# Patient Record
Sex: MALE | Employment: OTHER | URBAN - NONMETROPOLITAN AREA
[De-identification: names, ages, dates, MRNs, and addresses within clinical notes are randomized per-mention and may not be internally consistent; named-entity substitution may affect disease eponyms.]

---

## 2017-01-07 DIAGNOSIS — M1A.09X0 IDIOPATHIC CHRONIC GOUT OF MULTIPLE SITES WITHOUT TOPHUS: Primary | ICD-10-CM

## 2017-01-09 RX ORDER — ALLOPURINOL 300 MG/1
TABLET ORAL
Qty: 90 TABLET | Refills: 1 | Status: SHIPPED | OUTPATIENT
Start: 2017-01-09 | End: 2017-07-01

## 2017-01-17 ENCOUNTER — OFFICE VISIT (OUTPATIENT)
Dept: FAMILY MEDICINE | Facility: OTHER | Age: 77
End: 2017-01-17

## 2017-01-17 VITALS
SYSTOLIC BLOOD PRESSURE: 124 MMHG | OXYGEN SATURATION: 96 % | HEART RATE: 68 BPM | RESPIRATION RATE: 18 BRPM | TEMPERATURE: 97.1 F | DIASTOLIC BLOOD PRESSURE: 68 MMHG | WEIGHT: 225.7 LBS | BODY MASS INDEX: 35.43 KG/M2 | HEIGHT: 67 IN

## 2017-01-17 DIAGNOSIS — Z02.89 HEALTH EXAMINATION OF DEFINED SUBPOPULATION: Primary | ICD-10-CM

## 2017-01-17 PROCEDURE — 99499 UNLISTED E&M SERVICE: CPT | Performed by: FAMILY MEDICINE

## 2017-01-17 ASSESSMENT — PAIN SCALES - GENERAL: PAINLEVEL: NO PAIN (0)

## 2017-01-17 NOTE — NURSING NOTE
"Chief Complaint   Patient presents with     Forms     [employer] D[employer] Screening        Initial /68 mmHg  Pulse 68  Temp(Src) 97.1  F (36.2  C) (Temporal)  Resp 18  Ht 5' 7\" (1.702 m)  Wt 225 lb 11.2 oz (102.377 kg)  BMI 35.34 kg/m2  SpO2 96% Estimated body mass index is 35.34 kg/(m^2) as calculated from the following:    Height as of this encounter: 5' 7\" (1.702 m).    Weight as of this encounter: 225 lb 11.2 oz (102.377 kg).  BP completed using cuff size: large    "

## 2017-01-17 NOTE — PROGRESS NOTES
"SUBJECTIVE:                                                    Uli Champagne is a 76 year old male who presents to clinic today for the following health issues:    Chief Complaint   Patient presents with     Forms     [employer] Drivers Screening          Problem list and histories reviewed & adjusted, as indicated.    C: NEGATIVE for fever, chills, change in weight  E/M: NEGATIVE for ear, mouth and throat problems  R: NEGATIVE for significant cough or SOB  CV: NEGATIVE for chest pain, palpitations or peripheral edema    OBJECTIVE:                                                    /68 mmHg  Pulse 68  Temp(Src) 97.1  F (36.2  C) (Temporal)  Resp 18  Ht 5' 7\" (1.702 m)  Wt 225 lb 11.2 oz (102.377 kg)  BMI 35.34 kg/m2  SpO2 96%  Body mass index is 35.34 kg/(m^2).    See dictated note     ASSESSMENT/PLAN:                                                    [employer]  Driving form completed    Jakub Chester MD, MD  [facility]          "

## 2017-01-17 NOTE — MR AVS SNAPSHOT
After Visit Summary   1/17/2017    Uli Champagne    MRN: [id number]           Patient Information     Date Of Birth          1940        Visit Information        Provider Department      1/17/2017 2:00 PM Jakub Chester MD [facility]         Follow-ups after your visit        Your next 10 appointments already scheduled     Jan 20, 2017  1:00 PM   Anticoagulation Visit with [facility]   [facility]    [address] [facility] [id number] [phone]              Who to contact     If you have questions or need follow up information about today's clinic visit or your schedule please contact [facility] directly at [phone].  Normal or non-critical lab and imaging results will be communicated to you by [employer], letter or phone within 4 business days after the clinic has received the results. If you do not hear from us within 7 days, please contact the clinic through [employer] or phone. If you have a critical or abnormal lab result, we will notify you by phone as soon as possible.  Submit refill requests through [employer] or call your pharmacy and they will forward the refill request to us. Please allow 3 business days for your refill to be completed.          Additional Information About Your Visit        MyChart Information     [employer] gives you secure access to your electronic health record. If you see a primary care provider, you can also send messages to your care team and make appointments. If you have questions, please call your primary care clinic.  If you do not have a primary care provider, please call [phone] and they will assist you.        Care EveryWhere ID     This is your Care EveryWhere ID. This could be used by other organizations to access your [facility] medical records  [id number]        Your Vitals Were     Pulse Temperature Respirations Height BMI (Body Mass Index) Pulse Oximetry    68 97.1  " F (36.2  C) (Temporal) 18 5' 7\" (1.702 m) 35.34 kg/m2 96%       Blood Pressure from Last 3 Encounters:   01/17/17 124/68   10/03/16 112/68   07/05/16 112/64    Weight from Last 3 Encounters:   01/17/17 225 lb 11.2 oz (102.377 kg)   10/03/16 225 lb 12.8 oz (102.422 kg)   07/20/16 223 lb (101.152 kg)              Today, you had the following     No orders found for display       Primary Care Provider Office Phone # Fax #    Jakub Chester [phone]       [facility] [address] [facility] [address]        Thank you!     Thank you for choosing [facility]  for your care. Our goal is always to provide you with excellent care. Hearing back from our patients is one way we can continue to improve our services. Please take a few minutes to complete the written survey that you may receive in the mail after your visit with us. Thank you!             Your Updated Medication List - Protect others around you: Learn how to safely use, store and throw away your medicines at www.disposemymeds.org.          This list is accurate as of: 1/17/17  2:59 PM.  Always use your most recent med list.                   Brand Name Dispense Instructions for use    allopurinol 300 MG tablet    ZYLOPRIM    90 tablet    TAKE 1 TABLET DAILY FOR GOUT       aspirin 81 MG tablet      1 TABLET DAILY       Benazepril-Hydrochlorothiazide 20-25 MG Tabs     180 tablet    TAKE 1 TABLET TWICE A DAY FOR HYPERTENSION       fish oil-omega-3 fatty acids 1000 MG capsule      Take 1 g by mouth daily.       nitroglycerin 0.4 MG sublingual tablet    NITROSTAT    25 tablet    Place 1 tablet (0.4 mg) under the tongue See Admin Instructions for chest pain       simvastatin 40 MG tablet    ZOCOR    90 tablet    Take 1 tablet (40 mg) by mouth At Bedtime       warfarin 5 MG tablet    COUMADIN    90 tablet    Take 1 tablet (5 mg) by mouth See Admin Instructions Take one half tablet on Tuesday & Saturday and one " tablet all other days of the week or as directed by the coumadin clinic

## 2017-01-20 ENCOUNTER — ANTICOAGULATION THERAPY VISIT (OUTPATIENT)
Dept: ANTICOAGULATION | Facility: OTHER | Age: 77
End: 2017-01-20
Payer: COMMERCIAL

## 2017-01-20 DIAGNOSIS — I48.20 CHRONIC A-FIB (H): ICD-10-CM

## 2017-01-20 DIAGNOSIS — Z79.01 LONG-TERM (CURRENT) USE OF ANTICOAGULANTS: Primary | ICD-10-CM

## 2017-01-20 LAB — INR POINT OF CARE: 2.3 (ref 0.86–1.14)

## 2017-01-20 PROCEDURE — 99207 ZZC NO CHARGE NURSE ONLY: CPT

## 2017-01-20 PROCEDURE — 85610 PROTHROMBIN TIME: CPT | Mod: QW

## 2017-01-20 PROCEDURE — 36416 COLLJ CAPILLARY BLOOD SPEC: CPT

## 2017-01-20 NOTE — PROGRESS NOTES
ANTICOAGULATION FOLLOW-UP CLINIC VISIT    Patient Name:  Uli Champagne  Date:  1/20/2017  Contact Type:  Face to Face    SUBJECTIVE:     Patient Findings     Positives No Problem Findings           OBJECTIVE    INR PROTIME   Date Value Ref Range Status   01/20/2017 2.3* 0.86 - 1.14 Final       ASSESSMENT / PLAN  INR assessment THER    Recheck INR In: 6 WEEKS    INR Location Clinic      Anticoagulation Summary as of 1/20/2017     INR goal 2.0-3.0   Selected INR 2.3 (1/20/2017)   Maintenance plan 2.5 mg (5 mg x 0.5) on Tue, Sat; 5 mg (5 mg x 1) all other days   Full instructions 2.5 mg on Tue, Sat; 5 mg all other days   Weekly total 30 mg   No change documented Rui Landers RN   Plan last modified Rui Landers RN (3/31/2016)   Next INR check 3/3/2017   Target end date     Indications   Long-term (current) use of anticoagulants [Z79.01] [Z79.01]  Chronic a-fib (H) [I48.2]         Anticoagulation Episode Summary     INR check location     Preferred lab     Send INR reminders to [facility]    Comments       Anticoagulation Care Providers     Provider Role Specialty Phone number    Jakub Chester MD Responsible Family Practice [phone]            See the Encounter Report to view Anticoagulation Flowsheet and Dosing Calendar (Go to Encounters tab in chart review, and find the Anticoagulation Therapy Visit)    Dosage adjustment made based on physician directed care plan.    Rui Landers RN

## 2017-03-03 ENCOUNTER — ANTICOAGULATION THERAPY VISIT (OUTPATIENT)
Dept: ANTICOAGULATION | Facility: OTHER | Age: 77
End: 2017-03-03
Payer: COMMERCIAL

## 2017-03-03 DIAGNOSIS — Z79.01 LONG-TERM (CURRENT) USE OF ANTICOAGULANTS: ICD-10-CM

## 2017-03-03 DIAGNOSIS — I48.20 CHRONIC A-FIB (H): ICD-10-CM

## 2017-03-03 LAB — INR POINT OF CARE: 2.5 (ref 0.86–1.14)

## 2017-03-03 PROCEDURE — 85610 PROTHROMBIN TIME: CPT | Mod: QW

## 2017-03-03 PROCEDURE — 99207 ZZC NO CHARGE NURSE ONLY: CPT

## 2017-03-03 PROCEDURE — 36416 COLLJ CAPILLARY BLOOD SPEC: CPT

## 2017-03-03 NOTE — PROGRESS NOTES
ANTICOAGULATION FOLLOW-UP CLINIC VISIT    Patient Name:  Uli Champagne  Date:  3/3/2017  Contact Type:  Face to Face    SUBJECTIVE:     Patient Findings     Positives No Problem Findings           OBJECTIVE    INR Protime   Date Value Ref Range Status   03/03/2017 2.5 (A) 0.86 - 1.14 Final       ASSESSMENT / PLAN  INR assessment THER    Recheck INR In: 6 WEEKS    INR Location Clinic      Anticoagulation Summary as of 3/3/2017     INR goal 2.0-3.0   Today's INR 2.5   Maintenance plan 2.5 mg (5 mg x 0.5) on Tue, Sat; 5 mg (5 mg x 1) all other days   Full instructions 2.5 mg on Tue, Sat; 5 mg all other days   Weekly total 30 mg   No change documented Rui Landers RN   Plan last modified Rui Landers RN (3/31/2016)   Next INR check 4/14/2017   Target end date     Indications   Long-term (current) use of anticoagulants [Z79.01] [Z79.01]  Chronic a-fib (H) [I48.2]         Anticoagulation Episode Summary     INR check location     Preferred lab     Send INR reminders to [facility]    Comments       Anticoagulation Care Providers     Provider Role Specialty Phone number    Jakub Chester MD [facility] [phone]            See the Encounter Report to view Anticoagulation Flowsheet and Dosing Calendar (Go to Encounters tab in chart review, and find the Anticoagulation Therapy Visit)    Dosage adjustment made based on physician directed care plan.    Rui Landers RN

## 2017-03-03 NOTE — MR AVS SNAPSHOT
Uli SINGH Ihsan   3/3/2017 1:00 PM   Anticoagulation Therapy Visit    Description:  76 year old male   Provider:  JITENDRA ANTI COAG   Department:  [facility]           INR as of 3/3/2017     Today's INR 2.5      Anticoagulation Summary as of 3/3/2017     INR goal 2.0-3.0   Today's INR 2.5   Full instructions 2.5 mg on Tue, Sat; 5 mg all other days   Next INR check 4/14/2017    Indications   Long-term (current) use of anticoagulants [Z79.01] [Z79.01]  Chronic a-fib (H) [I48.2]         Your next Anticoagulation Clinic appointment(s)     Apr 14, 2017  1:00 PM CDT   Anticoagulation Visit with JITENDRA ANTI ROCKY   [facility] ([facility])    [address] [facility] [id number] [phone]              Contact Numbers     Clinic Number:         March 2017 Details    Sun Mon Tue Wed Thu Fri Sat        1               2               3      5 mg   See details      4      2.5 mg           5      5 mg         6      5 mg         7      2.5 mg         8      5 mg         9      5 mg         10      5 mg         11      2.5 mg           12      5 mg         13      5 mg         14      2.5 mg         15      5 mg         16      5 mg         17      5 mg         18      2.5 mg           19      5 mg         20      5 mg         21      2.5 mg         22      5 mg         23      5 mg         24      5 mg         25      2.5 mg           26      5 mg         27      5 mg         28      2.5 mg         29      5 mg         30      5 mg         31      5 mg           Date Details   03/03 This INR check               How to take your warfarin dose     To take:  2.5 mg Take 0.5 of a 5 mg tablet.    To take:  5 mg Take 1 of the 5 mg tablets.           April 2017 Details    Sun Mon Tue Wed Thu Fri Sat           1      2.5 mg           2      5 mg         3      5 mg         4      2.5 mg         5      5 mg         6      5 mg         7      5 mg         8      2.5 mg           9      5 mg         10       5 mg         11      2.5 mg         12      5 mg         13      5 mg         14            15                 16               17               18               19               20               21               22                 23               24               25               26               27               28               29                 30                      Date Details   No additional details    Date of next INR:  4/14/2017         How to take your warfarin dose     To take:  2.5 mg Take 0.5 of a 5 mg tablet.    To take:  5 mg Take 1 of the 5 mg tablets.

## 2017-03-25 DIAGNOSIS — I48.21 PERMANENT ATRIAL FIBRILLATION (H): Chronic | ICD-10-CM

## 2017-03-25 DIAGNOSIS — I10 BENIGN ESSENTIAL HYPERTENSION: ICD-10-CM

## 2017-03-27 RX ORDER — WARFARIN SODIUM 5 MG/1
TABLET ORAL
Qty: 90 TABLET | Refills: 0 | Status: SHIPPED | OUTPATIENT
Start: 2017-03-27 | End: 2017-07-01

## 2017-03-27 RX ORDER — BENAZEPRIL/HYDROCHLOROTHIAZIDE 20 MG-25MG
TABLET ORAL
Qty: 180 TABLET | Refills: 0 | Status: SHIPPED | OUTPATIENT
Start: 2017-03-27 | End: 2017-07-01

## 2017-03-27 NOTE — TELEPHONE ENCOUNTER
Recent Labs   Lab Test 05/10/16 05/20/15  12/16/14   0921   11/12/13   0758   CHOL  121  108*  108*  122   < >  124   HDL  43  35  35  48   < >  35*   LDL  57.0  49.0  49  50   < >  61   TRIG  104  120  120  119   < >  137   CHOLHDLRATIO   --    --   2.5   --   3.0    < > = values in this interval not displayed.     Lab Results   Component Value Date    AST 33 05/10/2016     Lab Results   Component Value Date    ALT 28 05/10/2016     No results found for: A1C  Creatinine   Date Value Ref Range Status   05/10/2016 1.0 mg/dL Final   ]  Potassium   Date Value Ref Range Status   05/10/2016 3.9 mmol/L Final   ]  TSH   Date Value Ref Range Status   05/20/2015 2.50 mcU/mL Final   01/31/2007 2.45 0.4 - 5.0 mU/L Final   11/13/2002 2.58 0.4 - 5.5 mcU/mL    09/14/2001 1.93 0.4 - 5.5 mcU/mL      BP Readings from Last 4 Encounters:   01/17/17 124/68   10/03/16 112/68   07/05/16 112/64   02/01/16 124/70       Last PHQ-9 score on record= No Value exists for the : HP#[id number]

## 2017-04-14 ENCOUNTER — ANTICOAGULATION THERAPY VISIT (OUTPATIENT)
Dept: ANTICOAGULATION | Facility: OTHER | Age: 77
End: 2017-04-14
Payer: COMMERCIAL

## 2017-04-14 DIAGNOSIS — Z79.01 LONG-TERM (CURRENT) USE OF ANTICOAGULANTS: ICD-10-CM

## 2017-04-14 DIAGNOSIS — I48.20 CHRONIC A-FIB (H): ICD-10-CM

## 2017-04-14 LAB — INR POINT OF CARE: 2.1 (ref 0.86–1.14)

## 2017-04-14 PROCEDURE — 36416 COLLJ CAPILLARY BLOOD SPEC: CPT

## 2017-04-14 PROCEDURE — 99207 ZZC NO CHARGE NURSE ONLY: CPT

## 2017-04-14 PROCEDURE — 85610 PROTHROMBIN TIME: CPT | Mod: QW

## 2017-04-14 NOTE — MR AVS SNAPSHOT
Uli SINGH Ihsan   4/14/2017 1:00 PM   Anticoagulation Therapy Visit    Description:  76 year old male   Provider:  JITENDRA ANTI COAG   Department:  [facility]           INR as of 4/14/2017     Today's INR 2.1      Anticoagulation Summary as of 4/14/2017     INR goal 2.0-3.0   Today's INR 2.1   Full instructions 2.5 mg on Tue, Sat; 5 mg all other days   Next INR check 5/25/2017    Indications   Long-term (current) use of anticoagulants [Z79.01] [Z79.01]  Chronic a-fib (H) [I48.2]         Your next Anticoagulation Clinic appointment(s)     May 25, 2017  1:15 PM CDT   Anticoagulation Visit with J[facility]ENDRA ANTI ROCKY   [facility]    [address] [facility] [id number] [phone]              Contact Numbers     Clinic Number:         April 2017 Details    Sun Mon Tue Wed Thu Fri Sat           1                 2               3               4               5               6               7               8                 9               10               11               12               13               14      5 mg   See details      15      2.5 mg           16      5 mg         17      5 mg         18      2.5 mg         19      5 mg         20      5 mg         21      5 mg         22      2.5 mg           23      5 mg         24      5 mg         25      2.5 mg         26      5 mg         27      5 mg         28      5 mg         29      2.5 mg           30      5 mg                Date Details   04/14 This INR check               How to take your warfarin dose     To take:  2.5 mg Take 0.5 of a 5 mg tablet.    To take:  5 mg Take 1 of the 5 mg tablets.           May 2017 Details    Sun Mon Tue Wed Thu Fri Sat      1      5 mg         2      2.5 mg         3      5 mg         4      5 mg         5      5 mg         6      2.5 mg           7      5 mg         8      5 mg         9      2.5 mg         10      5 mg         11      5 mg         12      5 mg         13      2.5  mg           14      5 mg         15      5 mg         16      2.5 mg         17      5 mg         18      5 mg         19      5 mg         20      2.5 mg           21      5 mg         22      5 mg         23      2.5 mg         24      5 mg         25            26               27                 28               29               30               31                   Date Details   No additional details    Date of next INR:  5/25/2017         How to take your warfarin dose     To take:  2.5 mg Take 0.5 of a 5 mg tablet.    To take:  5 mg Take 1 of the 5 mg tablets.

## 2017-04-14 NOTE — PROGRESS NOTES
ANTICOAGULATION FOLLOW-UP CLINIC VISIT    Patient Name:  Uli Champagne  Date:  4/14/2017  Contact Type:  Face to Face    SUBJECTIVE:     Patient Findings     Positives No Problem Findings           OBJECTIVE    INR Protime   Date Value Ref Range Status   04/14/2017 2.1 (A) 0.86 - 1.14 Final       ASSESSMENT / PLAN  INR assessment THER    Recheck INR In: 6 WEEKS    INR Location Clinic      Anticoagulation Summary as of 4/14/2017     INR goal 2.0-3.0   Today's INR 2.1   Maintenance plan 2.5 mg (5 mg x 0.5) on Tue, Sat; 5 mg (5 mg x 1) all other days   Full instructions 2.5 mg on Tue, Sat; 5 mg all other days   Weekly total 30 mg   No change documented Rui Landers RN   Plan last modified Rui Landers RN (3/31/2016)   Next INR check 5/25/2017   Target end date     Indications   Long-term (current) use of anticoagulants [Z79.01] [Z79.01]  Chronic a-fib (H) [I48.2]         Anticoagulation Episode Summary     INR check location     Preferred lab     Send INR reminders to [facility]    Comments       Anticoagulation Care Providers     Provider Role Specialty Phone number    Jakub Chester MD [facility] [phone]            See the Encounter Report to view Anticoagulation Flowsheet and Dosing Calendar (Go to Encounters tab in chart review, and find the Anticoagulation Therapy Visit)    Dosage adjustment made based on physician directed care plan.    Rui Landers RN

## 2017-04-20 ENCOUNTER — HOSPITAL ENCOUNTER (OUTPATIENT)
Dept: CARDIOLOGY | Facility: CLINIC | Age: 77
Discharge: HOME | End: 2017-04-20
Attending: INTERNAL MEDICINE | Admitting: INTERNAL MEDICINE
Payer: COMMERCIAL

## 2017-04-20 ENCOUNTER — OFFICE VISIT (OUTPATIENT)
Dept: CARDIOLOGY | Facility: CLINIC | Age: 77
End: 2017-04-20
Payer: COMMERCIAL

## 2017-04-20 VITALS
HEART RATE: 46 BPM | WEIGHT: 227.3 LBS | HEIGHT: 69 IN | DIASTOLIC BLOOD PRESSURE: 68 MMHG | SYSTOLIC BLOOD PRESSURE: 130 MMHG | BODY MASS INDEX: 33.67 KG/M2 | OXYGEN SATURATION: 96 %

## 2017-04-20 DIAGNOSIS — R00.1 BRADYCARDIA: Primary | ICD-10-CM

## 2017-04-20 DIAGNOSIS — I48.20 CHRONIC A-FIB (H): ICD-10-CM

## 2017-04-20 DIAGNOSIS — E78.5 HYPERLIPIDEMIA LDL GOAL <100: ICD-10-CM

## 2017-04-20 DIAGNOSIS — E66.09 NON MORBID OBESITY DUE TO EXCESS CALORIES: ICD-10-CM

## 2017-04-20 DIAGNOSIS — I10 ESSENTIAL HYPERTENSION, BENIGN: ICD-10-CM

## 2017-04-20 PROCEDURE — 93005 ELECTROCARDIOGRAM TRACING: CPT

## 2017-04-20 PROCEDURE — 99214 OFFICE O/P EST MOD 30 MIN: CPT | Mod: 25 | Performed by: INTERNAL MEDICINE

## 2017-04-20 PROCEDURE — 93000 ELECTROCARDIOGRAM COMPLETE: CPT | Performed by: INTERNAL MEDICINE

## 2017-04-20 NOTE — LETTER
4/20/2017    Jakub Chester MD  [facility]   [address] [facility] [address]    RE: Uli Champagne       Dear Colleague,    I had the pleasure of seeing Uli Champagne in the [facility].    Quick note on Uli Champagne who is 76.  He has chronic atrial fibrillation and significant AV conduction system dysfunction.  He has a slow ventricular response, but he has not had anything to suggest progressive limitations as best I can tell based on his heart rate.  He has not had dizziness or syncope.  He does not feel as if his breathing slows him down at all.  He is slowed down more by orthopedic issues and age and his being overweight.  He says he is able to carry groceries and do things that are not overly demanding without a sense of limitation.      A 24-hour Holter monitor done as a surveillance study revealed an average heart rate of 50 beats a minute, a lower rate of 30 during the hours of sleep and an upper rate of 70.  Pretty significant, but once again he seems to be tolerating it.  There were plenty of pauses greater than 2 seconds.  Typically, it has to be over 3 seconds to get the body's and the brain's attention.      Accordingly, he should not ever be on bradycardic drugs unless he gets a pacemaker and he understands that.      His current medicines as listed in Epic are allopurinol, benazepril, hydrochlorothiazide, simvastatin, warfarin, aspirin, fish oil.      REVIEW OF SYSTEMS:  As is identified in Epic was reviewed.  In general, he has vision and hearing and some hearing loss.  He is short of breath on exertion if he really pushes himself, but it has not changed.  Negative for dizziness or syncope.  No chest pain.  Upper GI:  Negative.  Lower GI:  Negative.  :  Positive for some urinary incontinence. Musculoskeletal:  Positive for chronic back pain.  He walks with a cane.  The remainder of the review as listed and I endorse as  negative.      SOCIAL HISTORY:  He lives in a house.  He drives a car.  He takes care of his yard a little bit.  He does not drink or smoke.  He has a couple of grown children.  He is retired.      PHYSICAL EXAMINATION:     GENERAL:  Revealed that he walks slowly with a cane.   VITAL SIGNS:  Blood pressure 130/70, heart rate 45-50 beats a minute.  He weighs 227 pounds, which is relatively stable for him.   HEAD:  Normal.   NECK:  Free of neck vein distention or bruit.   HEART:  Slow and irregular without gallop or murmur.   LUNGS:  Clear.   ABDOMEN:  Rotund, firm, nontender without pain or mass.   EXTREMITIES:  Free of edema.      I reviewed the issues with him with regards to his bradycardia and AV conduction and I explained how the system works.  He is a smart man and he gets it.  His blood pressure is relatively normal.  He is not symptomatic with his bradycardia.  His lipid profile on simvastatin is excellent, but I have not particularly been following that per se.     Outpatient Encounter Prescriptions as of 4/20/2017   Medication Sig Dispense Refill     warfarin (COUMADIN) 5 MG tablet TAKE ONE-HALF (1/2) TABLET ON TUESDAY AND SATURDAY AND 1 TABLET ON ALL OTHER DAYS OF THE WEEK OR AS DIRECTED BY THE COUMADIN CLINIC 90 tablet 0     Benazepril-Hydrochlorothiazide 20-25 MG TABS TAKE 1 TABLET TWICE A DAY FOR HYPERTENSION 180 tablet 0     allopurinol (ZYLOPRIM) 300 MG tablet TAKE 1 TABLET DAILY FOR GOUT 90 tablet 1     simvastatin (ZOCOR) 40 MG tablet Take 1 tablet (40 mg) by mouth At Bedtime 90 tablet 3     fish oil-omega-3 fatty acids (FISH OIL) 1000 MG capsule Take 1 g by mouth daily.       ASPIRIN 81 MG OR TABS 1 TABLET DAILY       nitroglycerin (NITROSTAT) 0.4 MG SL tablet Place 1 tablet (0.4 mg) under the tongue See Admin Instructions for chest pain 25 tablet 1     No facility-administered encounter medications on file as of 4/20/2017.       IMPRESSION:   1.  Chronic AV conduction system dysfunction.     2.   Moderate bradycardia bordering on significant, but asymptomatic so far.   3.  Obesity.   4.  Hypertension.   5.  Hyperlipidemia.      PLAN:  Continue as we are.  I asked to see him in a year with a surveillance 24-hour Holter monitor.      PROGNOSIS:  Good.  I told him before he leaves this earth he will probably get a pacemaker.  He smiled and understood.        During this consult, I took at least 35 minutes.  Over half the time was in counseling and education, 80% of the time face-to-face.  He was fully examined.  Medications and some indications and side effects were all reviewed.  Various studies were reviewed including previous nuclear stress test in 2009, EKGs, stress tests, etc.     Again, thank you for allowing me to participate in the care of your patient.      Sincerely,    JIGNA NEWBY MD     [facility]

## 2017-04-20 NOTE — PROGRESS NOTES
HPI and Plan:   See dictation        Orders Placed This Encounter   Procedures     EKG 12-LEAD CLINIC READ     No orders of the defined types were placed in this encounter.    There are no discontinued medications.      Encounter Diagnosis   Name Primary?     Bradycardia Yes       CURRENT MEDICATIONS:  Current Outpatient Prescriptions   Medication Sig Dispense Refill     warfarin (COUMADIN) 5 MG tablet TAKE ONE-HALF (1/2) TABLET ON TUESDAY AND SATURDAY AND 1 TABLET ON ALL OTHER DAYS OF THE WEEK OR AS DIRECTED BY THE COUMADIN CLINIC 90 tablet 0     Benazepril-Hydrochlorothiazide 20-25 MG TABS TAKE 1 TABLET TWICE A DAY FOR HYPERTENSION 180 tablet 0     allopurinol (ZYLOPRIM) 300 MG tablet TAKE 1 TABLET DAILY FOR GOUT 90 tablet 1     simvastatin (ZOCOR) 40 MG tablet Take 1 tablet (40 mg) by mouth At Bedtime 90 tablet 3     fish oil-omega-3 fatty acids (FISH OIL) 1000 MG capsule Take 1 g by mouth daily.       ASPIRIN 81 MG OR TABS 1 TABLET DAILY       nitroglycerin (NITROSTAT) 0.4 MG SL tablet Place 1 tablet (0.4 mg) under the tongue See Admin Instructions for chest pain (Patient not taking: Reported on 4/20/2017) 25 tablet 1       ALLERGIES     Allergies   Allergen Reactions     No Known Drug Allergies        PAST MEDICAL HISTORY:  Past Medical History:   Diagnosis Date     Atrial fibrillation (H)      Coronary atherosclerosis of unspecified type of vessel, native or graft      Hernia of unspecified site of abdominal cavity without mention of obstruction or gangrene     umbilical hernia; s/p repair 3/1998     Hordeolum externum left upper eyelid 1/21/2016     Malignant neoplasm of prostate (H) 07/14/08    Admit.Discharged 07/17/08     Other and unspecified hyperlipidemia      Postsurgical aortocoronary bypass status 1992     Respiratory complications      Unspecified essential hypertension      Unspecified tinnitus     left ear       PAST SURGICAL HISTORY:  Past Surgical History:   Procedure Laterality Date     C CABG,  VEIN, THREE  1992     C NONSPECIFIC PROCEDURE  1987    Bone fusion in neck     C REMV PROSTATE,RETROPUB,RAD,LTD NODES  7/14/2008    Radical retropubic prostatectomy and pelvic lymph node dissection.     HC REMOVAL OF TONSILS,10+ Y/O  1969    Tonsils 10+y.o.     HEMILAMINECTOMY, DISCECTOMY LUMBAR TWO LEVELS, COMBINED Left 12/16/2015    Procedure: COMBINED HEMILAMINECTOMY, DISCECTOMY LUMBAR TWO LEVELS;  Surgeon: Jung Finley MD;  Location: [facility]     STENT, CORONARY, ALEAH  2002    x5       FAMILY HISTORY:  Family History   Problem Relation Age of Onset     Arthritis Father      Hypertension Brother      CANCER Brother      liver     HEART DISEASE Father      HEART DISEASE Sister      HEART DISEASE Brother        SOCIAL HISTORY:  Social History     Social History     Marital status:      Spouse name: N/A     Number of children: N/A     Years of education: N/A     Social History Main Topics     Smoking status: Former Smoker     Packs/day: 0.50     Years: 2.00     Types: Cigarettes     Quit date: 1/1/1960     Smokeless tobacco: Never Used     Alcohol use Yes      Comment: socially     Drug use: No     Sexual activity: No     Other Topics Concern     Parent/Sibling W/ Cabg, Mi Or Angioplasty Before 65f 55m? No     Social History Narrative         Review of Systems:  Skin:  Negative       Eyes:  Positive for glasses    ENT:  Positive for tinnitus left ear, has had this long term.  Respiratory:  Positive for dyspnea on exertion;shortness of breath     Cardiovascular:  Negative for;chest pain;edema Positive for;palpitations;lightheadedness    Gastroenterology: Negative      Genitourinary:  Positive for incontinence nuerogenic bladder  Musculoskeletal:  Positive for back pain lower back pain   Neurologic:  Negative      Psychiatric:  Negative      Heme/Lymph/Imm:  Negative      Endocrine:  Negative        Physical Exam:  Vitals: /68 (BP Location: Left arm, Patient Position: Fowlers, Cuff Size: Adult Large)  " Pulse (!) 46  Ht 1.753 m (5' 9\")  Wt 103.1 kg (227 lb 4.8 oz)  SpO2 96%  BMI 33.57 kg/m2    Constitutional:  cooperative, alert and oriented, well developed, well nourished, in no acute distress overweight;obese      Skin:  warm and dry to the touch        Head:  normocephalic        Eyes:  pupils equal and round;sclera white        ENT:           Neck:  carotid pulses are full and equal bilaterally        Chest:  clear to auscultation          Cardiac: no murmurs, gallops or rubs detected;no S3 or S4 irregularly irregular rhythm;bradycardic                Abdomen:  abdomen soft;no masses;non-tender obese      Vascular: pulses full and equal                                        Extremities and Back:  no edema;no deformities, clubbing, cyanosis, erythema observed              Neurological:  affect appropriate, oriented to time, person and place;no gross motor deficits          Recent Lab Results:  LIPID RESULTS:  Lab Results   Component Value Date    CHOL 121 05/10/2016    HDL 43 05/10/2016    LDL 57.0 05/10/2016    TRIG 104 05/10/2016    CHOLHDLRATIO 2.5 12/16/2014       LIVER ENZYME RESULTS:  Lab Results   Component Value Date    AST 33 05/10/2016    ALT 28 05/10/2016       CBC RESULTS:  Lab Results   Component Value Date    WBC 7.6 11/27/2015    RBC 5.23 11/27/2015    HGB 14.7 12/29/2015    HCT 47.3 11/27/2015    MCV 90 11/27/2015    MCH 31.9 11/27/2015    MCHC 35.3 11/27/2015    RDW 13.7 11/27/2015     11/27/2015       BMP RESULTS:  Lab Results   Component Value Date     12/29/2015    POTASSIUM 3.9 05/10/2016    CHLORIDE 104 12/29/2015    CO2 31 12/29/2015    ANIONGAP 5 12/29/2015     (A) 05/10/2016    BUN 18 12/29/2015    CR 1.0 05/10/2016    GFRESTIMATED >60 05/10/2016    GFRESTBLACK >90   GFR Calc   12/29/2015    ANKIT 8.6 12/29/2015        A1C RESULTS:  No results found for: A1C    INR RESULTS:  Lab Results   Component Value Date    INR 2.1 (A) 04/14/2017    INR 2.5 " (A) 03/03/2017    INR 1.99 (H) 01/07/2016    INR 1.55 (H) 12/31/2015           CC  No referring provider defined for this encounter.

## 2017-04-20 NOTE — LETTER
4/20/2017      RE: Uli Champagne  [address] [facility] [address]       Dear Colleague,    Thank you for the opportunity to participate in the care of your patient, Uli Champagne, at the [facility]. Please see a copy of my visit note below.    HPI and Plan:   See dictation        Orders Placed This Encounter   Procedures     EKG 12-LEAD CLINIC READ     No orders of the defined types were placed in this encounter.    There are no discontinued medications.      Encounter Diagnosis   Name Primary?     Bradycardia Yes       CURRENT MEDICATIONS:  Current Outpatient Prescriptions   Medication Sig Dispense Refill     warfarin (COUMADIN) 5 MG tablet TAKE ONE-HALF (1/2) TABLET ON TUESDAY AND SATURDAY AND 1 TABLET ON ALL OTHER DAYS OF THE WEEK OR AS DIRECTED BY THE COUMADIN CLINIC 90 tablet 0     Benazepril-Hydrochlorothiazide 20-25 MG TABS TAKE 1 TABLET TWICE A DAY FOR HYPERTENSION 180 tablet 0     allopurinol (ZYLOPRIM) 300 MG tablet TAKE 1 TABLET DAILY FOR GOUT 90 tablet 1     simvastatin (ZOCOR) 40 MG tablet Take 1 tablet (40 mg) by mouth At Bedtime 90 tablet 3     fish oil-omega-3 fatty acids (FISH OIL) 1000 MG capsule Take 1 g by mouth daily.       ASPIRIN 81 MG OR TABS 1 TABLET DAILY       nitroglycerin (NITROSTAT) 0.4 MG SL tablet Place 1 tablet (0.4 mg) under the tongue See Admin Instructions for chest pain (Patient not taking: Reported on 4/20/2017) 25 tablet 1       ALLERGIES     Allergies   Allergen Reactions     No Known Drug Allergies        PAST MEDICAL HISTORY:  Past Medical History:   Diagnosis Date     Atrial fibrillation (H)      Coronary atherosclerosis of unspecified type of vessel, native or graft      Hernia of unspecified site of abdominal cavity without mention of obstruction or gangrene     umbilical hernia; s/p repair 3/1998     Hordeolum externum left upper eyelid 1/21/2016     Malignant neoplasm of prostate (H)  07/14/08    Admit.Discharged 07/17/08     Other and unspecified hyperlipidemia      Postsurgical aortocoronary bypass status 1992     Respiratory complications      Unspecified essential hypertension      Unspecified tinnitus     left ear       PAST SURGICAL HISTORY:  Past Surgical History:   Procedure Laterality Date     C CABG, VEIN, THREE  1992     C NONSPECIFIC PROCEDURE  1987    Bone fusion in neck     C REMV PROSTATE,RETROPUB,RAD,LTD NODES  7/14/2008    Radical retropubic prostatectomy and pelvic lymph node dissection.     HC REMOVAL OF TONSILS,12+ Y/O  1969    Tonsils 10+y.o.     HEMILAMINECTOMY, DISCECTOMY LUMBAR TWO LEVELS, COMBINED Left 12/16/2015    Procedure: COMBINED HEMILAMINECTOMY, DISCECTOMY LUMBAR TWO LEVELS;  Surgeon: Jung Finley MD;  Location: [facility]     STENT, CORONARY, ALEAH  2002    x5       FAMILY HISTORY:  Family History   Problem Relation Age of Onset     Arthritis Father      Hypertension Brother      CANCER Brother      liver     HEART DISEASE Father      HEART DISEASE Sister      HEART DISEASE Brother        SOCIAL HISTORY:  Social History     Social History     Marital status:      Spouse name: N/A     Number of children: N/A     Years of education: N/A     Social History Main Topics     Smoking status: Former Smoker     Packs/day: 0.50     Years: 2.00     Types: Cigarettes     Quit date: 1/1/1960     Smokeless tobacco: Never Used     Alcohol use Yes      Comment: socially     Drug use: No     Sexual activity: No     Other Topics Concern     Parent/Sibling W/ Cabg, Mi Or Angioplasty Before 65f 55m? No     Social History Narrative         Review of Systems:  Skin:  Negative       Eyes:  Positive for glasses    ENT:  Positive for tinnitus left ear, has had this long term.  Respiratory:  Positive for dyspnea on exertion;shortness of breath     Cardiovascular:  Negative for;chest pain;edema Positive for;palpitations;lightheadedness    Gastroenterology: Negative     "  Genitourinary:  Positive for incontinence nuerogenic bladder  Musculoskeletal:  Positive for back pain lower back pain   Neurologic:  Negative      Psychiatric:  Negative      Heme/Lymph/Imm:  Negative      Endocrine:  Negative        Physical Exam:  Vitals: /68 (BP Location: Left arm, Patient Position: Fowlers, Cuff Size: Adult Large)  Pulse (!) 46  Ht 1.753 m (5' 9\")  Wt 103.1 kg (227 lb 4.8 oz)  SpO2 96%  BMI 33.57 kg/m2    Constitutional:  cooperative, alert and oriented, well developed, well nourished, in no acute distress overweight;obese      Skin:  warm and dry to the touch        Head:  normocephalic        Eyes:  pupils equal and round;sclera white        ENT:           Neck:  carotid pulses are full and equal bilaterally        Chest:  clear to auscultation          Cardiac: no murmurs, gallops or rubs detected;no S3 or S4 irregularly irregular rhythm;bradycardic                Abdomen:  abdomen soft;no masses;non-tender obese      Vascular: pulses full and equal                                        Extremities and Back:  no edema;no deformities, clubbing, cyanosis, erythema observed              Neurological:  affect appropriate, oriented to time, person and place;no gross motor deficits          Recent Lab Results:  LIPID RESULTS:  Lab Results   Component Value Date    CHOL 121 05/10/2016    HDL 43 05/10/2016    LDL 57.0 05/10/2016    TRIG 104 05/10/2016    CHOLHDLRATIO 2.5 12/16/2014       LIVER ENZYME RESULTS:  Lab Results   Component Value Date    AST 33 05/10/2016    ALT 28 05/10/2016       CBC RESULTS:  Lab Results   Component Value Date    WBC 7.6 11/27/2015    RBC 5.23 11/27/2015    HGB 14.7 12/29/2015    HCT 47.3 11/27/2015    MCV 90 11/27/2015    MCH 31.9 11/27/2015    MCHC 35.3 11/27/2015    RDW 13.7 11/27/2015     11/27/2015       BMP RESULTS:  Lab Results   Component Value Date     12/29/2015    POTASSIUM 3.9 05/10/2016    CHLORIDE 104 12/29/2015    CO2 31 " 12/29/2015    ANIONGAP 5 12/29/2015     (A) 05/10/2016    BUN 18 12/29/2015    CR 1.0 05/10/2016    GFRESTIMATED >60 05/10/2016    GFRESTBLACK >90   GFR Calc   12/29/2015    ANKIT 8.6 12/29/2015        A1C RESULTS:  No results found for: A1C    INR RESULTS:  Lab Results   Component Value Date    INR 2.1 (A) 04/14/2017    INR 2.5 (A) 03/03/2017    INR 1.99 (H) 01/07/2016    INR 1.55 (H) 12/31/2015         JIGNA NEWBY MD    CC  No referring provider defined for this encounter.

## 2017-04-20 NOTE — MR AVS SNAPSHOT
After Visit Summary   4/20/2017    Uli Champagne    MRN: [id number]           Patient Information     Date Of Birth          1940        Visit Information        Provider Department      4/20/2017 3:30 PM Yovani Amanda MD [facility]        Today's Diagnoses     Bradycardia    -  1    Non morbid obesity due to excess calories        Hyperlipidemia LDL goal <100        Chronic a-fib (H)        Essential hypertension, benign           Follow-ups after your visit        Additional Services     Follow-Up with Cardiologist                 Your next 10 appointments already scheduled     Apr 21, 2017  1:40 PM CDT   XR PELVIS AND HIP LEFT 2 VIEWS with [email or web address]   [address] [address]              Please bring a list of your current medicines to your exam. (Include vitamins, minerals and over-thecounter medicines.) Leave your valuables at home.  Tell your doctor if there is a chance you may be pregnant.  You do not need to do anything special for this exam.            Apr 21, 2017  1:50 PM CDT   Return Visit with Yovani Ogden DO   [facility]    [address] [place] [id number] [phone]            May 25, 2017  1:15 PM CDT   Anticoagulation Visit with JITENDRA ANTI COAG   [facility]    [address] [facility] [address] [phone]              Future tests that were ordered for you today     Open Future Orders        Priority Expected Expires Ordered    Holter Monitor 24 hour - Adult Routine 4/20/2018 5/25/2018 4/20/2017    Follow-Up with Cardiologist Routine 4/20/2018 9/2/2018 4/20/2017    XR Pelvis and Hip Left 2 Views Routine 4/21/2017 4/20/2018 4/19/2017            Who to contact     If you have questions or need follow up information about today's clinic visit or your schedule please  "contact [facility] directly at [phone].  Normal or non-critical lab and imaging results will be communicated to you by MyChart, letter or phone within 4 business days after the clinic has received the results. If you do not hear from us within 7 days, please contact the clinic through [employer] or phone. If you have a critical or abnormal lab result, we will notify you by phone as soon as possible.  Submit refill requests through [employer] or call your pharmacy and they will forward the refill request to us. Please allow 3 business days for your refill to be completed.          Additional Information About Your Visit        [employer] [facility] Information     [employer] gives you secure access to your electronic health record. If you see a primary care provider, you can also send messages to your care team and make appointments. If you have questions, please call your primary care clinic.  If you do not have a primary care provider, please call [phone] and they will assist you.        Care EveryWhere ID     This is your Care EveryWhere ID. This could be used by other organizations to access your [facility] medical records  [id number]        Your Vitals Were     Pulse Height Pulse Oximetry BMI (Body Mass Index)          46 1.753 m (5' 9\") 96% 33.57 kg/m2         Blood Pressure from Last 3 Encounters:   04/20/17 130/68   01/17/17 124/68   10/03/16 112/68    Weight from Last 3 Encounters:   04/20/17 103.1 kg (227 lb 4.8 oz)   01/17/17 102.4 kg (225 lb 11.2 oz)   10/03/16 102.4 kg (225 lb 12.8 oz)              We Performed the Following     EKG 12-LEAD CLINIC READ        Primary Care Provider Office Phone # Fax #    Jakub Chester [phone]       [facility] [address] [facility] [address]        Thank you!     Thank you for choosing [facility]  for your care. Our goal is always to provide you with excellent care. Hearing back from our patients is one " way we can continue to improve our services. Please take a few minutes to complete the written survey that you may receive in the mail after your visit with us. Thank you!             Your Updated Medication List - Protect others around you: Learn how to safely use, store and throw away your medicines at www.disposemymeds.org.          This list is accurate as of: 4/20/17  3:39 PM.  Always use your most recent med list.                   Brand Name Dispense Instructions for use    allopurinol 300 MG tablet    ZYLOPRIM    90 tablet    TAKE 1 TABLET DAILY FOR GOUT       aspirin 81 MG tablet      1 TABLET DAILY       Benazepril-Hydrochlorothiazide 20-25 MG Tabs     180 tablet    TAKE 1 TABLET TWICE A DAY FOR HYPERTENSION       fish oil-omega-3 fatty acids 1000 MG capsule      Take 1 g by mouth daily.       nitroglycerin 0.4 MG sublingual tablet    NITROSTAT    25 tablet    Place 1 tablet (0.4 mg) under the tongue See Admin Instructions for chest pain       simvastatin 40 MG tablet    ZOCOR    90 tablet    Take 1 tablet (40 mg) by mouth At Bedtime       warfarin 5 MG tablet    COUMADIN    90 tablet    TAKE ONE-HALF (1/2) TABLET ON TUESDAY AND SATURDAY AND 1 TABLET ON ALL OTHER DAYS OF THE WEEK OR AS DIRECTED BY THE COUMADIN CLINIC

## 2017-04-21 ENCOUNTER — OFFICE VISIT (OUTPATIENT)
Dept: ORTHOPEDICS | Facility: CLINIC | Age: 77
End: 2017-04-21
Payer: COMMERCIAL

## 2017-04-21 ENCOUNTER — RADIANT APPOINTMENT (OUTPATIENT)
Dept: GENERAL RADIOLOGY | Facility: CLINIC | Age: 77
End: 2017-04-21
Attending: ORTHOPAEDIC SURGERY
Payer: COMMERCIAL

## 2017-04-21 VITALS — BODY MASS INDEX: 35.05 KG/M2 | HEIGHT: 67 IN | TEMPERATURE: 97.5 F | WEIGHT: 223.3 LBS

## 2017-04-21 DIAGNOSIS — M25.552 HIP PAIN, LEFT: ICD-10-CM

## 2017-04-21 DIAGNOSIS — M16.12 PRIMARY OSTEOARTHRITIS OF LEFT HIP: Primary | ICD-10-CM

## 2017-04-21 PROCEDURE — 99213 OFFICE O/P EST LOW 20 MIN: CPT | Performed by: ORTHOPAEDIC SURGERY

## 2017-04-21 PROCEDURE — 73502 X-RAY EXAM HIP UNI 2-3 VIEWS: CPT | Mod: TC

## 2017-04-21 ASSESSMENT — PAIN SCALES - GENERAL: PAINLEVEL: NO PAIN (0)

## 2017-04-21 NOTE — PROGRESS NOTES
HISTORY OF PRESENT ILLNESS:  Quick note on Uli Champagne who is 76.  He has chronic atrial fibrillation and significant AV conduction system dysfunction.  He has a slow ventricular response, but he has not had anything to suggest progressive limitations as best I can tell based on his heart rate.  He has not had dizziness or syncope.  He does not feel as if his breathing slows him down at all.  He is slowed down more by orthopedic issues and age and his being overweight.  He says he is able to carry groceries and do things that are not overly demanding without a sense of limitation.      A 24-hour Holter monitor done as a surveillance study revealed an average heart rate of 50 beats a minute, a lower rate of 30 during the hours of sleep and an upper rate of 70.  Pretty significant, but once again he seems to be tolerating it.  There were plenty of pauses greater than 2 seconds.  Typically, it has to be over 3 seconds to get the body's and the brain's attention.      Accordingly, he should not ever be on bradycardic drugs unless he gets a pacemaker and he understands that.      His current medicines as listed in Epic are allopurinol, benazepril, hydrochlorothiazide, simvastatin, warfarin, aspirin, fish oil.      REVIEW OF SYSTEMS:  As is identified in Epic was reviewed.  In general, he has vision and hearing and some hearing loss.  He is short of breath on exertion if he really pushes himself, but it has not changed.  Negative for dizziness or syncope.  No chest pain.  Upper GI:  Negative.  Lower GI:  Negative.  :  Positive for some urinary incontinence. Musculoskeletal:  Positive for chronic back pain.  He walks with a cane.  The remainder of the review as listed and I endorse as negative.      SOCIAL HISTORY:  He lives in a house.  He drives a car.  He takes care of his yard a little bit.  He does not drink or smoke.  He has a couple of grown children.  He is retired.      PHYSICAL EXAMINATION:     GENERAL:   Revealed that he walks slowly with a cane.   VITAL SIGNS:  Blood pressure 130/70, heart rate 45-50 beats a minute.  He weighs 227 pounds, which is relatively stable for him.   HEAD:  Normal.   NECK:  Free of neck vein distention or bruit.   HEART:  Slow and irregular without gallop or murmur.   LUNGS:  Clear.   ABDOMEN:  Rotund, firm, nontender without pain or mass.   EXTREMITIES:  Free of edema.      I reviewed the issues with him with regards to his bradycardia and AV conduction and I explained how the system works.  He is a smart man and he gets it.  His blood pressure is relatively normal.  He is not symptomatic with his bradycardia.  His lipid profile on simvastatin is excellent, but I have not particularly been following that per se.      IMPRESSION:   1.  Chronic AV conduction system dysfunction.     2.  Moderate bradycardia bordering on significant, but asymptomatic so far.   3.  Obesity.   4.  Hypertension.   5.  Hyperlipidemia.      PLAN:  Continue as we are.  I asked to see him in a year with a surveillance 24-hour Holter monitor.      PROGNOSIS:  Good.  I told him before he leaves this earth he will probably get a pacemaker.  He smiled and understood.        During this consult, I took at least 35 minutes.  Over half the time was in counseling and education, 80% of the time face-to-face.  He was fully examined.  Medications and some indications and side effects were all reviewed.  Various studies were reviewed including previous nuclear stress test in , EKGs, stress tests, etc.      cc:   YOSHI Chester M.D.   [address]  [address]         JIGNA NEWBY MD, [facility]             D: 2017 15:38   T: 2017 15:43   MT: SEBASTIAN      Name:     KIRK LAYTON   MRN:      [id number]        Account:      [id number]   :      1940           Service Date: 2017      Document: [id number]

## 2017-04-21 NOTE — NURSING NOTE
"Chief Complaint   Patient presents with     Musculoskeletal Problem     Left hip pain     Consult       Initial Temp 97.5  F (36.4  C) (Temporal)  Ht 1.702 m (5' 7\")  Wt 101.3 kg (223 lb 4.8 oz)  BMI 34.97 kg/m2 Estimated body mass index is 34.97 kg/(m^2) as calculated from the following:    Height as of this encounter: 1.702 m (5' 7\").    Weight as of this encounter: 101.3 kg (223 lb 4.8 oz).  Medication Reconciliation: complete   Tevin/SEAN     "

## 2017-04-21 NOTE — PROGRESS NOTES
"Office Visit-Follow up    Chief Complaint: Uli Champagne is a 76 year old male who is being seen for   Chief Complaint   Patient presents with     Musculoskeletal Problem     Left hip pain     Consult       History of Present Illness:   Complaints of deep left lateral hip pain. Worse with weightbearing. Better with rest. No pain at rest. No pain at night. Rates the pain as moderate. Describes it as achy. Tingling a couple years progressively worsening. He's been using a cane. I previously seen him for a knee issue that is improved.      REVIEW OF SYSTEMS  General: negative for, night sweats, dizziness, fatigue  Resp: No shortness of breath and no cough  CV: negative for chest pain, syncope or near-syncope  GI: negative for nausea, vomiting and diarrhea  : negative for dysuria and hematuria  Musculoskeletal: as above  Neurologic: negative for syncope   Hematologic: negative for bleeding disorder    Physical Exam:  Vitals: Temp 97.5  F (36.4  C) (Temporal)  Ht 5' 7\" (1.702 m)  Wt 223 lb 4.8 oz (101.3 kg)  BMI 34.97 kg/m2  BMI= Body mass index is 34.97 kg/(m^2).  Constitutional: healthy, alert and no acute distress   Psychiatric: mentation appears normal and affect normal/bright  NEURO: no focal deficits  RESP: Normal with easy respirations and no use of accessory muscles to breathe, no audible wheezing or retractions  CV: LLE:  no edema           SKIN: No erythema, rashes, excoriation, or breakdown. No evidence of infection.   JOINT/EXTREMITIES:left hip: No focal areas of tenderness. Quad is slightly smaller when compared to the contralateral side. Hip flexion to approximately 100  with some pain. Internal rotation 10  no pain. External rotation of the hip re-creates his pain.  GAIT: antalgic            Diagnostic Modalities:  left hip X-ray: with moderate joint space narrowing, osteophytes  Independent visualization of the images was performed.      Impression: left hip primary osteoarthritis in a " 76-year-old male on Coumadin    Plan:  All of the above pertinent physical exam and imaging modalities findings was reviewed with Uli.    Treatment options discussed. Recommended Tylenol (avoid anti-inflammatories secondary to Coumadin use) and physical therapy. Could proceed with a intra-articular steroid injection if this fails. However order to do this by radiology he would need to come off his Coumadin.        Return to clinic 4-6 , week(s), PRN, or sooner as needed for changes.  Re-x-ray on return: Amina Ogden D.O.

## 2017-04-21 NOTE — PROGRESS NOTES
Appropriate assistive devices provided during their visit. yes (Yes, No, N/A) cane (list device)    Exam table and/or cart  placed in the lowest position. yes (Yes, No, N/A)    Brakes on tables/carts/wheelchairs used at all times. yes (Yes, No, N/A)    Non slip footwear applied. n/a (Yes, No, NA)    Patient was accompanied by staff throughout visit. yes (Yes, No, N/A)    Equipment safety straps used. n/a (Yes, No, N/A)    Assist with toileting. n/a (Yes, No, N/A)

## 2017-04-21 NOTE — MR AVS SNAPSHOT
After Visit Summary   4/21/2017    Uli Champagne    MRN: [id number]           Patient Information     Date Of Birth          1940        Visit Information        Provider Department      4/21/2017 1:50 PM Yovani Ogden,  [facility]        Today's Diagnoses     Primary osteoarthritis of left hip    -  1       Follow-ups after your visit        Additional Services     PHYSICAL THERAPY REFERRAL       *This therapy referral will be filtered to a centralized scheduling office at [facility] and the patient will receive a call to schedule an appointment at a [facility] location most convenient for them. *     [facility] provides Physical Therapy evaluation and treatment and many specialty services across the [facility] system.  If requesting a specialty program, please choose from the list below.    If you have not heard from the scheduling office within 2 business days, please call [phone] for all locations, with the exception of [place], please call [phone].  Treatment: Evaluation & Treatment  Special Instructions/Modalities: hep  Special Programs:     Please be aware that coverage of these services is subject to the terms and limitations of your health insurance plan.  Call member services at your health plan with any benefit or coverage questions.      **Note to Provider:  If you are referring outside of [facility] for the therapy appointment, please list the name of the location in the  special instructions  above, print the referral and give to the patient to schedule the appointment.                  Your next 10 appointments already scheduled     May 25, 2017  1:15 PM CDT   Anticoagulation Visit with [facility] ANTI COAG   [facility] ([facility]    [address]  [facility] [address] [phone]              Future tests that were ordered for you today     Open Future Orders        Priority  "Expected Expires Ordered    Holter Monitor 24 hour - Adult Routine 4/20/2018 5/25/2018 4/20/2017    Follow-Up with Cardiologist Routine 4/20/2018 9/2/2018 4/20/2017            Who to contact     If you have questions or need follow up information about today's clinic visit or your schedule please contact [facility] directly at [phone].  Normal or non-critical lab and imaging results will be communicated to you by DCMobilityhart, letter or phone within 4 business days after the clinic has received the results. If you do not hear from us within 7 days, please contact the clinic through SavingStart or phone. If you have a critical or abnormal lab result, we will notify you by phone as soon as possible.  Submit refill requests through [employer] Amigo or call your pharmacy and they will forward the refill request to us. Please allow 3 business days for your refill to be completed.          Additional Information About Your Visit        DCMobilityharSnappCloud Information     [employer] gives you secure access to your electronic health record. If you see a primary care provider, you can also send messages to your care team and make appointments. If you have questions, please call your primary care clinic.  If you do not have a primary care provider, please call [phone] and they will assist you.        Care EveryWhere ID     This is your Care EveryWhere ID. This could be used by other organizations to access your [place] medical records  [id number]        Your Vitals Were     Temperature Height BMI (Body Mass Index)             97.5  F (36.4  C) (Temporal) 5' 7\" (1.702 m) 34.97 kg/m2          Blood Pressure from Last 3 Encounters:   04/20/17 130/68   01/17/17 124/68   10/03/16 112/68    Weight from Last 3 Encounters:   04/21/17 223 lb 4.8 oz (101.3 kg)   04/20/17 227 lb 4.8 oz (103.1 kg)   01/17/17 225 lb 11.2 oz (102.4 kg)              We Performed the Following     PHYSICAL THERAPY REFERRAL        Primary Care Provider Office " Phone # Fax #    Jakub Chester [phone]       [facility] [address] [facility] [address]        Thank you!     Thank you for choosing [facility]  for your care. Our goal is always to provide you with excellent care. Hearing back from our patients is one way we can continue to improve our services. Please take a few minutes to complete the written survey that you may receive in the mail after your visit with us. Thank you!             Your Updated Medication List - Protect others around you: Learn how to safely use, store and throw away your medicines at www.disposemymeds.org.          This list is accurate as of: 4/21/17  2:17 PM.  Always use your most recent med list.                   Brand Name Dispense Instructions for use    allopurinol 300 MG tablet    ZYLOPRIM    90 tablet    TAKE 1 TABLET DAILY FOR GOUT       aspirin 81 MG tablet      1 TABLET DAILY       Benazepril-Hydrochlorothiazide 20-25 MG Tabs     180 tablet    TAKE 1 TABLET TWICE A DAY FOR HYPERTENSION       fish oil-omega-3 fatty acids 1000 MG capsule      Take 1 g by mouth daily.       nitroglycerin 0.4 MG sublingual tablet    NITROSTAT    25 tablet    Place 1 tablet (0.4 mg) under the tongue See Admin Instructions for chest pain       simvastatin 40 MG tablet    ZOCOR    90 tablet    Take 1 tablet (40 mg) by mouth At Bedtime       warfarin 5 MG tablet    COUMADIN    90 tablet    TAKE ONE-HALF (1/2) TABLET ON TUESDAY AND SATURDAY AND 1 TABLET ON ALL OTHER DAYS OF THE WEEK OR AS DIRECTED BY THE COUMADIN CLINIC

## 2017-04-21 NOTE — LETTER
"  4/21/2017       RE: Uli Champagne  [address] [facility] [address]           Dear Colleague,    Thank you for referring your patient, Uli Champagne, to the [facility]. Please see a copy of my visit note below.    Office Visit-Follow up    Chief Complaint: Uli Champagne is a 76 year old male who is being seen for   Chief Complaint   Patient presents with     Musculoskeletal Problem     Left hip pain     Consult       History of Present Illness:   Complaints of deep left lateral hip pain. Worse with weightbearing. Better with rest. No pain at rest. No pain at night. Rates the pain as moderate. Describes it as achy. Tingling a couple years progressively worsening. He's been using a cane. I previously seen him for a knee issue that is improved.      REVIEW OF SYSTEMS  General: negative for, night sweats, dizziness, fatigue  Resp: No shortness of breath and no cough  CV: negative for chest pain, syncope or near-syncope  GI: negative for nausea, vomiting and diarrhea  : negative for dysuria and hematuria  Musculoskeletal: as above  Neurologic: negative for syncope   Hematologic: negative for bleeding disorder    Physical Exam:  Vitals: Temp 97.5  F (36.4  C) (Temporal)  Ht 5' 7\" (1.702 m)  Wt 223 lb 4.8 oz (101.3 kg)  BMI 34.97 kg/m2  BMI= Body mass index is 34.97 kg/(m^2).  Constitutional: healthy, alert and no acute distress   Psychiatric: mentation appears normal and affect normal/bright  NEURO: no focal deficits  RESP: Normal with easy respirations and no use of accessory muscles to breathe, no audible wheezing or retractions  CV: LLE:  no edema           SKIN: No erythema, rashes, excoriation, or breakdown. No evidence of infection.   JOINT/EXTREMITIES:left hip: No focal areas of tenderness. Quad is slightly smaller when compared to the contralateral side. Hip flexion to approximately 100  with some pain. Internal rotation 10  no pain. External rotation of the hip " re-creates his pain.  GAIT: antalgic            Diagnostic Modalities:  left hip X-ray: with moderate joint space narrowing, osteophytes  Independent visualization of the images was performed.      Impression: left hip primary osteoarthritis in a 76-year-old male on Coumadin    Plan:  All of the above pertinent physical exam and imaging modalities findings was reviewed with Uli.    Treatment options discussed. Recommended Tylenol (avoid anti-inflammatories secondary to Coumadin use) and physical therapy. Could proceed with a intra-articular steroid injection if this fails. However order to do this by radiology he would need to come off his Coumadin.        Return to clinic 4-6 , week(s), PRN, or sooner as needed for changes.  Re-x-ray on return: No    Isaiah Ogden D.O.          Again, thank you for allowing me to participate in the care of your patient.        Sincerely,              Yovani Ogden, DO

## 2017-04-27 ENCOUNTER — OFFICE VISIT (OUTPATIENT)
Dept: FAMILY MEDICINE | Facility: OTHER | Age: 77
End: 2017-04-27
Payer: COMMERCIAL

## 2017-04-27 VITALS
DIASTOLIC BLOOD PRESSURE: 82 MMHG | WEIGHT: 228.6 LBS | SYSTOLIC BLOOD PRESSURE: 138 MMHG | BODY MASS INDEX: 35.8 KG/M2 | TEMPERATURE: 97 F | RESPIRATION RATE: 24 BRPM | HEART RATE: 64 BPM

## 2017-04-27 DIAGNOSIS — S90.212A HEMATOMA, SUBUNGUAL, GREAT TOE, LEFT, INITIAL ENCOUNTER: Primary | ICD-10-CM

## 2017-04-27 PROCEDURE — 99212 OFFICE O/P EST SF 10 MIN: CPT | Performed by: FAMILY MEDICINE

## 2017-04-27 ASSESSMENT — PAIN SCALES - GENERAL: PAINLEVEL: NO PAIN (0)

## 2017-04-27 NOTE — PROGRESS NOTES
SUBJECTIVE:                                                    Uli Champagne is a 76 year old male who presents to clinic today for the following health issues:  Chief Complaint   Patient presents with     Toenail     toenail loose lt great toe     Loose great toenail with hematoma beneath, no hx trauma  No obvious infection  Trimmed, cleaned  Tepid soaks  Pod consult   cb sooner if signs of infection  dbue

## 2017-04-27 NOTE — NURSING NOTE
"Chief Complaint   Patient presents with     Toenail     toenail loose lt great toe       Initial /82 (BP Location: Left arm, Patient Position: Chair, Cuff Size: Adult Regular)  Pulse 64  Temp 97  F (36.1  C) (Temporal)  Resp 24  Wt 228 lb 9.6 oz (103.7 kg)  BMI 35.8 kg/m2 Estimated body mass index is 35.8 kg/(m^2) as calculated from the following:    Height as of 4/21/17: 5' 7\" (1.702 m).    Weight as of this encounter: 228 lb 9.6 oz (103.7 kg).  Medication Reconciliation: complete  "

## 2017-04-27 NOTE — MR AVS SNAPSHOT
After Visit Summary   4/27/2017    Uli Champagne    MRN: [id number]           Patient Information     Date Of Birth          1940        Visit Information        Provider Department      4/27/2017 10:00 AM Naren Bautista MD [facility]        Today's Diagnoses     Hematoma, subungual, great toe, left, initial encounter    -  1       Follow-ups after your visit        Your next 10 appointments already scheduled     May 01, 2017  1:00 PM CDT   Evaluation with Irma Driscoll PT   [facility]    [address] [facility] [id number] [phone]            May 03, 2017  8:00 AM CDT   New Visit with Gomez Huddleston DPM   [facility]    [address] [facility] [id number] [phone]            May 25, 2017  1:15 PM CDT   Anticoagulation Visit with JITENDRA ANTI COAG   [facility])    [address] [facility] [id number] [phone]              Who to contact     If you have questions or need follow up information about today's clinic visit or your schedule please contact [facility] directly at [phone].  Normal or non-critical lab and imaging results will be communicated to you by MyChart, letter or phone within 4 business days after the clinic has received the results. If you do not hear from us within 7 days, please contact the clinic through MyChart or phone. If you have a critical or abnormal lab result, we will notify you by phone as soon as possible.  Submit refill requests through CyberHeart or call your pharmacy and they will forward the refill request to us. Please allow 3 business days for your refill to be completed.          Additional Information About Your Visit        ShareDeskhart Information     [employer]berHeart gives you secure access to your electronic health record. If you see a primary care provider, you can also send messages to your care  team and make appointments. If you have questions, please call your primary care clinic.  If you do not have a primary care provider, please call [phone] and they will assist you.        Care EveryWhere ID     This is your Care EveryWhere ID. This could be used by other organizations to access your [place] medical records  [id number]        Your Vitals Were     Pulse Temperature Respirations BMI (Body Mass Index)          64 97  F (36.1  C) (Temporal) 24 35.8 kg/m2         Blood Pressure from Last 3 Encounters:   04/27/17 138/82   04/20/17 130/68   01/17/17 124/68    Weight from Last 3 Encounters:   04/27/17 228 lb 9.6 oz (103.7 kg)   04/21/17 223 lb 4.8 oz (101.3 kg)   04/20/17 227 lb 4.8 oz (103.1 kg)              Today, you had the following     No orders found for display       Primary Care Provider Office Phone # Fax #    Jakub Chester [phone]       [facility] [address] [facility] [address]        Thank you!     Thank you for choosing [facility]  for your care. Our goal is always to provide you with excellent care. Hearing back from our patients is one way we can continue to improve our services. Please take a few minutes to complete the written survey that you may receive in the mail after your visit with us. Thank you!             Your Updated Medication List - Protect others around you: Learn how to safely use, store and throw away your medicines at www.disposemymeds.org.          This list is accurate as of: 4/27/17 10:32 AM.  Always use your most recent med list.                   Brand Name Dispense Instructions for use    allopurinol 300 MG tablet    ZYLOPRIM    90 tablet    TAKE 1 TABLET DAILY FOR GOUT       aspirin 81 MG tablet      1 TABLET DAILY       Benazepril-Hydrochlorothiazide 20-25 MG Tabs     180 tablet    TAKE 1 TABLET TWICE A DAY FOR HYPERTENSION       fish oil-omega-3 fatty acids 1000 MG capsule      Take 1 g by mouth  daily.       nitroglycerin 0.4 MG sublingual tablet    NITROSTAT    25 tablet    Place 1 tablet (0.4 mg) under the tongue See Admin Instructions for chest pain       simvastatin 40 MG tablet    ZOCOR    90 tablet    Take 1 tablet (40 mg) by mouth At Bedtime       warfarin 5 MG tablet    COUMADIN    90 tablet    TAKE ONE-HALF (1/2) TABLET ON TUESDAY AND SATURDAY AND 1 TABLET ON ALL OTHER DAYS OF THE WEEK OR AS DIRECTED BY THE COUMADIN CLINIC

## 2017-05-01 ENCOUNTER — HOSPITAL ENCOUNTER (OUTPATIENT)
Dept: PHYSICAL THERAPY | Facility: OTHER | Age: 77
Setting detail: THERAPIES SERIES
End: 2017-05-01
Attending: ORTHOPAEDIC SURGERY
Payer: COMMERCIAL

## 2017-05-01 PROCEDURE — 97161 PT EVAL LOW COMPLEX 20 MIN: CPT | Mod: GP | Performed by: PHYSICAL THERAPIST

## 2017-05-01 PROCEDURE — 97110 THERAPEUTIC EXERCISES: CPT | Mod: GP | Performed by: PHYSICAL THERAPIST

## 2017-05-01 PROCEDURE — 40000718 ZZHC STATISTIC PT DEPARTMENT ORTHO VISIT: Performed by: PHYSICAL THERAPIST

## 2017-05-01 NOTE — PROGRESS NOTES
05/01/17 1300   General Information   Type of Visit Initial OP Ortho PT Evaluation   Start of Care Date 05/01/17   Referring Physician ilana vu DO   Patient/Family Goals Statement left groin pain    Orders Evaluate and Treat   Date of Order 04/21/17   Insurance Type [employer]   Insurance Comments/Visits Authorized after eval    Medical Diagnosis primary OA of left hip M16.12   Surgical/Medical history reviewed Yes   Precautions/Limitations no known precautions/limitations   Body Part(s)   Body Part(s) Hip   Presentation and Etiology   Pertinent history of current problem (include personal factors and/or comorbidities that impact the POC) patient reports that he has had left groin , hip laterial and buttock pain . has started to take tylenoll and that helps with laying down . has trouble with straingthening the left knee . PMH had LB surgery dec 2015 due to legs feeling like they were going to clapse and that is better since surgery . the left knee still has pain and makes it feels like his leg is going to give out . currently if he brushes his teeth x 2 minutes will start to have lowback pain and laterial hip pain . denies numb or tingling or incottenence .will get pain in B calves when he walks . PMH none reported    Impairments A. Pain;H. Impaired gait   Functional Limitations perform desired leisure / sports activities;perform activities of daily living   Symptom Location left buttock and laterial hip    Onset date of current episode/exacerbation 07/01/16   Chronicity Chronic   Pain rating (0-10 point scale) Worst (/10);Best (/10)   Best (/10) 0/10   Worst (/10) 8/10   Pain quality B. Dull;C. Aching;A. Sharp   Frequency of pain/symptoms C. With activity   Pain/symptoms are: Worse during the day   Pain/symptoms exacerbated by B. Walking   Pain/symptoms eased by C. Rest;E. Changing positions   Progression of symptoms since onset: Unchanged   Prior Level of Function   Functional Level Prior Comment goes to snap 2  x week , bike , hip abd/add, knee flexion , knee extension   Current Level of Function   Current Community Support Family/friend caregiver   Patient role/employment history F. Retired   Current equipment-Gait/Locomotion Standard cane   Fall Risk Screen   Fall screen completed by PT   Per patient - Fall 2 or more times in past year? No   Per patient - Fall with injury in past year? No   Is patient a fall risk? No   Hip Objective Findings   Side (if bilateral, select both right and left) Left   Observation B knee varus and lacks full extension B   Hip ROM Comments left hip internal rotation pain in buttock and hip    Lumbar ROM flexion limited standing by balance but no change in pain ,extension and laterial flexion / rotation moderate range with no change in pain    Hip/Knee Strength Comments left hip 4-/5 right hip 4/5 knee B 4/5   Straight Leg Raise Test neg B    Scour Test pain in left hip    Left Hamstring Flexibility B 25    Planned Therapy Interventions   Planned Therapy Interventions ROM;strengthening;stretching   Clinical Impression   Criteria for Skilled Therapeutic Interventions Met yes, treatment indicated   PT Diagnosis left laterial hip / buttock pain    Functional limitations due to impairments LEFS 26/80   Clinical Presentation Stable/Uncomplicated   Clinical Decision Making (Complexity) Low complexity   Predicted Duration of Therapy Intervention (days/wks) 6 Rx sessions over 3 months every other week    Risk & Benefits of therapy have been explained Yes   Patient, Family & other staff in agreement with plan of care Yes   Education Assessment   Preferred Learning Style Listening;Reading;Demonstration   Barriers to Learning No barriers   ORTHO GOALS   PT Ortho Eval Goals 1;2   Ortho Goal 1   Goal Identifier 1   Goal Description instruction in HEP for gentle ROM and strengtheinging    Target Date 08/01/17   Ortho Goal 2   Goal Identifier 2    Goal Description patient to have reduction in pain level  currently 0-7/10 goal is 0-4/10 and improved tolerance to activity currently LEFS is 26/80 goal is 40/80   Target Date 08/01/17   Total Evaluation Time   Total Evaluation Time 15

## 2017-05-01 NOTE — PROGRESS NOTES
05/01/17 1300   Lower Extremity Functional Scale ( 1996 KD Serrano: used with permission)   a. Any of your usual work, housework, or school activities. 1-Quite a Bit of Difficulty   b. Your usual hobbies, recreational or sporting activities.  2-Moderate Difficulty   c. Getting into or out of the bath. 2-Moderate Difficulty   d. Walking between rooms. 2-Moderate Difficulty   e. Putting on your shoes or socks. 2-Moderate Difficulty   f. Squatting. 0-Extreme Difficulty or Unable to Perform Activity   g. Lifting an object, like a bag of groceries from the floor.  3-A Little Bit of Difficulty   h. Performing light activities around your home.  2-Moderate Difficulty   i. Performing heavy activities around your home. 0-Extreme Difficulty or Unable to Perform Activity   j. Getting into or out of a car. 3-A Little Bit of Difficulty   k. Walking 2 blocks. 1-Quite a Bit of Difficulty   l. Walking a mile. 0-Extreme Difficulty or Unable to Perform Activity   m. Going up or down 10 stairs (about 1 flight of stairs). 1-Quite a Bit of Difficulty   n. Standing for 1 hour. 0-Extreme Difficulty or Unable to Perform Activity   o. Sitting for 1 hour. 4-No Difficulty   p. Running on even ground. 0-Extreme Difficulty or Unable to Perform Activity   q. Running on uneven ground. 0-Extreme Difficulty or Unable to Perform Activity   r. Making sharp turns while running fast. 0-Extreme Difficulty or Unable to Perform Activity   s. Hopping. 0-Extreme Difficulty or Unable to Perform Activity   t. Rolling over in bed. 3-A Little Bit of Difficulty   SCORE:    Column Totals: /80 26

## 2017-05-03 ENCOUNTER — OFFICE VISIT (OUTPATIENT)
Dept: PODIATRY | Facility: OTHER | Age: 77
End: 2017-05-03
Payer: COMMERCIAL

## 2017-05-03 VITALS — WEIGHT: 228 LBS | TEMPERATURE: 98 F | BODY MASS INDEX: 35.79 KG/M2 | HEIGHT: 67 IN

## 2017-05-03 DIAGNOSIS — Z92.29 HX: ANTICOAGULATION: Primary | ICD-10-CM

## 2017-05-03 DIAGNOSIS — L60.3 ONYCHODYSTROPHY: ICD-10-CM

## 2017-05-03 DIAGNOSIS — S90.222A SUBUNGUAL HEMATOMA OF FOOT, LEFT, INITIAL ENCOUNTER: ICD-10-CM

## 2017-05-03 DIAGNOSIS — I73.9 PERIPHERAL VASCULAR DISEASE (H): ICD-10-CM

## 2017-05-03 PROCEDURE — 99203 OFFICE O/P NEW LOW 30 MIN: CPT | Mod: 25 | Performed by: PODIATRIST

## 2017-05-03 PROCEDURE — 11721 DEBRIDE NAIL 6 OR MORE: CPT | Performed by: PODIATRIST

## 2017-05-03 ASSESSMENT — PAIN SCALES - GENERAL: PAINLEVEL: NO PAIN (0)

## 2017-05-03 NOTE — MR AVS SNAPSHOT
"              After Visit Summary   5/3/2017    Uli Champagne    MRN: [id number]           Patient Information     Date Of Birth          1940        Visit Information        Provider Department      5/3/2017 8:00 AM Gomez Huddleston DPM [facility]        Today's Diagnoses     HX: anticoagulation    -  1    Peripheral vascular disease (H)        Onychodystrophy        Subungual hematoma of foot, left, initial encounter          Care Instructions    Nail Debridement    A high quality instrument makes trimming toenails MUCH easier.  Search [employer] for any 5\" nail nipper manufactured by reliable brands such as Miltex, Integra or Jarit as these quality instruments will help manage difficult nails more effectively and comfortably. Search \"Miltex -CH\" for chrome nippers about $67 or \"Miltex -SS\" for stainless steel and are my preferred instrument in the clinic as they are processed through the autoclave after each use.  A physician is not necessary to trim nails even if you are taking blood thinners or are diabetic.  Your family or care givers may help manage your toenails.      Trim or sand the nails once weekly.  Do not wait until they are long and painful or trimming will become too difficult and painful and will increase your risk of complications or infection.  A course file or 120 grit sandpaper on a block can be helpful.  For very thick nails many people prefer battery operated guerra such as an Amope', Personal Pedi and Emjoi for regular use or heavy painful callouses or thick toenails.    Trim or skive any portion of nail that is thick, loose, crumbling, or not well attached. Do not tear the nail away, but rather cut them with a nail nipper.  You may follow up with your Podiatric Physician if you have pain, bleeding, infection, questions or other concerns.      You may also contact the following Registered Nurses for further help with nail debridement and minor hygiene " valente.  They will come to your home, trim your toenails and soak your feet, as well as monitor for any complications that would require evaluation by a Physician.      [employer] [facility]  Fadumo Rodriguez RN, [facility]  [phone]  [email or web address]    [facility]  Michelle Klein RN  Office [phone]  [email or web address]    [facility] by Gina Love RN  [phone]  Call or text for appointment        Calluses, Corns, IPKs, Porokeratosis    When there is excessive friction or pressure on the skin, the body responds by making the skin thicker.  While this may protect the deeper structures, the thickened skin can take up more space and thus increase pressure over a bony prominence or become an open sore or skin ulcer as this skin becomes less flexible.    Flat, diffuse thickening are simple calluses and they are usually caused by friction.  Often these are the result of rubbing on a shoe or going barefoot.    Calluses with a central core between the toes are called corns.  These often result from prominent joints on adjacent toes rubbing together.  Theses are often a symptom of bone malalignment and will usually recur unless the underlying bones are addressed.    Many of these lesions can be kept comfortable with routine maintenance. This consists of filing them with a Ped Egg, callus file, or 120 grit sandpaper on a block, every day during your bath or shower.  Most people prefer battery operated guerra such as an Amope', Personal Pedi and Emjoi for regular use or heavy painful callouses.  Heavy creams or ointments can be applied 1-2 times every day to keep them soft. Toe spacers can be used for corns, gel pads can be used for other lesions on the bottom of the foot. If there is a deformity noted, such as a prominent bone, often this can be addressed to minimize recurrence. However, sometimes the pressure and lesion simply migrates to another spot after surgery, so it is not a  guaranteed cure.     If you have severe callouses and cracking, you may apply heavy greasy ointments that you scoop up such as Cetaphil, Eucerin, Aquaphor or Vaseline.  For more aggressive help apply heavy creams or ointment under occlusive dressings such as Saran Wrap or Jelly Feet while sleeping.   Jelly Feet can be obtained at [email or web address].     To be successful with managing hyperkeratotic skin, you must manage hygiene daily.  At your bath or shower time is the easiest time to work on this when skin is most soft.  There is no medical or surgical treatment that will absolutely eliminate many of these symptoms.      Pedifix is a reliable source for all sorts of foot pads, cushions, or interdigital spacers and foot appliances. Go to www.[employer] or request a catalog at 1-823-CloudHelix.        Please call with any additional questions.             Follow-ups after your visit        Your next 10 appointments already scheduled     May 15, 2017  1:30 PM CDT   Ortho Treatment with Irma F Peel, PT   [facility]    [address] [facility] [address]   [phone]            May 25, 2017  1:15 PM CDT   Anticoagulation Visit with JITENDRA ANTI ROCKY   [facility]    [address] [facility] [address]   [phone]            May 30, 2017  1:00 PM CDT   Ortho Treatment with Irma F Peel, PT   [facility]    [address] [facility] [address]   [phone]            Jun 12, 2017  1:00 PM CDT   Ortho Treatment with Irma F Peel, PT   [facility]    [address] [facility] [address]   [phone]            Jun 26, 2017  1:00 PM CDT   Ortho Treatment with Irma F Peel, PT   [facility]    [address] [place] [address] [phone]              Who to contact     If you have questions or need follow up  "information about today's clinic visit or your schedule please contact [facility] directly at [phone].  Normal or non-critical lab and imaging results will be communicated to you by MyChart, letter or phone within 4 business days after the clinic has received the results. If you do not hear from us within 7 days, please contact the clinic through ViaViewhart or phone. If you have a critical or abnormal lab result, we will notify you by phone as soon as possible.  Submit refill requests through [employer] or call your pharmacy and they will forward the refill request to us. Please allow 3 business days for your refill to be completed.          Additional Information About Your Visit        ViaViewhart Information     [employer] gives you secure access to your electronic health record. If you see a primary care provider, you can also send messages to your care team and make appointments. If you have questions, please call your primary care clinic.  If you do not have a primary care provider, please call [phone] and they will assist you.        Care EveryWhere ID     This is your Care EveryWhere ID. This could be used by other organizations to access your [place] medical records  [id number]        Your Vitals Were     Temperature Height BMI (Body Mass Index)             98  F (36.7  C) (Temporal) 5' 7\" (1.702 m) 35.71 kg/m2          Blood Pressure from Last 3 Encounters:   04/27/17 138/82   04/20/17 130/68   01/17/17 124/68    Weight from Last 3 Encounters:   05/03/17 228 lb (103.4 kg)   04/27/17 228 lb 9.6 oz (103.7 kg)   04/21/17 223 lb 4.8 oz (101.3 kg)              Today, you had the following     No orders found for display       Primary Care Provider Office Phone # Fax #    Jakub Chester [phone]       [facility] [address] [facility] [address]        Thank you!     Thank you for choosing [facility]  for your care. Our goal is always to " provide you with excellent care. Hearing back from our patients is one way we can continue to improve our services. Please take a few minutes to complete the written survey that you may receive in the mail after your visit with us. Thank you!             Your Updated Medication List - Protect others around you: Learn how to safely use, store and throw away your medicines at www.disposemymeds.org.          This list is accurate as of: 5/3/17  8:21 AM.  Always use your most recent med list.                   Brand Name Dispense Instructions for use    allopurinol 300 MG tablet    ZYLOPRIM    90 tablet    TAKE 1 TABLET DAILY FOR GOUT       aspirin 81 MG tablet      1 TABLET DAILY       Benazepril-Hydrochlorothiazide 20-25 MG Tabs     180 tablet    TAKE 1 TABLET TWICE A DAY FOR HYPERTENSION       fish oil-omega-3 fatty acids 1000 MG capsule      Take 1 g by mouth daily.       nitroglycerin 0.4 MG sublingual tablet    NITROSTAT    25 tablet    Place 1 tablet (0.4 mg) under the tongue See Admin Instructions for chest pain       simvastatin 40 MG tablet    ZOCOR    90 tablet    Take 1 tablet (40 mg) by mouth At Bedtime       warfarin 5 MG tablet    COUMADIN    90 tablet    TAKE ONE-HALF (1/2) TABLET ON TUESDAY AND SATURDAY AND 1 TABLET ON ALL OTHER DAYS OF THE WEEK OR AS DIRECTED BY THE COUMADIN CLINIC

## 2017-05-03 NOTE — PROGRESS NOTES
HPI:  Uli Champagne is a 76 year old male who is seen in consultation at the request of Naren Bautista MD.    Pt presents for eval of:   (Onset, Location, L/R, Character, Treatments, Injury if yes)     Onset mid April 2017, loose Left great toenail, black appearance under the nail last few days  Toenail fungus Left toenails ongoing for years with multiple treatments over the years    Works as a retired.    Weight management plan: Patient was referred to their PCP to discuss a diet and exercise plan.     Review of Systems:  Patient denies fever, chills, rash, wound, stiffness, limping, numbness, weakness, heart burn, blood in stool, chest pain with activity, calf pain when walking, shortness of breath with activity, chronic cough, easy bleeding/bruising, swelling of ankles, excessive thirst, fatigue, depression, anxiety.  Patient admits only to symptoms noted in history.     PAST MEDICAL HISTORY:   Past Medical History:   Diagnosis Date     Atrial fibrillation (H)      Coronary atherosclerosis of unspecified type of vessel, native or graft      Hernia of unspecified site of abdominal cavity without mention of obstruction or gangrene     umbilical hernia; s/p repair 3/1998     Hordeolum externum left upper eyelid 1/21/2016     Malignant neoplasm of prostate (H) 07/14/08    Admit.Discharged 07/17/08     Other and unspecified hyperlipidemia      Postsurgical aortocoronary bypass status 1992     Respiratory complications      Unspecified essential hypertension      Unspecified tinnitus     left ear     PAST SURGICAL HISTORY:   Past Surgical History:   Procedure Laterality Date     C CABG, VEIN, THREE  1992     C NONSPECIFIC PROCEDURE  1987    Bone fusion in neck     C REMV PROSTATE,RETROPUB,RAD,LTD NODES  7/14/2008    Radical retropubic prostatectomy and pelvic lymph node dissection.     HC REMOVAL OF TONSILS,10+ Y/O  1969    Tonsils 10+y.o.     HEMILAMINECTOMY, DISCECTOMY LUMBAR TWO LEVELS, COMBINED Left 12/16/2015     Procedure: COMBINED HEMILAMINECTOMY, DISCECTOMY LUMBAR TWO LEVELS;  Surgeon: Jung Finley MD;  Location: [facility] OR     STENT, CORONARY, ALEAH  2002    x5     MEDICATIONS:   Current Outpatient Prescriptions:      warfarin (COUMADIN) 5 MG tablet, TAKE ONE-HALF (1/2) TABLET ON TUESDAY AND SATURDAY AND 1 TABLET ON ALL OTHER DAYS OF THE WEEK OR AS DIRECTED BY THE COUMADIN CLINIC, Disp: 90 tablet, Rfl: 0     Benazepril-Hydrochlorothiazide 20-25 MG TABS, TAKE 1 TABLET TWICE A DAY FOR HYPERTENSION, Disp: 180 tablet, Rfl: 0     allopurinol (ZYLOPRIM) 300 MG tablet, TAKE 1 TABLET DAILY FOR GOUT, Disp: 90 tablet, Rfl: 1     simvastatin (ZOCOR) 40 MG tablet, Take 1 tablet (40 mg) by mouth At Bedtime, Disp: 90 tablet, Rfl: 3     nitroglycerin (NITROSTAT) 0.4 MG SL tablet, Place 1 tablet (0.4 mg) under the tongue See Admin Instructions for chest pain, Disp: 25 tablet, Rfl: 1     fish oil-omega-3 fatty acids (FISH OIL) 1000 MG capsule, Take 1 g by mouth daily., Disp: , Rfl:      ASPIRIN 81 MG OR TABS, 1 TABLET DAILY, Disp: , Rfl:   ALLERGIES:    Allergies   Allergen Reactions     No Known Drug Allergies      SOCIAL HISTORY:   Social History     Social History     Marital status:      Spouse name: N/A     Number of children: N/A     Years of education: N/A     Occupational History     Not on file.     Social History Main Topics     Smoking status: Former Smoker     Packs/day: 0.50     Years: 2.00     Types: Cigarettes     Quit date: 1/1/1960     Smokeless tobacco: Never Used     Alcohol use Yes      Comment: socially     Drug use: No     Sexual activity: No     Other Topics Concern     Parent/Sibling W/ Cabg, Mi Or Angioplasty Before 65f 55m? No     Social History Narrative     FAMILY HISTORY:   Family History   Problem Relation Age of Onset     Arthritis Father      HEART DISEASE Father      Hypertension Brother      CANCER Brother      liver     HEART DISEASE Sister      HEART DISEASE Brother      EXAM:Vitals: Temp  "98  F (36.7  C) (Temporal)  Ht 5' 7\" (1.702 m)  Wt 228 lb (103.4 kg)  BMI 35.71 kg/m2  BMI= Body mass index is 35.71 kg/(m^2).    General appearance: Patient is alert and fully cooperative with history & exam.  No sign of distress is noted during the visit.     Psychiatric: Affect is pleasant & appropriate.  Patient appears motivated to improve health.     Respiratory: Breathing is regular & unlabored while sitting.     HEENT: Hearing is intact to spoken word.  Speech is clear.  No gross evidence of visual impairment that would impact ambulation.     Vascular: DP 1/4 & PT 1/4 left & right.  CFT delayed with dependent rubor noted about the digits.  Diminished hair growth distal to mid tibia and no hair about the foot and toes.  Temperature changes noted, warm to cool proximal to distal.  Hemosiderin pigmentation noted with multiple varicosities legs and feet bilateral. Generalized edema bilateral legs and feet.  Pt denies claudication history.     Neurologic: Normal plantar response bilateral.  Intact protective threshold plus 10/10 applications of a 5.07 monofilament.  Pt admits no burning and paraesthesias about the feet and toes with palpation.     Dermatologic: All 10 nails are thickened, elongated, discolored and painful with palpation and debridement.  Diminished texture turgor and tone about the integument.  Skin is thin & shiny.  No Pre ulcerative hyperkeratosis noted.  No abscess or full thickness ulcerations noted.    Left hallux nail has a subungual hematoma. Upon debridement of the entire nail plate further bleeding is noted.      Musculoskeletal: Patient is ambulatory without assistive device or brace.  There is semi reducible contracture of the lesser digits.    Creatinine (mg/dL)   Date Value   05/10/2016 1.0   12/29/2015 0.73   11/27/2015 0.69   11/16/2015 0.78   05/20/2015 0.9   05/20/2015 0.9          ASSESSMENT:       ICD-10-CM    1. HX: anticoagulation Z79.01    2. Peripheral vascular disease " (H) I73.9    3. Onychodystrophy L60.3    4. Subungual hematoma of foot, left, initial encounter S90.222A         PLAN:    5/3/2017  All 10 nails were debrided with a nail nipper.   We discussed risk factors and preventive measures.    We discussed appropriate hygiene, shoe gear, daily foot exam, and reinforced management of weight, diet, activity goals.  Dispensed written foot care instructions.    All questions were answered to their satisfaction.    RTC as needed with questions or concerns.    Gomez Huddleston DPM

## 2017-05-03 NOTE — PATIENT INSTRUCTIONS
"Nail Debridement    A high quality instrument makes trimming toenails MUCH easier.  Search [employer] ([place]) for any 5\" nail nipper manufactured by reliable brands such as Miltex, Integra or Jarit as these quality instruments will help manage difficult nails more effectively and comfortably. Search \"Miltex -CH\" for chrome nippers about $67 or \"Miltex -SS\" for stainless steel and are my preferred instrument in the clinic as they are processed through the autoclave after each use.  A physician is not necessary to trim nails even if you are taking blood thinners or are diabetic.  Your family or care givers may help manage your toenails.      Trim or sand the nails once weekly.  Do not wait until they are long and painful or trimming will become too difficult and painful and will increase your risk of complications or infection.  A course file or 120 grit sandpaper on a block can be helpful.  For very thick nails many people prefer battery operated guerra such as an Amope', Personal Pedi and Emjoi for regular use or heavy painful callouses or thick toenails.    Trim or skive any portion of nail that is thick, loose, crumbling, or not well attached. Do not tear the nail away, but rather cut them with a nail nipper.  You may follow up with your Podiatric Physician if you have pain, bleeding, infection, questions or other concerns.      You may also contact the following Registered Nurses for further help with nail debridement and minor hygiene concnerns.  They will come to your home, trim your toenails and soak your feet, as well as monitor for any complications that would require evaluation by a Physician.      [employer] [facility]  Fadumo Rodriguez RN, [facility]  [phone]  [email or web address]    [employer]  Michelle Klein RN  Office [phone]  [email or web address]    [facility] [employer] by Gina Love RN  [phone]  Call or text for appointment        Calluses, Corns, IPKs, " Porokeratosis    When there is excessive friction or pressure on the skin, the body responds by making the skin thicker.  While this may protect the deeper structures, the thickened skin can take up more space and thus increase pressure over a bony prominence or become an open sore or skin ulcer as this skin becomes less flexible.    Flat, diffuse thickening are simple calluses and they are usually caused by friction.  Often these are the result of rubbing on a shoe or going barefoot.    Calluses with a central core between the toes are called corns.  These often result from prominent joints on adjacent toes rubbing together.  Theses are often a symptom of bone malalignment and will usually recur unless the underlying bones are addressed.    Many of these lesions can be kept comfortable with routine maintenance. This consists of filing them with a Ped Egg, callus file, or 120 grit sandpaper on a block, every day during your bath or shower.  Most people prefer battery operated guerra such as an Amope', Personal Pedi and Emjoi for regular use or heavy painful callouses.  Heavy creams or ointments can be applied 1-2 times every day to keep them soft. Toe spacers can be used for corns, gel pads can be used for other lesions on the bottom of the foot. If there is a deformity noted, such as a prominent bone, often this can be addressed to minimize recurrence. However, sometimes the pressure and lesion simply migrates to another spot after surgery, so it is not a guaranteed cure.     If you have severe callouses and cracking, you may apply heavy greasy ointments that you scoop up such as Cetaphil, Eucerin, Aquaphor or Vaseline.  For more aggressive help apply heavy creams or ointment under occlusive dressings such as Saran Wrap or Jelly Feet while sleeping.   Jelly Feet can be obtained at [email or web address].     To be successful with managing hyperkeratotic skin, you must manage hygiene daily.  At your bath or shower time  is the easiest time to work on this when skin is most soft.  There is no medical or surgical treatment that will absolutely eliminate many of these symptoms.      Pedifix is a reliable source for all sorts of foot pads, cushions, or interdigital spacers and foot appliances. Go to [email or web address].WhoGotStuff or request a catalog at 6-191-Greatist.        Please call with any additional questions.

## 2017-05-15 ENCOUNTER — HOSPITAL ENCOUNTER (OUTPATIENT)
Dept: PHYSICAL THERAPY | Facility: OTHER | Age: 77
Setting detail: THERAPIES SERIES
End: 2017-05-15
Attending: ORTHOPAEDIC SURGERY
Payer: COMMERCIAL

## 2017-05-15 PROCEDURE — 97110 THERAPEUTIC EXERCISES: CPT | Mod: GP | Performed by: PHYSICAL THERAPIST

## 2017-05-15 PROCEDURE — 40000718 ZZHC STATISTIC PT DEPARTMENT ORTHO VISIT: Performed by: PHYSICAL THERAPIST

## 2017-05-25 ENCOUNTER — ANTICOAGULATION THERAPY VISIT (OUTPATIENT)
Dept: ANTICOAGULATION | Facility: OTHER | Age: 77
End: 2017-05-25
Payer: COMMERCIAL

## 2017-05-25 DIAGNOSIS — Z79.01 LONG-TERM (CURRENT) USE OF ANTICOAGULANTS: ICD-10-CM

## 2017-05-25 DIAGNOSIS — I48.20 CHRONIC A-FIB (H): ICD-10-CM

## 2017-05-25 LAB — INR POINT OF CARE: 2.3 (ref 0.86–1.14)

## 2017-05-25 PROCEDURE — 99207 ZZC NO CHARGE NURSE ONLY: CPT

## 2017-05-25 PROCEDURE — 36416 COLLJ CAPILLARY BLOOD SPEC: CPT

## 2017-05-25 PROCEDURE — 85610 PROTHROMBIN TIME: CPT | Mod: QW

## 2017-05-25 NOTE — MR AVS SNAPSHOT
Uli SINGH Ihsan   5/25/2017 1:15 PM   Anticoagulation Therapy Visit    Description:  76 year old male   Provider:  JITENDRA ANTI COAG   Department:  [facility]           INR as of 5/25/2017     Today's INR 2.3      Anticoagulation Summary as of 5/25/2017     INR goal 2.0-3.0   Today's INR 2.3   Full instructions 2.5 mg on Tue, Sat; 5 mg all other days   Next INR check 7/6/2017    Indications   Long-term (current) use of anticoagulants [Z79.01] [Z79.01]  Chronic a-fib (H) [I48.2]         Your next Anticoagulation Clinic appointment(s)     Jul 06, 2017  1:00 PM CDT   Anticoagulation [facility] with [facility]ENDRA ANTI ROCKY   [facility]    [address] [facility] [id number] [phone]              Contact Numbers     Clinic Number:         May 2017 Details    Sun Mon Tue Wed Thu Fri Sat      1               2               3               4               5               6                 7               8               9               10               11               12               13                 14               15               16               17               18               19               20                 21               22               23               24               25      5 mg   See details      26      5 mg         27      2.5 mg           28      5 mg         29      5 mg         30      2.5 mg         31      5 mg             Date Details   05/25 This INR check               How to take your warfarin dose     To take:  2.5 mg Take 0.5 of a 5 mg tablet.    To take:  5 mg Take 1 of the 5 mg tablets.           June 2017 Details    Sun Mon Tue Wed Thu Fri Sat         1      5 mg         2      5 mg         3      2.5 mg           4      5 mg         5      5 mg         6      2.5 mg         7      5 mg         8      5 mg         9      5 mg         10      2.5 mg           11      5 mg         12      5 mg         13      2.5 mg         14      5 mg         15       5 mg         16      5 mg         17      2.5 mg           18      5 mg         19      5 mg         20      2.5 mg         21      5 mg         22      5 mg         23      5 mg         24      2.5 mg           25      5 mg         26      5 mg         27      2.5 mg         28      5 mg         29      5 mg         30      5 mg           Date Details   No additional details            How to take your warfarin dose     To take:  2.5 mg Take 0.5 of a 5 mg tablet.    To take:  5 mg Take 1 of the 5 mg tablets.           July 2017 Details    Sun Mon Tue Wed Thu Fri Sat           1      2.5 mg           2      5 mg         3      5 mg         4      2.5 mg         5      5 mg         6            7               8                 9               10               11               12               13               14               15                 16               17               18               19               20               21               22                 23               24               25               26               27               28               29                 30               31                     Date Details   No additional details    Date of next INR:  7/6/2017         How to take your warfarin dose     To take:  2.5 mg Take 0.5 of a 5 mg tablet.    To take:  5 mg Take 1 of the 5 mg tablets.

## 2017-05-25 NOTE — PROGRESS NOTES
ANTICOAGULATION FOLLOW-UP CLINIC VISIT    Patient Name:  Uli Champagne  Date:  5/25/2017  Contact Type:  Face to Face    SUBJECTIVE:     Patient Findings     Positives No Problem Findings           OBJECTIVE    INR Protime   Date Value Ref Range Status   05/25/2017 2.3 (A) 0.86 - 1.14 Final       ASSESSMENT / PLAN  INR assessment THER    Recheck INR In: 6 WEEKS    INR Location Clinic      Anticoagulation Summary as of 5/25/2017     INR goal 2.0-3.0   Today's INR 2.3   Maintenance plan 2.5 mg (5 mg x 0.5) on Tue, Sat; 5 mg (5 mg x 1) all other days   Full instructions 2.5 mg on Tue, Sat; 5 mg all other days   Weekly total 30 mg   No change documented Rui Landers RN   Plan last modified Rui Landers RN (3/31/2016)   Next INR check 7/6/2017   Target end date     Indications   Long-term (current) use of anticoagulants [Z79.01] [Z79.01]  Chronic a-fib (H) [I48.2]         Anticoagulation Episode Summary     INR check location     Preferred lab     Send INR reminders to [facility]    Comments       Anticoagulation Care Providers     Provider Role Specialty Phone number    Jakub Chester MD [facility] [phone]            See the Encounter Report to view Anticoagulation Flowsheet and Dosing Calendar (Go to Encounters tab in chart review, and find the Anticoagulation Therapy Visit)    Dosage adjustment made based on physician directed care plan.    Rui Landers RN

## 2017-05-30 ENCOUNTER — HOSPITAL ENCOUNTER (OUTPATIENT)
Dept: PHYSICAL THERAPY | Facility: OTHER | Age: 77
Setting detail: THERAPIES SERIES
End: 2017-05-30
Attending: ORTHOPAEDIC SURGERY
Payer: COMMERCIAL

## 2017-05-30 PROCEDURE — 97110 THERAPEUTIC EXERCISES: CPT | Mod: GP | Performed by: PHYSICAL THERAPIST

## 2017-05-30 PROCEDURE — 40000718 ZZHC STATISTIC PT DEPARTMENT ORTHO VISIT: Performed by: PHYSICAL THERAPIST

## 2017-06-12 ENCOUNTER — HOSPITAL ENCOUNTER (OUTPATIENT)
Dept: PHYSICAL THERAPY | Facility: OTHER | Age: 77
Setting detail: THERAPIES SERIES
End: 2017-06-12
Attending: ORTHOPAEDIC SURGERY
Payer: COMMERCIAL

## 2017-06-12 PROCEDURE — 40000718 ZZHC STATISTIC PT DEPARTMENT ORTHO VISIT: Performed by: PHYSICAL THERAPIST

## 2017-06-12 PROCEDURE — 97110 THERAPEUTIC EXERCISES: CPT | Mod: GP | Performed by: PHYSICAL THERAPIST

## 2017-06-26 ENCOUNTER — HOSPITAL ENCOUNTER (OUTPATIENT)
Dept: PHYSICAL THERAPY | Facility: OTHER | Age: 77
Setting detail: THERAPIES SERIES
End: 2017-06-26
Attending: ORTHOPAEDIC SURGERY
Payer: COMMERCIAL

## 2017-06-26 PROCEDURE — 40000718 ZZHC STATISTIC PT DEPARTMENT ORTHO VISIT: Performed by: PHYSICAL THERAPIST

## 2017-06-26 PROCEDURE — 97110 THERAPEUTIC EXERCISES: CPT | Mod: GP | Performed by: PHYSICAL THERAPIST

## 2017-06-26 NOTE — PROGRESS NOTES
06/26/17 1300   Lower Extremity Functional Scale ( 1996 KD Serrano: used with permission)   a. Any of your usual work, housework, or school activities. 1-Quite a Bit of Difficulty   b. Your usual hobbies, recreational or sporting activities.  2-Moderate Difficulty   c. Getting into or out of the bath. 2-Moderate Difficulty   d. Walking between rooms. 2-Moderate Difficulty   e. Putting on your shoes or socks. 2-Moderate Difficulty   f. Squatting. 0-Extreme Difficulty or Unable to Perform Activity   g. Lifting an object, like a bag of groceries from the floor.  3-A Little Bit of Difficulty   h. Performing light activities around your home.  2-Moderate Difficulty   i. Performing heavy activities around your home. 0-Extreme Difficulty or Unable to Perform Activity   j. Getting into or out of a car. 3-A Little Bit of Difficulty   k. Walking 2 blocks. 1-Quite a Bit of Difficulty   l. Walking a mile. 0-Extreme Difficulty or Unable to Perform Activity   m. Going up or down 10 stairs (about 1 flight of stairs). 1-Quite a Bit of Difficulty   n. Standing for 1 hour. 0-Extreme Difficulty or Unable to Perform Activity   o. Sitting for 1 hour. 4-No Difficulty   p. Running on even ground. 0-Extreme Difficulty or Unable to Perform Activity   q. Running on uneven ground. 0-Extreme Difficulty or Unable to Perform Activity   r. Making sharp turns while running fast. 0-Extreme Difficulty or Unable to Perform Activity   s. Hopping. 0-Extreme Difficulty or Unable to Perform Activity   t. Rolling over in bed. 3-A Little Bit of Difficulty   SCORE:    Column Totals: /80 26

## 2017-06-28 NOTE — PROGRESS NOTES
Outpatient Physical Therapy Discharge Note     Patient: Uli Champagne  : 1940    Beginning/End Dates of Reporting Period:  2017 to 2017    Referring Provider: ilana Rodriguez Diagnosis: hip pain      Client Self Report: is going to follow up with mirella and possible injection     Objective Measurements:  Objective Measure: pain level 0-5/10,very little change with pain in his back when brushing teeth   Details: LEFS 26 at eval and discharge       patient seen for 5 Rx sessions for instruction in HEP and exercises in clinic at last Rx completing the following   bridges , SLR, hip adduction isometric , hip internal/external rotation , side hip abduction and standing  knee flexion 30x biodex x 15 level 5, standing on green step laterial step downs 15x , standing heelcord stretch at counter hold 10 x 5 , bosu 2 minutes B UE support     Goals:  Goal Identifier 1   Goal Description instruction in HEP for gentle ROM and strengtheinging    Target Date 17   Date Met      Progress:     Goal Identifier 2    Goal Description patient to have reduction in pain level currently 0-7/10 goal is 0-4/10 and improved tolerance to activity currently LEFS is 26/80 goal is 40/80   Target Date 17   Date Met      Progress:       Progress Toward Goals:   Progress this reporting period: patient is very compliant with HEP , has made slight improvement in overall decrease pain but his overall function has not significantly improved , he is going to follow up with his doctor          Plan:  Discharge from therapy.    Discharge:    Reason for Discharge: Patient has met all goals.  No further expectation of progress.    Equipment Issued: none    Discharge Plan: Patient to continue home program.

## 2017-07-01 DIAGNOSIS — I10 BENIGN ESSENTIAL HYPERTENSION: ICD-10-CM

## 2017-07-01 DIAGNOSIS — M1A.09X0 IDIOPATHIC CHRONIC GOUT OF MULTIPLE SITES WITHOUT TOPHUS: ICD-10-CM

## 2017-07-01 DIAGNOSIS — I48.21 PERMANENT ATRIAL FIBRILLATION (H): Chronic | ICD-10-CM

## 2017-07-03 RX ORDER — BENAZEPRIL/HYDROCHLOROTHIAZIDE 20 MG-25MG
TABLET ORAL
Qty: 180 TABLET | Refills: 1 | Status: SHIPPED | OUTPATIENT
Start: 2017-07-03 | End: 2018-01-08

## 2017-07-03 RX ORDER — ALLOPURINOL 300 MG/1
TABLET ORAL
Qty: 90 TABLET | Refills: 1 | Status: SHIPPED | OUTPATIENT
Start: 2017-07-03 | End: 2018-01-08

## 2017-07-03 RX ORDER — WARFARIN SODIUM 5 MG/1
TABLET ORAL
Qty: 90 TABLET | Refills: 0 | Status: SHIPPED | OUTPATIENT
Start: 2017-07-03 | End: 2017-10-02

## 2017-07-03 NOTE — TELEPHONE ENCOUNTER
Allopurinol  Routing refill request to provider for review/approval because:  Labs out of range:  Uric acid  Labs not current:  Uric acid, CBC    Benazepril-HCTZ  Routing refill request to provider for review/approval because:  Labs not current:  Potassium and Creatinine    Mary Carty RN  [facility]

## 2017-07-03 NOTE — TELEPHONE ENCOUNTER
Allopurinol       Last Written Prescription Date: 1/9/17  Last Fill Quantity: 90, # refills: 1  Last Office Visit with [facility] or  Health prescribing provider:  4/27/17        Uric Acid   Date Value Ref Range Status   02/03/2010 8.5 3.5 - 8.5 mg/dL Final   ]  Creatinine   Date Value Ref Range Status   05/10/2016 1.0 mg/dL Final   ]  Lab Results   Component Value Date    WBC 7.6 11/27/2015     Lab Results   Component Value Date    RBC 5.23 11/27/2015     Lab Results   Component Value Date    HGB 14.7 12/29/2015     Lab Results   Component Value Date    HCT 47.3 11/27/2015     No components found for: MCT  Lab Results   Component Value Date    MCV 90 11/27/2015     Lab Results   Component Value Date    MCH 31.9 11/27/2015     Lab Results   Component Value Date    MCHC 35.3 11/27/2015     Lab Results   Component Value Date    RDW 13.7 11/27/2015     Lab Results   Component Value Date     11/27/2015     Lab Results   Component Value Date    AST 33 05/10/2016     Lab Results   Component Value Date    ALT 28 05/10/2016     Benazepril-HCTZ      Last Written Prescription Date: 3/27/17  Last Fill Quantity: 180, # refills: 0  Last Office Visit with [facility] or [facility] prescribing provider: 4/27/17       Potassium   Date Value Ref Range Status   05/10/2016 3.9 mmol/L Final     Creatinine   Date Value Ref Range Status   05/10/2016 1.0 mg/dL Final     BP Readings from Last 3 Encounters:   04/27/17 138/82   04/20/17 130/68   01/17/17 124/68     Warfarin    Last Written Prescription Date: 3/27/17  Last Fill Qty: 90, # refills: 0  Last Office Visit with [facility] or [facility] prescribing provider: 4/27/17       Date and Result of Last PT/INR:   Lab Results   Component Value Date    INR 2.3 05/25/2017    INR 2.1 04/14/2017    INR 1.99 01/07/2016    INR 1.55 12/31/2015

## 2017-07-05 ENCOUNTER — TELEPHONE (OUTPATIENT)
Dept: ORTHOPEDICS | Facility: CLINIC | Age: 77
End: 2017-07-05

## 2017-07-05 DIAGNOSIS — M16.12 PRIMARY OSTEOARTHRITIS OF LEFT HIP: Primary | ICD-10-CM

## 2017-07-05 NOTE — TELEPHONE ENCOUNTER
Per Dr. Ogden's note:     Plan:  All of the above pertinent physical exam and imaging modalities findings was reviewed with Uli.     Treatment options discussed. Recommended Tylenol (avoid anti-inflammatories secondary to Coumadin use) and physical therapy. Could proceed with a intra-articular steroid injection if this fails. However order to do this by radiology he would need to come off his Coumadin.      Return to clinic 4-6 , week(s), PRN, or sooner as needed for changes.      *writer scheduled patient for appt with Melvi 7/13/17. Should be have him stop coumadin prior and schedule the injection for the same day?

## 2017-07-05 NOTE — TELEPHONE ENCOUNTER
Reason for Call:  Other call back    Detailed comments: Dr. Ogden patient, left hip, patient called and just completed therapy, he would like to know what the next step is.     Phone Number Patient can be reached at: Home number on file [phone] (CELL PHONE    Best Time: any time -   Can we leave a detailed message on this number? YES    Call taken on 7/5/2017 at 2:02 PM by Lili Echevarria

## 2017-07-06 ENCOUNTER — ANTICOAGULATION THERAPY VISIT (OUTPATIENT)
Dept: ANTICOAGULATION | Facility: OTHER | Age: 77
End: 2017-07-06
Payer: COMMERCIAL

## 2017-07-06 DIAGNOSIS — Z79.01 LONG-TERM (CURRENT) USE OF ANTICOAGULANTS: ICD-10-CM

## 2017-07-06 DIAGNOSIS — I48.20 CHRONIC A-FIB (H): ICD-10-CM

## 2017-07-06 LAB — INR POINT OF CARE: 2.1 (ref 0.86–1.14)

## 2017-07-06 PROCEDURE — 36416 COLLJ CAPILLARY BLOOD SPEC: CPT

## 2017-07-06 PROCEDURE — 85610 PROTHROMBIN TIME: CPT | Mod: QW

## 2017-07-06 PROCEDURE — 99207 ZZC NO CHARGE NURSE ONLY: CPT

## 2017-07-06 NOTE — TELEPHONE ENCOUNTER
Pt ok with going ahead with injection. Will cancel appt for next week upon return call from patient and order injection through radiology.

## 2017-07-06 NOTE — PROGRESS NOTES
ANTICOAGULATION FOLLOW-UP CLINIC VISIT    Patient Name:  Uli Champagne  Date:  7/6/2017  Contact Type:  Face to Face    SUBJECTIVE:     Patient Findings     Positives No Problem Findings           OBJECTIVE    INR Protime   Date Value Ref Range Status   07/06/2017 2.1 (A) 0.86 - 1.14 Final       ASSESSMENT / PLAN  INR assessment THER    Recheck INR In: 7 WEEKS    INR Location Clinic      Anticoagulation Summary as of 7/6/2017     INR goal 2.0-3.0   Today's INR 2.1   Maintenance plan 2.5 mg (5 mg x 0.5) on Tue, Sat; 5 mg (5 mg x 1) all other days   Full instructions 2.5 mg on Tue, Sat; 5 mg all other days   Weekly total 30 mg   No change documented Rui Landers RN   Plan last modified Rui Landers RN (3/31/2016)   Next INR check 8/24/2017   Target end date     Indications   Long-term (current) use of anticoagulants [Z79.01] [Z79.01]  Chronic a-fib (H) [I48.2]         Anticoagulation Episode Summary     INR check location     Preferred lab     Send INR reminders to [facility]    Comments       Anticoagulation Care Providers     Provider Role Specialty Phone number    Jakub Chester MD [facility] [phone]            See the Encounter Report to view Anticoagulation Flowsheet and Dosing Calendar (Go to Encounters tab in chart review, and find the Anticoagulation Therapy Visit)    Dosage adjustment made based on physician directed care plan.    Rui Landers RN

## 2017-07-06 NOTE — TELEPHONE ENCOUNTER
"Patient reports completing therapy without relief in his hip.  Still has pain \"when standing at the counter\" for example. He would like the steroid injection. Pain to left hip and radiates to low back. Injection ordered with assistance of Venita Scott. Patient will need to wean from coumadin prior to injection. Called patient to inform him that he needs to call his primary care doctor for instructions regarding weaning from coumadin. Provided number to schedule injection ([phone]). Instructed patient that he will need a  the day of injection and if he's still having lots of pain 2 weeks after the injection, he's to f/u with Dr. Ogden. Patient agreeable.  "

## 2017-07-06 NOTE — MR AVS SNAPSHOT
Uli SINGH Ihsan   7/6/2017 1:00 PM   Anticoagulation Therapy Visit    Description:  76 year old male   Provider:  JITENDRA ANTI COAG   Department:  [facility]           INR as of 7/6/2017     Today's INR 2.1      Anticoagulation Summary as of 7/6/2017     INR goal 2.0-3.0   Today's INR 2.1   Full instructions 2.5 mg on Tue, Sat; 5 mg all other days   Next INR check 8/24/2017    Indications   Long-term (current) use of anticoagulants [Z79.01] [Z79.01]  Chronic a-fib (H) [I48.2]         Your next Anticoagulation Clinic appointment(s)     Aug 24, 2017  1:00 PM CDT   Antico[facility]    [address] [facility] [id number] [phone]              Contact Numbers     Clinic Number:         July 2017 Details    Sun Mon Tue Wed Thu Fri Sat           1                 2               3               4               5               6      5 mg   See details      7      5 mg         8      2.5 mg           9      5 mg         10      5 mg         11      2.5 mg         12      5 mg         13      5 mg         14      5 mg         15      2.5 mg           16      5 mg         17      5 mg         18      2.5 mg         19      5 mg         20      5 mg         21      5 mg         22      2.5 mg           23      5 mg         24      5 mg         25      2.5 mg         26      5 mg         27      5 mg         28      5 mg         29      2.5 mg           30      5 mg         31      5 mg               Date Details   07/06 This INR check               How to take your warfarin dose     To take:  2.5 mg Take 0.5 of a 5 mg tablet.    To take:  5 mg Take 1 of the 5 mg tablets.           August 2017 Details    Sun Mon Tue Wed Thu Fri Sat       1      2.5 mg         2      5 mg         3      5 mg         4      5 mg         5      2.5 mg           6      5 mg         7      5 mg         8      2.5 mg         9      5 mg         10      5 mg          11      5 mg         12      2.5 mg           13      5 mg         14      5 mg         15      2.5 mg         16      5 mg         17      5 mg         18      5 mg         19      2.5 mg           20      5 mg         21      5 mg         22      2.5 mg         23      5 mg         24            25               26                 27               28               29               30               31                  Date Details   No additional details    Date of next INR:  8/24/2017         How to take your warfarin dose     To take:  2.5 mg Take 0.5 of a 5 mg tablet.    To take:  5 mg Take 1 of the 5 mg tablets.

## 2017-07-06 NOTE — TELEPHONE ENCOUNTER
Patient ok with cancelling appointment and scheduling injection. Will call patient back this afternoon or tomorrow AM to schedule injection.

## 2017-07-14 ENCOUNTER — HOSPITAL ENCOUNTER (OUTPATIENT)
Dept: GENERAL RADIOLOGY | Facility: CLINIC | Age: 77
Discharge: HOME | End: 2017-07-14
Attending: ORTHOPAEDIC SURGERY | Admitting: ORTHOPAEDIC SURGERY
Payer: COMMERCIAL

## 2017-07-14 DIAGNOSIS — M16.12 PRIMARY OSTEOARTHRITIS OF LEFT HIP: ICD-10-CM

## 2017-07-14 PROCEDURE — 25000128 H RX IP 250 OP 636: Performed by: RADIOLOGY

## 2017-07-14 PROCEDURE — 25000125 ZZHC RX 250: Performed by: RADIOLOGY

## 2017-07-14 PROCEDURE — 25500064 ZZH RX 255 OP 636: Performed by: RADIOLOGY

## 2017-07-14 PROCEDURE — 20610 DRAIN/INJ JOINT/BURSA W/O US: CPT | Mod: LT

## 2017-07-14 PROCEDURE — S0020 INJECTION, BUPIVICAINE HYDRO: HCPCS | Performed by: RADIOLOGY

## 2017-07-14 RX ORDER — IOPAMIDOL 408 MG/ML
50 INJECTION, SOLUTION INTRAVASCULAR ONCE
Status: COMPLETED | OUTPATIENT
Start: 2017-07-14 | End: 2017-07-14

## 2017-07-14 RX ORDER — BUPIVACAINE HYDROCHLORIDE 2.5 MG/ML
10 INJECTION, SOLUTION EPIDURAL; INFILTRATION; INTRACAUDAL ONCE
Status: COMPLETED | OUTPATIENT
Start: 2017-07-14 | End: 2017-07-14

## 2017-07-14 RX ORDER — LIDOCAINE HYDROCHLORIDE 10 MG/ML
20 INJECTION, SOLUTION INFILTRATION; PERINEURAL ONCE
Status: COMPLETED | OUTPATIENT
Start: 2017-07-14 | End: 2017-07-14

## 2017-07-14 RX ORDER — TRIAMCINOLONE ACETONIDE 40 MG/ML
40 INJECTION, SUSPENSION INTRA-ARTICULAR; INTRAMUSCULAR ONCE
Status: COMPLETED | OUTPATIENT
Start: 2017-07-14 | End: 2017-07-14

## 2017-07-14 RX ADMIN — IOPAMIDOL 1 ML: 408 INJECTION, SOLUTION INTRAVASCULAR at 09:38

## 2017-07-14 RX ADMIN — TRIAMCINOLONE ACETONIDE 1 ML: 40 INJECTION, SUSPENSION INTRA-ARTICULAR; INTRAMUSCULAR at 09:42

## 2017-07-14 RX ADMIN — LIDOCAINE HYDROCHLORIDE 1 ML: 10 INJECTION, SOLUTION INFILTRATION; PERINEURAL at 09:37

## 2017-07-14 RX ADMIN — BUPIVACAINE HYDROCHLORIDE 4 ML: 2.5 INJECTION, SOLUTION EPIDURAL; INFILTRATION; INTRACAUDAL at 09:42

## 2017-07-14 NOTE — PROGRESS NOTES
Appropriate assistive devices provided during their visit. yes (Yes, No, N/A)n/a (list device)    Exam table and/or cart  placed in the lowest position. yes(Yes, No, N/A)    Brakes on tables/carts/wheelchairs used at all times. n/a(Yes, No, N/A)    Non slip footwear applied. n/a(Yes, No, NA)    Patient was accompanied by staff throughout visit. yes (Yes, No, N/A)    Equipment safety straps used. n/a (Yes, No, N/A)    Assist with toileting. n/a (Yes, No, N/A)

## 2017-08-24 ENCOUNTER — TELEPHONE (OUTPATIENT)
Dept: ANTICOAGULATION | Facility: OTHER | Age: 77
End: 2017-08-24

## 2017-08-24 ENCOUNTER — ANTICOAGULATION THERAPY VISIT (OUTPATIENT)
Dept: ANTICOAGULATION | Facility: OTHER | Age: 77
End: 2017-08-24
Payer: COMMERCIAL

## 2017-08-24 DIAGNOSIS — Z79.01 LONG-TERM (CURRENT) USE OF ANTICOAGULANTS: ICD-10-CM

## 2017-08-24 DIAGNOSIS — I48.20 CHRONIC A-FIB (H): ICD-10-CM

## 2017-08-24 DIAGNOSIS — I48.21 PERMANENT ATRIAL FIBRILLATION (H): Primary | Chronic | ICD-10-CM

## 2017-08-24 LAB — INR POINT OF CARE: 2 (ref 0.86–1.14)

## 2017-08-24 PROCEDURE — 99207 ZZC NO CHARGE NURSE ONLY: CPT

## 2017-08-24 PROCEDURE — 85610 PROTHROMBIN TIME: CPT | Mod: QW

## 2017-08-24 PROCEDURE — 36416 COLLJ CAPILLARY BLOOD SPEC: CPT

## 2017-08-24 NOTE — PROGRESS NOTES
ANTICOAGULATION FOLLOW-UP CLINIC VISIT    Patient Name:  Uli Champagne  Date:  8/24/2017  Contact Type:  Face to Face    SUBJECTIVE:     Patient Findings     Positives No Problem Findings           OBJECTIVE    INR Protime   Date Value Ref Range Status   08/24/2017 2.0 (A) 0.86 - 1.14 Final       ASSESSMENT / PLAN  INR assessment THER    Recheck INR In: 6 WEEKS    INR Location Clinic      Anticoagulation Summary as of 8/24/2017     INR goal 2.0-3.0   Today's INR 2.0   Maintenance plan 2.5 mg (5 mg x 0.5) on Tue, Sat; 5 mg (5 mg x 1) all other days   Full instructions 2.5 mg on Tue, Sat; 5 mg all other days   Weekly total 30 mg   No change documented Rui Landers RN   Plan last modified Rui Landers RN (3/31/2016)   Next INR check 10/5/2017   Target end date     Indications   Long-term (current) use of anticoagulants [Z79.01] [Z79.01]  Chronic a-fib (H) [I48.2]         Anticoagulation Episode Summary     INR check location     Preferred lab     Send INR reminders to [facility]    Comments       Anticoagulation Care Providers     Provider Role Specialty Phone number    Jakub Chester MD [facility] [phone]            See the Encounter Report to view Anticoagulation Flowsheet and Dosing Calendar (Go to Encounters tab in chart review, and find the Anticoagulation Therapy Visit)    Dosage adjustment made based on physician directed care plan.    Rui Landers RN

## 2017-08-24 NOTE — MR AVS SNAPSHOT
Uli SINGH Ihsan   8/24/2017 1:00 PM   Anticoagulation Therapy Visit    Description:  76 year old male   Provider:  JITENDRA ANTI COAG   Department:  [facility]           INR as of 8/24/2017     Today's INR 2.0      Anticoagulation Summary as of 8/24/2017     INR goal 2.0-3.0   Today's INR 2.0   Full instructions 2.5 mg on Tue, Sat; 5 mg all other days   Next INR check 10/5/2017    Indications   Long-term (current) use of anticoagulants [Z79.01] [Z79.01]  Chronic a-fib (H) [I48.2]         Your next Anticoagulation Clinic appointment(s)     Oct 05, 2017  1:15 PM CDT   Anticoagulation Visit with JITENDRA ANTI ROCKY   [facility]    [address] [facility] [id number] [phone]              Contact Numbers     Clinic Number:         August 2017 Details    Sun Mon Tue Wed Thu Fri Sat       1               2               3               4               5                 6               7               8               9               10               11               12                 13               14               15               16               17               18               19                 20               21               22               23               24      5 mg   See details      25      5 mg         26      2.5 mg           27      5 mg         28      5 mg         29      2.5 mg         30      5 mg         31      5 mg            Date Details   08/24 This INR check               How to take your warfarin dose     To take:  2.5 mg Take 0.5 of a 5 mg tablet.    To take:  5 mg Take 1 of the 5 mg tablets.           September 2017 Details    Sun Mon Tue Wed Thu Fri Sat          1      5 mg         2      2.5 mg           3      5 mg         4      5 mg         5      2.5 mg         6      5 mg         7      5 mg         8      5 mg         9      2.5 mg           10      5 mg         11      5 mg         12      2.5 mg         13      5 mg         14      5  mg         15      5 mg         16      2.5 mg           17      5 mg         18      5 mg         19      2.5 mg         20      5 mg         21      5 mg         22      5 mg         23      2.5 mg           24      5 mg         25      5 mg         26      2.5 mg         27      5 mg         28      5 mg         29      5 mg         30      2.5 mg          Date Details   No additional details            How to take your warfarin dose     To take:  2.5 mg Take 0.5 of a 5 mg tablet.    To take:  5 mg Take 1 of the 5 mg tablets.           October 2017 Details    Sun Mon Tue Wed Thu Fri Sat     1      5 mg         2      5 mg         3      2.5 mg         4      5 mg         5            6               7                 8               9               10               11               12               13               14                 15               16               17               18               19               20               21                 22               23               24               25               26               27               28                 29               30               31                    Date Details   No additional details    Date of next INR:  10/5/2017         How to take your warfarin dose     To take:  2.5 mg Take 0.5 of a 5 mg tablet.    To take:  5 mg Take 1 of the 5 mg tablets.

## 2017-08-28 ENCOUNTER — TELEPHONE (OUTPATIENT)
Dept: LAB | Facility: OTHER | Age: 77
End: 2017-08-28

## 2017-08-28 DIAGNOSIS — E78.5 HYPERLIPIDEMIA LDL GOAL <100: ICD-10-CM

## 2017-08-28 DIAGNOSIS — I10 ESSENTIAL HYPERTENSION, BENIGN: Primary | ICD-10-CM

## 2017-08-28 DIAGNOSIS — C61 PROSTATE CANCER (H): ICD-10-CM

## 2017-08-28 DIAGNOSIS — Z13.29 SCREENING FOR THYROID DISORDER: ICD-10-CM

## 2017-08-28 NOTE — TELEPHONE ENCOUNTER
Patient has lab appointment 08/31/2017. There are no orders for him. Please review and future any orders needed.  Thank you!  [place] Alyssa

## 2017-08-31 ENCOUNTER — ALLIED HEALTH/NURSE VISIT (OUTPATIENT)
Dept: FAMILY MEDICINE | Facility: OTHER | Age: 77
End: 2017-08-31
Payer: COMMERCIAL

## 2017-08-31 DIAGNOSIS — I10 ESSENTIAL HYPERTENSION, BENIGN: ICD-10-CM

## 2017-08-31 DIAGNOSIS — Z23 NEED FOR PROPHYLACTIC VACCINATION AND INOCULATION AGAINST INFLUENZA: Primary | ICD-10-CM

## 2017-08-31 DIAGNOSIS — Z13.29 SCREENING FOR THYROID DISORDER: ICD-10-CM

## 2017-08-31 DIAGNOSIS — E78.5 HYPERLIPIDEMIA LDL GOAL <100: ICD-10-CM

## 2017-08-31 LAB
ALBUMIN SERPL-MCNC: 3.5 G/DL (ref 3.4–5)
ALP SERPL-CCNC: 109 U/L (ref 40–150)
ALT SERPL W P-5'-P-CCNC: 27 U/L (ref 0–70)
ANION GAP SERPL CALCULATED.3IONS-SCNC: 10 MMOL/L (ref 3–14)
AST SERPL W P-5'-P-CCNC: 24 U/L (ref 0–45)
BILIRUB SERPL-MCNC: 0.9 MG/DL (ref 0.2–1.3)
BUN SERPL-MCNC: 16 MG/DL (ref 7–30)
CALCIUM SERPL-MCNC: 8.8 MG/DL (ref 8.5–10.1)
CHLORIDE SERPL-SCNC: 107 MMOL/L (ref 94–109)
CHOLEST SERPL-MCNC: 100 MG/DL
CO2 SERPL-SCNC: 25 MMOL/L (ref 20–32)
CREAT SERPL-MCNC: 0.92 MG/DL (ref 0.66–1.25)
GFR SERPL CREATININE-BSD FRML MDRD: 80 ML/MIN/1.7M2
GLUCOSE SERPL-MCNC: 89 MG/DL (ref 70–99)
HDLC SERPL-MCNC: 43 MG/DL
LDLC SERPL CALC-MCNC: 39 MG/DL
NONHDLC SERPL-MCNC: 57 MG/DL
POTASSIUM SERPL-SCNC: 4 MMOL/L (ref 3.4–5.3)
PROT SERPL-MCNC: 7.2 G/DL (ref 6.8–8.8)
PSA SERPL-MCNC: <0.01 UG/L (ref 0–4)
SODIUM SERPL-SCNC: 142 MMOL/L (ref 133–144)
TRIGL SERPL-MCNC: 92 MG/DL
TSH SERPL DL<=0.005 MIU/L-ACNC: 2.31 MU/L (ref 0.4–4)

## 2017-08-31 PROCEDURE — 90670 PCV13 VACCINE IM: CPT

## 2017-08-31 PROCEDURE — 84153 ASSAY OF PSA TOTAL: CPT | Performed by: FAMILY MEDICINE

## 2017-08-31 PROCEDURE — G0009 ADMIN PNEUMOCOCCAL VACCINE: HCPCS

## 2017-08-31 PROCEDURE — 80053 COMPREHEN METABOLIC PANEL: CPT | Performed by: FAMILY MEDICINE

## 2017-08-31 PROCEDURE — 84443 ASSAY THYROID STIM HORMONE: CPT | Performed by: FAMILY MEDICINE

## 2017-08-31 PROCEDURE — 80061 LIPID PANEL: CPT | Performed by: FAMILY MEDICINE

## 2017-08-31 PROCEDURE — 36415 COLL VENOUS BLD VENIPUNCTURE: CPT | Performed by: FAMILY MEDICINE

## 2017-08-31 NOTE — MR AVS SNAPSHOT
After Visit Summary   8/31/2017    Uli Champagne    MRN: [id number]           Patient Information     Date Of Birth          1940        Visit Information        Provider Department      8/31/2017 1:45 PM NL FLOAT NURSE, [facility]        Today's Diagnoses     Need for prophylactic vaccination and inoculation against influenza    -  1       Follow-ups after your visit        Your next 10 appointments already scheduled     Aug 31, 2017  1:45 PM CDT   Nurse Only with JONEL CRAWFORD NURSE, [facility] ([facility])    [address] [facility] [id number] [phone]            Oct 05, 2017  1:15 PM CDT   [facility] with  [facility]    [address] [facility] [address] [phone]              Who to contact     If you have questions or need follow up information about today's clinic visit or your schedule please contact [facility] directly at [phone].  Normal or non-critical lab and imaging results will be communicated to you by [employer], letter or phone within 4 business days after the clinic has received the results. If you do not hear from us within 7 days, please contact the clinic through [employer] or phone. If you have a critical or abnormal lab result, we will notify you by phone as soon as possible.  Submit refill requests through [employer] or call your pharmacy and they will forward the refill request to us. Please allow 3 business days for your refill to be completed.          Additional Information About Your Visit        [employer] Information     [employer] gives you secure access to your electronic health record. If you see a primary care provider, you can also send messages to your care team and make appointments. If you have questions, please call your primary care clinic.  If you do not have a primary care provider, please call [phone] and they will  assist you.        Care EveryWhere ID     This is your Care EveryWhere ID. This could be used by other organizations to access your [place] medical records  [id number]         Blood Pressure from Last 3 Encounters:   04/27/17 138/82   04/20/17 130/68   01/17/17 124/68    Weight from Last 3 Encounters:   05/03/17 228 lb (103.4 kg)   04/27/17 228 lb 9.6 oz (103.7 kg)   04/21/17 223 lb 4.8 oz (101.3 kg)              We Performed the Following     ADMIN MEDICARE: Pneumococcal Vaccine ()     Pneumococcal vaccine 13 valent PCV13 IM (Prevnar) [97071]        Primary Care Provider Office Phone # Fax #    Jakub Chester [phone]       [address] [facility] [address]        Equal Access to Services     [facility] : Hadii oxana blas hadasho Soomaali, waaxda luqadaha, qaybta kaalmada ademartiyada, vesna branham . So [facility] [phone].    ATENCIÓN: Si habla español, tiene a lema disposición servicios gratuitos de asistencia lingüística. Llame al [phone].    We comply with applicable federal civil rights laws and [place] laws. We do not discriminate on the basis of race, color, national origin, age, disability sex, sexual orientation or gender identity.            Thank you!     Thank you for choosing [facility]  for your care. Our goal is always to provide you with excellent care. Hearing back from our patients is one way we can continue to improve our services. Please take a few minutes to complete the written survey that you may receive in the mail after your visit with us. Thank you!             Your Updated Medication List - Protect others around you: Learn how to safely use, store and throw away your medicines at www.disposemymeds.org.          This list is accurate as of: 8/31/17  1:37 PM.  Always use your most recent med list.                   Brand Name Dispense Instructions for use Diagnosis    allopurinol 300 MG tablet    ZYLOPRIM    90  tablet    TAKE 1 TABLET DAILY FOR GOUT    Idiopathic chronic gout of multiple sites without tophus       aspirin 81 MG tablet      1 TABLET DAILY        Benazepril-Hydrochlorothiazide 20-25 MG Tabs     180 tablet    TAKE 1 TABLET TWICE A DAY FOR HYPERTENSION    Benign essential hypertension       fish oil-omega-3 fatty acids 1000 MG capsule      Take 1 g by mouth daily.        nitroGLYcerin 0.4 MG sublingual tablet    NITROSTAT    25 tablet    Place 1 tablet (0.4 mg) under the tongue See Admin Instructions for chest pain    Cardiac dysrhythmia, unspecified       simvastatin 40 MG tablet    ZOCOR    90 tablet    Take 1 tablet (40 mg) by mouth At Bedtime    Hyperlipidemia LDL goal <100       warfarin 5 MG tablet    COUMADIN    90 tablet    TAKE ONE-HALF (1/2) TABLET ON TUESDAY AND SATURDAY AND 1 TABLET ON ALL OTHER DAYS OF THE WEEK OR AS DIRECTED BY THE COUMADIN CLINIC    Permanent atrial fibrillation (H)

## 2017-08-31 NOTE — PROGRESS NOTES
Screening Questionnaire for Adult Immunization    Are you sick today?   No   Do you have allergies to medications, food, a vaccine component or latex?   No   Have you ever had a serious reaction after receiving a vaccination?   No   Do you have a long-term health problem with heart disease, lung disease, asthma, kidney disease, metabolic disease (e.g. diabetes), anemia, or other blood disorder?   No   Do you have cancer, leukemia, HIV/AIDS, or any other immune system problem?   No   In the past 3 months, have you taken medications that affect  your immune system, such as prednisone, other steroids, or anticancer drugs; drugs for the treatment of rheumatoid arthritis, Crohn s disease, or psoriasis; or have you had radiation treatments?   No   Have you had a seizure, or a brain or other nervous system problem?   No   During the past year, have you received a transfusion of blood or blood     products, or been given immune (gamma) globulin or antiviral drug?   No   For women: Are you pregnant or is there a chance you could become        pregnant during the next month?   No   Have you received any vaccinations in the past 4 weeks?   No     Immunization questionnaire answers were all negative.        Prior to injection verified patient identity using patient's name and date of birth.   Patient instructed to remain in clinic for 15 minutes afterwards, and to report any adverse reaction to me immediately.       Screening performed by Garima Lara on 8/31/2017 at 1:36 PM.

## 2017-10-02 DIAGNOSIS — E78.5 HYPERLIPIDEMIA LDL GOAL <100: ICD-10-CM

## 2017-10-02 DIAGNOSIS — I48.21 PERMANENT ATRIAL FIBRILLATION (H): Chronic | ICD-10-CM

## 2017-10-02 RX ORDER — WARFARIN SODIUM 5 MG/1
TABLET ORAL
Qty: 90 TABLET | Refills: 0 | Status: SHIPPED | OUTPATIENT
Start: 2017-10-02 | End: 2018-01-08

## 2017-10-02 RX ORDER — SIMVASTATIN 40 MG
TABLET ORAL
Qty: 90 TABLET | Refills: 2 | Status: SHIPPED | OUTPATIENT
Start: 2017-10-02 | End: 2018-01-10

## 2017-10-02 NOTE — TELEPHONE ENCOUNTER
Simvastatin,   Last Written Prescription Date: 7/26/16  Last Fill Quantity: 90, # refills: 3  Last Office Visit with [facility] or  Health prescribing provider: 4/27/17       Lab Results   Component Value Date    CHOL 100 08/31/2017     Lab Results   Component Value Date    HDL 43 08/31/2017     Lab Results   Component Value Date    LDL 39 08/31/2017     Lab Results   Component Value Date    TRIG 92 08/31/2017     Lab Results   Component Value Date    CHOLHDLRATIO 2.5 12/16/2014       Warfarin       Last Written Prescription Date: 7/3/17  Last Fill Qty: 90, # refills: 0  Last Office Visit with [facility] or [facility] prescribing provider: 4/27/17       Date and Result of Last PT/INR:   Lab Results   Component Value Date    INR 2.0 08/24/2017    INR 2.1 07/06/2017    INR 1.99 01/07/2016    INR 1.55 12/31/2015

## 2017-10-02 NOTE — TELEPHONE ENCOUNTER
Prescription approved per [facility] Refill Protocol.    Rui Landers RN, BSN

## 2017-10-05 ENCOUNTER — ANTICOAGULATION THERAPY VISIT (OUTPATIENT)
Dept: ANTICOAGULATION | Facility: OTHER | Age: 77
End: 2017-10-05
Payer: COMMERCIAL

## 2017-10-05 DIAGNOSIS — I48.20 CHRONIC A-FIB (H): ICD-10-CM

## 2017-10-05 DIAGNOSIS — Z79.01 LONG-TERM (CURRENT) USE OF ANTICOAGULANTS: ICD-10-CM

## 2017-10-05 LAB — INR POINT OF CARE: 2.2 (ref 0.86–1.14)

## 2017-10-05 PROCEDURE — 99207 ZZC NO CHARGE NURSE ONLY: CPT

## 2017-10-05 PROCEDURE — 36416 COLLJ CAPILLARY BLOOD SPEC: CPT

## 2017-10-05 PROCEDURE — 85610 PROTHROMBIN TIME: CPT | Mod: QW

## 2017-10-05 NOTE — MR AVS SNAPSHOT
Uli SINGH Ihsan   10/5/2017 10:30 AM   Anticoagulation Therapy Visit    Description:  77 year old male   Provider:  JITENDRA ANTI COAG   Department:  [facility]           INR as of 10/5/2017     Today's INR 2.2      Anticoagulation Summary as of 10/5/2017     INR goal 2.0-3.0   Today's INR 2.2   Full instructions 2.5 mg on Tue, Sat; 5 mg all other days   Next INR check 11/16/2017    Indications   Long-term (current) use of anticoagulants [Z79.01] [Z79.01]  Chronic a-fib (H) [I48.2]         Your next Anticoagulation Clinic appointment(s)     Nov 16, 2017  1:00 PM CST   Anticoagulation Visit with [facility]DRA ANTI [facility]   [facility]    [address] [facility] [id number] [phone]              Contact Numbers     Clinic Number:         October 2017 Details    Sun Mon Tue Wed Thu Fri Sat     1               2               3               4               5      5 mg   See details      6      5 mg         7      2.5 mg           8      5 mg         9      5 mg         10      2.5 mg         11      5 mg         12      5 mg         13      5 mg         14      2.5 mg           15      5 mg         16      5 mg         17      2.5 mg         18      5 mg         19      5 mg         20      5 mg         21      2.5 mg           22      5 mg         23      5 mg         24      2.5 mg         25      5 mg         26      5 mg         27      5 mg         28      2.5 mg           29      5 mg         30      5 mg         31      2.5 mg              Date Details   10/05 This INR check               How to take your warfarin dose     To take:  2.5 mg Take 0.5 of a 5 mg tablet.    To take:  5 mg Take 1 of the 5 mg tablets.           November 2017 Details    Sun Mon Tue Wed Thu Fri Sat        1      5 mg         2      5 mg         3      5 mg         4      2.5 mg           5      5 mg         6      5 mg         7      2.5 mg         8      5 mg         9      5 mg         10      5 mg          11      2.5 mg           12      5 mg         13      5 mg         14      2.5 mg         15      5 mg         16            17               18                 19               20               21               22               23               24               25                 26               27               28               29               30                  Date Details   No additional details    Date of next INR:  11/16/2017         How to take your warfarin dose     To take:  2.5 mg Take 0.5 of a 5 mg tablet.    To take:  5 mg Take 1 of the 5 mg tablets.

## 2017-10-05 NOTE — PROGRESS NOTES
ANTICOAGULATION FOLLOW-UP CLINIC VISIT    Patient Name:  Uli Champagne  Date:  10/5/2017  Contact Type:  Face to Face    SUBJECTIVE:     Patient Findings     Positives No Problem Findings           OBJECTIVE    INR Protime   Date Value Ref Range Status   10/05/2017 2.2 (A) 0.86 - 1.14 Final       ASSESSMENT / PLAN  INR assessment THER    Recheck INR In: 6 WEEKS    INR Location Clinic      Anticoagulation Summary as of 10/5/2017     INR goal 2.0-3.0   Today's INR 2.2   Maintenance plan 2.5 mg (5 mg x 0.5) on Tue, Sat; 5 mg (5 mg x 1) all other days   Full instructions 2.5 mg on Tue, Sat; 5 mg all other days   Weekly total 30 mg   No change documented Rui Landers RN   Plan last modified Rui Landers RN (3/31/2016)   Next INR check 11/16/2017   Target end date     Indications   Long-term (current) use of anticoagulants [Z79.01] [Z79.01]  Chronic a-fib (H) [I48.2]         Anticoagulation Episode Summary     INR check location     Preferred lab     Send INR reminders to [facility]    Comments       Anticoagulation Care Providers     Provider Role Specialty Phone number    Jakub Chester MD [facility] [phone]            See the Encounter Report to view Anticoagulation Flowsheet and Dosing Calendar (Go to Encounters tab in chart review, and find the Anticoagulation Therapy Visit)    Dosage adjustment made based on physician directed care plan.    Rui Landers RN

## 2017-11-15 ENCOUNTER — OFFICE VISIT (OUTPATIENT)
Dept: FAMILY MEDICINE | Facility: OTHER | Age: 77
End: 2017-11-15
Payer: COMMERCIAL

## 2017-11-15 VITALS
WEIGHT: 212.1 LBS | RESPIRATION RATE: 20 BRPM | TEMPERATURE: 98.2 F | OXYGEN SATURATION: 97 % | BODY MASS INDEX: 33.22 KG/M2 | HEART RATE: 81 BPM | DIASTOLIC BLOOD PRESSURE: 76 MMHG | SYSTOLIC BLOOD PRESSURE: 136 MMHG

## 2017-11-15 DIAGNOSIS — H00.011 HORDEOLUM EXTERNUM OF RIGHT UPPER EYELID: ICD-10-CM

## 2017-11-15 DIAGNOSIS — Z23 NEED FOR PROPHYLACTIC VACCINATION AND INOCULATION AGAINST INFLUENZA: Primary | ICD-10-CM

## 2017-11-15 PROCEDURE — 90662 IIV NO PRSV INCREASED AG IM: CPT | Performed by: FAMILY MEDICINE

## 2017-11-15 PROCEDURE — G0008 ADMIN INFLUENZA VIRUS VAC: HCPCS | Performed by: FAMILY MEDICINE

## 2017-11-15 PROCEDURE — 99213 OFFICE O/P EST LOW 20 MIN: CPT | Mod: 25 | Performed by: FAMILY MEDICINE

## 2017-11-15 RX ORDER — GENTAMICIN SULFATE 3 MG/ML
2 SOLUTION/ DROPS OPHTHALMIC 4 TIMES DAILY
Qty: 3 ML | Refills: 0 | Status: SHIPPED | OUTPATIENT
Start: 2017-11-15 | End: 2017-11-22

## 2017-11-15 ASSESSMENT — PAIN SCALES - GENERAL: PAINLEVEL: MILD PAIN (3)

## 2017-11-15 NOTE — NURSING NOTE
Prior to injection verified patient identity using patient's name and date of birth.      Lawanda Wren MA 11/15/2017  3:42 PM

## 2017-11-15 NOTE — PROGRESS NOTES
SUBJECTIVE:   Uli Champagne is a 77 year old male who presents to clinic today for the following health issues:        Eye(s) Problem  Onset: 3 to 4 days    Description:   Location: right  Pain: YES  Redness: YES, eye lid    Accompanying Signs & Symptoms:  Discharge/mattering: YES  Swelling: YES  Visual changes: no  Fever: no  Nasal Congestion: no  Bothered by bright lights: no    History:   Trauma: no   Foreign body exposure: no    Precipitating factors:   Wearing contacts: no    Alleviating factors:  Improved by: hot packs    Therapies Tried and outcome: Hot packs have had styes in the past. Had eyelids lanced in the past.       Problem list and histories reviewed & adjusted, as indicated.  Additional history: as documented    Patient Active Problem List   Diagnosis     Cardiac dysrhythmia     Essential hypertension, benign     Neurogenic bladder     Prostate cancer (H)     Permanent atrial fibrillation (H)     Chronic idiopathic gout of multiple sites     HYPERLIPIDEMIA LDL GOAL <100     CAD (coronary artery disease)     Advanced directives, counseling/discussion     Health Care Home     Stye     Non morbid obesity due to excess calories     Lumbar spinal stenosis     S/P lumbar laminectomy     Chronic a-fib (H)     Essential hypertension     Hordeolum externum left upper eyelid     Long-term (current) use of anticoagulants [Z79.01]     Tear of medial meniscus of left knee, initial encounter     Primary osteoarthritis of left knee     Bradycardia     Past Surgical History:   Procedure Laterality Date     C CABG, VEIN, THREE  1992     C NONSPECIFIC PROCEDURE  1987    Bone fusion in neck     C REMV PROSTATE,RETROPUB,RAD,LTD NODES  7/14/2008    Radical retropubic prostatectomy and pelvic lymph node dissection.     HC REMOVAL OF TONSILS,10+ Y/O  1969    Tonsils 10+y.o.     HEMILAMINECTOMY, DISCECTOMY LUMBAR TWO LEVELS, COMBINED Left 12/16/2015    Procedure: COMBINED HEMILAMINECTOMY, DISCECTOMY LUMBAR TWO  LEVELS;  Surgeon: Jung Finley MD;  Location: [facility] OR     STENT, CORONARY, ALEAH  2002    x5       Social History   Substance Use Topics     Smoking status: Former Smoker     Packs/day: 0.50     Years: 2.00     Types: Cigarettes     Quit date: 1/1/1960     Smokeless tobacco: Never Used     Alcohol use Yes      Comment: socially     Family History   Problem Relation Age of Onset     Arthritis Father      HEART DISEASE Father      Hypertension Brother      CANCER Brother      liver     HEART DISEASE Sister      HEART DISEASE Brother          Current Outpatient Prescriptions   Medication Sig Dispense Refill     gentamicin (GARAMYCIN) 0.3 % ophthalmic solution Place 2 drops into the right eye 4 times daily for 7 days 3 mL 0     warfarin (COUMADIN) 5 MG tablet TAKE ONE-HALF (1/2) TABLET ON TUESDAY AND SATURDAY AND 1 TABLET ON ALL OTHER DAYS OF THE WEEK OR AS DIRECTED BY THE COUMADIN CLINIC 90 tablet 0     simvastatin (ZOCOR) 40 MG tablet TAKE 1 TABLET AT BEDTIME 90 tablet 2     Benazepril-Hydrochlorothiazide 20-25 MG TABS TAKE 1 TABLET TWICE A DAY FOR HYPERTENSION 180 tablet 1     allopurinol (ZYLOPRIM) 300 MG tablet TAKE 1 TABLET DAILY FOR GOUT 90 tablet 1     fish oil-omega-3 fatty acids (FISH OIL) 1000 MG capsule Take 1 g by mouth daily.       ASPIRIN 81 MG OR TABS 1 TABLET DAILY       nitroglycerin (NITROSTAT) 0.4 MG SL tablet Place 1 tablet (0.4 mg) under the tongue See Admin Instructions for chest pain (Patient not taking: Reported on 11/15/2017) 25 tablet 1     Allergies   Allergen Reactions     No Known Drug Allergies      Recent Labs   Lab Test  08/31/17   1323 05/10/16  12/29/15   0530  05/20/15   LDL  39  57.0   --    --   49.0  49   HDL  43  43   --    --   35  35   TRIG  92  104   --    --   120  120   ALT  27  28   --    --   28  28   CR  0.92  1.0  0.73   < >  0.9  0.9   GFRESTIMATED  80  >60  >90  Non  GFR Calc     < >  >60   GFRESTBLACK  >90   --   >90   GFR  Calc     < >   --    POTASSIUM  4.0  3.9  4.1   < >  4.2  4.2   TSH  2.31   --    --    --   2.50    < > = values in this interval not displayed.      BP Readings from Last 3 Encounters:   11/15/17 136/76   04/27/17 138/82   04/20/17 130/68    Wt Readings from Last 3 Encounters:   11/15/17 212 lb 1.6 oz (96.2 kg)   05/03/17 228 lb (103.4 kg)   04/27/17 228 lb 9.6 oz (103.7 kg)                          Reviewed and updated as needed this visit by clinical staffTobacco  Allergies  Med Hx  Surg Hx  Fam Hx  Soc Hx      Reviewed and updated as needed this visit by Provider         ROS:  Constitutional, HEENT, cardiovascular, pulmonary, gi and gu systems are negative, except as otherwise noted.      OBJECTIVE:   /76 (BP Location: Right arm, Patient Position: Chair, Cuff Size: Adult Large)  Pulse 81  Temp 98.2  F (36.8  C) (Temporal)  Resp 20  Wt 212 lb 1.6 oz (96.2 kg)  SpO2 97%  BMI 33.22 kg/m2  Body mass index is 33.22 kg/(m^2).  GENERAL: healthy, alert and no distress  EYES: PERRL, EOMI and eyelids- hordeolum/sty OD    Diagnostic Test Results:  none     ASSESSMENT/PLAN:       1. Need for prophylactic vaccination and inoculation against influenza  - FLU VACCINE, INCREASED ANTIGEN, PRESV FREE, AGE 65+ [32598]  - ADMIN INFLUENZA (For MEDICARE Patients ONLY) []    2. Hordeolum externum of right upper eyelid  Hot/warm packs 2-3 times daily for stye.  Come back if increasing reddness, pain or persisting symptoms.  - gentamicin (GARAMYCIN) 0.3 % ophthalmic solution; Place 2 drops into the right eye 4 times daily for 7 days  Dispense: 3 mL; Refill: 0    Patient Instructions     Sty (or Stye)  A sty is an infection of the oil gland of the eyelid. It may develop into a small pocket of pus (an abscess). This can cause pain, redness, and swelling. In early stages, a sty is treated with antibiotic cream, eye drops, or a small towel soaked in warm water (a warm compress). More severe cases may need to be  opened and drained by a healthcare provider.  Home care    Eye drops or ointment are usually prescribed to treat the infection. Use these as directed.     Artificial tears may also be used to lubricate the eye and make it more comfortable. You can buy these over the counter without a prescription. Talk with your healthcare provider before using any over-the-counter treatment for a sty.    Apply a warm, damp towel to the affected eye for at least 5 minutes, 3 to 4 times a day for a week. Warm compresses open the pores and speed the healing. But if the compresses are too hot, they may burn your eyelid.    Sometimes the sty will drain with this treatment alone. If this happens, keep using the antibiotic until all the redness and swelling are gone.    Wash your hands before and after touching the infected eyelid to avoid spreading the infection.    Don t squeeze or try to break open the sty.  Follow-up care  Follow up with your healthcare provider, or as advised.   When to seek medical advice  Call your healthcare provider right away if any of these occur:    Increase in swelling or redness around the eyelid after 48 to 72 hours    Increase in eye pain or the eyelid blisters    Increase in warmth--the eyelid feels hot    Drainage of blood or thick pus from the sty    Blister on the eyelid    Inability to open the eyelid due to swelling    Fever of 100.4 F (38 C) or above, or as directed by your provider    Vision changes    Headache or stiff neck    The sty comes back  Date Last Reviewed: 8/1/2017 2000-2017 [employer]. [address] [place] [address]. All rights reserved. This information is not intended as a substitute for professional medical care. Always follow your healthcare professional's instructions.        Chalazion    A chalazion is a blocked, swollen oil gland in the eyelid. The eyelids have oil glands that lubricate the inside of the lids. If a gland becomes blocked, the oil  builds up and causes the skin to swell.  A chalazion can take several weeks to grow. It can vary in size. It may appear on the inside or outside of the lid. In most cases, it occurs on the upper lid. The skin may be a normal color or may be red. A chalazion is usually not painful, but it can cause discomfort, tenderness, sensitivity to light, eye discharge, and increased tearing.  A chalazion usually lasts from a few weeks to a month. It often goes away on its own. A chalazion can be mistaken for a sty (infection of an oil gland) because they both appear on the eyelid.  Why a chalazion forms  It s often unclear why a chalazion appears. However, a chalazion can develop when you have any of the following conditions:    Chronic blepharitis, when eyelids become irritated    Acne rosacea    Seborrhea    Tuberculosis    Viral infection  Home care  If your healthcare provider finds that a chalazion is infected, he or she may prescribe an antibiotic drop or ointment. Use the medicine as directed.    Wash your hands carefully with soap and warm water before and after caring for your eye(s). This is to help prevent infection.    Apply a warm, moist towel or compress for 10 to 15 minutes, 3 to 4 times a day. This will reduce the swelling and soften the hardened oils blocking the duct.    Massage the area gently after applying the warm compress to help drain the chalazion. Or follow the directions given by your healthcare provider.    Do not try to pop or squeeze the chalazion.    Do not wear eye makeup until the chalazion has healed, or follow your healthcare provider s directions.    Do not wear contact lenses until the chalazion has healed, or follow your healthcare provider s directions.    Once a day, with eyes closed, clean your eyelids with baby shampoo or a moist eyelid cleansing wipe. This is to help reduce clogging of the duct, as well as help prevent a chalazion from returning. Ask your health care provider about  products to clean your eyelids.  Follow-up care  Follow up with your health care provider, or as advised. If the chalazion does not heal in 4 weeks, you may be referred to a healthcare provider who specializes in eye care (an optometrist or ophthalmologist) for further evaluation and treatment. You may also be referred to an eye specialist if you have a large chalazion.  When to seek medical advice  Call your health care provider right away if any of these occur:    Chalazion returns to the same area repeatedly    Existing symptoms (such as pain, warmth, redness, and drainage) get worse    New symptoms appear, such as eye pain, warmth or redness around the eye, eye drainage, or both the upper and lower lids of the same eye swell    You have visual changes or blurred vision    You have a headache that persists    You have a fever of 100.4 F (38 C) or higher, or as directed by your healthcare provider  Date Last Reviewed: 10/9/2015    5276-8127 [employer]. [address] [place] [address]. All rights reserved. This information is not intended as a substitute for professional medical care. Always follow your healthcare professional's instructions.            Isra Carrillo MD  [facility]      Injectable Influenza Immunization Documentation    1.  Is the person to be vaccinated sick today?   No    2. Does the person to be vaccinated have an allergy to a component   of the vaccine?   No  Egg Allergy Algorithm Link    3. Has the person to be vaccinated ever had a serious reaction   to influenza vaccine in the past?   No    4. Has the person to be vaccinated ever had Guillain-Barré syndrome?   No    Form completed by Lawanda Wren MA 11/15/2017  3:24 PM

## 2017-11-15 NOTE — MR AVS SNAPSHOT
After Visit Summary   11/15/2017    Uli Champagne    MRN: [id number]           Patient Information     Date Of Birth          1940        Visit Information        Provider Department      11/15/2017 3:10 PM Isra Carrillo MD [facility]        Today's Diagnoses     Need for prophylactic vaccination and inoculation against influenza    -  1    Hordeolum externum of right upper eyelid          Care Instructions      Sty (or Stye)  A sty is an infection of the oil gland of the eyelid. It may develop into a small pocket of pus (an abscess). This can cause pain, redness, and swelling. In early stages, a sty is treated with antibiotic cream, eye drops, or a small towel soaked in warm water (a warm compress). More severe cases may need to be opened and drained by a healthcare provider.  Home care    Eye drops or ointment are usually prescribed to treat the infection. Use these as directed.     Artificial tears may also be used to lubricate the eye and make it more comfortable. You can buy these over the counter without a prescription. Talk with your healthcare provider before using any over-the-counter treatment for a sty.    Apply a warm, damp towel to the affected eye for at least 5 minutes, 3 to 4 times a day for a week. Warm compresses open the pores and speed the healing. But if the compresses are too hot, they may burn your eyelid.    Sometimes the sty will drain with this treatment alone. If this happens, keep using the antibiotic until all the redness and swelling are gone.    Wash your hands before and after touching the infected eyelid to avoid spreading the infection.    Don t squeeze or try to break open the sty.  Follow-up care  Follow up with your healthcare provider, or as advised.   When to seek medical advice  Call your healthcare provider right away if any of these occur:    Increase in swelling or redness around the eyelid after 48 to 72 hours    Increase in eye  pain or the eyelid blisters    Increase in warmth--the eyelid feels hot    Drainage of blood or thick pus from the sty    Blister on the eyelid    Inability to open the eyelid due to swelling    Fever of 100.4 F (38 C) or above, or as directed by your provider    Vision changes    Headache or stiff neck    The sty comes back  Date Last Reviewed: 8/1/2017 2000-2017 [employer]. [address] [place] [address], [place] [address]. All rights reserved. This information is not intended as a substitute for professional medical care. Always follow your healthcare professional's instructions.        Chalazion    A chalazion is a blocked, swollen oil gland in the eyelid. The eyelids have oil glands that lubricate the inside of the lids. If a gland becomes blocked, the oil builds up and causes the skin to swell.  A chalazion can take several weeks to grow. It can vary in size. It may appear on the inside or outside of the lid. In most cases, it occurs on the upper lid. The skin may be a normal color or may be red. A chalazion is usually not painful, but it can cause discomfort, tenderness, sensitivity to light, eye discharge, and increased tearing.  A chalazion usually lasts from a few weeks to a month. It often goes away on its own. A chalazion can be mistaken for a sty (infection of an oil gland) because they both appear on the eyelid.  Why a chalazion forms  It s often unclear why a chalazion appears. However, a chalazion can develop when you have any of the following conditions:    Chronic blepharitis, when eyelids become irritated    Acne rosacea    Seborrhea    Tuberculosis    Viral infection  Home care  If your healthcare provider finds that a chalazion is infected, he or she may prescribe an antibiotic drop or ointment. Use the medicine as directed.    Wash your hands carefully with soap and warm water before and after caring for your eye(s). This is to help prevent infection.    Apply a warm, moist towel or  compress for 10 to 15 minutes, 3 to 4 times a day. This will reduce the swelling and soften the hardened oils blocking the duct.    Massage the area gently after applying the warm compress to help drain the chalazion. Or follow the directions given by your healthcare provider.    Do not try to pop or squeeze the chalazion.    Do not wear eye makeup until the chalazion has healed, or follow your healthcare provider s directions.    Do not wear contact lenses until the chalazion has healed, or follow your healthcare provider s directions.    Once a day, with eyes closed, clean your eyelids with baby shampoo or a moist eyelid cleansing wipe. This is to help reduce clogging of the duct, as well as help prevent a chalazion from returning. Ask your health care provider about products to clean your eyelids.  Follow-up care  Follow up with your health care provider, or as advised. If the chalazion does not heal in 4 weeks, you may be referred to a healthcare provider who specializes in eye care (an optometrist or ophthalmologist) for further evaluation and treatment. You may also be referred to an eye specialist if you have a large chalazion.  When to seek medical advice  Call your health care provider right away if any of these occur:    Chalazion returns to the same area repeatedly    Existing symptoms (such as pain, warmth, redness, and drainage) get worse    New symptoms appear, such as eye pain, warmth or redness around the eye, eye drainage, or both the upper and lower lids of the same eye swell    You have visual changes or blurred vision    You have a headache that persists    You have a fever of 100.4 F (38 C) or higher, or as directed by your healthcare provider  Date Last Reviewed: 10/9/2015    4782-0396 [employer]. [address] [place] [address], [place] [address]. All rights reserved. This information is not intended as a substitute for professional medical care. Always follow your healthcare  professional's instructions.                Follow-ups after your visit        Your next 10 appointments already scheduled     Nov 16, 2017  1:00 PM CST   Anticoagulation Visit with [facility]A [facility]   [facility]    [address] [facility] [place] [address] [phone]              Who to contact     If you have questions or need follow up information about today's clinic visit or your schedule please contact [facility] directly at [phone].  Normal or non-critical lab and imaging results will be communicated to you by [facility], letter or phone within 4 business days after the clinic has received the results. If you do not hear from us within 7 days, please contact the clinic through [employer] or phone. If you have a critical or abnormal lab result, we will notify you by phone as soon as possible.  Submit refill requests through [employer] or call your pharmacy and they will forward the refill request to us. Please allow 3 business days for your refill to be completed.          Additional Information About Your Visit        [facility] Information     [employer] gives you secure access to your electronic health record. If you see a primary care provider, you can also send messages to your care team and make appointments. If you have questions, please call your primary care clinic.  If you do not have a primary care provider, please call [phone] and they will assist you.        Care EveryWhere ID     This is your Care EveryWhere ID. This could be used by other organizations to access your [facility] medical records  [id number]        Your Vitals Were     Pulse Temperature Respirations Pulse Oximetry BMI (Body Mass Index)       81 98.2  F (36.8  C) (Temporal) 20 97% 33.22 kg/m2        Blood Pressure from Last 3 Encounters:   11/15/17 136/76   04/27/17 138/82   04/20/17 130/68    Weight from Last 3 Encounters:   11/15/17 212 lb 1.6 oz (96.2 kg)   05/03/17 228 lb (103.4 kg)    04/27/17 228 lb 9.6 oz (103.7 kg)              We Performed the Following     ADMIN INFLUENZA (For MEDICARE Patients ONLY) []     FLU VACCINE, INCREASED ANTIGEN, PRESV FREE, AGE 65+ [66417]          Today's Medication Changes          These changes are accurate as of: 11/15/17  3:39 PM.  If you have any questions, ask your nurse or doctor.               Start taking these medicines.        Dose/Directions    gentamicin 0.3 % ophthalmic solution   Commonly known as:  GARAMYCIN   Used for:  Hordeolum externum of right upper eyelid   Started by:  Isra Carrillo MD        Dose:  2 drop   Place 2 drops into the right eye 4 times daily for 7 days   Quantity:  3 mL   Refills:  0            Where to get your medicines      These medications were sent to [facility] [address]   [address] [facility] [address]     Phone:  [phone]     gentamicin 0.3 % ophthalmic solution                Primary Care Provider Office Phone # Fax #    Jakub Chester [phone]       [address] [facility] [address]        Equal Access to Services     [facility]: Hadii aad ku hadasho Soomaali, waaxda luqadaha, qaybta kaalmada adeegyachandan, vesna branham . So [facility] [phone].    ATENCIÓN: Si habla español, tiene a lema disposición servicios gratuitos de asistencia lingüística. Bernardo al [phone].    We comply with applicable federal civil rights laws and [place] laws. We do not discriminate on the basis of race, color, national origin, age, disability, sex, sexual orientation, or gender identity.            Thank you!     Thank you for choosing [facility]  for your care. Our goal is always to provide you with excellent care. Hearing back from our patients is one way we can continue to improve our services. Please take a few minutes to complete the written survey that you may receive in the mail after your visit with us. Thank you!              Your Updated Medication List - Protect others around you: Learn how to safely use, store and throw away your medicines at www.disposemymeds.org.          This list is accurate as of: 11/15/17  3:39 PM.  Always use your most recent med list.                   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        gentamicin 0.3 % ophthalmic solution    GARAMYCIN    3 mL    Place 2 drops into the right eye 4 times daily for 7 days    Hordeolum externum of right upper eyelid       nitroGLYcerin 0.4 MG sublingual tablet    NITROSTAT    25 tablet    Place 1 tablet (0.4 mg) under the tongue See Admin Instructions for chest pain    Cardiac dysrhythmia, unspecified       simvastatin 40 MG tablet    ZOCOR    90 tablet    TAKE 1 TABLET AT BEDTIME    Hyperlipidemia LDL goal <100       warfarin 5 MG tablet    COUMADIN    90 tablet    TAKE ONE-HALF (1/2) TABLET ON TUESDAY AND SATURDAY AND 1 TABLET ON ALL OTHER DAYS OF THE WEEK OR AS DIRECTED BY THE COUMADIN CLINIC    Permanent atrial fibrillation (H)

## 2017-11-15 NOTE — NURSING NOTE
"Chief Complaint   Patient presents with     Eye Problem       Initial /76 (BP Location: Right arm, Patient Position: Chair, Cuff Size: Adult Large)  Pulse 81  Temp 98.2  F (36.8  C) (Temporal)  Resp 20  Wt 212 lb 1.6 oz (96.2 kg)  SpO2 97%  BMI 33.22 kg/m2 Estimated body mass index is 33.22 kg/(m^2) as calculated from the following:    Height as of 5/3/17: 5' 7\" (1.702 m).    Weight as of this encounter: 212 lb 1.6 oz (96.2 kg).  Medication Reconciliation: complete     Lawanda Wren MA 11/15/2017  3:20 PM          "

## 2017-11-16 ENCOUNTER — ANTICOAGULATION THERAPY VISIT (OUTPATIENT)
Dept: ANTICOAGULATION | Facility: OTHER | Age: 77
End: 2017-11-16
Payer: COMMERCIAL

## 2017-11-16 DIAGNOSIS — I48.20 CHRONIC A-FIB (H): ICD-10-CM

## 2017-11-16 DIAGNOSIS — Z79.01 LONG-TERM (CURRENT) USE OF ANTICOAGULANTS: ICD-10-CM

## 2017-11-16 LAB — INR POINT OF CARE: 2.2 (ref 0.86–1.14)

## 2017-11-16 PROCEDURE — 36416 COLLJ CAPILLARY BLOOD SPEC: CPT

## 2017-11-16 PROCEDURE — 85610 PROTHROMBIN TIME: CPT | Mod: QW

## 2017-11-16 PROCEDURE — 99207 ZZC NO CHARGE NURSE ONLY: CPT

## 2017-11-16 NOTE — MR AVS SNAPSHOT
Uli Mcdonaldpramod   11/16/2017 8:45 AM   Anticoagulation Therapy Visit    Description:  77 year old male   Provider:  JITENDRA ANTI COAG   Department:  [facility]           INR as of 11/16/2017     Today's INR 2.2      Anticoagulation Summary as of 11/16/2017     INR goal 2.0-3.0   Today's INR 2.2   Full instructions 2.5 mg on Tue, Sat; 5 mg all other days   Next INR check 12/28/2017    Indications   Long-term (current) use of anticoagulants [Z79.01] [Z79.01]  Chronic a-fib (H) [I48.2]         Your next Anticoagulation Clinic appointment(s)     Dec 28, 2017  1:00 PM CST   Anticoagulation Visit with JITENDRA ANTI ROCKY   [facility]    [address] [facility] [id number] [phone]              Contact Numbers     Clinic Number:         November 2017 Details    Sun Mon Tue Wed Thu Fri Sat        1               2               3               4                 5               6               7               8               9               10               11                 12               13               14               15               16      5 mg   See details      17      5 mg         18      2.5 mg           19      5 mg         20      5 mg         21      2.5 mg         22      5 mg         23      5 mg         24      5 mg         25      2.5 mg           26      5 mg         27      5 mg         28      2.5 mg         29      5 mg         30      5 mg            Date Details   11/16 This INR check               How to take your warfarin dose     To take:  2.5 mg Take 0.5 of a 5 mg tablet.    To take:  5 mg Take 1 of the 5 mg tablets.           December 2017 Details    Sun Mon Tue Wed Thu Fri Sat          1      5 mg         2      2.5 mg           3      5 mg         4      5 mg         5      2.5 mg         6      5 mg         7      5 mg         8      5 mg         9      2.5 mg           10      5 mg         11      5 mg         12      2.5 mg         13      5  mg         14      5 mg         15      5 mg         16      2.5 mg           17      5 mg         18      5 mg         19      2.5 mg         20      5 mg         21      5 mg         22      5 mg         23      2.5 mg           24      5 mg         25      5 mg         26      2.5 mg         27      5 mg         28            29               30                 31                      Date Details   No additional details    Date of next INR:  12/28/2017         How to take your warfarin dose     To take:  2.5 mg Take 0.5 of a 5 mg tablet.    To take:  5 mg Take 1 of the 5 mg tablets.

## 2017-12-06 ENCOUNTER — OFFICE VISIT (OUTPATIENT)
Dept: FAMILY MEDICINE | Facility: OTHER | Age: 77
End: 2017-12-06
Payer: COMMERCIAL

## 2017-12-06 VITALS
SYSTOLIC BLOOD PRESSURE: 126 MMHG | BODY MASS INDEX: 34.93 KG/M2 | HEART RATE: 62 BPM | DIASTOLIC BLOOD PRESSURE: 58 MMHG | RESPIRATION RATE: 20 BRPM | WEIGHT: 223 LBS | TEMPERATURE: 95.1 F | OXYGEN SATURATION: 97 %

## 2017-12-06 DIAGNOSIS — J06.9 VIRAL URI WITH COUGH: ICD-10-CM

## 2017-12-06 DIAGNOSIS — H00.011 HORDEOLUM EXTERNUM OF RIGHT UPPER EYELID: ICD-10-CM

## 2017-12-06 DIAGNOSIS — H11.31 SUBCONJUNCTIVAL HEMORRHAGE OF RIGHT EYE: Primary | ICD-10-CM

## 2017-12-06 PROCEDURE — 99213 OFFICE O/P EST LOW 20 MIN: CPT | Performed by: NURSE PRACTITIONER

## 2017-12-06 NOTE — NURSING NOTE
"Chief Complaint   Patient presents with     Eye Problem     red, irritated       Initial /58  Pulse 62  Temp 95.1  F (35.1  C) (Tympanic)  Resp 20  Wt 223 lb (101.2 kg)  SpO2 97%  BMI 34.93 kg/m2 Estimated body mass index is 34.93 kg/(m^2) as calculated from the following:    Height as of 5/3/17: 5' 7\" (1.702 m).    Weight as of this encounter: 223 lb (101.2 kg).  Medication Reconciliation: complete   ................Madhav Kline LPN,   December 6, 2017,      2:03 PM,   [facility]    "

## 2017-12-06 NOTE — PROGRESS NOTES
SUBJECTIVE:   Uli Champagne is a 77 year old male who presents to clinic today for the following health issues:      Eye(s) Problem      Duration: 3 days    Description:  Location: right  Pain: no  Redness: YES  Discharge: YES- slight mattering, but this is improving now    Accompanying signs and symptoms: cold sx    History (Trauma, foreign body exposure,): None    Precipitating or alleviating factors (contact use): None    Therapies tried and outcome: warm water, eye gtts    Was seen in clinic for right eye stye on 11/15/2017.  Was advised on hot packs and given antibiotic eye drops.  The stye has somewhat improved, but he now has new redness in the eye that started 3 days ago.     He has had upper respiratory infection symptoms for the past week and was coughing quite a bit.   No fevers/chills.         Problem list and histories reviewed & adjusted, as indicated.  Additional history: none    Patient Active Problem List   Diagnosis     Cardiac dysrhythmia     Essential hypertension, benign     Neurogenic bladder     Prostate cancer (H)     Permanent atrial fibrillation (H)     Chronic idiopathic gout of multiple sites     HYPERLIPIDEMIA LDL GOAL <100     CAD (coronary artery disease)     Advanced directives, counseling/discussion     Health Care Home     Stye     Non morbid obesity due to excess calories     Lumbar spinal stenosis     S/P lumbar laminectomy     Chronic a-fib (H)     Essential hypertension     Hordeolum externum left upper eyelid     Long-term (current) use of anticoagulants [Z79.01]     Tear of medial meniscus of left knee, initial encounter     Primary osteoarthritis of left knee     Bradycardia     Past Surgical History:   Procedure Laterality Date     C CABG, VEIN, THREE  1992     C NONSPECIFIC PROCEDURE  1987    Bone fusion in neck     C REMV PROSTATE,RETROPUB,RAD,LTD NODES  7/14/2008    Radical retropubic prostatectomy and pelvic lymph node dissection.     HC REMOVAL OF TONSILS,12+  Y/O  1969    Tonsils 10+y.o.     HEMILAMINECTOMY, DISCECTOMY LUMBAR TWO LEVELS, COMBINED Left 12/16/2015    Procedure: COMBINED HEMILAMINECTOMY, DISCECTOMY LUMBAR TWO LEVELS;  Surgeon: Jung Finley MD;  Location: [facility]     STENT, CORONARY, ALEAH  2002    x5       Social History   Substance Use Topics     Smoking status: Former Smoker     Packs/day: 0.50     Years: 2.00     Types: Cigarettes     Quit date: 1/1/1960     Smokeless tobacco: Never Used     Alcohol use Yes      Comment: socially     Family History   Problem Relation Age of Onset     Arthritis Father      HEART DISEASE Father      Hypertension Brother      CANCER Brother      liver     HEART DISEASE Sister      HEART DISEASE Brother          Current Outpatient Prescriptions   Medication Sig Dispense Refill     warfarin (COUMADIN) 5 MG tablet TAKE ONE-HALF (1/2) TABLET ON TUESDAY AND SATURDAY AND 1 TABLET ON ALL OTHER DAYS OF THE WEEK OR AS DIRECTED BY THE COUMADIN CLINIC 90 tablet 0     simvastatin (ZOCOR) 40 MG tablet TAKE 1 TABLET AT BEDTIME 90 tablet 2     Benazepril-Hydrochlorothiazide 20-25 MG TABS TAKE 1 TABLET TWICE A DAY FOR HYPERTENSION 180 tablet 1     allopurinol (ZYLOPRIM) 300 MG tablet TAKE 1 TABLET DAILY FOR GOUT 90 tablet 1     nitroglycerin (NITROSTAT) 0.4 MG SL tablet Place 1 tablet (0.4 mg) under the tongue See Admin Instructions for chest pain 25 tablet 1     fish oil-omega-3 fatty acids (FISH OIL) 1000 MG capsule Take 1 g by mouth daily.       ASPIRIN 81 MG OR TABS 1 TABLET DAILY       Allergies   Allergen Reactions     No Known Drug Allergies      BP Readings from Last 3 Encounters:   12/06/17 126/58   11/15/17 136/76   04/27/17 138/82    Wt Readings from Last 3 Encounters:   12/06/17 223 lb (101.2 kg)   11/15/17 212 lb 1.6 oz (96.2 kg)   05/03/17 228 lb (103.4 kg)              Reviewed and updated as needed this visit by clinical staffTobacco  Allergies  Meds       Reviewed and updated as needed this visit by Provider          ROS:  Constitutional, HEENT, cardiovascular, pulmonary, gi and gu systems are negative, except as otherwise noted.      OBJECTIVE:   /58  Pulse 62  Temp 95.1  F (35.1  C) (Tympanic)  Resp 20  Wt 223 lb (101.2 kg)  SpO2 97%  BMI 34.93 kg/m2  Body mass index is 34.93 kg/(m^2).  GENERAL: healthy, alert and no distress  EYES: left eye normal, right eye has a small stye on the upper lid, no discharge.  He has a subconjunctival hemorrhage covering the medial half of the eye.  No pain with EOMs.  No discharge or foreign objects noted.      NECK: no adenopathy, no asymmetry, masses, or scars and thyroid normal to palpation  RESP: no rales , no rhonchi, no wheezes and decreased breath sounds bibasilar  CV: regular rate and rhythm, normal S1 S2, no S3 or S4, no murmur, click or rub, no peripheral edema and peripheral pulses strong  MS: no gross musculoskeletal defects noted, no edema    Diagnostic Test Results:  none     ASSESSMENT/PLAN:       1. Subconjunctival hemorrhage of right eye  Likely from the coughing.  Advised this should resolve with time and no specific treatment is needed.     2. Hordeolum externum of right upper eyelid  Improving per patient report, does not appear infected.  Advised on continued hot packs, if this fails to resolve, he may need to see opthalmology again as he has had these drained in the past.     3. Viral URI with cough  Advised on symptomatic treatments including Tylenol, Ibuprofen, increasing fluids and rest.       See Patient Instructions    MARIA D Marr [facility]

## 2017-12-06 NOTE — MR AVS SNAPSHOT
After Visit Summary   12/6/2017    Uli Champagne    MRN: [id number]           Patient Information     Date Of Birth          1940        Visit Information        Provider Department      12/6/2017 2:00 PM Palak Mathias APRN [facility]        Today's Diagnoses     Subconjunctival hemorrhage of right eye    -  1      Care Instructions      Keep using the hot packs for the stye.   If this gets bigger or doesn't go away, let us know.     The redness in your eye will resolve on its own over time, but may take a few weeks to fully go away.       Sty (or Stye)  A sty is an infection of the oil gland of the eyelid. It may develop into a small pocket of pus (an abscess). This can cause pain, redness, and swelling. In early stages, a sty is treated with antibiotic cream, eye drops, or a small towel soaked in warm water (a warm compress). More severe cases may need to be opened and drained by a healthcare provider.  Home care    Eye drops or ointment are usually prescribed to treat the infection. Use these as directed.     Artificial tears may also be used to lubricate the eye and make it more comfortable. You can buy these over the counter without a prescription. Talk with your healthcare provider before using any over-the-counter treatment for a sty.    Apply a warm, damp towel to the affected eye for at least 5 minutes, 3 to 4 times a day for a week. Warm compresses open the pores and speed the healing. But if the compresses are too hot, they may burn your eyelid.    Sometimes the sty will drain with this treatment alone. If this happens, keep using the antibiotic until all the redness and swelling are gone.    Wash your hands before and after touching the infected eyelid to avoid spreading the infection.    Don t squeeze or try to break open the sty.  Follow-up care  Follow up with your healthcare provider, or as advised.   When to seek medical advice  Call your healthcare  provider right away if any of these occur:    Increase in swelling or redness around the eyelid after 48 to 72 hours    Increase in eye pain or the eyelid blisters    Increase in warmth--the eyelid feels hot    Drainage of blood or thick pus from the sty    Blister on the eyelid    Inability to open the eyelid due to swelling    Fever of 100.4 F (38 C) or above, or as directed by your provider    Vision changes    Headache or stiff neck    The sty comes back  Date Last Reviewed: 8/1/2017 2000-2017 [employer]. [address] [place] [address]. All rights reserved. This information is not intended as a substitute for professional medical care. Always follow your healthcare professional's instructions.    Subconjunctival Hemorrhage    A subconjunctival hemorrhage is a result of a broken blood vessel in the white part of the eye. It is usually painless and may be caused by coughing, sneezing, or vomiting. An injury to the eye can cause this. It can also be a sign of high blood pressure (hypertension) or a bleeding disorder.  This can look frightening. But the presence of the blood is not serious. The blood will be reabsorbed without treatment within 2 to 3 weeks.  Home care  You may continue your usual activities.  Follow-up care  Follow up with your healthcare provider, or as advised.  When to seek medical advice  Contact your healthcare provider right away if any of these occur:    Pain in the eye    Change in vision    The blood does not go away within 3 weeks    Increasing redness or swelling of the eye    Severe headache or dizziness    Signs of bruising or bleeding from other parts of your body  Date Last Reviewed: 8/1/2017 2000-2017 [employer]. [address] [place] [address]. All rights reserved. This information is not intended as a substitute for professional medical care. Always follow your healthcare professional's instructions.                Follow-ups  after your visit        Your next 10 appointments already scheduled     Dec 28, 2017  1:00 PM CST   Anticoagulation Visit with JITENDRA ANTI COAG   [facility]    [address] [facility] [id number] [phone]              Who to contact     If you have questions or need follow up information about today's clinic visit or your schedule please contact [facility] directly at [phone].  Normal or non-critical lab and imaging results will be communicated to you by Auxogynhart, letter or phone within 4 business days after the clinic has received the results. If you do not hear from us within 7 days, please contact the clinic through Promuc or phone. If you have a critical or abnormal lab result, we will notify you by phone as soon as possible.  Submit refill requests through Promuc or call your pharmacy and they will forward the refill request to us. Please allow 3 business days for your refill to be completed.          Additional Information About Your Visit        AuxogynharGamervision Information     Promuc gives you secure access to your electronic health record. If you see a primary care provider, you can also send messages to your care team and make appointments. If you have questions, please call your primary care clinic.  If you do not have a primary care provider, please call [phone] and they will assist you.        Care EveryWhere ID     This is your Care EveryWhere ID. This could be used by other organizations to access your [place] medical records  [id number]        Your Vitals Were     Pulse Temperature Respirations Pulse Oximetry BMI (Body Mass Index)       62 95.1  F (35.1  C) (Tympanic) 20 97% 34.93 kg/m2        Blood Pressure from Last 3 Encounters:   12/06/17 126/58   11/15/17 136/76   04/27/17 138/82    Weight from Last 3 Encounters:   12/06/17 223 lb (101.2 kg)   11/15/17 212 lb 1.6 oz (96.2 kg)   05/03/17 228 lb (103.4 kg)              Today, you had the  following     No orders found for display       Primary Care Provider Office Phone # Fax #    Jakub Chester [phone]       [address] [facility] [address]        Equal Access to Services     JAS GODOY : Hadsandra oxana blas aminta Soaustyn, waaxda luqadaha, qaybta kaalmada ademartiyada, vesna boo mukundmarti little yonas verma. So [facility] [phone].    ATENCIÓN: Si habla español, tiene a lema disposición servicios gratuitos de asistencia lingüística. Llame al [phone].    We comply with applicable federal civil rights laws and [place] laws. We do not discriminate on the basis of race, color, national origin, age, disability, sex, sexual orientation, or gender identity.            Thank you!     Thank you for choosing [facility]  for your care. Our goal is always to provide you with excellent care. Hearing back from our patients is one way we can continue to improve our services. Please take a few minutes to complete the written survey that you may receive in the mail after your visit with us. Thank you!             Your Updated Medication List - Protect others around you: Learn how to safely use, store and throw away your medicines at www.disposemymeds.org.          This list is accurate as of: 12/6/17  2:17 PM.  Always use your most recent med list.                   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        nitroGLYcerin 0.4 MG sublingual tablet    NITROSTAT    25 tablet    Place 1 tablet (0.4 mg) under the tongue See Admin Instructions for  chest pain    Cardiac dysrhythmia, unspecified       simvastatin 40 MG tablet    ZOCOR    90 tablet    TAKE 1 TABLET AT BEDTIME    Hyperlipidemia LDL goal <100       warfarin 5 MG tablet    COUMADIN    90 tablet    TAKE ONE-HALF (1/2) TABLET ON TUESDAY AND SATURDAY AND 1 TABLET ON ALL OTHER DAYS OF THE WEEK OR AS DIRECTED BY THE COUMADIN CLINIC    Permanent atrial fibrillation (H)

## 2017-12-06 NOTE — PATIENT INSTRUCTIONS
Keep using the hot packs for the stye.   If this gets bigger or doesn't go away, let us know.     The redness in your eye will resolve on its own over time, but may take a few weeks to fully go away.       Sty (or Stye)  A sty is an infection of the oil gland of the eyelid. It may develop into a small pocket of pus (an abscess). This can cause pain, redness, and swelling. In early stages, a sty is treated with antibiotic cream, eye drops, or a small towel soaked in warm water (a warm compress). More severe cases may need to be opened and drained by a healthcare provider.  Home care    Eye drops or ointment are usually prescribed to treat the infection. Use these as directed.     Artificial tears may also be used to lubricate the eye and make it more comfortable. You can buy these over the counter without a prescription. Talk with your healthcare provider before using any over-the-counter treatment for a sty.    Apply a warm, damp towel to the affected eye for at least 5 minutes, 3 to 4 times a day for a week. Warm compresses open the pores and speed the healing. But if the compresses are too hot, they may burn your eyelid.    Sometimes the sty will drain with this treatment alone. If this happens, keep using the antibiotic until all the redness and swelling are gone.    Wash your hands before and after touching the infected eyelid to avoid spreading the infection.    Don t squeeze or try to break open the sty.  Follow-up care  Follow up with your healthcare provider, or as advised.   When to seek medical advice  Call your healthcare provider right away if any of these occur:    Increase in swelling or redness around the eyelid after 48 to 72 hours    Increase in eye pain or the eyelid blisters    Increase in warmth--the eyelid feels hot    Drainage of blood or thick pus from the sty    Blister on the eyelid    Inability to open the eyelid due to swelling    Fever of 100.4 F (38 C) or above, or as directed by your  provider    Vision changes    Headache or stiff neck    The sty comes back  Date Last Reviewed: 8/1/2017 2000-2017 [employer]. [address] [place] [address]. All rights reserved. This information is not intended as a substitute for professional medical care. Always follow your healthcare professional's instructions.    Subconjunctival Hemorrhage    A subconjunctival hemorrhage is a result of a broken blood vessel in the white part of the eye. It is usually painless and may be caused by coughing, sneezing, or vomiting. An injury to the eye can cause this. It can also be a sign of high blood pressure (hypertension) or a bleeding disorder.  This can look frightening. But the presence of the blood is not serious. The blood will be reabsorbed without treatment within 2 to 3 weeks.  Home care  You may continue your usual activities.  Follow-up care  Follow up with your healthcare provider, or as advised.  When to seek medical advice  Contact your healthcare provider right away if any of these occur:    Pain in the eye    Change in vision    The blood does not go away within 3 weeks    Increasing redness or swelling of the eye    Severe headache or dizziness    Signs of bruising or bleeding from other parts of your body  Date Last Reviewed: 8/1/2017 2000-2017 [employer]. [address] [place] [address]. All rights reserved. This information is not intended as a substitute for professional medical care. Always follow your healthcare professional's instructions.

## 2017-12-28 ENCOUNTER — ANTICOAGULATION THERAPY VISIT (OUTPATIENT)
Dept: ANTICOAGULATION | Facility: OTHER | Age: 77
End: 2017-12-28
Payer: COMMERCIAL

## 2017-12-28 DIAGNOSIS — I48.20 CHRONIC A-FIB (H): ICD-10-CM

## 2017-12-28 DIAGNOSIS — Z79.01 LONG-TERM (CURRENT) USE OF ANTICOAGULANTS: ICD-10-CM

## 2017-12-28 LAB — INR POINT OF CARE: 2.1 (ref 0.86–1.14)

## 2017-12-28 PROCEDURE — 99207 ZZC NO CHARGE NURSE ONLY: CPT

## 2017-12-28 PROCEDURE — 36416 COLLJ CAPILLARY BLOOD SPEC: CPT

## 2017-12-28 PROCEDURE — 85610 PROTHROMBIN TIME: CPT | Mod: QW

## 2017-12-28 NOTE — MR AVS SNAPSHOT
Uli SINGH Ihsan   12/28/2017 1:00 PM   Anticoagulation Therapy Visit    Description:  77 year old male   Provider:  JITENDRA ANTI COAG   Department:  [facility]           INR as of 12/28/2017     Today's INR 2.1      Anticoagulation Summary as of 12/28/2017     INR goal 2.0-3.0   Today's INR 2.1   Full instructions 2.5 mg on Tue, Sat; 5 mg all other days   Next INR check 2/8/2018    Indications   Long-term (current) use of anticoagulants [Z79.01] [Z79.01]  Chronic a-fib (H) [I48.2]         Your next Anticoagulation Clinic appointment(s)     Feb 08, 2018  1:00 PM CST   Anticoagulation Visit with JITENDAR ANTI ROCKY   [facility]    [address] [facility] [address]   [phone]              Contact Numbers     Clinic Number:         December 2017 Details    Sun Mon Tue Wed Thu Fri Sat          1               2                 3               4               5               6               7               8               9                 10               11               12               13               14               15               16                 17               18               19               20               21               22               23                 24               25               26               27               28      5 mg   See details      29      5 mg         30      2.5 mg           31      5 mg                Date Details   12/28 This INR check               How to take your warfarin dose     To take:  2.5 mg Take 0.5 of a 5 mg tablet.    To take:  5 mg Take 1 of the 5 mg tablets.           January 2018 Details    Sun Mon Tue Wed Thu Fri Sat      1      5 mg         2      2.5 mg         3      5 mg         4      5 mg         5      5 mg         6      2.5 mg           7      5 mg         8      5 mg         9      2.5 mg         10      5 mg         11      5 mg         12      5 mg         13      2.5 mg           14      5 mg          15      5 mg         16      2.5 mg         17      5 mg         18      5 mg         19      5 mg         20      2.5 mg           21      5 mg         22      5 mg         23      2.5 mg         24      5 mg         25      5 mg         26      5 mg         27      2.5 mg           28      5 mg         29      5 mg         30      2.5 mg         31      5 mg             Date Details   No additional details            How to take your warfarin dose     To take:  2.5 mg Take 0.5 of a 5 mg tablet.    To take:  5 mg Take 1 of the 5 mg tablets.           February 2018 Details    Sun Mon Tue Wed Thu Fri Sat         1      5 mg         2      5 mg         3      2.5 mg           4      5 mg         5      5 mg         6      2.5 mg         7      5 mg         8            9               10                 11               12               13               14               15               16               17                 18               19               20               21               22               23               24                 25               26               27               28                   Date Details   No additional details    Date of next INR:  2/8/2018         How to take your warfarin dose     To take:  2.5 mg Take 0.5 of a 5 mg tablet.    To take:  5 mg Take 1 of the 5 mg tablets.

## 2017-12-28 NOTE — PROGRESS NOTES
ANTICOAGULATION FOLLOW-UP CLINIC VISIT    Patient Name:  Uli Champagne  Date:  12/28/2017  Contact Type:  Face to Face    SUBJECTIVE:     Patient Findings     Positives No Problem Findings           OBJECTIVE    INR Protime   Date Value Ref Range Status   12/28/2017 2.1 (A) 0.86 - 1.14 Final       ASSESSMENT / PLAN  INR assessment THER    Recheck INR In: 6 WEEKS    INR Location Clinic      Anticoagulation Summary as of 12/28/2017     INR goal 2.0-3.0   Today's INR 2.1   Maintenance plan 2.5 mg (5 mg x 0.5) on Tue, Sat; 5 mg (5 mg x 1) all other days   Full instructions 2.5 mg on Tue, Sat; 5 mg all other days   Weekly total 30 mg   No change documented Iker Jurado RN   Plan last modified Rui Landers RN (3/31/2016)   Next INR check 2/8/2018   Target end date     Indications   Long-term (current) use of anticoagulants [Z79.01] [Z79.01]  Chronic a-fib (H) [I48.2]         Anticoagulation Episode Summary     INR check location     Preferred lab     Send INR reminders to [facility]    Comments       Anticoagulation Care Providers     Provider Role Specialty Phone number    Jakub Chester MD Responsible Family Practice [phone]            See the Encounter Report to view Anticoagulation Flowsheet and Dosing Calendar (Go to Encounters tab in chart review, and find the Anticoagulation Therapy Visit)    Dosage adjustment made based on physician directed care plan.    Iker Jurado, RN

## 2018-01-08 DIAGNOSIS — I10 BENIGN ESSENTIAL HYPERTENSION: ICD-10-CM

## 2018-01-08 DIAGNOSIS — I48.21 PERMANENT ATRIAL FIBRILLATION (H): Chronic | ICD-10-CM

## 2018-01-08 DIAGNOSIS — M1A.09X0 IDIOPATHIC CHRONIC GOUT OF MULTIPLE SITES WITHOUT TOPHUS: ICD-10-CM

## 2018-01-08 NOTE — TELEPHONE ENCOUNTER
"Requested Prescriptions   Pending Prescriptions Disp Refills     warfarin (COUMADIN) 5 MG tablet [Pharmacy Med Name: WARFARIN TABS 5MG] 90 tablet 0     Sig: TAKE ONE-HALF (1/2) TABLET ON TUESDAY AND SATURDAY AND 1 TABLET ON ALL OTHER DAYS OF THE WEEK OR AS DIRECTED BY THE COUMADIN CLINIC    Vitamin K Antagonists Failed    1/8/2018  2:54 PM       Failed - INR is within goal in the past 6 weeks    Confirm INR is within goal in the past 6 weeks.     Recent Labs   Lab Test 12/28/17   INR  2.1*                      Passed - Recent or future visit with authorizing provider's specialty    Patient had office visit in the last year or has a visit in the next 30 days with authorizing provider.  See \"Patient Info\" tab in inbasket, or \"Choose Columns\" in Meds & Orders section of the refill encounter.              Passed - Patient is 18 years of age or older        allopurinol (ZYLOPRIM) 300 MG tablet [Pharmacy Med Name: ALLOPURINOL TABS 300MG] 90 tablet 1     Sig: TAKE 1 TABLET DAILY FOR GOUT    Gout Agents Protocol Failed    1/8/2018  2:54 PM       Failed - CBC on file in past 12 months    Recent Labs   Lab Test  12/29/15   0530  11/27/15   1425   WBC   --   7.6   RBC   --   5.23   HGB  14.7  16.7   HCT   --   47.3   PLT   --   169            Failed - Uric acid greater than or equal to 6 on file in past 12 months    Recent Labs   Lab Test  02/03/10   1455   URIC  8.5     If level is 6mg/dL or greater, ok to refill one time and refer to provider.          Passed - ALT on file in past 12 months    Recent Labs   Lab Test  08/31/17   1323   ALT  27            Passed - Recent or future visit with authorizing provider's specialty    Patient had office visit in the last year or has a visit in the next 30 days with authorizing provider.  See \"Patient Info\" tab in inbasket, or \"Choose Columns\" in Meds & Orders section of the refill encounter.              Passed - Patient is age 18 or older       Passed - Normal serum creatinine on " "file in the past 12 months    Recent Labs   Lab Test  08/31/17   1323   CR  0.92             Benazepril-Hydrochlorothiazide 20-25 MG TABS [Pharmacy Med Name: BENAZEPRIL/HCTZ TABS 20/25MG] 180 tablet 1     Sig: TAKE 1 TABLET TWICE A DAY FOR HYPERTENSION    Diuretics (Including Combos) Protocol Passed    1/8/2018  2:54 PM       Passed - Blood pressure under 140/90    BP Readings from Last 3 Encounters:   12/06/17 126/58   11/15/17 136/76   04/27/17 138/82                Passed - Recent or future visit with authorizing provider's specialty    Patient had office visit in the last year or has a visit in the next 30 days with authorizing provider.  See \"Patient Info\" tab in inbasket, or \"Choose Columns\" in Meds & Orders section of the refill encounter.              Passed - Patient is age 18 or older       Passed - Normal serum creatinine on file in past 12 months    Recent Labs   Lab Test  08/31/17   1323   CR  0.92             Passed - Normal serum potassium on file in past 12 months    Recent Labs   Lab Test  08/31/17   1323   POTASSIUM  4.0                   Passed - Normal serum sodium on file in past 12 months    Recent Labs   Lab Test  08/31/17   1323   NA  142              Last Written Prescription Date:  10/2/17  Last Fill Quantity: 90,  # refills: 0   Last Office Visit with CECIL, P or  Health prescribing provider:  12/6/17   Future Office Visit:    Next 5 appointments (look out 90 days)     Madhav 10, 2018  2:30 PM CST   [facility]ra [facility]illo [facility]   [facility]    [address] [facility] [address] [phone]                 Last Written Prescription Date:  7/3/17  Last Fill Quantity: 1801,  # refills: 1   Last Office Visit with CECIL LEXY or M [facility] prescribing provider:  12/6/17   Future Office Visit:    Next 5 appointments (look out 90 days)     Madhav 10, 2018  2:30 PM CST   Office Visit with Isra Carrillo MD   [facility]    [address] [facility] [id number] [phone]                 /

## 2018-01-09 RX ORDER — ALLOPURINOL 300 MG/1
TABLET ORAL
Qty: 90 TABLET | Refills: 0 | Status: SHIPPED | OUTPATIENT
Start: 2018-01-09 | End: 2018-01-10

## 2018-01-09 RX ORDER — BENAZEPRIL/HYDROCHLOROTHIAZIDE 20 MG-25MG
TABLET ORAL
Qty: 180 TABLET | Refills: 0 | Status: SHIPPED | OUTPATIENT
Start: 2018-01-09 | End: 2018-01-10

## 2018-01-09 RX ORDER — WARFARIN SODIUM 5 MG/1
TABLET ORAL
Qty: 90 TABLET | Refills: 0 | Status: SHIPPED | OUTPATIENT
Start: 2018-01-09 | End: 2018-01-10

## 2018-01-09 NOTE — TELEPHONE ENCOUNTER
Routing refill request to provider for review/approval because:  Labs not current:  CBC, uric acid  Patient needs to be seen because:  Needs to establish care with a new provider    Mary Carty RN  [facility]

## 2018-01-10 ENCOUNTER — OFFICE VISIT (OUTPATIENT)
Dept: FAMILY MEDICINE | Facility: OTHER | Age: 78
End: 2018-01-10
Payer: COMMERCIAL

## 2018-01-10 VITALS
DIASTOLIC BLOOD PRESSURE: 70 MMHG | TEMPERATURE: 98.1 F | WEIGHT: 215.5 LBS | OXYGEN SATURATION: 97 % | BODY MASS INDEX: 33.75 KG/M2 | RESPIRATION RATE: 20 BRPM | HEART RATE: 57 BPM | SYSTOLIC BLOOD PRESSURE: 124 MMHG

## 2018-01-10 DIAGNOSIS — Z76.89 ENCOUNTER TO ESTABLISH CARE WITH NEW DOCTOR: Primary | ICD-10-CM

## 2018-01-10 DIAGNOSIS — I48.21 PERMANENT ATRIAL FIBRILLATION (H): Chronic | ICD-10-CM

## 2018-01-10 DIAGNOSIS — E78.5 HYPERLIPIDEMIA LDL GOAL <100: ICD-10-CM

## 2018-01-10 DIAGNOSIS — M1A.09X0 IDIOPATHIC CHRONIC GOUT OF MULTIPLE SITES WITHOUT TOPHUS: ICD-10-CM

## 2018-01-10 DIAGNOSIS — I10 BENIGN ESSENTIAL HYPERTENSION: ICD-10-CM

## 2018-01-10 DIAGNOSIS — H00.011 HORDEOLUM EXTERNUM OF RIGHT UPPER EYELID: ICD-10-CM

## 2018-01-10 PROBLEM — I71.40 ABDOMINAL AORTIC ANEURYSM (AAA) WITHOUT RUPTURE (H): Status: ACTIVE | Noted: 2018-01-10

## 2018-01-10 PROBLEM — Z95.1 POSTSURGICAL AORTOCORONARY BYPASS STATUS: Status: ACTIVE | Noted: 2018-01-10

## 2018-01-10 PROBLEM — C61 ADENOCARCINOMA OF PROSTATE (H): Status: ACTIVE | Noted: 2018-01-10

## 2018-01-10 PROCEDURE — 99214 OFFICE O/P EST MOD 30 MIN: CPT | Performed by: FAMILY MEDICINE

## 2018-01-10 RX ORDER — BENAZEPRIL/HYDROCHLOROTHIAZIDE 20 MG-25MG
TABLET ORAL
Qty: 180 TABLET | Refills: 3 | Status: SHIPPED | OUTPATIENT
Start: 2018-01-10 | End: 2018-10-08

## 2018-01-10 RX ORDER — SIMVASTATIN 40 MG
40 TABLET ORAL AT BEDTIME
Qty: 90 TABLET | Refills: 3 | Status: SHIPPED | OUTPATIENT
Start: 2018-01-10 | End: 2019-03-29

## 2018-01-10 RX ORDER — WARFARIN SODIUM 5 MG/1
TABLET ORAL
Qty: 90 TABLET | Refills: 3 | Status: SHIPPED | OUTPATIENT
Start: 2018-01-10 | End: 2018-12-26

## 2018-01-10 RX ORDER — ALLOPURINOL 300 MG/1
TABLET ORAL
Qty: 90 TABLET | Refills: 3 | Status: SHIPPED | OUTPATIENT
Start: 2018-01-10 | End: 2019-03-29

## 2018-01-10 ASSESSMENT — PAIN SCALES - GENERAL: PAINLEVEL: NO PAIN (0)

## 2018-01-10 NOTE — PATIENT INSTRUCTIONS
Sty (or Stye)  A sty is an infection of the oil gland of the eyelid. It may develop into a small pocket of pus (an abscess). This can cause pain, redness, and swelling. In early stages, a sty is treated with antibiotic cream, eye drops, or a small towel soaked in warm water (a warm compress). More severe cases may need to be opened and drained by a healthcare provider.  Home care    Eye drops or ointment are usually prescribed to treat the infection. Use these as directed.     Artificial tears may also be used to lubricate the eye and make it more comfortable. You can buy these over the counter without a prescription. Talk with your healthcare provider before using any over-the-counter treatment for a sty.    Apply a warm, damp towel to the affected eye for at least 5 minutes, 3 to 4 times a day for a week. Warm compresses open the pores and speed the healing. But if the compresses are too hot, they may burn your eyelid.    Sometimes the sty will drain with this treatment alone. If this happens, keep using the antibiotic until all the redness and swelling are gone.    Wash your hands before and after touching the infected eyelid to avoid spreading the infection.    Don t squeeze or try to break open the sty.  Follow-up care  Follow up with your healthcare provider, or as advised.   When to seek medical advice  Call your healthcare provider right away if any of these occur:    Increase in swelling or redness around the eyelid after 48 to 72 hours    Increase in eye pain or the eyelid blisters    Increase in warmth--the eyelid feels hot    Drainage of blood or thick pus from the sty    Blister on the eyelid    Inability to open the eyelid due to swelling    Fever of 100.4 F (38 C) or above, or as directed by your provider    Vision changes    Headache or stiff neck    The sty comes back  Date Last Reviewed: 8/1/2017 2000-2017 [employer]. [address] [place] [address], [place] [address]. All rights  reserved. This information is not intended as a substitute for professional medical care. Always follow your healthcare professional's instructions.

## 2018-01-10 NOTE — NURSING NOTE
"Chief Complaint   Patient presents with     Recheck Medication     Eye Problem       Initial /78 (BP Location: Left arm, Patient Position: Chair, Cuff Size: Adult Large)  Pulse 57  Temp 98.1  F (36.7  C) (Temporal)  Resp 20  Wt 215 lb 8 oz (97.8 kg)  SpO2 97%  BMI 33.75 kg/m2 Estimated body mass index is 33.75 kg/(m^2) as calculated from the following:    Height as of 5/3/17: 5' 7\" (1.702 m).    Weight as of this encounter: 215 lb 8 oz (97.8 kg).  Medication Reconciliation: complete     Lawanda Wren MA 1/10/2018  2:35 PM          "

## 2018-01-10 NOTE — MR AVS SNAPSHOT
After Visit Summary   1/10/2018    Uli Champagne    MRN: [id number]           Patient Information     Date Of Birth          1940        Visit Information        Provider Department      1/10/2018 2:30 PM Isra Carrillo MD [facility]        Today's Diagnoses     Hordeolum externum of right upper eyelid    -  1    Permanent atrial fibrillation (H)        Idiopathic chronic gout of multiple sites without tophus        Benign essential hypertension        Hyperlipidemia LDL goal <100          Care Instructions      Sty (or Stye)  A sty is an infection of the oil gland of the eyelid. It may develop into a small pocket of pus (an abscess). This can cause pain, redness, and swelling. In early stages, a sty is treated with antibiotic cream, eye drops, or a small towel soaked in warm water (a warm compress). More severe cases may need to be opened and drained by a healthcare provider.  Home care    Eye drops or ointment are usually prescribed to treat the infection. Use these as directed.     Artificial tears may also be used to lubricate the eye and make it more comfortable. You can buy these over the counter without a prescription. Talk with your healthcare provider before using any over-the-counter treatment for a sty.    Apply a warm, damp towel to the affected eye for at least 5 minutes, 3 to 4 times a day for a week. Warm compresses open the pores and speed the healing. But if the compresses are too hot, they may burn your eyelid.    Sometimes the sty will drain with this treatment alone. If this happens, keep using the antibiotic until all the redness and swelling are gone.    Wash your hands before and after touching the infected eyelid to avoid spreading the infection.    Don t squeeze or try to break open the sty.  Follow-up care  Follow up with your healthcare provider, or as advised.   When to seek medical advice  Call your healthcare provider right away if any of these  occur:    Increase in swelling or redness around the eyelid after 48 to 72 hours    Increase in eye pain or the eyelid blisters    Increase in warmth--the eyelid feels hot    Drainage of blood or thick pus from the sty    Blister on the eyelid    Inability to open the eyelid due to swelling    Fever of 100.4 F (38 C) or above, or as directed by your provider    Vision changes    Headache or stiff neck    The sty comes back  Date Last Reviewed: 8/1/2017 2000-2017 The [employer]. [address] [place] [address]. All rights reserved. This information is not intended as a substitute for professional medical care. Always follow your healthcare professional's instructions.                Follow-ups after your visit        Follow-up notes from your care team     Return if symptoms worsen or fail to improve.      Your next 10 appointments already scheduled     Feb 08, 2018  1:00 PM CST   [facility] with [facility]   [facility]    [address] [facility] [address] [phone]              Who to contact     If you have questions or need follow up information about today's clinic visit or your schedule please contact [facility] directly at [phone].  Normal or non-critical lab and imaging results will be communicated to you by MyChart, letter or phone within 4 business days after the clinic has received the results. If you do not hear from us within 7 days, please contact the clinic through [employer] or phone. If you have a critical or abnormal lab result, we will notify you by phone as soon as possible.  Submit refill requests through [employer] or call your pharmacy and they will forward the refill request to us. Please allow 3 business days for your refill to be completed.          Additional Information About Your Visit        [employer] Information     [employer] gives you secure access to your electronic health record. If you see a  primary care provider, you can also send messages to your care team and make appointments. If you have questions, please call your primary care clinic.  If you do not have a primary care provider, please call [phone] and they will assist you.        Care EveryWhere ID     This is your Care EveryWhere ID. This could be used by other organizations to access your [place] medical records  [id number]        Your Vitals Were     Pulse Temperature Respirations Pulse Oximetry BMI (Body Mass Index)       57 98.1  F (36.7  C) (Temporal) 20 97% 33.75 kg/m2        Blood Pressure from Last 3 Encounters:   01/10/18 124/70   12/06/17 126/58   11/15/17 136/76    Weight from Last 3 Encounters:   01/10/18 215 lb 8 oz (97.8 kg)   12/06/17 223 lb (101.2 kg)   11/15/17 212 lb 1.6 oz (96.2 kg)              Today, you had the following     No orders found for display         Today's Medication Changes          These changes are accurate as of: 1/10/18  3:14 PM.  If you have any questions, ask your nurse or doctor.               These medicines have changed or have updated prescriptions.        Dose/Directions    allopurinol 300 MG tablet   Commonly known as:  ZYLOPRIM   This may have changed:  See the new instructions.   Used for:  Idiopathic chronic gout of multiple sites without tophus   Changed by:  Isra Carrillo MD        TAKE 1 TABLET DAILY FOR GOUT   Quantity:  90 tablet   Refills:  3       Benazepril-Hydrochlorothiazide 20-25 MG Tabs   This may have changed:  See the new instructions.   Used for:  Benign essential hypertension   Changed by:  Isra Carrillo MD        TAKE 1 TABLET TWICE A DAY FOR HYPERTENSION   Quantity:  180 tablet   Refills:  3       simvastatin 40 MG tablet   Commonly known as:  ZOCOR   This may have changed:  See the new instructions.   Used for:  Hyperlipidemia LDL goal <100   Changed by:  Isra Carrillo MD        Dose:  40 mg   Take 1 tablet (40 mg) by mouth At Bedtime   Quantity:  90 tablet    Refills:  3       warfarin 5 MG tablet   Commonly known as:  COUMADIN   This may have changed:  See the new instructions.   Used for:  Permanent atrial fibrillation (H)   Changed by:  Isra Carrillo MD        TAKE ONE-HALF (1/2) TABLET ON TUESDAY AND SATURDAY AND 1 TABLET ON ALL OTHER DAYS OF THE WEEK OR AS DIRECTED BY THE COUMADIN CLINIC   Quantity:  90 tablet   Refills:  3            Where to get your medicines      These medications were sent to [facility] - [place] [id number] [facility]  [address] [facility] [address]     Phone:  [phone]     allopurinol 300 MG tablet    Benazepril-Hydrochlorothiazide 20-25 MG Tabs    simvastatin 40 MG tablet    warfarin 5 MG tablet                Primary Care Provider Office Phone # Fax #    Isra Carrillo [phone]       [address] [facility] [address]        Equal Access to Services     [facility]: Hadii oxana blas hadasho Soomaali, waaxda luqadaha, qaybta kaalmada adeegyada, vesna martinez haybill branham . So [facility] [phone].    ATENCIÓN: Si habla español, tiene a lema disposición servicios gratuitos de asistencia lingüística. Llame al [phone].    We comply with applicable federal civil rights laws and [place] laws. We do not discriminate on the basis of race, color, national origin, age, disability, sex, sexual orientation, or gender identity.            Thank you!     Thank you for choosing [facility]  for your care. Our goal is always to provide you with excellent care. Hearing back from our patients is one way we can continue to improve our services. Please take a few minutes to complete the written survey that you may receive in the mail after your visit with us. Thank you!             Your Updated Medication List - Protect others around you: Learn how to safely use, store and throw away your medicines at www.disposemymeds.org.          This list is accurate as of: 1/10/18   3:14 PM.  Always use your most recent med list.                   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        nitroGLYcerin 0.4 MG sublingual tablet    NITROSTAT    25 tablet    Place 1 tablet (0.4 mg) under the tongue See Admin Instructions for chest pain    Cardiac dysrhythmia, unspecified       simvastatin 40 MG tablet    ZOCOR    90 tablet    Take 1 tablet (40 mg) by mouth At Bedtime    Hyperlipidemia LDL goal <100       warfarin 5 MG tablet    COUMADIN    90 tablet    TAKE ONE-HALF (1/2) TABLET ON TUESDAY AND SATURDAY AND 1 TABLET ON ALL OTHER DAYS OF THE WEEK OR AS DIRECTED BY THE COUMADIN CLINIC    Permanent atrial fibrillation (H)

## 2018-01-10 NOTE — PROGRESS NOTES
SUBJECTIVE:   Uli Champagne is a 77 year old male who presents to clinic today for the following health issues:        Hyperlipidemia Follow-Up      Rate your low fat/cholesterol diet?: fair    Taking statin?  Yes, no muscle aches from statin    Other lipid medications/supplements?:  Fish oil/Omega 3, dose 1000mg without side effects    Hypertension Follow-up      Outpatient blood pressures are being checked at home.  Results are 120 to 116 / 60's.    Low Salt Diet: low salt          Amount of exercise or physical activity: 2-3 days/week for an average of 15-30 minutes    Problems taking medications regularly: No    Medication side effects: none  Diet: regular (no restrictions) and low salt    Concern: Right eye has hard bump on eyelid  and been having numbness in fingers.     Vascular Disease Follow-up:  Atrial Fibrillation with bradycardia      Chest pain or pressure, left side neck or arm pain: No    Shortness of breath/increased sweats/nausea with exertion: No    Pain in calves walking 1-2 blocks: Yes lumbar spinal stenosis.    Worsened or new symptoms since last visit: No    Nitroglycerin use: no    Daily aspirin use: Yes          Problem list and histories reviewed & adjusted, as indicated.  Additional history: Patient has been under the care of Dr. Hernandez who retired. [facility] transfer care to me.   Patient medications reconciled, PMH and Problem list reviewed and HM updated.    Patient Active Problem List   Diagnosis     Cardiac dysrhythmia     Essential hypertension, benign     Neurogenic bladder     Prostate cancer (H)     Permanent atrial fibrillation (H)     Chronic idiopathic gout of multiple sites     HYPERLIPIDEMIA LDL GOAL <100     CAD (coronary artery disease)     Advanced directives, counseling/discussion     Health Care Home     Stye     Non morbid obesity due to excess calories     Lumbar spinal stenosis     S/P lumbar laminectomy     Chronic  a-fib (H)     Essential hypertension     Hordeolum externum left upper eyelid     Long-term (current) use of anticoagulants [Z79.01]     Tear of medial meniscus of left knee, initial encounter     Primary osteoarthritis of left knee     Bradycardia     Abdominal aortic aneurysm (AAA) without rupture (H)     Adenocarcinoma of prostate (H)     Postsurgical aortocoronary bypass status     Past Surgical History:   Procedure Laterality Date     C CABG, VEIN, THREE  1992     C NONSPECIFIC PROCEDURE  1987    Bone fusion in neck     C REMV PROSTATE,RETROPUB,RAD,LTD NODES  7/14/2008    Radical retropubic prostatectomy and pelvic lymph node dissection.     HC REMOVAL OF TONSILS,10+ Y/O  1969    Tonsils 10+y.o.     HEMILAMINECTOMY, DISCECTOMY LUMBAR TWO LEVELS, COMBINED Left 12/16/2015    Procedure: COMBINED HEMILAMINECTOMY, DISCECTOMY LUMBAR TWO LEVELS;  Surgeon: Jung Finley MD;  Location: [facility] OR     STENT, CORONARY, ALEAH  2002    x5       Social History   Substance Use Topics     Smoking status: Former Smoker     Packs/day: 0.50     Years: 2.00     Types: Cigarettes     Quit date: 1/1/1960     Smokeless tobacco: Never Used     Alcohol use Yes      Comment: socially     Family History   Problem Relation Age of Onset     Arthritis Father      HEART DISEASE Father      Hypertension Brother      CANCER Brother      liver     HEART DISEASE Sister      HEART DISEASE Brother          Current Outpatient Prescriptions   Medication Sig Dispense Refill     warfarin (COUMADIN) 5 MG tablet TAKE ONE-HALF (1/2) TABLET ON TUESDAY AND SATURDAY AND 1 TABLET ON ALL OTHER DAYS OF THE WEEK OR AS DIRECTED BY THE COUMADIN CLINIC 90 tablet 0     allopurinol (ZYLOPRIM) 300 MG tablet TAKE 1 TABLET DAILY FOR GOUT 90 tablet 0     simvastatin (ZOCOR) 40 MG tablet TAKE 1 TABLET AT BEDTIME 90 tablet 2     fish oil-omega-3 fatty acids (FISH OIL) 1000 MG capsule Take 1 g by mouth daily.       ASPIRIN 81 MG OR TABS 1 TABLET DAILY        Benazepril-Hydrochlorothiazide 20-25 MG TABS TAKE 1 TABLET TWICE A DAY FOR HYPERTENSION 180 tablet 0     nitroglycerin (NITROSTAT) 0.4 MG SL tablet Place 1 tablet (0.4 mg) under the tongue See Admin Instructions for chest pain (Patient not taking: Reported on 1/10/2018) 25 tablet 1     Allergies   Allergen Reactions     No Known Drug Allergies      Recent Labs   Lab Test  08/31/17   1323 05/10/16  12/29/15   0530  05/20/15   LDL  39  57.0   --    --   49.0  49   HDL  43  43   --    --   35  35   TRIG  92  104   --    --   120  120   ALT  27  28   --    --   28  28   CR  0.92  1.0  0.73   < >  0.9  0.9   GFRESTIMATED  80  >60  >90  Non  GFR Calc     < >  >60   GFRESTBLACK  >90   --   >90   GFR Calc     < >   --    POTASSIUM  4.0  3.9  4.1   < >  4.2  4.2   TSH  2.31   --    --    --   2.50    < > = values in this interval not displayed.      BP Readings from Last 3 Encounters:   01/10/18 124/70   12/06/17 126/58   11/15/17 136/76    Wt Readings from Last 3 Encounters:   01/10/18 215 lb 8 oz (97.8 kg)   12/06/17 223 lb (101.2 kg)   11/15/17 212 lb 1.6 oz (96.2 kg)                  Labs reviewed in EPIC          Reviewed and updated as needed this visit by clinical staffTobacco  Allergies  Meds  Med Hx  Surg Hx  Fam Hx  Soc Hx      Reviewed and updated as needed this visit by Provider  Meds         ROS:  C: NEGATIVE for fever, chills, change in weight  EYES: POSITIVE for lid problem right upper improving   E/M: NEGATIVE for ear, mouth and throat problems  R: NEGATIVE for significant cough or SOB  CV: NEGATIVE for chest pain, palpitations or peripheral edema  MUSCULOSKELETAL: POSITIVE  for back pain chronic lumbar, joint pain chronic left hip, seeing Dr. Ogden and carpal tunnel syndrome in both hands with only AM symptoms.  ROS otherwise negative    OBJECTIVE:     /70 (BP Location: Left arm, Patient Position: Sitting, Cuff Size: Adult Large)  Pulse 57  Temp 98.1  F  (36.7  C) (Temporal)  Resp 20  Wt 215 lb 8 oz (97.8 kg)  SpO2 97%  BMI 33.75 kg/m2  Body mass index is 33.75 kg/(m^2).  GENERAL: alert, no distress, over weight and ambulates with cane  EYES: Eyes grossly normal to inspection and eyelids- hordeolum/sty OD  NECK: no adenopathy, no asymmetry, masses, or scars and thyroid normal to palpation  RESP: lungs clear to auscultation - no rales, rhonchi or wheezes  CV: regular rate and rhythm, normal S1 S2, no S3 or S4, no murmur, click or rub, no peripheral edema and peripheral pulses strong  ABDOMEN: soft, nontender, no hepatosplenomegaly, no masses and bowel sounds normal  MS: Exam for Carpal Tunnel syndrome shows both sides negative - Tinel and Phalen tests are negative, normal sensation, no atrophy.    Diagnostic Test Results:  none     ASSESSMENT/PLAN:     1. Encounter to establish care with new doctor  Patient has been under the care of Dr. Hernandez who retired. [facility] requests transfer care to me.   Patient medications reconciled, PMH and Problem list reviewed and HM updated.    2. Hordeolum externum of right upper eyelid  Acute. Hot/warm packs 2-3 times daily for stye.  Come back if increasing reddness, pain or persisting symptoms.    3. Permanent atrial fibrillation (H)  Chronic. Currently anticoagulated with coumadin. Asymptomatic bradycardia. Follow up with Dr. Amanda in 3 months after 24 hour Holter  - warfarin (COUMADIN) 5 MG tablet; TAKE ONE-HALF (1/2) TABLET ON TUESDAY AND SATURDAY AND 1 TABLET ON ALL OTHER DAYS OF THE WEEK OR AS DIRECTED BY THE COUMADIN CLINIC  Dispense: 90 tablet; Refill: 3    4. Idiopathic chronic gout of multiple sites without tophus  Chronic stable. The current medical regimen is effective;  continue present plan and medications.  - allopurinol (ZYLOPRIM) 300 MG tablet; TAKE 1 TABLET DAILY FOR GOUT  Dispense: 90 tablet; Refill: 3    5. Benign essential hypertension  Chronic controlled. The current  medical regimen is effective;  continue present plan and medications.  - Benazepril-Hydrochlorothiazide 20-25 MG TABS; TAKE 1 TABLET TWICE A DAY FOR HYPERTENSION  Dispense: 180 tablet; Refill: 3    6. Hyperlipidemia LDL goal <100  Chronic controlled. The current medical regimen is effective;  continue present plan and medications.  - simvastatin (ZOCOR) 40 MG tablet; Take 1 tablet (40 mg) by mouth At Bedtime  Dispense: 90 tablet; Refill: 3    FUTURE APPOINTMENTS:       - Make appointment with Cardiology specialist       - Follow-up for annual visit or as needed  Regular exercise    Isra Carrillo MD  [facility]

## 2018-02-08 ENCOUNTER — ANTICOAGULATION THERAPY VISIT (OUTPATIENT)
Dept: ANTICOAGULATION | Facility: OTHER | Age: 78
End: 2018-02-08
Payer: COMMERCIAL

## 2018-02-08 DIAGNOSIS — Z79.01 LONG-TERM (CURRENT) USE OF ANTICOAGULANTS: ICD-10-CM

## 2018-02-08 DIAGNOSIS — I48.20 CHRONIC A-FIB (H): ICD-10-CM

## 2018-02-08 LAB — INR POINT OF CARE: 1.9 (ref 0.86–1.14)

## 2018-02-08 PROCEDURE — 99207 ZZC NO CHARGE NURSE ONLY: CPT

## 2018-02-08 PROCEDURE — 85610 PROTHROMBIN TIME: CPT | Mod: QW

## 2018-02-08 PROCEDURE — 36416 COLLJ CAPILLARY BLOOD SPEC: CPT

## 2018-02-08 NOTE — MR AVS SNAPSHOT
Uli Mcdonaldpramod   2/8/2018 1:00 PM   Anticoagulation Therapy Visit    Description:  77 year old male   Provider:  [facility]   Department:  [facility]           INR as of 2/8/2018     Today's INR 1.9!      Anticoagulation Summary as of 2/8/2018     INR goal 2.0-3.0   Today's INR 1.9!   Full instructions 2.5 mg on Tue, Sat; 5 mg all other days   Next INR check 3/22/2018    Indications   Long-term (current) use of anticoagulants [Z79.01] [Z79.01]  Chronic a-fib (H) [I48.2]         Your next Anticoagulation Clinic appointment(s)     Mar 22, 2018  1:00 PM CDT   Anticoagulation Visit with [facility]   [facility]    [address] [facility] [address]   [phone]              Contact Numbers     Clinic Number:         February 2018 Details    Sun Mon Tue Wed Thu Fri Sat         1               2               3                 4               5               6               7               8      5 mg   See details      9      5 mg         10      2.5 mg           11      5 mg         12      5 mg         13      2.5 mg         14      5 mg         15      5 mg         16      5 mg         17      2.5 mg           18      5 mg         19      5 mg         20      2.5 mg         21      5 mg         22      5 mg         23      5 mg         24      2.5 mg           25      5 mg         26      5 mg         27      2.5 mg         28      5 mg             Date Details   02/08 This INR check               How to take your warfarin dose     To take:  2.5 mg Take 0.5 of a 5 mg tablet.    To take:  5 mg Take 1 of the 5 mg tablets.           March 2018 Details    Sun Mon Tue Wed Thu Fri Sat         1      5 mg         2      5 mg         3      2.5 mg           4      5 mg         5      5 mg         6      2.5 mg         7      5 mg         8      5 mg         9      5 mg         10      2.5 mg           11      5 mg         12      5 mg         13      2.5 mg         14       5 mg         15      5 mg         16      5 mg         17      2.5 mg           18      5 mg         19      5 mg         20      2.5 mg         21      5 mg         22            23               24                 25               26               27               28               29               30               31                Date Details   No additional details    Date of next INR:  3/22/2018         How to take your warfarin dose     To take:  2.5 mg Take 0.5 of a 5 mg tablet.    To take:  5 mg Take 1 of the 5 mg tablets.

## 2018-02-08 NOTE — PROGRESS NOTES
ANTICOAGULATION FOLLOW-UP CLINIC VISIT    Patient Name:  Uli Champagne  Date:  2/8/2018  Contact Type:  Face to Face    SUBJECTIVE:     Patient Findings     Positives No Problem Findings           OBJECTIVE    INR Protime   Date Value Ref Range Status   02/08/2018 1.9 (A) 0.86 - 1.14 Final       ASSESSMENT / PLAN  INR assessment THER    Recheck INR In: 6 WEEKS    INR Location Clinic      Anticoagulation Summary as of 2/8/2018     INR goal 2.0-3.0   Today's INR 1.9!   Maintenance plan 2.5 mg (5 mg x 0.5) on Tue, Sat; 5 mg (5 mg x 1) all other days   Full instructions 2.5 mg on Tue, Sat; 5 mg all other days   Weekly total 30 mg   No change documented Rui Landers RN   Plan last modified Rui Landers RN (3/31/2016)   Next INR check 3/22/2018   Target end date     Indications   Long-term (current) use of anticoagulants [Z79.01] [Z79.01]  Chronic a-fib (H) [I48.2]         Anticoagulation Episode Summary     INR check location     Preferred lab     Send INR reminders to [facility]    Comments       Anticoagulation Care Providers     Provider Role Specialty Phone number    Isra Carrillo MD [facility] [phone]            See the Encounter Report to view Anticoagulation Flowsheet and Dosing Calendar (Go to Encounters tab in chart review, and find the Anticoagulation Therapy Visit)    Dosage adjustment made based on physician directed care plan.    Rui Landers RN

## 2018-02-15 ENCOUNTER — OFFICE VISIT (OUTPATIENT)
Dept: ORTHOPEDICS | Facility: CLINIC | Age: 78
End: 2018-02-15
Payer: COMMERCIAL

## 2018-02-15 VITALS — TEMPERATURE: 97.1 F | HEIGHT: 67 IN | WEIGHT: 215.5 LBS | BODY MASS INDEX: 33.82 KG/M2

## 2018-02-15 DIAGNOSIS — M16.12 PRIMARY OSTEOARTHRITIS OF LEFT HIP: Primary | ICD-10-CM

## 2018-02-15 PROCEDURE — 99212 OFFICE O/P EST SF 10 MIN: CPT | Performed by: ORTHOPAEDIC SURGERY

## 2018-02-15 ASSESSMENT — PAIN SCALES - GENERAL: PAINLEVEL: NO PAIN (0)

## 2018-02-15 NOTE — NURSING NOTE
"Chief Complaint   Patient presents with     RECHECK     left hip primary osteoarthritis        Initial Temp 97.1  F (36.2  C) (Temporal)  Ht 1.708 m (5' 7.25\")  Wt 97.8 kg (215 lb 8 oz)  BMI 33.5 kg/m2 Estimated body mass index is 33.5 kg/(m^2) as calculated from the following:    Height as of this encounter: 1.708 m (5' 7.25\").    Weight as of this encounter: 97.8 kg (215 lb 8 oz).  Medication Reconciliation: complete   Tevin/SEAN     "

## 2018-02-15 NOTE — MR AVS SNAPSHOT
After Visit Summary   2/15/2018    Uli Champagne    MRN: [id number]           Patient Information     Date Of Birth          1940        Visit Information        Provider Department      2/15/2018 11:30 AM Yovani Ogden DO [facility]        Today's Diagnoses     Primary osteoarthritis of left hip    -  1       Follow-ups after your visit        Your next 10 appointments already scheduled     Mar 22, 2018  1:00 PM CDT   [facility] with  ANTI COAG   [facility]    [address] [facility] [address] [phone]              Who to contact     If you have questions or need follow up information about today's clinic visit or your schedule please contact [facility] directly at [phone].  Normal or non-critical lab and imaging results will be communicated to you by MyChart, letter or phone within 4 business days after the clinic has received the results. If you do not hear from us within 7 days, please contact the clinic through MyChart or phone. If you have a critical or abnormal lab result, we will notify you by phone as soon as possible.  Submit refill requests through [facility] or call your pharmacy and they will forward the refill request to us. Please allow 3 business days for your refill to be completed.          Additional Information About Your Visit        MyChart Information     [facility] gives you secure access to your electronic health record. If you see a primary care provider, you can also send messages to your care team and make appointments. If you have questions, please call your primary care clinic.  If you do not have a primary care provider, please call [phone] and they will assist you.        Care EveryWhere ID     This is your Care EveryWhere ID. This could be used by other organizations to access your [place] medical records  [id number]        Your Vitals Were      "Temperature Height BMI (Body Mass Index)             97.1  F (36.2  C) (Temporal) 5' 7.25\" (1.708 m) 33.5 kg/m2          Blood Pressure from Last 3 Encounters:   01/10/18 124/70   12/06/17 126/58   11/15/17 136/76    Weight from Last 3 Encounters:   02/15/18 215 lb 8 oz (97.8 kg)   01/10/18 215 lb 8 oz (97.8 kg)   12/06/17 223 lb (101.2 kg)              Today, you had the following     No orders found for display       Primary Care Provider Office Phone # Fax #    Isra Carrillo [phone]       [address] [facility] [address]        Equal Access to Services     JAS GODOY : Elfego rosenthalo Soaustyn, waaxda luqadaha, qaybta kaalmada adeegyada, vesna verma. So [facility] [phone].    ATENCIÓN: Si habla español, tiene a lema disposición servicios gratuitos de asistencia lingüística. Ll[facility] [phone].    We comply with applicable federal civil rights laws and [place] laws. We do not discriminate on the basis of race, color, national origin, age, disability, sex, sexual orientation, or gender identity.            Thank you!     Thank you for choosing [facility]  for your care. Our goal is always to provide you with excellent care. Hearing back from our patients is one way we can continue to improve our services. Please take a few minutes to complete the written survey that you may receive in the mail after your visit with us. Thank you!             Your Updated Medication List - Protect others around you: Learn how to safely use, store and throw away your medicines at www.disposemymeds.org.          This list is accurate as of 2/15/18 11:38 AM.  Always use your most recent med list.                   Brand Name Dispense Instructions for use Diagnosis    allopurinol 300 MG tablet    ZYLOPRIM    90 tablet    TAKE 1 TABLET DAILY FOR GOUT    Idiopathic chronic gout of multiple sites without tophus       aspirin 81 MG tablet      1 TABLET DAILY        " Benazepril-Hydrochlorothiazide 20-25 MG Tabs     180 tablet    TAKE 1 TABLET TWICE A DAY FOR HYPERTENSION    Benign essential hypertension       fish oil-omega-3 fatty acids 1000 MG capsule      Take 1 g by mouth daily.        nitroGLYcerin 0.4 MG sublingual tablet    NITROSTAT    25 tablet    Place 1 tablet (0.4 mg) under the tongue See Admin Instructions for chest pain    Cardiac dysrhythmia, unspecified       simvastatin 40 MG tablet    ZOCOR    90 tablet    Take 1 tablet (40 mg) by mouth At Bedtime    Hyperlipidemia LDL goal <100       warfarin 5 MG tablet    COUMADIN    90 tablet    TAKE ONE-HALF (1/2) TABLET ON TUESDAY AND SATURDAY AND 1 TABLET ON ALL OTHER DAYS OF THE WEEK OR AS DIRECTED BY THE COUMADIN CLINIC    Permanent atrial fibrillation (H)

## 2018-02-15 NOTE — PROGRESS NOTES
"Office Visit-Follow up    Chief Complaint: Uli Champagne is a 77 year old male who is being seen for   Chief Complaint   Patient presents with     RECHECK     left hip primary osteoarthritis        History of Present Illness:   Received a left hip intra-articular steroid injection under fluoroscopy July 2017.  It has provided good relief.  He is attended physical therapy.  Overall he is doing well.  He is happy with his progress.  He occasionally gets some low back pain when he stands still but it immediately resolves if he changes positions.  He has no pain shooting down the legs.  He is otherwise doing well    REVIEW OF SYSTEMS  General: negative for, night sweats, dizziness, fatigue  Resp: No shortness of breath and no cough  CV: negative for chest pain, syncope or near-syncope  GI: negative for nausea, vomiting and diarrhea  : negative for dysuria and hematuria  Musculoskeletal: as above  Neurologic: negative for syncope   Hematologic: negative for bleeding disorder    Physical Exam:  Vitals: Temp 97.1  F (36.2  C) (Temporal)  Ht 5' 7.25\" (1.708 m)  Wt 215 lb 8 oz (97.8 kg)  BMI 33.5 kg/m2  BMI= Body mass index is 33.5 kg/(m^2).  Constitutional: healthy, alert and no acute distress   Psychiatric: mentation appears normal and affect normal/bright  NEURO: no focal deficits  RESP: Normal with easy respirations and no use of accessory muscles to breathe, no audible wheezing or retractions  CV: LLE:  no edema         SKIN: No erythema, rashes, excoriation, or breakdown. No evidence of infection.   JOINT/EXTREMITIES:left: No focal areas of tenderness.  No weakness.  No pain with motion.  Some restrictions to internal rotation of the hip.  Negative straight leg.  GAIT: antalgic and with assistive device            Diagnostic Modalities:  Previous imaging reviewed.  Independent visualization of the images was performed.      Impression: left hip primary osteoarthritis doing well after intra-articular " steroid injection    Plan:  All of the above pertinent physical exam and imaging modalities findings was reviewed with Uli.    He is currently very happy with his progress.  Minimal symptoms.  I recommended activity as tolerated follow up with me as needed.        Return to clinic PRN, or sooner as needed for changes.  Re-x-ray on return: No    Isaiah Ogden D.O.

## 2018-02-15 NOTE — LETTER
"    2/15/2018         RE: Uli Champagne  [address] [facility] [address]        Dear Colleague,    Thank you for referring your patient, Uli Champagne, to the [facility]. Please see a copy of my visit note below.    Office Visit-Follow up    Chief Complaint: Uli Champagne is a 77 year old male who is being seen for   Chief Complaint   Patient presents with     RECHECK     left hip primary osteoarthritis        History of Present Illness:   Received a left hip intra-articular steroid injection under fluoroscopy July 2017.  It has provided good relief.  He is attended physical therapy.  Overall he is doing well.  He is happy with his progress.  He occasionally gets some low back pain when he stands still but it immediately resolves if he changes positions.  He has no pain shooting down the legs.  He is otherwise doing well    REVIEW OF SYSTEMS  General: negative for, night sweats, dizziness, fatigue  Resp: No shortness of breath and no cough  CV: negative for chest pain, syncope or near-syncope  GI: negative for nausea, vomiting and diarrhea  : negative for dysuria and hematuria  Musculoskeletal: as above  Neurologic: negative for syncope   Hematologic: negative for bleeding disorder    Physical Exam:  Vitals: Temp 97.1  F (36.2  C) (Temporal)  Ht 5' 7.25\" (1.708 m)  Wt 215 lb 8 oz (97.8 kg)  BMI 33.5 kg/m2  BMI= Body mass index is 33.5 kg/(m^2).  Constitutional: healthy, alert and no acute distress   Psychiatric: mentation appears normal and affect normal/bright  NEURO: no focal deficits  RESP: Normal with easy respirations and no use of accessory muscles to breathe, no audible wheezing or retractions  CV: LLE:  no edema         SKIN: No erythema, rashes, excoriation, or breakdown. No evidence of infection.   JOINT/EXTREMITIES:left: No focal areas of tenderness.  No weakness.  No pain with motion.  Some restrictions to internal rotation of the hip.  Negative straight " leg.  GAIT: antalgic and with assistive device            Diagnostic Modalities:  Previous imaging reviewed.  Independent visualization of the images was performed.      Impression: left hip primary osteoarthritis doing well after intra-articular steroid injection    Plan:  All of the above pertinent physical exam and imaging modalities findings was reviewed with Uli.    He is currently very happy with his progress.  Minimal symptoms.  I recommended activity as tolerated follow up with me as needed.        Return to clinic PRN, or sooner as needed for changes.  Re-x-ray on return: No    Isaiah Ogden D.O.          Again, thank you for allowing me to participate in the care of your patient.        Sincerely,        Yovani Ogden, DO

## 2018-03-14 ENCOUNTER — TELEPHONE (OUTPATIENT)
Dept: INTERNAL MEDICINE | Facility: CLINIC | Age: 78
End: 2018-03-14

## 2018-03-14 DIAGNOSIS — Z98.890 S/P LUMBAR LAMINECTOMY: ICD-10-CM

## 2018-03-14 DIAGNOSIS — M48.062 SPINAL STENOSIS OF LUMBAR REGION WITH NEUROGENIC CLAUDICATION: Primary | ICD-10-CM

## 2018-03-14 NOTE — TELEPHONE ENCOUNTER
Patient dropped off a letter to the clinic requesting a referral to a spinal specialist. He had surgery in [place] 3 years ago to help stop his left leg from wanting to collapse at times. He stated that the surgery helped but the surgeon told him that he might need a further surgery done. The surgeon is not longer working I [place]. He would like a referral to a local specialist.     I placed the letter in your basket. Referral pending diagnosis and approval.    Garima Lara MA     3/14/2018

## 2018-03-16 ENCOUNTER — OFFICE VISIT (OUTPATIENT)
Dept: FAMILY MEDICINE | Facility: CLINIC | Age: 78
End: 2018-03-16
Payer: COMMERCIAL

## 2018-03-16 ENCOUNTER — HOSPITAL ENCOUNTER (OUTPATIENT)
Dept: ULTRASOUND IMAGING | Facility: CLINIC | Age: 78
Discharge: HOME | End: 2018-03-16
Attending: NURSE PRACTITIONER | Admitting: NURSE PRACTITIONER
Payer: COMMERCIAL

## 2018-03-16 VITALS
HEART RATE: 54 BPM | WEIGHT: 215.8 LBS | SYSTOLIC BLOOD PRESSURE: 136 MMHG | DIASTOLIC BLOOD PRESSURE: 76 MMHG | RESPIRATION RATE: 20 BRPM | TEMPERATURE: 97 F | OXYGEN SATURATION: 97 % | BODY MASS INDEX: 33.55 KG/M2

## 2018-03-16 DIAGNOSIS — Z79.01 LONG-TERM (CURRENT) USE OF ANTICOAGULANTS: ICD-10-CM

## 2018-03-16 DIAGNOSIS — M79.662 PAIN OF LEFT LOWER LEG: ICD-10-CM

## 2018-03-16 DIAGNOSIS — M79.662 PAIN OF LEFT LOWER LEG: Primary | ICD-10-CM

## 2018-03-16 DIAGNOSIS — Z86.73 HISTORY OF CVA (CEREBROVASCULAR ACCIDENT): ICD-10-CM

## 2018-03-16 DIAGNOSIS — R00.1 BRADYCARDIA: ICD-10-CM

## 2018-03-16 PROCEDURE — 99214 OFFICE O/P EST MOD 30 MIN: CPT | Performed by: NURSE PRACTITIONER

## 2018-03-16 PROCEDURE — 93971 EXTREMITY STUDY: CPT | Mod: LT

## 2018-03-16 ASSESSMENT — PAIN SCALES - GENERAL: PAINLEVEL: MILD PAIN (3)

## 2018-03-16 NOTE — MR AVS SNAPSHOT
After Visit Summary   3/16/2018    Uli Champagne    MRN: [id number]           Patient Information     Date Of Birth          1940        Visit Information        Provider Department      3/16/2018 9:00 AM Argenis Nevarez APRN CNP [facility]        Today's Diagnoses     Pain of left lower leg    -  1    Long-term (current) use of anticoagulants [Z79.01]        Bradycardia        History of CVA (cerebrovascular accident)           Follow-ups after your visit        Your next 10 appointments already scheduled     Mar 22, 2018  1:00 PM CDT   Anticoagulation Visit with JITENDRA ANTI COAG   [facility]    [address] [facility] [id number] [phone]              Future tests that were ordered for you today     Open Future Orders        Priority Expected Expires Ordered    US Lower Extremity Venous Duplex Left STAT  3/16/2019 3/16/2018            Who to contact     If you have questions or need follow up information about today's clinic visit or your schedule please contact [facility] directly at [phone].  Normal or non-critical lab and imaging results will be communicated to you by MyChart, letter or phone within 4 business days after the clinic has received the results. If you do not hear from us within 7 days, please contact the clinic through MyChart or phone. If you have a critical or abnormal lab result, we will notify you by phone as soon as possible.  Submit refill requests through [facility] or call your pharmacy and they will forward the refill request to us. Please allow 3 business days for your refill to be completed.          Additional Information About Your Visit        MyChart Information     [facility] gives you secure access to your electronic health record. If you see a primary care provider, you can also send messages to your care team and make appointments. If you have questions, please call your primary  [facility] clinic.  If you do not have a primary care provider, please call [phone] and they will assist you.        Care EveryWhere ID     This is your Care EveryWhere ID. This could be used by other organizations to access your [place] medical records  [id number]        Your Vitals Were     Pulse Temperature Respirations Pulse Oximetry BMI (Body Mass Index)       54 97  F (36.1  C) (Temporal) 20 97% 33.55 kg/m2        Blood Pressure from Last 3 Encounters:   03/16/18 136/76   01/10/18 124/70   12/06/17 126/58    Weight from Last 3 Encounters:   03/16/18 215 lb 12.8 oz (97.9 kg)   02/15/18 215 lb 8 oz (97.8 kg)   01/10/18 215 lb 8 oz (97.8 kg)               Primary Care Provider Office Phone # Fax #    Isra Carrillo [phone]       [address] [facility] [address]        Equal Access to Services     JAS GODOY : Hadii oxana ku hadasho Soomaali, waaxda luqadaha, qaybta kaalmada adeegyada, vesna branham . So [facility] [phone].    ATENCIÓN: Si habla español, tiene a lema disposición servicios gratuitos de asistencia lingüística. Llame al [phone].    We comply with applicable federal civil rights laws and [place] laws. We do not discriminate on the basis of race, color, national origin, age, disability, sex, sexual orientation, or gender identity.            Thank you!     Thank you for choosing [facility]  for your care. Our goal is always to provide you with excellent care. Hearing back from our patients is one way we can continue to improve our services. Please take a few minutes to complete the written survey that you may receive in the mail after your visit with us. Thank you!             Your Updated Medication List - Protect others around you: Learn how to safely use, store and throw away your medicines at www.disposemymeds.org.          This list is accurate as of 3/16/18 11:11 AM.  Always use your most recent med list.                   Brand  Name Dispense Instructions for use Diagnosis    allopurinol 300 MG tablet    ZYLOPRIM    90 tablet    TAKE 1 TABLET DAILY FOR GOUT    Idiopathic chronic gout of multiple sites without tophus       aspirin 81 MG tablet      1 TABLET DAILY        Benazepril-Hydrochlorothiazide 20-25 MG Tabs     180 tablet    TAKE 1 TABLET TWICE A DAY FOR HYPERTENSION    Benign essential hypertension       fish oil-omega-3 fatty acids 1000 MG capsule      Take 1 g by mouth daily.        nitroGLYcerin 0.4 MG sublingual tablet    NITROSTAT    25 tablet    Place 1 tablet (0.4 mg) under the tongue See Admin Instructions for chest pain    Cardiac dysrhythmia, unspecified       simvastatin 40 MG tablet    ZOCOR    90 tablet    Take 1 tablet (40 mg) by mouth At Bedtime    Hyperlipidemia LDL goal <100       warfarin 5 MG tablet    COUMADIN    90 tablet    TAKE ONE-HALF (1/2) TABLET ON TUESDAY AND SATURDAY AND 1 TABLET ON ALL OTHER DAYS OF THE WEEK OR AS DIRECTED BY THE COUMADIN CLINIC    Permanent atrial fibrillation (H)

## 2018-03-16 NOTE — NURSING NOTE
"Chief Complaint   Patient presents with     Musculoskeletal Problem       Initial /76 (BP Location: Right arm, Patient Position: Chair, Cuff Size: Adult Large)  Pulse 54  Temp 97  F (36.1  C) (Temporal)  Resp 20  Wt 215 lb 12.8 oz (97.9 kg)  SpO2 97%  BMI 33.55 kg/m2 Estimated body mass index is 33.55 kg/(m^2) as calculated from the following:    Height as of 2/15/18: 5' 7.25\" (1.708 m).    Weight as of this encounter: 215 lb 12.8 oz (97.9 kg).  Medication Reconciliation: complete     Lawanda Wren MA 3/16/2018  8:58 AM          "

## 2018-03-16 NOTE — PROGRESS NOTES
SUBJECTIVE:   Uli Champagne is a 77 year old male who presents to clinic today for the following health issues:        Left Leg Pain    Onset: Started last night    Description:   Location: Calf of leg left leg  Character: Dull ache and sharp pain when bending leg    Intensity: mild, moderate    Progression of Symptoms: same    Accompanying Signs & Symptoms:  Other symptoms: tingling    History:   Previous similar pain: no       Precipitating factors:   Trauma or overuse: no     Alleviating factors:  Improved by: Walking    Therapies Tried and outcome: Walking feels a little better today. Concerned about blood clot has had previous stroke in 1987.    Uli is a 77-year-old male who woke up in the middle of the night last night with pain in the posterior left calf, and a feeling of tenseness and tightness into the ankle and foot.  He is quite sedentary, he ambulates with a cane.  He has had problems with increasing weakness of the left lower extremity.  States he had a stroke in 1987, he did well after that, has been working and was ambulatory without any help.  More recently he has needed to use a cane.  The left leg at times wants to give out on him, and he has a requesting to his primary care provider for referral to a spine specialist.  She had a history of previous back surgery.  Presently he denies radiating pain from the back down the leg.  His concern today is for possible blood clot in the leg, since he does have a history of stroke.  He is on warfarin, takes it as ordered, has not missed any doses.  He has his INR checked regularly.  He denies recent surgery or travel.  He is, as stated, more sedentary than usual.  The pain seemed intense last night, is not as severe today.  It actually is better when he is up and ambulating, seems worse at rest.  He denies chest pain, cough, or shortness of breath.          Problem list and histories reviewed & adjusted, as indicated.  Additional history: as  "documented    BP Readings from Last 3 Encounters:   03/16/18 136/76   01/10/18 124/70   12/06/17 126/58    Wt Readings from Last 3 Encounters:   03/16/18 215 lb 12.8 oz (97.9 kg)   02/15/18 215 lb 8 oz (97.8 kg)   01/10/18 215 lb 8 oz (97.8 kg)                    Reviewed and updated as needed this visit by clinical staff  Tobacco  Allergies  Meds  Med Hx  Surg Hx  Fam Hx  Soc Hx      Reviewed and updated as needed this visit by Provider         ROS:  Constitutional, HEENT, cardiovascular, pulmonary, gi and gu systems are negative, except as otherwise noted.    OBJECTIVE:     /76 (BP Location: Right arm, Patient Position: Chair, Cuff Size: Adult Large)  Pulse 54  Temp 97  F (36.1  C) (Temporal)  Resp 20  Wt 215 lb 12.8 oz (97.9 kg)  SpO2 97%  BMI 33.55 kg/m2  Body mass index is 33.55 kg/(m^2).   GENERAL: healthy, alert and no distress  NECK: no adenopathy, no asymmetry, masses, or scars and thyroid normal to palpation  RESP: lungs clear to auscultation - no rales, rhonchi or wheezes  CV: Bradycardic, heart rate regular, normal S1 S2, no S3 or S4, no murmur, click or rub, no peripheral edema and peripheral pulses strong  ABDOMEN: soft, nontender, no hepatosplenomegaly, no masses and bowel sounds normal  MS: The patient ambulates using a cane for support.  Focused exam of left lower extremity: Few scabbed abrasions over the pretibial surface, where he states he \"bumped into something \".  Minimal tenderness in the calf of the leg.  No ropiness, redness, or increased warmth.  No one area of point tenderness.  He describes a feeling of tightness in the ankle and foot, normal pulses, no temperature or color change, no edema.    Venous Doppler ultrasound is negative for DVT    ASSESSMENT/PLAN:     Problem List Items Addressed This Visit        Medium    Long-term (current) use of anticoagulants [Z79.01]    Relevant Orders    US Lower Extremity Venous Duplex Left (Completed)    Bradycardia    Relevant " Orders    US Lower Extremity Venous Duplex Left (Completed)    History of CVA (cerebrovascular accident)    Relevant Orders    US Lower Extremity Venous Duplex Left (Completed)      Other Visit Diagnoses     Pain of left lower leg    -  Primary    Relevant Orders    US Lower Extremity Venous Duplex Left (Completed)           Patient is reassured there is no evidence of DVT.  That was his primary concern today.  He intends to follow-up with his primary care provider regarding the chronic weakness of the left lower extremity, and is hoping to follow-up with a spinal specialist    He is encouraged to try to increase his activity, walk for short intervals more frequently.  Ensure adequate fluid intake, continue anticoagulation therapy    MARIA D Bradford CNP  [facility]

## 2018-03-21 NOTE — TELEPHONE ENCOUNTER
Please notify patient referral placed for medical Spine specialist.  Electronically signed by Isra Carrilol MD

## 2018-03-22 ENCOUNTER — ANTICOAGULATION THERAPY VISIT (OUTPATIENT)
Dept: ANTICOAGULATION | Facility: OTHER | Age: 78
End: 2018-03-22
Payer: COMMERCIAL

## 2018-03-22 DIAGNOSIS — I48.20 CHRONIC A-FIB (H): ICD-10-CM

## 2018-03-22 DIAGNOSIS — Z79.01 LONG-TERM (CURRENT) USE OF ANTICOAGULANTS: ICD-10-CM

## 2018-03-22 LAB — INR POINT OF CARE: 2.6 (ref 0.86–1.14)

## 2018-03-22 PROCEDURE — 99207 ZZC NO CHARGE NURSE ONLY: CPT

## 2018-03-22 PROCEDURE — 36416 COLLJ CAPILLARY BLOOD SPEC: CPT

## 2018-03-22 PROCEDURE — 85610 PROTHROMBIN TIME: CPT | Mod: QW

## 2018-03-22 NOTE — PROGRESS NOTES
ANTICOAGULATION FOLLOW-UP CLINIC VISIT    Patient Name:  Uli Champagne  Date:  3/22/2018  Contact Type:  Face to Face    SUBJECTIVE:     Patient Findings     Positives No Problem Findings           OBJECTIVE    INR Protime   Date Value Ref Range Status   03/22/2018 2.6 (A) 0.86 - 1.14 Final       ASSESSMENT / PLAN  INR assessment THER    Recheck INR In: 6 WEEKS    INR Location Clinic      Anticoagulation Summary as of 3/22/2018     INR goal 2.0-3.0   Today's INR 2.6   Maintenance plan 2.5 mg (5 mg x 0.5) on Tue, Sat; 5 mg (5 mg x 1) all other days   Full instructions 2.5 mg on Tue, Sat; 5 mg all other days   Weekly total 30 mg   No change documented Rui Landers RN   Plan last modified Rui Landers RN (3/31/2016)   Next INR check 5/3/2018   Target end date     Indications   Long-term (current) use of anticoagulants [Z79.01] [Z79.01]  Chronic a-fib (H) [I48.2]         Anticoagulation Episode Summary     INR check location     Preferred lab     Send INR reminders to [facility] POOL    Comments 5 mg tab, AM dose        Anticoagulation Care Providers     Provider Role Specialty Phone number    Isra Carrillo MD [facility] [phone]            See the Encounter Report to view Anticoagulation Flowsheet and Dosing Calendar (Go to Encounters tab in chart review, and find the Anticoagulation Therapy Visit)    Dosage adjustment made based on physician directed care plan.    Rui Landers, RN

## 2018-03-22 NOTE — MR AVS SNAPSHOT
Uli SINGH Ihsan   3/22/2018 1:00 PM   Anticoagulation Therapy Visit    Description:  77 year old male   Provider:  JITENDRA ANTI COAG   Department:  [facility]           INR as of 3/22/2018     Today's INR 2.6      Anticoagulation Summary as of 3/22/2018     INR goal 2.0-3.0   Today's INR 2.6   Full instructions 2.5 mg on Tue, Sat; 5 mg all other days   Next INR check 5/3/2018    Indications   Long-term (current) use of anticoagulants [Z79.01] [Z79.01]  Chronic a-fib (H) [I48.2]         Your next Anticoagulation Clinic appointment(s)     May 03, 2018  1:00 PM CDT   Anticoagulation Visit with JITENDRA ANTI ROCKY   [facility]    [address] [facility] [id number] [phone]              Contact Numbers     Clinic Number:         March 2018 Details    Sun Mon Tue Wed Thu Fri Sat         1               2               3                 4               5               6               7               8               9               10                 11               12               13               14               15               16               17                 18               19               20               21               22      5 mg   See details      23      5 mg         24      2.5 mg           25      5 mg         26      5 mg         27      2.5 mg         28      5 mg         29      5 mg         30      5 mg         31      2.5 mg          Date Details   03/22 This INR check               How to take your warfarin dose     To take:  2.5 mg Take 0.5 of a 5 mg tablet.    To take:  5 mg Take 1 of the 5 mg tablets.           April 2018 Details    Sun Mon Tue Wed Thu Fri Sat     1      5 mg         2      5 mg         3      2.5 mg         4      5 mg         5      5 mg         6      5 mg         7      2.5 mg           8      5 mg         9      5 mg         10      2.5 mg         11      5 mg         12      5 mg         13      5 mg         14      2.5 mg            15      5 mg         16      5 mg         17      2.5 mg         18      5 mg         19      5 mg         20      5 mg         21      2.5 mg           22      5 mg         23      5 mg         24      2.5 mg         25      5 mg         26      5 mg         27      5 mg         28      2.5 mg           29      5 mg         30      5 mg               Date Details   No additional details            How to take your warfarin dose     To take:  2.5 mg Take 0.5 of a 5 mg tablet.    To take:  5 mg Take 1 of the 5 mg tablets.           May 2018 Details    Sun Mon Tue Wed Thu Fri Sat       1      2.5 mg         2      5 mg         3            4               5                 6               7               8               9               10               11               12                 13               14               15               16               17               18               19                 20               21               22               23               24               25               26                 27               28               29               30               31                  Date Details   No additional details    Date of next INR:  5/3/2018         How to take your warfarin dose     To take:  2.5 mg Take 0.5 of a 5 mg tablet.    To take:  5 mg Take 1 of the 5 mg tablets.

## 2018-04-06 ENCOUNTER — HOSPITAL ENCOUNTER (OUTPATIENT)
Dept: CARDIOLOGY | Facility: CLINIC | Age: 78
Discharge: HOME | End: 2018-04-06
Attending: INTERNAL MEDICINE | Admitting: INTERNAL MEDICINE
Payer: COMMERCIAL

## 2018-04-06 DIAGNOSIS — E66.09 NON MORBID OBESITY DUE TO EXCESS CALORIES: ICD-10-CM

## 2018-04-06 DIAGNOSIS — I48.20 CHRONIC A-FIB (H): ICD-10-CM

## 2018-04-06 DIAGNOSIS — R00.1 BRADYCARDIA: ICD-10-CM

## 2018-04-06 DIAGNOSIS — E78.5 HYPERLIPIDEMIA LDL GOAL <100: ICD-10-CM

## 2018-04-06 DIAGNOSIS — I10 ESSENTIAL HYPERTENSION, BENIGN: ICD-10-CM

## 2018-04-06 PROCEDURE — 93227 XTRNL ECG REC<48 HR R&I: CPT | Performed by: INTERNAL MEDICINE

## 2018-04-06 PROCEDURE — 93226 XTRNL ECG REC<48 HR SCAN A/R: CPT

## 2018-04-11 LAB — INTERPRETATION MONITOR -MUSE: NORMAL

## 2018-04-12 ENCOUNTER — OFFICE VISIT (OUTPATIENT)
Dept: FAMILY MEDICINE | Facility: OTHER | Age: 78
End: 2018-04-12
Payer: COMMERCIAL

## 2018-04-12 VITALS
HEART RATE: 60 BPM | BODY MASS INDEX: 32.96 KG/M2 | WEIGHT: 212 LBS | SYSTOLIC BLOOD PRESSURE: 116 MMHG | TEMPERATURE: 95.9 F | DIASTOLIC BLOOD PRESSURE: 70 MMHG | RESPIRATION RATE: 20 BRPM | OXYGEN SATURATION: 99 %

## 2018-04-12 DIAGNOSIS — R19.7 DIARRHEA, UNSPECIFIED TYPE: Primary | ICD-10-CM

## 2018-04-12 PROCEDURE — 87177 OVA AND PARASITES SMEARS: CPT | Performed by: NURSE PRACTITIONER

## 2018-04-12 PROCEDURE — 87209 SMEAR COMPLEX STAIN: CPT | Performed by: NURSE PRACTITIONER

## 2018-04-12 PROCEDURE — 87506 IADNA-DNA/RNA PROBE TQ 6-11: CPT | Performed by: NURSE PRACTITIONER

## 2018-04-12 PROCEDURE — 99213 OFFICE O/P EST LOW 20 MIN: CPT | Performed by: NURSE PRACTITIONER

## 2018-04-12 NOTE — MR AVS SNAPSHOT
After Visit Summary   4/12/2018    Uli Champagne    MRN: [id number]           Patient Information     Date Of Birth          1940        Visit Information        Provider Department      4/12/2018 7:40 AM Palak Mathias APRN [facility]        Today's Diagnoses     Diarrhea, unspecified type    -  1      Care Instructions    Bring back the stool samples when you can.     Make sure you are drinking a lot of water.       Uncertain Causes of Diarrhea (Adult)    Diarrhea is when stools are loose and watery. This can be caused by:    Viral infections    Bacterial infections    Food poisoning    Parasites    Irritable bowel syndrome (IBS)    Inflammatory bowel diseases such as ulcerative colitis, Crohn's disease, and celiac disease    Food intolerance, such as to lactose, the sugar found in milk and milk products    Reaction to medicines like antibiotics, laxatives, cancer drugs, and antacids  Along with diarrhea, you may also have:    Abdominal pain and cramping    Nausea and vomiting    Loss of bowel control    Fever and chills    Bloody stools  In some cases, antibiotics may help to treat diarrhea. You may have a stool sample test. This is done to see what is causing your diarrhea, and if antibiotics will help treat it. The results of a stool sample test may take up to 2 days. The healthcare provider may not give you antibiotics until he or she has the stool test results.  Diarrhea can cause dehydration. This is the loss of too much water and other fluids from the body. When this occurs, body fluid must be replaced. This can be done with oral rehydration solutions. Oral rehydration solutions are available at drugstores and grocery stores without a prescription.  Home care  Follow all instructions given by your healthcare provider. Rest at home for the next 24 hours, or until you feel better. Avoid caffeine, tobacco, and alcohol. These can make diarrhea, cramping, and pain  worse.  If taking medicines:    Don t take over-the-counter diarrhea or nausea medicines unless your healthcare provider tells you to.    You may use acetaminophen or NSAID medicines like ibuprofen or naproxen to reduce pain and fever. Don t use these if you have chronic liver or kidney disease, or ever had a stomach ulcer or gastrointestinal bleeding. Don't use NSAID medicines if you are already taking one for another condition (like arthritis) or are on daily aspirin therapy (such as for heart disease or after a stroke). Talk with your healthcare provider first.    If antibiotics were prescribed, be sure you take them until they are finished. Don t stop taking them even when you feel better. Antibiotics must be taken as a full course.  To prevent the spread of illness:    Remember that washing with soap and water and using alcohol-based  is the best way to prevent the spread of infection.    Clean the toilet after each use.    Wash your hands before eating.    Wash your hands before and after preparing food. Keep in mind that people with diarrhea or vomiting should not prepare food for others.    Wash your hands after using cutting boards, countertops, and knives that have been in contact with raw foods.    Wash and then peel fruits and vegetables.    Keep uncooked meats away from cooked and ready-to-eat foods.    Use a food thermometer when cooking. Cook poultry to at least 165 F (74 C). Cook ground meat (beef, veal, pork, lamb) to at least 160 F (71 C). Cook fresh beef, veal, lamb, and pork to at least 145 F (63 C).    Don t eat raw or undercooked eggs (poached or jeovanny side up), poultry, meat, or unpasteurized milk and juices.  Food and drinks  The main goal while treating vomiting or diarrhea is to prevent dehydration. This is done by taking small amounts of liquids often.    Keep in mind that liquids are more important than food right now.    Drink only small amounts of liquids at a time.    Don t  force yourself to eat, especially if you are having cramping, vomiting, or diarrhea. Don t eat large amounts at a time, even if you are hungry.    If you eat, avoid fatty, greasy, spicy, or fried foods.    Don t eat dairy foods or drink milk if you have diarrhea. These can make diarrhea worse.  During the first 24 hours you can try:    Oral rehydration solutions. Do not use sports drinks. They have too much sugar and not enough electrolytes.    Soft drinks without caffeine    Ginger ale    Water (plain or flavored)    Decaf tea or coffee    Clear broth, consommé, or bouillon    Gelatin, popsicles, or frozen fruit juice bars  The second 24 hours, if you are feeling better, you can add:    Hot cereal, plain toast, bread, rolls, or crackers    Plain noodles, rice, mashed potatoes, chicken noodle soup, or rice soup    Unsweetened canned fruit (no pineapple)    Bananas  As you recover:    Limit fat intake to less than 15 grams per day. Don t eat margarine, butter, oils, mayonnaise, sauces, gravies, fried foods, peanut butter, meat, poultry, or fish.    Limit fiber. Don t eat raw or cooked vegetables, fresh fruits except bananas, or bran cereals.    Limit caffeine and chocolate.    Limit dairy.    Don t use spices or seasonings except salt.    Go back to your normal diet over time, as you feel better and your symptoms improve.    If the symptoms come back, go back to a simple diet or clear liquids.  Follow-up care  Follow up with your healthcare provider, or as advised. If a stool sample was taken or cultures were done, call the healthcare provider for the results as instructed.  Call 911  Call 911 if you have any of these symptoms:    Trouble breathing    Confusion    Extreme drowsiness or trouble walking    Loss of consciousness    Rapid heart rate    Chest pain    Stiff neck    Seizure  When to seek medical advice  Call your healthcare provider right away if any of these occur:    Abdominal pain that gets  worse    Constant lower right abdominal pain    Continued vomiting and inability to keep liquids down    Diarrhea more than 5 times a day    Blood in vomit or stool    Dark urine or no urine for 8 hours, dry mouth and tongue, tiredness, weakness, or dizziness    Drowsiness    New rash    You don t get better in 2 to 3 days    Fever of 100.4 F (38 C) or higher that doesn t get lower with medicine  Date Last Reviewed: 1/3/2016    5274-5087 [employer]. [address] [place] [address]. All rights reserved. This information is not intended as a substitute for professional medical care. Always follow your healthcare professional's instructions.                Follow-ups after your visit        Your next 10 appointments already scheduled     Apr 18, 2018  3:00 PM CDT   Return Visit with Yovani Amanda MD   [facility] ([facility])    [address] [place] [id number]   [phone]            Apr 20, 2018  1:10 PM CDT   New Visit with Scotty Shafer PA-C   [facility] ([facility])    [address] [place] [id number] [phone]            May 03, 2018  1:00 PM CDT   Anticoagulation Visit with JITENDRA ANTI COAG   [facility]    [address] [facility] [id number] [phone]              Future tests that were ordered for you today     Open Future Orders        Priority Expected Expires Ordered    Ova and Parasite Exam Routine Routine  4/12/2019 4/12/2018    Enteric Bacteria and Virus Panel by PRECIOUS Stool Routine  4/12/2019 4/12/2018    Clostridium difficile Toxin B PCR Routine  5/12/2018 4/12/2018            Who to contact     If you have questions or need follow up information about today's clinic visit or your schedule please contact [facility] directly at [phone].  Normal or non-critical lab and imaging results will be  communicated to you by Cardpoolhart, letter or phone within 4 business days after the clinic has received the results. If you do not hear from us within 7 days, please contact the clinic through [employer] or phone. If you have a critical or abnormal lab result, we will notify you by phone as soon as possible.  Submit refill requests through [employer] or call your pharmacy and they will forward the refill request to us. Please allow 3 business days for your refill to be completed.          Additional Information About Your Visit        [employer] Information     [employer] gives you secure access to your electronic health record. If you see a primary care provider, you can also send messages to your care team and make appointments. If you have questions, please call your primary care clinic.  If you do not have a primary care provider, please call [phone] and they will assist you.        Care EveryWhere ID     This is your Care EveryWhere ID. This could be used by other organizations to access your [place] medical records  [id number]        Your Vitals Were     Pulse Temperature Respirations Pulse Oximetry BMI (Body Mass Index)       60 95.9  F (35.5  C) (Tympanic) 20 99% 32.96 kg/m2        Blood Pressure from Last 3 Encounters:   04/12/18 116/70   03/16/18 136/76   01/10/18 124/70    Weight from Last 3 Encounters:   04/12/18 212 lb (96.2 kg)   03/16/18 215 lb 12.8 oz (97.9 kg)   02/15/18 215 lb 8 oz (97.8 kg)               Primary Care Provider Office Phone # Fax #    Isra Carrillo [phone]       [address] [facility] [address]        Equal Access to Services     [facility]: Hadii aad ku hadasho Soomaali, waaxda luqadaha, qaybta kaalmada vesna schneider. So [facility] [phone].    ATENCIÓN: Si habla español, tiene a lema disposición servicios gratuitos de asistencia lingüística. Llame al [phone].    We comply with applicable federal civil rights laws and  [place] laws. We do not discriminate on the basis of race, color, national origin, age, disability, sex, sexual orientation, or gender identity.            Thank you!     Thank you for choosing [facility]  for your care. Our goal is always to provide you with excellent care. Hearing back from our patients is one way we can continue to improve our services. Please take a few minutes to complete the written survey that you may receive in the mail after your visit with us. Thank you!             Your Updated Medication List - Protect others around you: Learn how to safely use, store and throw away your medicines at www.disposemymeds.org.          This list is accurate as of 4/12/18  8:12 AM.  Always use your most recent med list.                   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        nitroGLYcerin 0.4 MG sublingual tablet    NITROSTAT    25 tablet    Place 1 tablet (0.4 mg) under the tongue See Admin Instructions for chest pain    Cardiac dysrhythmia, unspecified       simvastatin 40 MG tablet    ZOCOR    90 tablet    Take 1 tablet (40 mg) by mouth At Bedtime    Hyperlipidemia LDL goal <100       warfarin 5 MG tablet    COUMADIN    90 tablet    TAKE ONE-HALF (1/2) TABLET ON TUESDAY AND SATURDAY AND 1 TABLET ON ALL OTHER DAYS OF THE WEEK OR AS DIRECTED BY THE COUMADIN CLINIC    Permanent atrial fibrillation (H)

## 2018-04-12 NOTE — PROGRESS NOTES
SUBJECTIVE:   Uli Champagne is a 77 year old male who presents to clinic today for the following health issues:      Diarrhea      Duration: 2-3, on/off    Description:       Consistency of stool: watery and explosive       Blood in stool: no        Number of loose stools past 24 hours: 2-3    Intensity:  moderate    Accompanying signs and symptoms:       Fever: no        Nausea/vomitting: no        Abdominal pain: no        Weight loss: YES - lost 8-9 pounds initially, nothing since then.     History (recent antibiotics or travel/ill contacts/med changes/testing done): no    Precipitating or alleviating factors: None    Therapies tried and outcome: PeptoBismol seemed to help a little but stopped per directions on bottle.    Has had diarrhea for 2-3 weeks.  Intermittent, seems to be getting slightly better.   No ill contacts    Problem list and histories reviewed & adjusted, as indicated.  Additional history: as documented    Patient Active Problem List   Diagnosis     Cardiac dysrhythmia     Essential hypertension, benign     Neurogenic bladder     Prostate cancer (H)     Permanent atrial fibrillation (H)     Chronic idiopathic gout of multiple sites     HYPERLIPIDEMIA LDL GOAL <100     CAD (coronary artery disease)     Advanced directives, counseling/discussion     Health Care Home     Stye     Non morbid obesity due to excess calories     Lumbar spinal stenosis     S/P lumbar laminectomy     Chronic a-fib (H)     Essential hypertension     Hordeolum externum left upper eyelid     Long-term (current) use of anticoagulants [Z79.01]     Tear of medial meniscus of left knee, initial encounter     Primary osteoarthritis of left knee     Bradycardia     Abdominal aortic aneurysm (AAA) without rupture (H)     Adenocarcinoma of prostate (H)     Postsurgical aortocoronary bypass status     History of CVA (cerebrovascular accident)     Past Surgical History:   Procedure Laterality Date     C CABG, VEIN, THREE   1992     C NONSPECIFIC PROCEDURE  1987    Bone fusion in neck     C REMV PROSTATE,RETROPUB,RAD,LTD NODES  7/14/2008    Radical retropubic prostatectomy and pelvic lymph node dissection.     HC REMOVAL OF TONSILS,10+ Y/O  1969    Tonsils 10+y.o.     HEMILAMINECTOMY, DISCECTOMY LUMBAR TWO LEVELS, COMBINED Left 12/16/2015    Procedure: COMBINED HEMILAMINECTOMY, DISCECTOMY LUMBAR TWO LEVELS;  Surgeon: Jung Finley MD;  Location: [facility]     STENT, CORONARY, ALEAH  2002    x5       Social History   Substance Use Topics     Smoking status: Former Smoker     Packs/day: 0.50     Years: 2.00     Types: Cigarettes     Quit date: 1/1/1960     Smokeless tobacco: Never Used     Alcohol use Yes      Comment: socially     Family History   Problem Relation Age of Onset     Arthritis Father      HEART DISEASE Father      Hypertension Brother      CANCER Brother      liver     HEART DISEASE Sister      HEART DISEASE Brother          Current Outpatient Prescriptions   Medication Sig Dispense Refill     warfarin (COUMADIN) 5 MG tablet TAKE ONE-HALF (1/2) TABLET ON TUESDAY AND SATURDAY AND 1 TABLET ON ALL OTHER DAYS OF THE WEEK OR AS DIRECTED BY THE COUMADIN CLINIC 90 tablet 3     allopurinol (ZYLOPRIM) 300 MG tablet TAKE 1 TABLET DAILY FOR GOUT 90 tablet 3     Benazepril-Hydrochlorothiazide 20-25 MG TABS TAKE 1 TABLET TWICE A DAY FOR HYPERTENSION 180 tablet 3     simvastatin (ZOCOR) 40 MG tablet Take 1 tablet (40 mg) by mouth At Bedtime 90 tablet 3     nitroglycerin (NITROSTAT) 0.4 MG SL tablet Place 1 tablet (0.4 mg) under the tongue See Admin Instructions for chest pain 25 tablet 1     fish oil-omega-3 fatty acids (FISH OIL) 1000 MG capsule Take 1 g by mouth daily.       ASPIRIN 81 MG OR TABS 1 TABLET DAILY       Allergies   Allergen Reactions     No Known Drug Allergies      BP Readings from Last 3 Encounters:   04/12/18 116/70   03/16/18 136/76   01/10/18 124/70    Wt Readings from Last 3 Encounters:   04/12/18 212 lb (96.2  kg)   03/16/18 215 lb 12.8 oz (97.9 kg)   02/15/18 215 lb 8 oz (97.8 kg)                    Reviewed and updated as needed this visit by clinical staff  Tobacco  Allergies  Meds  Med Hx  Surg Hx  Fam Hx  Soc Hx      Reviewed and updated as needed this visit by Provider         ROS:  Constitutional, HEENT, cardiovascular, pulmonary, gi and gu systems are negative, except as otherwise noted.    OBJECTIVE:     /70  Pulse 60  Temp 95.9  F (35.5  C) (Tympanic)  Resp 20  Wt 212 lb (96.2 kg)  SpO2 99%  BMI 32.96 kg/m2  Body mass index is 32.96 kg/(m^2).  GENERAL: alert, no distress and obese  NECK: no adenopathy, no asymmetry, masses, or scars and thyroid normal to palpation  RESP: lungs clear to auscultation - no rales, rhonchi or wheezes  CV: regular rate and rhythm, normal S1 S2, no S3 or S4, no murmur, click or rub, no peripheral edema and peripheral pulses strong  ABDOMEN: soft, nontender, no hepatosplenomegaly, no masses and bowel sounds normal  MS: no gross musculoskeletal defects noted, no edema    Diagnostic Test Results:  Pending     ASSESSMENT/PLAN:       1. Diarrhea, unspecified type  Will check for infectious etiology.  Slightly improving now and he is not dehydrated.    - Ova and Parasite Exam Routine; Future  - Enteric Bacteria and Virus Panel by PRECIOUS Stool; Future  - Clostridium difficile Toxin B PCR; Future    See Patient Instructions    MARIA D Marr CNP  [facility]

## 2018-04-12 NOTE — PATIENT INSTRUCTIONS
Bring back the stool samples when you can.     Make sure you are drinking a lot of water.       Uncertain Causes of Diarrhea (Adult)    Diarrhea is when stools are loose and watery. This can be caused by:    Viral infections    Bacterial infections    Food poisoning    Parasites    Irritable bowel syndrome (IBS)    Inflammatory bowel diseases such as ulcerative colitis, Crohn's disease, and celiac disease    Food intolerance, such as to lactose, the sugar found in milk and milk products    Reaction to medicines like antibiotics, laxatives, cancer drugs, and antacids  Along with diarrhea, you may also have:    Abdominal pain and cramping    Nausea and vomiting    Loss of bowel control    Fever and chills    Bloody stools  In some cases, antibiotics may help to treat diarrhea. You may have a stool sample test. This is done to see what is causing your diarrhea, and if antibiotics will help treat it. The results of a stool sample test may take up to 2 days. The healthcare provider may not give you antibiotics until he or she has the stool test results.  Diarrhea can cause dehydration. This is the loss of too much water and other fluids from the body. When this occurs, body fluid must be replaced. This can be done with oral rehydration solutions. Oral rehydration solutions are available at drugstores and grocery stores without a prescription.  Home care  Follow all instructions given by your healthcare provider. Rest at home for the next 24 hours, or until you feel better. Avoid caffeine, tobacco, and alcohol. These can make diarrhea, cramping, and pain worse.  If taking medicines:    Don t take over-the-counter diarrhea or nausea medicines unless your healthcare provider tells you to.    You may use acetaminophen or NSAID medicines like ibuprofen or naproxen to reduce pain and fever. Don t use these if you have chronic liver or kidney disease, or ever had a stomach ulcer or gastrointestinal bleeding. Don't use NSAID  medicines if you are already taking one for another condition (like arthritis) or are on daily aspirin therapy (such as for heart disease or after a stroke). Talk with your healthcare provider first.    If antibiotics were prescribed, be sure you take them until they are finished. Don t stop taking them even when you feel better. Antibiotics must be taken as a full course.  To prevent the spread of illness:    Remember that washing with soap and water and using alcohol-based  is the best way to prevent the spread of infection.    Clean the toilet after each use.    Wash your hands before eating.    Wash your hands before and after preparing food. Keep in mind that people with diarrhea or vomiting should not prepare food for others.    Wash your hands after using cutting boards, countertops, and knives that have been in contact with raw foods.    Wash and then peel fruits and vegetables.    Keep uncooked meats away from cooked and ready-to-eat foods.    Use a food thermometer when cooking. Cook poultry to at least 165 F (74 C). Cook ground meat (beef, veal, pork, lamb) to at least 160 F (71 C). Cook fresh beef, veal, lamb, and pork to at least 145 F (63 C).    Don t eat raw or undercooked eggs (poached or jeovanny side up), poultry, meat, or unpasteurized milk and juices.  Food and drinks  The main goal while treating vomiting or diarrhea is to prevent dehydration. This is done by taking small amounts of liquids often.    Keep in mind that liquids are more important than food right now.    Drink only small amounts of liquids at a time.    Don t force yourself to eat, especially if you are having cramping, vomiting, or diarrhea. Don t eat large amounts at a time, even if you are hungry.    If you eat, avoid fatty, greasy, spicy, or fried foods.    Don t eat dairy foods or drink milk if you have diarrhea. These can make diarrhea worse.  During the first 24 hours you can try:    Oral rehydration solutions. Do not  use sports drinks. They have too much sugar and not enough electrolytes.    Soft drinks without caffeine    Ginger ale    Water (plain or flavored)    Decaf tea or coffee    Clear broth, consommé, or bouillon    Gelatin, popsicles, or frozen fruit juice bars  The second 24 hours, if you are feeling better, you can add:    Hot cereal, plain toast, bread, rolls, or crackers    Plain noodles, rice, mashed potatoes, chicken noodle soup, or rice soup    Unsweetened canned fruit (no pineapple)    Bananas  As you recover:    Limit fat intake to less than 15 grams per day. Don t eat margarine, butter, oils, mayonnaise, sauces, gravies, fried foods, peanut butter, meat, poultry, or fish.    Limit fiber. Don t eat raw or cooked vegetables, fresh fruits except bananas, or bran cereals.    Limit caffeine and chocolate.    Limit dairy.    Don t use spices or seasonings except salt.    Go back to your normal diet over time, as you feel better and your symptoms improve.    If the symptoms come back, go back to a simple diet or clear liquids.  Follow-up care  Follow up with your healthcare provider, or as advised. If a stool sample was taken or cultures were done, call the healthcare provider for the results as instructed.  Call 911  Call 911 if you have any of these symptoms:    Trouble breathing    Confusion    Extreme drowsiness or trouble walking    Loss of consciousness    Rapid heart rate    Chest pain    Stiff neck    Seizure  When to seek medical advice  Call your healthcare provider right away if any of these occur:    Abdominal pain that gets worse    Constant lower right abdominal pain    Continued vomiting and inability to keep liquids down    Diarrhea more than 5 times a day    Blood in vomit or stool    Dark urine or no urine for 8 hours, dry mouth and tongue, tiredness, weakness, or dizziness    Drowsiness    New rash    You don t get better in 2 to 3 days    Fever of 100.4 F (38 C) or higher that doesn t get lower  with medicine  Date Last Reviewed: 1/3/2016    9778-0865 [employer]. [address] [place] [address], [place] [address]. All rights reserved. This information is not intended as a substitute for professional medical care. Always follow your healthcare professional's instructions.

## 2018-04-12 NOTE — NURSING NOTE
"Chief Complaint   Patient presents with     Diarrhea     x1 week       Initial /70  Pulse 60  Temp 95.9  F (35.5  C) (Tympanic)  Resp 20  Wt 212 lb (96.2 kg)  SpO2 99%  BMI 32.96 kg/m2 Estimated body mass index is 32.96 kg/(m^2) as calculated from the following:    Height as of 2/15/18: 5' 7.25\" (1.708 m).    Weight as of this encounter: 212 lb (96.2 kg).  Medication Reconciliation: complete   ................Madhav Kline LPN,   April 12, 2018,      7:59 AM,   [facility]    "

## 2018-04-13 DIAGNOSIS — R19.7 DIARRHEA, UNSPECIFIED TYPE: ICD-10-CM

## 2018-04-13 LAB
C COLI+JEJUNI+LARI FUSA STL QL NAA+PROBE: NOT DETECTED
C DIFF TOX B STL QL: NEGATIVE
EC STX1 GENE STL QL NAA+PROBE: NOT DETECTED
EC STX2 GENE STL QL NAA+PROBE: NOT DETECTED
ENTERIC PATHOGEN COMMENT: NORMAL
NOROV GI+II ORF1-ORF2 JNC STL QL NAA+PR: NOT DETECTED
O+P STL MICRO: NORMAL
O+P STL MICRO: NORMAL
RVA NSP5 STL QL NAA+PROBE: NOT DETECTED
SALMONELLA SP RPOD STL QL NAA+PROBE: NOT DETECTED
SHIGELLA SP+EIEC IPAH STL QL NAA+PROBE: NOT DETECTED
SPECIMEN SOURCE: NORMAL
SPECIMEN SOURCE: NORMAL
V CHOL+PARA RFBL+TRKH+TNAA STL QL NAA+PR: NOT DETECTED
Y ENTERO RECN STL QL NAA+PROBE: NOT DETECTED

## 2018-04-13 PROCEDURE — 87209 SMEAR COMPLEX STAIN: CPT | Performed by: NURSE PRACTITIONER

## 2018-04-13 PROCEDURE — 87177 OVA AND PARASITES SMEARS: CPT | Performed by: NURSE PRACTITIONER

## 2018-04-13 PROCEDURE — 87493 C DIFF AMPLIFIED PROBE: CPT | Performed by: NURSE PRACTITIONER

## 2018-04-16 LAB
O+P STL MICRO: NORMAL
O+P STL MICRO: NORMAL
SPECIMEN SOURCE: NORMAL

## 2018-04-18 ENCOUNTER — OFFICE VISIT (OUTPATIENT)
Dept: CARDIOLOGY | Facility: CLINIC | Age: 78
End: 2018-04-18
Payer: COMMERCIAL

## 2018-04-18 VITALS
DIASTOLIC BLOOD PRESSURE: 70 MMHG | BODY MASS INDEX: 33.9 KG/M2 | WEIGHT: 216 LBS | SYSTOLIC BLOOD PRESSURE: 142 MMHG | OXYGEN SATURATION: 97 % | HEART RATE: 42 BPM | HEIGHT: 67 IN

## 2018-04-18 DIAGNOSIS — Z79.01 LONG-TERM (CURRENT) USE OF ANTICOAGULANTS: ICD-10-CM

## 2018-04-18 DIAGNOSIS — I10 ESSENTIAL HYPERTENSION: ICD-10-CM

## 2018-04-18 DIAGNOSIS — I48.20 CHRONIC A-FIB (H): ICD-10-CM

## 2018-04-18 DIAGNOSIS — E78.5 HYPERLIPIDEMIA LDL GOAL <100: ICD-10-CM

## 2018-04-18 DIAGNOSIS — E66.09 NON MORBID OBESITY DUE TO EXCESS CALORIES: Primary | ICD-10-CM

## 2018-04-18 DIAGNOSIS — R00.1 BRADYCARDIA: ICD-10-CM

## 2018-04-18 PROCEDURE — 99214 OFFICE O/P EST MOD 30 MIN: CPT | Performed by: INTERNAL MEDICINE

## 2018-04-18 ASSESSMENT — PAIN SCALES - GENERAL: PAINLEVEL: NO PAIN (0)

## 2018-04-18 NOTE — MR AVS SNAPSHOT
After Visit Summary   4/18/2018    Uli Champagne    MRN: [id number]           Patient Information     Date Of Birth          1940        Visit Information        Provider Department      4/18/2018 3:00 PM Yovani Amanda MD [facility]        Today's Diagnoses     Non morbid obesity due to excess calories    -  1    Hyperlipidemia LDL goal <100        Bradycardia        Chronic a-fib (H)        Long-term (current) use of anticoagulants [Z79.01]        Essential hypertension           Follow-ups after your visit        Additional Services     Follow-Up with Cardiologist       Very slow atrial fibrillation                  Your next 10 appointments already scheduled     Apr 20, 2018  1:10 PM CDT   New Visit with Scotty Shafer PA-C   [facility] ([facility])    [address] [facility] [address] [phone]            May 03, 2018  1:00 PM CDT   Anticoagulation Visit with  ANTI [facility]   [facility]    [address] [facility] [id number] [phone]              Future tests that were ordered for you today     Open Future Orders        Priority Expected Expires Ordered    Holter Monitor 24 hour - Adult Routine 4/18/2019 4/19/2019 4/18/2018    Follow-Up with Cardiologist Routine 4/18/2019 4/19/2019 4/18/2018            Who to contact     If you have questions or need follow up information about today's clinic visit or your schedule please contact [facility] directly at [phone].  Normal or non-critical lab and imaging results will be communicated to you by MyChart, letter or phone within 4 business days after the clinic has received the results. If you do not hear from us within 7 days, please contact the clinic through MyChart or phone. If you have a critical or abnormal lab result, we will notify you by phone as soon as  "possible.  Submit refill requests through [employer] or call your pharmacy and they will forward the refill request to us. Please allow 3 business days for your refill to be completed.          Additional Information About Your Visit        behaviewhart Information     [employer] gives you secure access to your electronic health record. If you see a primary care provider, you can also send messages to your care team and make appointments. If you have questions, please call your primary care clinic.  If you do not have a primary care provider, please call [phone] and they will assist you.        Care EveryWhere ID     This is your Care EveryWhere ID. This could be used by other organizations to access your [place] medical records  [id number]        Your Vitals Were     Pulse Height Pulse Oximetry BMI (Body Mass Index)          42 1.708 m (5' 7.25\") 97% 33.58 kg/m2         Blood Pressure from Last 3 Encounters:   04/18/18 142/70   04/12/18 116/70   03/16/18 136/76    Weight from Last 3 Encounters:   04/18/18 98 kg (216 lb)   04/12/18 96.2 kg (212 lb)   03/16/18 97.9 kg (215 lb 12.8 oz)               Primary Care Provider Office Phone # Fax #    Isra Carrillo [phone]       [address] [facility] [address]        Equal Access to Services     JAS GODOY : Hadii oxana ku hadasho Soomaali, waaxda luqadaha, qaybta kaalmada adeegyada, vesna branham . So [facility] [phone].    ATENCIÓN: Si habla español, tiene a lema disposición servicios gratuitos de asistencia lingüística. Llame al [phone].    We comply with applicable federal civil rights laws and [place] laws. We do not discriminate on the basis of race, color, national origin, age, disability, sex, sexual orientation, or gender identity.            Thank you!     Thank you for choosing [facility]  for your care. Our goal is always to provide you with excellent care. Hearing back " from our patients is one way we can continue to improve our services. Please take a few minutes to complete the written survey that you may receive in the mail after your visit with us. Thank you!             Your Updated Medication List - Protect others around you: Learn how to safely use, store and throw away your medicines at www.disposemymeds.org.          This list is accurate as of 4/18/18  3:31 PM.  Always use your most recent med list.                   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        nitroGLYcerin 0.4 MG sublingual tablet    NITROSTAT    25 tablet    Place 1 tablet (0.4 mg) under the tongue See Admin Instructions for chest pain    Cardiac dysrhythmia, unspecified       simvastatin 40 MG tablet    ZOCOR    90 tablet    Take 1 tablet (40 mg) by mouth At Bedtime    Hyperlipidemia LDL goal <100       warfarin 5 MG tablet    COUMADIN    90 tablet    TAKE ONE-HALF (1/2) TABLET ON TUESDAY AND SATURDAY AND 1 TABLET ON ALL OTHER DAYS OF THE WEEK OR AS DIRECTED BY THE COUMADIN CLINIC    Permanent atrial fibrillation (H)

## 2018-04-18 NOTE — LETTER
4/18/2018    Isra Carrillo MD  [address] [facility] [address]    RE: Uli Champagne       Dear Colleague,    I had the pleasure of seeing Uli Champagne in the [facility].     He has chronic atrial fibrillation and significant AV conduction system dysfunction.  He has a slow ventricular response, but he has not had anything to suggest progressive limitations as best I can tell based on his heart rate.  He has not had dizziness or syncope.  He does not feel as if his breathing slows him down at all.  He is slowed down more by orthopedic issues and age and his being overweight.  He says he is able to carry groceries and do things that are not overly demanding without a sense of limitation.       A 24-hour Holter monitor done as a surveillance study revealed an average heart rate of 50 beats a minute, a lower rate of 30 during the hours of sleep and an upper rate of 70.  Pretty significant, but once again he seems to be tolerating it.  There were plenty of pauses greater than 2 seconds.  Typically, it has to be over 3 seconds to get the body's and the brain's attention.   IMPRESSION:   1.  Chronic AV conduction system dysfunction.     2.  Moderate bradycardia bordering on significant, but asymptomatic so far.   3.  Obesity.   4.  Hypertension.   5.  Hyperlipidemia.      Monitor - 4-2018:    1. Uli Champagne was monitored for 48:00 hours. The monitor was removed after 24:00 hours for unknown reasons. The overal l quality of the tracing  was poor. The principle rhythm was Atrial Fibrillation with slow ventricular response. During this keesha e his average heart rate was 44 bpm. He had a  minimum heart rate of 33 bpm at 1:20:19 (07-APR) and a maximum heart rate of 80 b pm at 9:19:58 (06-APR). The QRS duration measured .09-.14  seconds, the QT interval .44-.53 seconds.  2. He had zero pauses greater than 2.0 seconds. The longest R-R interval measured 1.94 seconds at  11:44:02 (06-APR).  3. There were 1,210 isolated ventricular ectopics, 7 couplets and 47 bigeminal cycles.  4. No events were logged during the recording period.    Signed: CHRISTIAN BROWN and Plan:   See dictation  I recommend continuing current treatment plans in the chart. and Holter Monitor        No orders of the defined types were placed in this encounter.    No orders of the defined types were placed in this encounter.    There are no discontinued medications.      No diagnosis found.    CURRENT MEDICATIONS:  Current Outpatient Prescriptions   Medication Sig Dispense Refill     allopurinol (ZYLOPRIM) 300 MG tablet TAKE 1 TABLET DAILY FOR GOUT 90 tablet 3     ASPIRIN 81 MG OR TABS 1 TABLET DAILY       Benazepril-Hydrochlorothiazide 20-25 MG TABS TAKE 1 TABLET TWICE A DAY FOR HYPERTENSION 180 tablet 3     fish oil-omega-3 fatty acids (FISH OIL) 1000 MG capsule Take 1 g by mouth daily.       simvastatin (ZOCOR) 40 MG tablet Take 1 tablet (40 mg) by mouth At Bedtime 90 tablet 3     warfarin (COUMADIN) 5 MG tablet TAKE ONE-HALF (1/2) TABLET ON TUESDAY AND SATURDAY AND 1 TABLET ON ALL OTHER DAYS OF THE WEEK OR AS DIRECTED BY THE COUMADIN CLINIC 90 tablet 3     nitroglycerin (NITROSTAT) 0.4 MG SL tablet Place 1 tablet (0.4 mg) under the tongue See Admin Instructions for chest pain (Patient not taking: Reported on 4/18/2018) 25 tablet 1       ALLERGIES     Allergies   Allergen Reactions     No Known Drug Allergies        PAST MEDICAL HISTORY:  Past Medical History:   Diagnosis Date     Atrial fibrillation (H)      Coronary atherosclerosis of unspecified type of vessel, native or graft      Hernia of unspecified site of abdominal cavity without mention of obstruction or gangrene     umbilical hernia; s/p repair 3/1998     History of CVA (cerebrovascular accident) 3/16/2018     Hordeolum externum left upper eyelid 1/21/2016     Malignant neoplasm of prostate (H) 07/14/08    Admit.Discharged 07/17/08     Other and  unspecified hyperlipidemia      Postsurgical aortocoronary bypass status 1992     Respiratory complications      Unspecified essential hypertension      Unspecified tinnitus     left ear       PAST SURGICAL HISTORY:  Past Surgical History:   Procedure Laterality Date     C CABG, VEIN, THREE  1992     C NONSPECIFIC PROCEDURE  1987    Bone fusion in neck     C REMV PROSTATE,RETROPUB,RAD,LTD NODES  7/14/2008    Radical retropubic prostatectomy and pelvic lymph node dissection.     HC REMOVAL OF TONSILS,10+ Y/O  1969    Tonsils 10+y.o.     HEMILAMINECTOMY, DISCECTOMY LUMBAR TWO LEVELS, COMBINED Left 12/16/2015    Procedure: COMBINED HEMILAMINECTOMY, DISCECTOMY LUMBAR TWO LEVELS;  Surgeon: Jung Finley MD;  Location: [facility]     STENT, CORONARY, ALEAH  2002    x5       FAMILY HISTORY:  Family History   Problem Relation Age of Onset     Arthritis Father      HEART DISEASE Father      Hypertension Brother      CANCER Brother      liver     HEART DISEASE Sister      HEART DISEASE Brother        SOCIAL HISTORY:  Social History     Social History     Marital status:      Spouse name: N/A     Number of children: N/A     Years of education: N/A     Social History Main Topics     Smoking status: Former Smoker     Packs/day: 0.50     Years: 2.00     Types: Cigarettes     Quit date: 1/1/1960     Smokeless tobacco: Never Used     Alcohol use Yes      Comment: socially     Drug use: No     Sexual activity: No     Other Topics Concern     Parent/Sibling W/ Cabg, Mi Or Angioplasty Before 65f 55m? No     Social History Narrative         Review of Systems:  Skin:  Negative       Eyes:  Positive for glasses    ENT:         Respiratory:  Negative for shortness of breath;dyspnea on exertion;cough;wheezing     Cardiovascular:  chest pain;palpitations;edema;syncope or near-syncope;cyanosis;exercise intolerance;fatigue;lightheadedness;dizziness;Negative for      Gastroenterology: Positive for heartburn;excessive gas or  "bloating;diarrhea    Genitourinary:  not assessed      Musculoskeletal:  Positive for arthritis;back pain Knee pain, arthritis, and low back pain.  Neurologic:  not assessed      Psychiatric:  not assessed      Heme/Lymph/Imm:  not assessed      Endocrine:  not assessed        Physical Exam:  Vitals: /70 (BP Location: Left arm, Patient Position: Chair, Cuff Size: Adult Large)  Pulse (!) 42  Ht 1.708 m (5' 7.25\")  Wt 98 kg (216 lb)  SpO2 97%  BMI 33.58 kg/m2    Constitutional:           Skin:             Head:           Eyes:           Lymph:      ENT:           Neck:           Respiratory:            Cardiac:                                                           GI:           Extremities and Muscular Skeletal:                 Neurological:           Psych:           Recent Lab Results:  LIPID RESULTS:  Lab Results   Component Value Date    CHOL 100 08/31/2017    HDL 43 08/31/2017    LDL 39 08/31/2017    TRIG 92 08/31/2017    CHOLHDLRATIO 2.5 12/16/2014       LIVER ENZYME RESULTS:  Lab Results   Component Value Date    AST 24 08/31/2017    ALT 27 08/31/2017       CBC RESULTS:  Lab Results   Component Value Date    WBC 7.6 11/27/2015    RBC 5.23 11/27/2015    HGB 14.7 12/29/2015    HCT 47.3 11/27/2015    MCV 90 11/27/2015    MCH 31.9 11/27/2015    MCHC 35.3 11/27/2015    RDW 13.7 11/27/2015     11/27/2015       BMP RESULTS:  Lab Results   Component Value Date     08/31/2017    POTASSIUM 4.0 08/31/2017    CHLORIDE 107 08/31/2017    CO2 25 08/31/2017    ANIONGAP 10 08/31/2017    GLC 89 08/31/2017    BUN 16 08/31/2017    CR 0.92 08/31/2017    GFRESTIMATED 80 08/31/2017    GFRESTBLACK >90 08/31/2017    ANKIT 8.8 08/31/2017        A1C RESULTS:  No results found for: A1C    INR RESULTS:  Lab Results   Component Value Date    INR 2.6 (A) 03/22/2018    INR 1.9 (A) 02/08/2018    INR 1.99 (H) 01/07/2016    INR 1.55 (H) 12/31/2015           CC  No referring provider defined for this " encounter.                    Thank you for allowing me to participate in the care of your patient.      Sincerely,     JIGNA NEWBY MD     [facility] [place] [facility]    cc:   No referring provider defined for this encounter.

## 2018-04-18 NOTE — LETTER
4/18/2018      Isra Carrillo MD  [address] [facility] [address]      RE: Uli Champagne       Dear Colleague,    I had the pleasure of seeing Uli Champagne in the [facility].    Service Date: 04/18/2018      HISTORY OF PRESENT ILLNESS:  I saw Uli Champagne today, who is 77.  I have followed him now for almost 5 years because of a history of well-tolerated chronic atrial fibrillation.  As time has gone by, he has become more and more bradycardic.  As time has gone by, however, he denies absolutely any symptoms to suggest that his bradycardia is symptomatic.  He denies chest pain, palpitations, dizziness or syncope.  He denies shortness of breath on exertion.  He says he is limited in terms of exertion more by having to walk with a cane and being a little unsteady and wobbly physically, but not dizziness, syncope, shortness of breath or chest pain.      A surveillance 24-hour Holter monitor revealed an average heart rate of 44 beats per minute, a lower rate of 33 beats per minute and maximum heart rate of 80 beats per minute.  Many people would be symptomatic at this level, but it does not seem to bother him at all.  He had no prolonged pauses.  He had occasional unifocal PVCs, rare couplets and occasional bigeminy.  No symptoms were recorded during the period of monitoring.      He is not on any bradycardic drugs nor should he receive any in the future unless he has a pacemaker as protection.      PAST MEDICAL HISTORY:   1.  Chronically overweight.   2.  Hypertension.   3.  Hyperlipidemia.   4.  Bradycardia associated with atrial fibrillation.      Because of the atrial fibrillation, he is on appropriate stroke prophylaxis anticoagulation.      CURRENT MEDICATIONS:  Zyloprim, aspirin, benazepril/hydrochlorothiazide, simvastatin and warfarin.      SOCIAL HISTORY:  He lives with his wife.  He is up and around slowly.  He does drive a car.  He does some shopping but  very little yard work or housework anymore.  He is retired.      PHYSICAL EXAMINATION:   GENERAL:  Bright, alert male.   VITAL SIGNS:  Blood pressure 140/70, heart rate 42-44 beats a minute.  He weighs 216 pounds.  He is overweight.   NECK:  He had no neck vein distention or bruit at 90 degrees.   HEART:  Irregular and slow without gallop or murmur.   LUNGS:  Clear.   ABDOMEN:  Soft without organomegaly.   EXTREMITIES:  Free of edema.  Pedal pulses were difficult to feel.      Mr. Layton is 77.  His average heart rate is slower than usual, but he has absolutely no symptoms.  He promises to let us know should he have any premonition of progressive slowness on exertion, dizziness, syncope, shortness of breath, etc.      I set him up for another 24-hour Holter monitor in a year, Cardiology followup at that time and we will continue to watch for anything that amounts to bradycardia-associated symptoms.      Over 35 minutes was spent doing his consult, looking at old records, monitoring, EKGs, remote stress nuclear study, etc.      Over half the time was face to face with counseling and education.      cc:   Isra Carrillo MD    [facility]    [address] [place] [address]         JIGNA NEWBY MD, [facility]             D: 2018   T: 2018   MT: JERE      Name:     KIRK LAYTON   MRN:      [id number]        Account:      [id number]   :      1940           Service Date: 2018      Document: [id number]         Outpatient Encounter Prescriptions as of 2018   Medication Sig Dispense Refill     allopurinol (ZYLOPRIM) 300 MG tablet TAKE 1 TABLET DAILY FOR GOUT 90 tablet 3     ASPIRIN 81 MG OR TABS 1 TABLET DAILY       Benazepril-Hydrochlorothiazide 20-25 MG TABS TAKE 1 TABLET TWICE A DAY FOR HYPERTENSION 180 tablet 3     fish oil-omega-3 fatty acids (FISH OIL) 1000 MG capsule Take 1 g by mouth daily.       simvastatin (ZOCOR) 40 MG tablet Take 1 tablet (40 mg) by  mouth At Bedtime 90 tablet 3     warfarin (COUMADIN) 5 MG tablet TAKE ONE-HALF (1/2) TABLET ON TUESDAY AND SATURDAY AND 1 TABLET ON ALL OTHER DAYS OF THE WEEK OR AS DIRECTED BY THE COUMADIN CLINIC 90 tablet 3     nitroglycerin (NITROSTAT) 0.4 MG SL tablet Place 1 tablet (0.4 mg) under the tongue See Admin Instructions for chest pain (Patient not taking: Reported on 4/18/2018) 25 tablet 1     No facility-administered encounter medications on file as of 4/18/2018.        Again, thank you for allowing me to participate in the care of your patient.      Sincerely,    JIGNA NEWBY MD     [facility]

## 2018-04-18 NOTE — PROGRESS NOTES
He has chronic atrial fibrillation and significant AV conduction system dysfunction.  He has a slow ventricular response, but he has not had anything to suggest progressive limitations as best I can tell based on his heart rate.  He has not had dizziness or syncope.  He does not feel as if his breathing slows him down at all.  He is slowed down more by orthopedic issues and age and his being overweight.  He says he is able to carry groceries and do things that are not overly demanding without a sense of limitation.       A 24-hour Holter monitor done as a surveillance study revealed an average heart rate of 50 beats a minute, a lower rate of 30 during the hours of sleep and an upper rate of 70.  Pretty significant, but once again he seems to be tolerating it.  There were plenty of pauses greater than 2 seconds.  Typically, it has to be over 3 seconds to get the body's and the brain's attention.   IMPRESSION:   1.  Chronic AV conduction system dysfunction.     2.  Moderate bradycardia bordering on significant, but asymptomatic so far.   3.  Obesity.   4.  Hypertension.   5.  Hyperlipidemia.     Monitor - 4-2018:    1. Uli Champagne was monitored for 48:00 hours. The monitor was removed after 24:00 hours for unknown reasons. The overal l quality of the tracing  was poor. The principle rhythm was Atrial Fibrillation with slow ventricular response. During this keesha e his average heart rate was 44 bpm. He had a  minimum heart rate of 33 bpm at 1:20:19 (07-APR) and a maximum heart rate of 80 b pm at 9:19:58 (06-APR). The QRS duration measured .09-.14  seconds, the QT interval .44-.53 seconds.  2. He had zero pauses greater than 2.0 seconds. The longest R-R interval measured 1.94 seconds at 11:44:02 (06-APR).  3. There were 1,210 isolated ventricular ectopics, 7 couplets and 47 bigeminal cycles.  4. No events were logged during the recording period.    Signed: CHRISTIAN BROWN and Plan:   See dictation  I recommend  continuing current treatment plans in the chart. and Holter Monitor        No orders of the defined types were placed in this encounter.    No orders of the defined types were placed in this encounter.    There are no discontinued medications.      No diagnosis found.    CURRENT MEDICATIONS:  Current Outpatient Prescriptions   Medication Sig Dispense Refill     allopurinol (ZYLOPRIM) 300 MG tablet TAKE 1 TABLET DAILY FOR GOUT 90 tablet 3     ASPIRIN 81 MG OR TABS 1 TABLET DAILY       Benazepril-Hydrochlorothiazide 20-25 MG TABS TAKE 1 TABLET TWICE A DAY FOR HYPERTENSION 180 tablet 3     fish oil-omega-3 fatty acids (FISH OIL) 1000 MG capsule Take 1 g by mouth daily.       simvastatin (ZOCOR) 40 MG tablet Take 1 tablet (40 mg) by mouth At Bedtime 90 tablet 3     warfarin (COUMADIN) 5 MG tablet TAKE ONE-HALF (1/2) TABLET ON TUESDAY AND SATURDAY AND 1 TABLET ON ALL OTHER DAYS OF THE WEEK OR AS DIRECTED BY THE COUMADIN CLINIC 90 tablet 3     nitroglycerin (NITROSTAT) 0.4 MG SL tablet Place 1 tablet (0.4 mg) under the tongue See Admin Instructions for chest pain (Patient not taking: Reported on 4/18/2018) 25 tablet 1       ALLERGIES     Allergies   Allergen Reactions     No Known Drug Allergies        PAST MEDICAL HISTORY:  Past Medical History:   Diagnosis Date     Atrial fibrillation (H)      Coronary atherosclerosis of unspecified type of vessel, native or graft      Hernia of unspecified site of abdominal cavity without mention of obstruction or gangrene     umbilical hernia; s/p repair 3/1998     History of CVA (cerebrovascular accident) 3/16/2018     Hordeolum externum left upper eyelid 1/21/2016     Malignant neoplasm of prostate (H) 07/14/08    Admit.Discharged 07/17/08     Other and unspecified hyperlipidemia      Postsurgical aortocoronary bypass status 1992     Respiratory complications      Unspecified essential hypertension      Unspecified tinnitus     left ear       PAST SURGICAL HISTORY:  Past Surgical  History:   Procedure Laterality Date     C CABG, VEIN, THREE  1992     C NONSPECIFIC PROCEDURE  1987    Bone fusion in neck     C REMV PROSTATE,RETROPUB,RAD,LTD NODES  7/14/2008    Radical retropubic prostatectomy and pelvic lymph node dissection.     HC REMOVAL OF TONSILS,12+ Y/O  1969    Tonsils 10+y.o.     HEMILAMINECTOMY, DISCECTOMY LUMBAR TWO LEVELS, COMBINED Left 12/16/2015    Procedure: COMBINED HEMILAMINECTOMY, DISCECTOMY LUMBAR TWO LEVELS;  Surgeon: Jung Finley MD;  Location: [facility]     STENT, CORONARY, ALEAH  2002    x5       FAMILY HISTORY:  Family History   Problem Relation Age of Onset     Arthritis Father      HEART DISEASE Father      Hypertension Brother      CANCER Brother      liver     HEART DISEASE Sister      HEART DISEASE Brother        SOCIAL HISTORY:  Social History     Social History     Marital status:      Spouse name: N/A     Number of children: N/A     Years of education: N/A     Social History Main Topics     Smoking status: Former Smoker     Packs/day: 0.50     Years: 2.00     Types: Cigarettes     Quit date: 1/1/1960     Smokeless tobacco: Never Used     Alcohol use Yes      Comment: socially     Drug use: No     Sexual activity: No     Other Topics Concern     Parent/Sibling W/ Cabg, Mi Or Angioplasty Before 65f 55m? No     Social History Narrative         Review of Systems:  Skin:  Negative       Eyes:  Positive for glasses    ENT:         Respiratory:  Negative for shortness of breath;dyspnea on exertion;cough;wheezing     Cardiovascular:  chest pain;palpitations;edema;syncope or near-syncope;cyanosis;exercise intolerance;fatigue;lightheadedness;dizziness;Negative for      Gastroenterology: Positive for heartburn;excessive gas or bloating;diarrhea    Genitourinary:  not assessed      Musculoskeletal:  Positive for arthritis;back pain Knee pain, arthritis, and low back pain.  Neurologic:  not assessed      Psychiatric:  not assessed      Heme/Lymph/Imm:  not assessed    "   Endocrine:  not assessed        Physical Exam:  Vitals: /70 (BP Location: Left arm, Patient Position: Chair, Cuff Size: Adult Large)  Pulse (!) 42  Ht 1.708 m (5' 7.25\")  Wt 98 kg (216 lb)  SpO2 97%  BMI 33.58 kg/m2    Constitutional:           Skin:             Head:           Eyes:           Lymph:      ENT:           Neck:           Respiratory:            Cardiac:                                                           GI:           Extremities and Muscular Skeletal:                 Neurological:           Psych:           Recent Lab Results:  LIPID RESULTS:  Lab Results   Component Value Date    CHOL 100 08/31/2017    HDL 43 08/31/2017    LDL 39 08/31/2017    TRIG 92 08/31/2017    CHOLHDLRATIO 2.5 12/16/2014       LIVER ENZYME RESULTS:  Lab Results   Component Value Date    AST 24 08/31/2017    ALT 27 08/31/2017       CBC RESULTS:  Lab Results   Component Value Date    WBC 7.6 11/27/2015    RBC 5.23 11/27/2015    HGB 14.7 12/29/2015    HCT 47.3 11/27/2015    MCV 90 11/27/2015    MCH 31.9 11/27/2015    MCHC 35.3 11/27/2015    RDW 13.7 11/27/2015     11/27/2015       BMP RESULTS:  Lab Results   Component Value Date     08/31/2017    POTASSIUM 4.0 08/31/2017    CHLORIDE 107 08/31/2017    CO2 25 08/31/2017    ANIONGAP 10 08/31/2017    GLC 89 08/31/2017    BUN 16 08/31/2017    CR 0.92 08/31/2017    GFRESTIMATED 80 08/31/2017    GFRESTBLACK >90 08/31/2017    ANKIT 8.8 08/31/2017        A1C RESULTS:  No results found for: A1C    INR RESULTS:  Lab Results   Component Value Date    INR 2.6 (A) 03/22/2018    INR 1.9 (A) 02/08/2018    INR 1.99 (H) 01/07/2016    INR 1.55 (H) 12/31/2015           CC  No referring provider defined for this encounter.                  "

## 2018-04-19 NOTE — PROGRESS NOTES
Service Date: 04/18/2018      HISTORY OF PRESENT ILLNESS:  I saw Uli Champagne today, who is 77.  I have followed him now for almost 5 years because of a history of well-tolerated chronic atrial fibrillation.  As time has gone by, he has become more and more bradycardic.  As time has gone by, however, he denies absolutely any symptoms to suggest that his bradycardia is symptomatic.  He denies chest pain, palpitations, dizziness or syncope.  He denies shortness of breath on exertion.  He says he is limited in terms of exertion more by having to walk with a cane and being a little unsteady and wobbly physically, but not dizziness, syncope, shortness of breath or chest pain.      A surveillance 24-hour Holter monitor revealed an average heart rate of 44 beats per minute, a lower rate of 33 beats per minute and maximum heart rate of 80 beats per minute.  Many people would be symptomatic at this level, but it does not seem to bother him at all.  He had no prolonged pauses.  He had occasional unifocal PVCs, rare couplets and occasional bigeminy.  No symptoms were recorded during the period of monitoring.      He is not on any bradycardic drugs nor should he receive any in the future unless he has a pacemaker as protection.      PAST MEDICAL HISTORY:   1.  Chronically overweight.   2.  Hypertension.   3.  Hyperlipidemia.   4.  Bradycardia associated with atrial fibrillation.      Because of the atrial fibrillation, he is on appropriate stroke prophylaxis anticoagulation.      CURRENT MEDICATIONS:  Zyloprim, aspirin, benazepril/hydrochlorothiazide, simvastatin and warfarin.      SOCIAL HISTORY:  He lives with his wife.  He is up and around slowly.  He does drive a car.  He does some shopping but very little yard work or housework anymore.  He is retired.      PHYSICAL EXAMINATION:   GENERAL:  Bright, alert male.   VITAL SIGNS:  Blood pressure 140/70, heart rate 42-44 beats a minute.  He weighs 216 pounds.  He is  overweight.   NECK:  He had no neck vein distention or bruit at 90 degrees.   HEART:  Irregular and slow without gallop or murmur.   LUNGS:  Clear.   ABDOMEN:  Soft without organomegaly.   EXTREMITIES:  Free of edema.  Pedal pulses were difficult to feel.      Mr. Layton is 77.  His average heart rate is slower than usual, but he has absolutely no symptoms.  He promises to let us know should he have any premonition of progressive slowness on exertion, dizziness, syncope, shortness of breath, etc.      I set him up for another 24-hour Holter monitor in a year, Cardiology followup at that time and we will continue to watch for anything that amounts to bradycardia-associated symptoms.      Over 35 minutes was spent doing his consult, looking at old records, monitoring, EKGs, remote stress nuclear study, etc.      Over half the time was face to face with counseling and education.      cc:   Isra Carrillo MD    [facility]    [address] [place] [address]         JIGNA NEWBY MD, FACC             D: 2018   T: 2018   MT: JERE      Name:     KIRK LAYTON   MRN:      [id number]        Account:      [id number]   :      1940           Service Date: 2018      Document: [id number]

## 2018-04-20 ENCOUNTER — OFFICE VISIT (OUTPATIENT)
Dept: NEUROSURGERY | Facility: CLINIC | Age: 78
End: 2018-04-20
Payer: COMMERCIAL

## 2018-04-20 VITALS
OXYGEN SATURATION: 99 % | WEIGHT: 216 LBS | DIASTOLIC BLOOD PRESSURE: 70 MMHG | BODY MASS INDEX: 33.9 KG/M2 | SYSTOLIC BLOOD PRESSURE: 138 MMHG | HEART RATE: 41 BPM | HEIGHT: 67 IN

## 2018-04-20 DIAGNOSIS — M54.50 CHRONIC BILATERAL LOW BACK PAIN WITHOUT SCIATICA: Primary | ICD-10-CM

## 2018-04-20 DIAGNOSIS — G89.29 CHRONIC BILATERAL LOW BACK PAIN WITHOUT SCIATICA: Primary | ICD-10-CM

## 2018-04-20 PROCEDURE — 99204 OFFICE O/P NEW MOD 45 MIN: CPT | Performed by: PHYSICIAN ASSISTANT

## 2018-04-20 ASSESSMENT — PAIN SCALES - GENERAL: PAINLEVEL: NO PAIN (1)

## 2018-04-20 NOTE — PROGRESS NOTES
Dr. Lenny Perez  [facility]  Neurosurgery Clinic Visit      CC: back pain    Primary care Provider: Isra Carrillo    Referring Provider:        Reason For Visit:   I was asked to consult on the patient for back pain.      HPI: Uli Champagne is a 77 year old male who presents for evaluation of his chief complaint of low back pain.  He has had symptoms of increased low back pain over the last 2 years.  He initially underwent a left-sided L3-4 hemilaminectomy with Dr. Finley in 2015.  He now states that he has increased low back pain and weakness in his left lower extremity after extended periods of walking.  He has not had any recent treatment.  No physical therapy or injections.  He denies bowel or bladder issues or any problems with balance or coordination.    Past Medical History:   Diagnosis Date     Atrial fibrillation (H)      Coronary atherosclerosis of unspecified type of vessel, native or graft      Hernia of unspecified site of abdominal cavity without mention of obstruction or gangrene     umbilical hernia; s/p repair 3/1998     History of CVA (cerebrovascular accident) 3/16/2018     Hordeolum externum left upper eyelid 1/21/2016     Malignant neoplasm of prostate (H) 07/14/08    Admit.Discharged 07/17/08     Other and unspecified hyperlipidemia      Postsurgical aortocoronary bypass status 1992     Respiratory complications      Unspecified essential hypertension      Unspecified tinnitus     left ear       Past Medical History reviewed with patient during visit.    Past Surgical History:   Procedure Laterality Date     C CABG, VEIN, THREE  1992     C NONSPECIFIC PROCEDURE  1987    Bone fusion in neck     C REMV PROSTATE,RETROPUB,RAD,LTD NODES  7/14/2008    Radical retropubic prostatectomy and pelvic lymph node dissection.     HC REMOVAL OF TONSILS,12+ Y/O  1969    Tonsils 10+y.o.     HEMILAMINECTOMY, DISCECTOMY LUMBAR TWO LEVELS, COMBINED Left 12/16/2015    Procedure: COMBINED  "HEMILAMINECTOMY, DISCECTOMY LUMBAR TWO LEVELS;  Surgeon: Jung Finley MD;  Location: [facility]     STENT, CORONARY, ALEAH  2002    x5     Past Surgical History reviewed with patient during visit.    Current Outpatient Prescriptions   Medication     allopurinol (ZYLOPRIM) 300 MG tablet     ASPIRIN 81 MG OR TABS     Benazepril-Hydrochlorothiazide 20-25 MG TABS     fish oil-omega-3 fatty acids (FISH OIL) 1000 MG capsule     simvastatin (ZOCOR) 40 MG tablet     warfarin (COUMADIN) 5 MG tablet     nitroglycerin (NITROSTAT) 0.4 MG SL tablet     No current facility-administered medications for this visit.        Allergies   Allergen Reactions     No Known Drug Allergies        Social History     Social History     Marital status:      Spouse name: N/A     Number of children: N/A     Years of education: N/A     Social History Main Topics     Smoking status: Former Smoker     Packs/day: 0.50     Years: 2.00     Types: Cigarettes     Quit date: 1/1/1960     Smokeless tobacco: Never Used     Alcohol use Yes      Comment: socially     Drug use: No     Sexual activity: No     Other Topics Concern     Parent/Sibling W/ Cabg, Mi Or Angioplasty Before 65f 55m? No     Social History Narrative       Family History   Problem Relation Age of Onset     Arthritis Father      HEART DISEASE Father      Hypertension Brother      CANCER Brother      liver     HEART DISEASE Sister      HEART DISEASE Brother           ROS: 10 point ROS neg other than the symptoms noted above in the HPI.    Vital Signs: /70 (BP Location: Left arm, Patient Position: Chair, Cuff Size: Adult Large)  Pulse (!) 41  Ht 5' 7.25\" (1.708 m)  Wt 216 lb (98 kg)  SpO2 99%  BMI 33.58 kg/m2    Examination:  Constitutional:  Alert, well nourished, NAD.  HEENT: Normocephalic, atraumatic.   Pulmonary:  Without shortness of breath, normal effort.   Lymph: no lymphadenopathy to low back or LE.   Integumentary: Skin is free of rashes or lesions. "   Cardiovascular:  No pitting edema of BLE.    Psych: Normal affect, no apparent distress    Neurological:  Awake  Alert  Oriented x 3  Speech clear  Cranial nerves II - XII grossly intact  Motor exam   Hip Flexor:                Right: 5/5  Left:  5/5  Hip Adductor:             Right:  5/5  Left:  5/5  Hip Abductor:             Right:  5/5  Left:  5/5  Gastroc Soleus:        Right:  5/5  Left:  5/5  Tib/Ant:                      Right:  5/5  Left:  5/5  EHL:                          Right:  5/5  Left:  5/5       Sensation normal to bilateral upper and lower extremities.    Reflexes are 2+ in the patellar and Achilles. There is no clonus. Downgoing Babinski.    Musculoskeletal:  Gait: Able to stand from a seated position. Normal non-antalgic, non-myelopathic gait.  Able to heel/toe walk without loss of balance  Lumbar examination reveals no tenderness of the spine or paraspinous muscles.  Hip height is symmetrical. Negative SI joint, sciatic notch or greater trochanteric tenderness to palpation bilaterally.  Straight leg raise is negative bilaterally.      Imaging:   None.     Assessment/Plan:     Low back pain  Prior L3-4 hemilaminectomy on the left  Left lower extremity weakness      Uli Champagne is a 77 year old male.  I did have a discussed with the patient regarding his symptoms.  He has had worsening weakness and low back pain over the last 2 years.  He underwent a left-sided hemilaminectomy in 2015 with Dr. Finley.  He has not had any treatment since that time.  We will go ahead and obtain a lumbar spine MRI with and without contrast for further evaluation.  He would like to follow-up with me in the clinic to discuss the results.  He voiced agreement and understanding          Scotty Shafer PA-C  [facility]  [address] [address]    Tel [phone]  Pager [phone]

## 2018-04-20 NOTE — LETTER
"    4/20/2018         RE: Uil Champagne  [address] [facility] [address]        Dear Colleague,    Thank you for referring your patient, Uli Champagne, to the [facility]. Please see a copy of my visit note below.    Uli Champagne is a 77 year old male who presents for:  Chief Complaint   Patient presents with     Consult        Initial Vitals:  /70 (BP Location: Left arm, Patient Position: Chair, Cuff Size: Adult Large)  Pulse (!) 41  Ht 5' 7.25\" (1.708 m)  Wt 216 lb (98 kg)  SpO2 99%  BMI 33.58 kg/m2 Estimated body mass index is 33.58 kg/(m^2) as calculated from the following:    Height as of this encounter: 5' 7.25\" (1.708 m).    Weight as of this encounter: 216 lb (98 kg).. Body surface area is 2.16 meters squared. BP completed using cuff size: Large  No Pain (1)    Do you feel safe in your environment?  Yes  Do you need any refills today? No    Nursing Comments:         Fadumo Perez  [facility]  Neurosurgery Clinic Visit      CC: back pain    Primary care Provider: Isra Carrillo    Referring Provider:        Reason For Visit:   I was asked to consult on the patient for back pain.      HPI: Uli Champagne is a 77 year old male who presents for evaluation of his chief complaint of low back pain.  He has had symptoms of increased low back pain over the last 2 years.  He initially underwent a left-sided L3-4 hemilaminectomy with Dr. Finley in 2015.  He now states that he has increased low back pain and weakness in his left lower extremity after extended periods of walking.  He has not had any recent treatment.  No physical therapy or injections.  He denies bowel or bladder issues or any problems with balance or coordination.    Past Medical History:   Diagnosis Date     Atrial fibrillation (H)      Coronary atherosclerosis of unspecified type of vessel, native or graft      Hernia of unspecified site of " abdominal cavity without mention of obstruction or gangrene     umbilical hernia; s/p repair 3/1998     History of CVA (cerebrovascular accident) 3/16/2018     Hordeolum externum left upper eyelid 1/21/2016     Malignant neoplasm of prostate (H) 07/14/08    Admit.Discharged 07/17/08     Other and unspecified hyperlipidemia      Postsurgical aortocoronary bypass status 1992     Respiratory complications      Unspecified essential hypertension      Unspecified tinnitus     left ear       Past Medical History reviewed with patient during visit.    Past Surgical History:   Procedure Laterality Date     C CABG, VEIN, THREE  1992     C NONSPECIFIC PROCEDURE  1987    Bone fusion in neck     C REMV PROSTATE,RETROPUB,RAD,LTD NODES  7/14/2008    Radical retropubic prostatectomy and pelvic lymph node dissection.     HC REMOVAL OF TONSILS,12+ Y/O  1969    Tonsils 10+y.o.     HEMILAMINECTOMY, DISCECTOMY LUMBAR TWO LEVELS, COMBINED Left 12/16/2015    Procedure: COMBINED HEMILAMINECTOMY, DISCECTOMY LUMBAR TWO LEVELS;  Surgeon: Jung Finley MD;  Location: [facility]     STENT, CORONARY, ALEAH  2002    x5     Past Surgical History reviewed with patient during visit.    Current Outpatient Prescriptions   Medication     allopurinol (ZYLOPRIM) 300 MG tablet     ASPIRIN 81 MG OR TABS     Benazepril-Hydrochlorothiazide 20-25 MG TABS     fish oil-omega-3 fatty acids (FISH OIL) 1000 MG capsule     simvastatin (ZOCOR) 40 MG tablet     warfarin (COUMADIN) 5 MG tablet     nitroglycerin (NITROSTAT) 0.4 MG SL tablet     No current facility-administered medications for this visit.        Allergies   Allergen Reactions     No Known Drug Allergies        Social History     Social History     Marital status:      Spouse name: N/A     Number of children: N/A     Years of education: N/A     Social History Main Topics     Smoking status: Former Smoker     Packs/day: 0.50     Years: 2.00     Types: Cigarettes     Quit date: 1/1/1960      "Smokeless tobacco: Never Used     Alcohol use Yes      Comment: socially     Drug use: No     Sexual activity: No     Other Topics Concern     Parent/Sibling W/ Cabg, Mi Or Angioplasty Before 65f 55m? No     Social History Narrative       Family History   Problem Relation Age of Onset     Arthritis Father      HEART DISEASE Father      Hypertension Brother      CANCER Brother      liver     HEART DISEASE Sister      HEART DISEASE Brother           ROS: 10 point ROS neg other than the symptoms noted above in the HPI.    Vital Signs: /70 (BP Location: Left arm, Patient Position: Chair, Cuff Size: Adult Large)  Pulse (!) 41  Ht 5' 7.25\" (1.708 m)  Wt 216 lb (98 kg)  SpO2 99%  BMI 33.58 kg/m2    Examination:  Constitutional:  Alert, well nourished, NAD.  HEENT: Normocephalic, atraumatic.   Pulmonary:  Without shortness of breath, normal effort.   Lymph: no lymphadenopathy to low back or LE.   Integumentary: Skin is free of rashes or lesions.   Cardiovascular:  No pitting edema of BLE.    Psych: Normal affect, no apparent distress    Neurological:  Awake  Alert  Oriented x 3  Speech clear  Cranial nerves II - XII grossly intact  Motor exam   Hip Flexor:                Right: 5/5  Left:  5/5  Hip Adductor:             Right:  5/5  Left:  5/5  Hip Abductor:             Right:  5/5  Left:  5/5  Gastroc Soleus:        Right:  5/5  Left:  5/5  Tib/Ant:                      Right:  5/5  Left:  5/5  EHL:                          Right:  5/5  Left:  5/5       Sensation normal to bilateral upper and lower extremities.    Reflexes are 2+ in the patellar and Achilles. There is no clonus. Downgoing Babinski.    Musculoskeletal:  Gait: Able to stand from a seated position. Normal non-antalgic, non-myelopathic gait.  Able to heel/toe walk without loss of balance  Lumbar examination reveals no tenderness of the spine or paraspinous muscles.  Hip height is symmetrical. Negative SI joint, sciatic notch or greater trochanteric " tenderness to palpation bilaterally.  Straight leg raise is negative bilaterally.      Imaging:   None.     Assessment/Plan:     Low back pain  Prior L3-4 hemilaminectomy on the left  Left lower extremity weakness      Uli Champagne is a 77 year old male.  I did have a discussed with the patient regarding his symptoms.  He has had worsening weakness and low back pain over the last 2 years.  He underwent a left-sided hemilaminectomy in 2015 with Dr. Finley.  He has not had any treatment since that time.  We will go ahead and obtain a lumbar spine MRI with and without contrast for further evaluation.  He would like to follow-up with me in the clinic to discuss the results.  He voiced agreement and understanding          Scotty Shafer PA-C  [facility]  [address] [address]    Tel [phone]  Pager [phone]      Again, thank you for allowing me to participate in the care of your patient.        Sincerely,        Scotty Shafer PA-C

## 2018-04-20 NOTE — PROGRESS NOTES
"Uli Champagne is a 77 year old male who presents for:  Chief Complaint   Patient presents with     Consult        Initial Vitals:  /70 (BP Location: Left arm, Patient Position: Chair, Cuff Size: Adult Large)  Pulse (!) 41  Ht 5' 7.25\" (1.708 m)  Wt 216 lb (98 kg)  SpO2 99%  BMI 33.58 kg/m2 Estimated body mass index is 33.58 kg/(m^2) as calculated from the following:    Height as of this encounter: 5' 7.25\" (1.708 m).    Weight as of this encounter: 216 lb (98 kg).. Body surface area is 2.16 meters squared. BP completed using cuff size: Large  No Pain (1)    Do you feel safe in your environment?  Yes  Do you need any refills today? No    Nursing Comments:         Fadumo Gonsales      "

## 2018-04-20 NOTE — MR AVS SNAPSHOT
After Visit Summary   4/20/2018    Uli Champagne    MRN: [id number]           Patient Information     Date Of Birth          1940        Visit Information        Provider Department      4/20/2018 1:10 PM Scotty Shafer PA-C [facility]        Today's Diagnoses     Chronic bilateral low back pain without sciatica    -  1       Follow-ups after your visit        Your next 10 appointments already scheduled     May 03, 2018  1:00 PM CDT   Anticoagulation [facility] with JITENDRA ANTI COAG   [facility]    [address] [facility] [id number] [phone]              Future tests that were ordered for you today     Open Future Orders        Priority Expected Expires Ordered    MR Lumbar Spine w Contrast Routine  4/20/2019 4/20/2018            Who to contact     If you have questions or need follow up information about today's clinic visit or your schedule please contact [facility] directly at [phone].  Normal or non-critical lab and imaging results will be communicated to you by BeatDeckhart, letter or phone within 4 business days after the clinic has received the results. If you do not hear from us within 7 days, please contact the clinic through [email or web address] or phone. If you have a critical or abnormal lab result, we will notify you by phone as soon as possible.  Submit refill requests through Indochino or call your pharmacy and they will forward the refill request to us. Please allow 3 business days for your refill to be completed.          Additional Information About Your Visit        MyChart Information     [employer] gives you secure access to your electronic health record. If you see a primary care provider, you can also send messages to your care team and make appointments. If you have questions, please call your primary care clinic.  If you do not have a primary care provider, please call [phone] and they will assist you.       "  Care EveryWhere ID     This is your Care EveryWhere ID. This could be used by other organizations to access your [facility] medical records  [id number]        Your Vitals Were     Pulse Height Pulse Oximetry BMI (Body Mass Index)          41 5' 7.25\" (1.708 m) 99% 33.58 kg/m2         Blood Pressure from Last 3 Encounters:   04/20/18 138/70   04/18/18 142/70   04/12/18 116/70    Weight from Last 3 Encounters:   04/20/18 216 lb (98 kg)   04/18/18 216 lb (98 kg)   04/12/18 212 lb (96.2 kg)               Primary Care Provider Office Phone # Fax #    Isra Carrillo [phone]       [address] [facility] [address]        Equal Access to Services     JAS GODOY : Hadii aad ku hadashyessica Soaustyn, waaxda luqadaha, qaybta kaalmada adeegyada, vesna branham . So [facility] [phone].    ATENCIÓN: Si habla español, tiene a lema disposición servicios gratuitos de asistencia lingüística. Llame al [phone].    We comply with applicable federal civil rights laws and [place] laws. We do not discriminate on the basis of race, color, national origin, age, disability, sex, sexual orientation, or gender identity.            Thank you!     Thank you for choosing [facility]  for your care. Our goal is always to provide you with excellent care. Hearing back from our patients is one way we can continue to improve our services. Please take a few minutes to complete the written survey that you may receive in the mail after your visit with us. Thank you!             Your Updated Medication List - Protect others around you: Learn how to safely use, store and throw away your medicines at www.disposemymeds.org.          This list is accurate as of 4/20/18  1:55 PM.  Always use your most recent med list.                   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        nitroGLYcerin 0.4 MG sublingual tablet    NITROSTAT    25 tablet    Place 1 tablet (0.4 mg) under the tongue See Admin Instructions for chest pain    Cardiac dysrhythmia, unspecified       simvastatin 40 MG tablet    ZOCOR    90 tablet    Take 1 tablet (40 mg) by mouth At Bedtime    Hyperlipidemia LDL goal <100       warfarin 5 MG tablet    COUMADIN    90 tablet    TAKE ONE-HALF (1/2) TABLET ON TUESDAY AND SATURDAY AND 1 TABLET ON ALL OTHER DAYS OF THE WEEK OR AS DIRECTED BY THE COUMADIN CLINIC    Permanent atrial fibrillation (H)

## 2018-04-30 ENCOUNTER — HOSPITAL ENCOUNTER (OUTPATIENT)
Dept: MRI IMAGING | Facility: CLINIC | Age: 78
Discharge: HOME | End: 2018-04-30
Attending: PHYSICIAN ASSISTANT | Admitting: PHYSICIAN ASSISTANT
Payer: COMMERCIAL

## 2018-04-30 DIAGNOSIS — G89.29 CHRONIC BILATERAL LOW BACK PAIN WITHOUT SCIATICA: ICD-10-CM

## 2018-04-30 DIAGNOSIS — M54.50 CHRONIC BILATERAL LOW BACK PAIN WITHOUT SCIATICA: ICD-10-CM

## 2018-04-30 LAB
CREAT BLD-MCNC: 0.9 MG/DL (ref 0.66–1.25)
GFR SERPL CREATININE-BSD FRML MDRD: 81 ML/MIN/1.7M2

## 2018-04-30 PROCEDURE — 25000128 H RX IP 250 OP 636: Performed by: RADIOLOGY

## 2018-04-30 PROCEDURE — 72158 MRI LUMBAR SPINE W/O & W/DYE: CPT

## 2018-04-30 PROCEDURE — A9585 GADOBUTROL INJECTION: HCPCS | Performed by: RADIOLOGY

## 2018-04-30 PROCEDURE — 82565 ASSAY OF CREATININE: CPT

## 2018-04-30 RX ORDER — GADOBUTROL 604.72 MG/ML
10 INJECTION INTRAVENOUS ONCE
Status: COMPLETED | OUTPATIENT
Start: 2018-04-30 | End: 2018-04-30

## 2018-04-30 RX ADMIN — GADOBUTROL 10 ML: 604.72 INJECTION INTRAVENOUS at 13:04

## 2018-05-03 ENCOUNTER — ANTICOAGULATION THERAPY VISIT (OUTPATIENT)
Dept: ANTICOAGULATION | Facility: OTHER | Age: 78
End: 2018-05-03
Payer: COMMERCIAL

## 2018-05-03 DIAGNOSIS — Z79.01 LONG-TERM (CURRENT) USE OF ANTICOAGULANTS: ICD-10-CM

## 2018-05-03 DIAGNOSIS — I48.20 CHRONIC A-FIB (H): ICD-10-CM

## 2018-05-03 LAB — INR POINT OF CARE: 2.6 (ref 0.86–1.14)

## 2018-05-03 PROCEDURE — 36416 COLLJ CAPILLARY BLOOD SPEC: CPT

## 2018-05-03 PROCEDURE — 85610 PROTHROMBIN TIME: CPT | Mod: QW

## 2018-05-03 PROCEDURE — 99207 ZZC NO CHARGE NURSE ONLY: CPT

## 2018-05-03 NOTE — MR AVS SNAPSHOT
Uli Mcdonaldpramod   5/3/2018 3:00 PM   Anticoagulation Therapy Visit    Description:  77 year old male   Provider:  JITENDRA ANTI COAG   Department:  [facility]           INR as of 5/3/2018     Today's INR 2.6      Anticoagulation Summary as of 5/3/2018     INR goal 2.0-3.0   Today's INR 2.6   Full instructions 2.5 mg on Tue, Sat; 5 mg all other days   Next INR check 6/14/2018    Indications   Long-term (current) use of anticoagulants [Z79.01] [Z79.01]  Chronic a-fib (H) [I48.2]         Your next Anticoagulation Clinic appointment(s)     Jun 14, 2018  1:00 PM CDT   Anticoagulation Visit with JITENDRA ANTI ROCKY   [facility]    [address] [facility] [id number] [phone]              Contact Numbers     Clinic Number:         May 2018 Details    Sun Mon Tue Wed Thu Fri Sat       1               2               3      5 mg   See details      4      5 mg         5      2.5 mg           6      5 mg         7      5 mg         8      2.5 mg         9      5 mg         10      5 mg         11      5 mg         12      2.5 mg           13      5 mg         14      5 mg         15      2.5 mg         16      5 mg         17      5 mg         18      5 mg         19      2.5 mg           20      5 mg         21      5 mg         22      2.5 mg         23      5 mg         24      5 mg         25      5 mg         26      2.5 mg           27      5 mg         28      5 mg         29      2.5 mg         30      5 mg         31      5 mg            Date Details   05/03 This INR check               How to take your warfarin dose     To take:  2.5 mg Take 0.5 of a 5 mg tablet.    To take:  5 mg Take 1 of the 5 mg tablets.           June 2018 Details    Sun Mon Tue Wed Thu Fri Sat          1      5 mg         2      2.5 mg           3      5 mg         4      5 mg         5      2.5 mg         6      5 mg         7      5 mg         8      5 mg         9      2.5 mg           10      5  mg         11      5 mg         12      2.5 mg         13      5 mg         14            15               16                 17               18               19               20               21               22               23                 24               25               26               27               28               29               30                Date Details   No additional details    Date of next INR:  6/14/2018         How to take your warfarin dose     To take:  2.5 mg Take 0.5 of a 5 mg tablet.    To take:  5 mg Take 1 of the 5 mg tablets.

## 2018-05-03 NOTE — PROGRESS NOTES
ANTICOAGULATION FOLLOW-UP CLINIC VISIT    Patient Name:  Uli Champagne  Date:  5/3/2018  Contact Type:  Face to Face    SUBJECTIVE:     Patient Findings     Positives No Problem Findings           OBJECTIVE    INR Protime   Date Value Ref Range Status   05/03/2018 2.6 (A) 0.86 - 1.14 Final       ASSESSMENT / PLAN  INR assessment THER    Recheck INR In: 6 WEEKS    INR Location Clinic      Anticoagulation Summary as of 5/3/2018     INR goal 2.0-3.0   Today's INR 2.6   Maintenance plan 2.5 mg (5 mg x 0.5) on Tue, Sat; 5 mg (5 mg x 1) all other days   Full instructions 2.5 mg on Tue, Sat; 5 mg all other days   Weekly total 30 mg   No change documented Rui Landers RN   Plan last modified Rui Landers RN (3/31/2016)   Next INR check 6/14/2018   Target end date     Indications   Long-term (current) use of anticoagulants [Z79.01] [Z79.01]  Chronic a-fib (H) [I48.2]         Anticoagulation Episode Summary     INR check location     Preferred lab     Send INR reminders to [facility] POOL    Comments 5 mg tab, AM dose        Anticoagulation Care Providers     Provider Role Specialty Phone number    Isra Carrillo MD [facility] [phone]            See the Encounter Report to view Anticoagulation Flowsheet and Dosing Calendar (Go to Encounters tab in chart review, and find the Anticoagulation Therapy Visit)    Dosage adjustment made based on physician directed care plan.    Rui Landers, RN

## 2018-05-04 ENCOUNTER — OFFICE VISIT (OUTPATIENT)
Dept: NEUROSURGERY | Facility: CLINIC | Age: 78
End: 2018-05-04
Payer: COMMERCIAL

## 2018-05-04 VITALS
HEIGHT: 67 IN | BODY MASS INDEX: 33.7 KG/M2 | TEMPERATURE: 97.4 F | DIASTOLIC BLOOD PRESSURE: 62 MMHG | WEIGHT: 214.7 LBS | SYSTOLIC BLOOD PRESSURE: 124 MMHG

## 2018-05-04 DIAGNOSIS — G89.29 CHRONIC BILATERAL LOW BACK PAIN WITHOUT SCIATICA: Primary | ICD-10-CM

## 2018-05-04 DIAGNOSIS — M54.50 CHRONIC BILATERAL LOW BACK PAIN WITHOUT SCIATICA: Primary | ICD-10-CM

## 2018-05-04 PROCEDURE — 99213 OFFICE O/P EST LOW 20 MIN: CPT | Performed by: PHYSICIAN ASSISTANT

## 2018-05-04 ASSESSMENT — PAIN SCALES - GENERAL: PAINLEVEL: MILD PAIN (2)

## 2018-05-04 NOTE — MR AVS SNAPSHOT
After Visit Summary   5/4/2018    Uli Champagne    MRN: [id number]           Patient Information     Date Of Birth          1940        Visit Information        Provider Department      5/4/2018 11:50 AM Scotty Shafer PA-C [facility]        Today's Diagnoses     Chronic bilateral low back pain without sciatica    -  1       Follow-ups after your visit        Your next 10 appointments already scheduled     Jun 14, 2018  1:00 PM CDT   [facility]  ANTI COAG   [facility]    [address] [facility] [address] [phone]              Who to contact     If you have questions or need follow up information about today's clinic visit or your schedule please contact [facility] directly at [phone].  Normal or non-critical lab and imaging results will be communicated to you by MyChart, letter or phone within 4 business days after the clinic has received the results. If you do not hear from us within 7 days, please contact the clinic through MyChart or phone. If you have a critical or abnormal lab result, we will notify you by phone as soon as possible.  Submit refill requests through [email or web address] or call your pharmacy and they will forward the refill request to us. Please allow 3 business days for your refill to be completed.          Additional Information About Your Visit        MyChart Information     [email or web address] gives you secure access to your electronic health record. If you see a primary care provider, you can also send messages to your care team and make appointments. If you have questions, please call your primary care clinic.  If you do not have a primary care provider, please call [phone] and they will assist you.        Care EveryWhere ID     This is your Care EveryWhere ID. This could be used by other organizations to access your [facility] medical records  [id number]        Your Vitals Were      "Temperature Height BMI (Body Mass Index)             97.4  F (36.3  C) (Temporal) 5' 7.25\" (1.708 m) 33.38 kg/m2          Blood Pressure from Last 3 Encounters:   05/04/18 124/62   04/20/18 138/70   04/18/18 142/70    Weight from Last 3 Encounters:   05/04/18 214 lb 11.2 oz (97.4 kg)   04/20/18 216 lb (98 kg)   04/18/18 216 lb (98 kg)              Today, you had the following     No orders found for display       Primary Care Provider Office Phone # Fax #    Isra Carrillo [phone]       [address] [facility] [address]        Equal Access to Services     JAS GODOY : Elfego rosenthalo Andrez, waaxda luqadaha, qaybta kaalmada adeegyada, vesna branham . So [facility] [phone].    ATENCIÓN: Si habla español, tiene a lema disposición servicios gratuitos de asistencia lingüística. [facility] [phone].    We comply with applicable federal civil rights laws and [place] laws. We do not discriminate on the basis of race, color, national origin, age, disability, sex, sexual orientation, or gender identity.            Thank you!     Thank you for choosing [facility]  for your care. Our goal is always to provide you with excellent care. Hearing back from our patients is one way we can continue to improve our services. Please take a few minutes to complete the written survey that you may receive in the mail after your visit with us. Thank you!             Your Updated Medication List - Protect others around you: Learn how to safely use, store and throw away your medicines at www.disposemymeds.org.          This list is accurate as of 5/4/18 11:55 AM.  Always use your most recent med list.                   Brand Name Dispense Instructions for use Diagnosis    allopurinol 300 MG tablet    ZYLOPRIM    90 tablet    TAKE 1 TABLET DAILY FOR GOUT    Idiopathic chronic gout of multiple sites without tophus       aspirin 81 MG tablet      1 TABLET DAILY        " Benazepril-Hydrochlorothiazide 20-25 MG Tabs     180 tablet    TAKE 1 TABLET TWICE A DAY FOR HYPERTENSION    Benign essential hypertension       fish oil-omega-3 fatty acids 1000 MG capsule      Take 1 g by mouth daily.        nitroGLYcerin 0.4 MG sublingual tablet    NITROSTAT    25 tablet    Place 1 tablet (0.4 mg) under the tongue See Admin Instructions for chest pain    Cardiac dysrhythmia, unspecified       simvastatin 40 MG tablet    ZOCOR    90 tablet    Take 1 tablet (40 mg) by mouth At Bedtime    Hyperlipidemia LDL goal <100       warfarin 5 MG tablet    COUMADIN    90 tablet    TAKE ONE-HALF (1/2) TABLET ON TUESDAY AND SATURDAY AND 1 TABLET ON ALL OTHER DAYS OF THE WEEK OR AS DIRECTED BY THE COUMADIN CLINIC    Permanent atrial fibrillation (H)

## 2018-05-04 NOTE — PROGRESS NOTES
[facility]  Neurosurgery followup:    HPI: 77 yr old male, in today to discuss MRI findings. He does not have constant pain in his legs or back. He states his pain today is 2/10, and that he feels is related to just walking into clinic today.    Exam:  Constitutional:  Alert, well nourished, NAD.  HEENT: Normocephalic, atraumatic.   Pulm:  Without shortness of breath   CV:  No pitting edema of BLE.      Neurological:  Awake  Alert  Oriented x 3  Motor exam:        IP Q DF PF EHL  R   5  5   5   5    5  L   5  5   5   5    5     Reflexes are 2+ in the patellar and Achilles. There is no clonus. Downgoing Babinski.      Able to spontaneously move L/E bilaterally  Sensation intact throughout all L/E dermatomes     Imaging: MRI shows multiple levels of severe foraminal narrowing, on the right at L4-5 and L5-S1, and on the left at L3-4, L4-5, and L5-S1. There are multiple levels of severe disc degeneration as well.     A/P:  Low back pain, with multiple levels of severe disc collapse and foraminal stenosis. As he is relatively pain free lately, he is not seeking any interventions. If symptoms worsen, would recommend referral to pain management to discuss possible injections. He will call if he wishes to pursue this.       Scotty Shaefr PA-C  [facility]  [address] [address]    Tel [phone]  Pager [phone]

## 2018-05-04 NOTE — LETTER
5/4/2018         RE: Uli Champagne  [address] [facility] [address]        Dear Colleague,    Thank you for referring your patient, Uli Champagne, to the [facility]. Please see a copy of my visit note below.    [facility]  Neurosurgery followup:    HPI: 77 yr old male, in today to discuss MRI findings. He does not have constant pain in his legs or back. He states his pain today is 2/10, and that he feels is related to just walking into clinic today.    Exam:  Constitutional:  Alert, well nourished, NAD.  HEENT: Normocephalic, atraumatic.   Pulm:  Without shortness of breath   CV:  No pitting edema of BLE.      Neurological:  Awake  Alert  Oriented x 3  Motor exam:        IP Q DF PF EHL  R   5  5   5   5    5  L   5  5   5   5    5     Reflexes are 2+ in the patellar and Achilles. There is no clonus. Downgoing Babinski.      Able to spontaneously move L/E bilaterally  Sensation intact throughout all L/E dermatomes     Imaging: MRI shows multiple levels of severe foraminal narrowing, on the right at L4-5 and L5-S1, and on the left at L3-4, L4-5, and L5-S1. There are multiple levels of severe disc degeneration as well.     A/P:  Low back pain, with multiple levels of severe disc collapse and foraminal stenosis. As he is relatively pain free lately, he is not seeking any interventions. If symptoms worsen, would recommend referral to pain management to discuss possible injections. He will call if he wishes to pursue this.       Scotty Shafer PA-C  [facility]  [address] [address]    Tel [phone]  Pager [phone]      Again, thank you for allowing me to participate in the care of your patient.        Sincerely,        Scotty Shafer PA-C

## 2018-05-10 ENCOUNTER — TRANSFERRED RECORDS (OUTPATIENT)
Dept: HEALTH INFORMATION MANAGEMENT | Facility: CLINIC | Age: 78
End: 2018-05-10

## 2018-05-10 LAB
ALT SERPL-CCNC: 37 U/L (ref 0–65)
AST SERPL-CCNC: 35 U/L (ref 5–40)
CHOLEST SERPL-MCNC: 115 MG/DL
CREAT SERPL-MCNC: 0.9 MG/DL (ref 0.5–1.5)
GFR SERPL CREATININE-BSD FRML MDRD: >60 ML/MIN/1.73M2
GLUCOSE SERPL-MCNC: 106 MG/DL (ref 70–100)
HDLC SERPL-MCNC: 37 MG/DL (ref 40–67)
LDLC SERPL CALC-MCNC: 58 MG/DL
POTASSIUM SERPL-SCNC: 3.7 MEQ/L (ref 3.5–5)
TRIGL SERPL-MCNC: 101 MG/DL

## 2018-05-15 ENCOUNTER — OFFICE VISIT (OUTPATIENT)
Dept: FAMILY MEDICINE | Facility: OTHER | Age: 78
End: 2018-05-15
Payer: COMMERCIAL

## 2018-05-15 VITALS
OXYGEN SATURATION: 97 % | BODY MASS INDEX: 32.8 KG/M2 | RESPIRATION RATE: 14 BRPM | WEIGHT: 211 LBS | SYSTOLIC BLOOD PRESSURE: 124 MMHG | DIASTOLIC BLOOD PRESSURE: 60 MMHG | HEART RATE: 54 BPM

## 2018-05-15 DIAGNOSIS — R19.7 DIARRHEA, UNSPECIFIED TYPE: Primary | ICD-10-CM

## 2018-05-15 DIAGNOSIS — C61 PROSTATE CANCER (H): ICD-10-CM

## 2018-05-15 DIAGNOSIS — I48.20 CHRONIC A-FIB (H): ICD-10-CM

## 2018-05-15 PROCEDURE — 99213 OFFICE O/P EST LOW 20 MIN: CPT | Performed by: PHYSICIAN ASSISTANT

## 2018-05-15 ASSESSMENT — PAIN SCALES - GENERAL: PAINLEVEL: NO PAIN (0)

## 2018-05-15 NOTE — MR AVS SNAPSHOT
After Visit Summary   5/15/2018    Uli Champagne    MRN: [id number]           Patient Information     Date Of Birth          1940        Visit Information        Provider Department      5/15/2018 1:00 PM Dusty Carter PA-C [facility] Instructions      1. Loperamide (Imodium) OTC - take as needed for diarrhea per instructions on the box  2. Metamucil follow directions on the package until diarrhea stops. Then as needed.   3. If diarrhea persists or symptoms change or worsen (see below) then call clinic.   Diarrhea with Uncertain Cause (Adult)    Diarrhea is when stools are loose and watery. This can be caused by:    Viral infections    Bacterial infections    Food poisoning    Parasites    Irritable bowel syndrome (IBS)    Inflammatory bowel diseases such as ulcerative colitis, Crohn's disease, and celiac disease    Food intolerance, such as to lactose, the sugar found in milk and milk products    Reaction to medicines like antibiotics, laxatives, cancer drugs, and antacids  Along with diarrhea, you may also have:    Abdominal pain and cramping    Nausea and vomiting    Loss of bowel control    Fever and chills    Bloody stools  In some cases, antibiotics may help to treat diarrhea. You may have a stool sample test. This is done to see what is causing your diarrhea, and if antibiotics will help treat it. The results of a stool sample test may take up to 2 days. The healthcare provider may not give you antibiotics until he or she has the stool test results.  Diarrhea can cause dehydration. This is the loss of too much water and other fluids from the body. When this occurs, body fluid must be replaced. This can be done with oral rehydration solutions. Oral rehydration solutions are available at drugstores and grocery stores without a prescription.  Home care  Follow all instructions given by your healthcare provider. Rest at home for the next 24 hours, or until  you feel better. Avoid caffeine, tobacco, and alcohol. These can make diarrhea, cramping, and pain worse.  If taking medicines:    Don t take over-the-counter diarrhea or nausea medicines unless your healthcare provider tells you to.    You may use acetaminophen or NSAID medicines like ibuprofen or naproxen to reduce pain and fever. Don t use these if you have chronic liver or kidney disease, or ever had a stomach ulcer or gastrointestinal bleeding. Don't use NSAID medicines if you are already taking one for another condition (like arthritis) or are on daily aspirin therapy (such as for heart disease or after a stroke). Talk with your healthcare provider first.    If antibiotics were prescribed, be sure you take them until they are finished. Don t stop taking them even when you feel better. Antibiotics must be taken as a full course.  To prevent the spread of illness:    Remember that washing with soap and water and using alcohol-based  is the best way to prevent the spread of infection.    Clean the toilet after each use.    Wash your hands before eating.    Wash your hands before and after preparing food. Keep in mind that people with diarrhea or vomiting should not prepare food for others.    Wash your hands after using cutting boards, countertops, and knives that have been in contact with raw foods.    Wash and then peel fruits and vegetables.    Keep uncooked meats away from cooked and ready-to-eat foods.    Use a food thermometer when cooking. Cook poultry to at least 165 F (74 C). Cook ground meat (beef, veal, pork, lamb) to at least 160 F (71 C). Cook fresh beef, veal, lamb, and pork to at least 145 F (63 C).    Don t eat raw or undercooked eggs (poached or jeovanny side up), poultry, meat, or unpasteurized milk and juices.  Food and drinks  The main goal while treating vomiting or diarrhea is to prevent dehydration. This is done by taking small amounts of liquids often.    Keep in mind that liquids  are more important than food right now.    Drink only small amounts of liquids at a time.    Don t force yourself to eat, especially if you are having cramping, vomiting, or diarrhea. Don t eat large amounts at a time, even if you are hungry.    If you eat, avoid fatty, greasy, spicy, or fried foods.    Don t eat dairy foods or drink milk if you have diarrhea. These can make diarrhea worse.  During the first 24 hours you can try:    Oral rehydration solutions. Do not use sports drinks. They have too much sugar and not enough electrolytes.    Soft drinks without caffeine    Ginger ale    Water (plain or flavored)    Decaf tea or coffee    Clear broth, consommé, or bouillon    Gelatin, popsicles, or frozen fruit juice bars  The second 24 hours, if you are feeling better, you can add:    Hot cereal, plain toast, bread, rolls, or crackers    Plain noodles, rice, mashed potatoes, chicken noodle soup, or rice soup    Unsweetened canned fruit (no pineapple)    Bananas  As you recover:    Limit fat intake to less than 15 grams per day. Don t eat margarine, butter, oils, mayonnaise, sauces, gravies, fried foods, peanut butter, meat, poultry, or fish.    Limit fiber. Don t eat raw or cooked vegetables, fresh fruits except bananas, or bran cereals.    Limit caffeine and chocolate.    Limit dairy.    Don t use spices or seasonings except salt.    Go back to your normal diet over time, as you feel better and your symptoms improve.    If the symptoms come back, go back to a simple diet or clear liquids.  Follow-up care  Follow up with your healthcare provider, or as advised. If a stool sample was taken or cultures were done, call the healthcare provider for the results as instructed.  Call 911  Call 911 if you have any of these symptoms:    Trouble breathing    Confusion    Extreme drowsiness or trouble walking    Loss of consciousness    Rapid heart rate    Chest pain    Stiff neck    Seizure  When to seek medical advice  Call  your healthcare provider right away if any of these occur:    Abdominal pain that gets worse    Constant lower right abdominal pain    Continued vomiting and inability to keep liquids down    Diarrhea more than 5 times a day    Blood in vomit or stool    Dark urine or no urine for 8 hours, dry mouth and tongue, tiredness, weakness, or dizziness    Drowsiness    New rash    You don t get better in 2 to 3 days    Fever of 100.4 F (38 C) or higher, or as directed by your healthcare provider  Date Last Reviewed: 1/3/2016    2678-8249 [employer]. [address] [place] [address]. All rights reserved. This information is not intended as a substitute for professional medical care. Always follow your healthcare professional's instructions.                Follow-ups after your visit        Your next 10 appointments already scheduled     Jun 14, 2018  1:00 PM CDT   Anticoagulation Visit with [facility] ANTI COAG   [facility] ([facility])    [address] [facility] [id number] [phone]              Who to contact     If you have questions or need follow up information about today's clinic visit or your schedule please contact [facility] directly at [phone].  Normal or non-critical lab and imaging results will be communicated to you by MyChart, letter or phone within 4 business days after the clinic has received the results. If you do not hear from us within 7 days, please contact the clinic through ftopiahart or phone. If you have a critical or abnormal lab result, we will notify you by phone as soon as possible.  Submit refill requests through [employer] or call your pharmacy and they will forward the refill request to us. Please allow 3 business days for your refill to be completed.          Additional Information About Your Visit        ftopiahart Information     [employer] gives you secure access to your electronic health record. If you see a primary care provider, you  can also send messages to your care team and make appointments. If you have questions, please call your primary care clinic.  If you do not have a primary care provider, please call [phone] and they will assist you.        Care EveryWhere ID     This is your Care EveryWhere ID. This could be used by other organizations to access your [facility] medical records  [id number]        Your Vitals Were     Pulse Respirations Pulse Oximetry BMI (Body Mass Index)          54 14 97% 32.8 kg/m2         Blood Pressure from Last 3 Encounters:   05/15/18 124/60   05/04/18 124/62   04/20/18 138/70    Weight from Last 3 Encounters:   05/15/18 211 lb (95.7 kg)   05/04/18 214 lb 11.2 oz (97.4 kg)   04/20/18 216 lb (98 kg)              Today, you had the following     No orders found for display       Primary Care Provider Office Phone # Fax #    Isra Carrillo [phone]       [address] [facility] [address]        Equal Access to Services     JAS GODOY : Hadii aad ku hadasho Soomaali, waaxda luqadaha, qaybta kaalmada adeegyada, vesna branham . So [facility] [phone].    ATENCIÓN: Si habla español, tiene a lema disposición servicios gratuitos de asistencia lingüística. Llame al [phone].    We comply with applicable federal civil rights laws and [place] laws. We do not discriminate on the basis of race, color, national origin, age, disability, sex, sexual orientation, or gender identity.            Thank you!     Thank you for choosing [facility]  for your care. Our goal is always to provide you with excellent care. Hearing back from our patients is one way we can continue to improve our services. Please take a few minutes to complete the written survey that you may receive in the mail after your visit with us. Thank you!             Your Updated Medication List - Protect others around you: Learn how to safely use, store and throw away your medicines at  www.disposemymeds.org.          This list is accurate as of 5/15/18  1:32 PM.  Always use your most recent med list.                   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        nitroGLYcerin 0.4 MG sublingual tablet    NITROSTAT    25 tablet    Place 1 tablet (0.4 mg) under the tongue See Admin Instructions for chest pain    Cardiac dysrhythmia, unspecified       simvastatin 40 MG tablet    ZOCOR    90 tablet    Take 1 tablet (40 mg) by mouth At Bedtime    Hyperlipidemia LDL goal <100       warfarin 5 MG tablet    COUMADIN    90 tablet    TAKE ONE-HALF (1/2) TABLET ON TUESDAY AND SATURDAY AND 1 TABLET ON ALL OTHER DAYS OF THE WEEK OR AS DIRECTED BY THE COUMADIN CLINIC    Permanent atrial fibrillation (H)

## 2018-05-15 NOTE — PATIENT INSTRUCTIONS
1. Loperamide (Imodium) OTC - take as needed for diarrhea per instructions on the box  2. Metamucil follow directions on the package until diarrhea stops. Then as needed.   3. If diarrhea persists or symptoms change or worsen (see below) then call clinic.   Diarrhea with Uncertain Cause (Adult)    Diarrhea is when stools are loose and watery. This can be caused by:    Viral infections    Bacterial infections    Food poisoning    Parasites    Irritable bowel syndrome (IBS)    Inflammatory bowel diseases such as ulcerative colitis, Crohn's disease, and celiac disease    Food intolerance, such as to lactose, the sugar found in milk and milk products    Reaction to medicines like antibiotics, laxatives, cancer drugs, and antacids  Along with diarrhea, you may also have:    Abdominal pain and cramping    Nausea and vomiting    Loss of bowel control    Fever and chills    Bloody stools  In some cases, antibiotics may help to treat diarrhea. You may have a stool sample test. This is done to see what is causing your diarrhea, and if antibiotics will help treat it. The results of a stool sample test may take up to 2 days. The healthcare provider may not give you antibiotics until he or she has the stool test results.  Diarrhea can cause dehydration. This is the loss of too much water and other fluids from the body. When this occurs, body fluid must be replaced. This can be done with oral rehydration solutions. Oral rehydration solutions are available at drugstores and grocery stores without a prescription.  Home care  Follow all instructions given by your healthcare provider. Rest at home for the next 24 hours, or until you feel better. Avoid caffeine, tobacco, and alcohol. These can make diarrhea, cramping, and pain worse.  If taking medicines:    Don t take over-the-counter diarrhea or nausea medicines unless your healthcare provider tells you to.    You may use acetaminophen or NSAID medicines like ibuprofen or naproxen  to reduce pain and fever. Don t use these if you have chronic liver or kidney disease, or ever had a stomach ulcer or gastrointestinal bleeding. Don't use NSAID medicines if you are already taking one for another condition (like arthritis) or are on daily aspirin therapy (such as for heart disease or after a stroke). Talk with your healthcare provider first.    If antibiotics were prescribed, be sure you take them until they are finished. Don t stop taking them even when you feel better. Antibiotics must be taken as a full course.  To prevent the spread of illness:    Remember that washing with soap and water and using alcohol-based  is the best way to prevent the spread of infection.    Clean the toilet after each use.    Wash your hands before eating.    Wash your hands before and after preparing food. Keep in mind that people with diarrhea or vomiting should not prepare food for others.    Wash your hands after using cutting boards, countertops, and knives that have been in contact with raw foods.    Wash and then peel fruits and vegetables.    Keep uncooked meats away from cooked and ready-to-eat foods.    Use a food thermometer when cooking. Cook poultry to at least 165 F (74 C). Cook ground meat (beef, veal, pork, lamb) to at least 160 F (71 C). Cook fresh beef, veal, lamb, and pork to at least 145 F (63 C).    Don t eat raw or undercooked eggs (poached or jeovanny side up), poultry, meat, or unpasteurized milk and juices.  Food and drinks  The main goal while treating vomiting or diarrhea is to prevent dehydration. This is done by taking small amounts of liquids often.    Keep in mind that liquids are more important than food right now.    Drink only small amounts of liquids at a time.    Don t force yourself to eat, especially if you are having cramping, vomiting, or diarrhea. Don t eat large amounts at a time, even if you are hungry.    If you eat, avoid fatty, greasy, spicy, or fried foods.    Don t  eat dairy foods or drink milk if you have diarrhea. These can make diarrhea worse.  During the first 24 hours you can try:    Oral rehydration solutions. Do not use sports drinks. They have too much sugar and not enough electrolytes.    Soft drinks without caffeine    Ginger ale    Water (plain or flavored)    Decaf tea or coffee    Clear broth, consommé, or bouillon    Gelatin, popsicles, or frozen fruit juice bars  The second 24 hours, if you are feeling better, you can add:    Hot cereal, plain toast, bread, rolls, or crackers    Plain noodles, rice, mashed potatoes, chicken noodle soup, or rice soup    Unsweetened canned fruit (no pineapple)    Bananas  As you recover:    Limit fat intake to less than 15 grams per day. Don t eat margarine, butter, oils, mayonnaise, sauces, gravies, fried foods, peanut butter, meat, poultry, or fish.    Limit fiber. Don t eat raw or cooked vegetables, fresh fruits except bananas, or bran cereals.    Limit caffeine and chocolate.    Limit dairy.    Don t use spices or seasonings except salt.    Go back to your normal diet over time, as you feel better and your symptoms improve.    If the symptoms come back, go back to a simple diet or clear liquids.  Follow-up care  Follow up with your healthcare provider, or as advised. If a stool sample was taken or cultures were done, call the healthcare provider for the results as instructed.  Call 911  Call 911 if you have any of these symptoms:    Trouble breathing    Confusion    Extreme drowsiness or trouble walking    Loss of consciousness    Rapid heart rate    Chest pain    Stiff neck    Seizure  When to seek medical advice  Call your healthcare provider right away if any of these occur:    Abdominal pain that gets worse    Constant lower right abdominal pain    Continued vomiting and inability to keep liquids down    Diarrhea more than 5 times a day    Blood in vomit or stool    Dark urine or no urine for 8 hours, dry mouth and tongue,  tiredness, weakness, or dizziness    Drowsiness    New rash    You don t get better in 2 to 3 days    Fever of 100.4 F (38 C) or higher, or as directed by your healthcare provider  Date Last Reviewed: 1/3/2016    3166-9242 [employer]. [address] [place] [address]. All rights reserved. This information is not intended as a substitute for professional medical care. Always follow your healthcare professional's instructions.

## 2018-05-15 NOTE — NURSING NOTE
Chief Complaint   Patient presents with     Diarrhea     on and off. But now it is back and and now for 2 days now and alot of gas.     MP/MA

## 2018-05-15 NOTE — PROGRESS NOTES
SUBJECTIVE:   Uli Champagne is a 77 year old male who presents to clinic today for the following health issues:      Diarrhea  Onset: bad the last 2 days now    Description:   Consistency of stool: watery and runny  Blood in stool: no   Number of loose stools in past 24 hours: 4    Progression of Symptoms:  same    Accompanying Signs & Symptoms:  Fever: no   Nausea or vomiting; no   Abdominal pain: no   Episodes of constipation: yes  Weight loss: YES    History:   Ill contacts: no   Recent use of antibiotics: no    Recent travels: no          Recent medication-new or changes(Rx or OTC): no       Therapies Tried and outcome:  None    Patient is a pleasant 77 year old with pmh of atrial fibrillation, bradycardia, s/p prostectomy for cancer, s/p CVA who is in clinic today due to concerns over intermittent diarrhea. He was seen for this on 04/12/2018 and underwent stool testing, all of which returned negative. Since that appointment he said that his symptoms improved and for a short while he had normal stools. Over the past week he says that he has been feeling gassy and has begun to have stools of a pudding like consistency. He says that he typically has a bowel movement when he wakes in the morning and maybe once more during an average day. Recently he has been having bowel movements 2-4 times a day and says that sometimes it is within hours of eating. Denies fever, abdominal pain, vomiting, nausea, blood in stool, melena, recent illness or use of antibiotics. He tried pepto bismol when this first began, but said that it did not help very much. We discussed the wide range of etiology for diarrhea and accompanying symptoms. He currently is not having any additional symptoms.     History of prostectomy without complication. Last colonoscopy, per patient report, was normal. No family history of colon cancer or inflammatory bowel disease. Takes allopurinol for chronic gout, this medication has been known to  cause diarrhea.     Problem list and histories reviewed & adjusted, as indicated.  Additional history: as documented    Patient Active Problem List   Diagnosis     Cardiac dysrhythmia     Essential hypertension, benign     Neurogenic bladder     Prostate cancer (H)     Permanent atrial fibrillation (H)     Chronic idiopathic gout of multiple sites     HYPERLIPIDEMIA LDL GOAL <100     CAD (coronary artery disease)     Advanced directives, counseling/discussion     Health Care Home     Stye     Non morbid obesity due to excess calories     Lumbar spinal stenosis     S/P lumbar laminectomy     Chronic a-fib (H)     Essential hypertension     Hordeolum externum left upper eyelid     Long-term (current) use of anticoagulants [Z79.01]     Tear of medial meniscus of left knee, initial encounter     Primary osteoarthritis of left knee     Bradycardia     Abdominal aortic aneurysm (AAA) without rupture (H)     Adenocarcinoma of prostate (H)     Postsurgical aortocoronary bypass status     History of CVA (cerebrovascular accident)     Past Surgical History:   Procedure Laterality Date     C CABG, VEIN, THREE  1992     C NONSPECIFIC PROCEDURE  1987    Bone fusion in neck     C REMV PROSTATE,RETROPUB,RAD,LTD NODES  7/14/2008    Radical retropubic prostatectomy and pelvic lymph node dissection.     HC REMOVAL OF TONSILS,10+ Y/O  1969    Tonsils 10+y.o.     HEMILAMINECTOMY, DISCECTOMY LUMBAR TWO LEVELS, COMBINED Left 12/16/2015    Procedure: COMBINED HEMILAMINECTOMY, DISCECTOMY LUMBAR TWO LEVELS;  Surgeon: Jung Finley MD;  Location: [facility]     STENT, CORONARY, ALEAH  2002    x5       Social History   Substance Use Topics     Smoking status: Former Smoker     Packs/day: 0.50     Years: 2.00     Types: Cigarettes     Quit date: 1/1/1960     Smokeless tobacco: Never Used     Alcohol use Yes      Comment: socially     Family History   Problem Relation Age of Onset     Arthritis Father      HEART DISEASE Father       Hypertension Brother      CANCER Brother      liver     HEART DISEASE Sister      HEART DISEASE Brother          Current Outpatient Prescriptions   Medication Sig Dispense Refill     allopurinol (ZYLOPRIM) 300 MG tablet TAKE 1 TABLET DAILY FOR GOUT 90 tablet 3     ASPIRIN 81 MG OR TABS 1 TABLET DAILY       Benazepril-Hydrochlorothiazide 20-25 MG TABS TAKE 1 TABLET TWICE A DAY FOR HYPERTENSION 180 tablet 3     fish oil-omega-3 fatty acids (FISH OIL) 1000 MG capsule Take 1 g by mouth daily.       nitroglycerin (NITROSTAT) 0.4 MG SL tablet Place 1 tablet (0.4 mg) under the tongue See Admin Instructions for chest pain 25 tablet 1     simvastatin (ZOCOR) 40 MG tablet Take 1 tablet (40 mg) by mouth At Bedtime 90 tablet 3     warfarin (COUMADIN) 5 MG tablet TAKE ONE-HALF (1/2) TABLET ON TUESDAY AND SATURDAY AND 1 TABLET ON ALL OTHER DAYS OF THE WEEK OR AS DIRECTED BY THE COUMADIN CLINIC 90 tablet 3     Allergies   Allergen Reactions     No Known Drug Allergies      Labs reviewed in EPIC    Reviewed and updated as needed this visit by clinical staff  Tobacco  Allergies  Meds       Reviewed and updated as needed this visit by Provider         ROS:  Constitutional, HEENT, cardiovascular, pulmonary, gi and gu systems are negative, except as otherwise noted.    OBJECTIVE:     /60  Pulse 54  Resp 14  Wt 211 lb (95.7 kg)  SpO2 97%  BMI 32.8 kg/m2  Body mass index is 32.8 kg/(m^2).  GENERAL: healthy, alert and no distress  RESP: lungs clear to auscultation - no rales, rhonchi or wheezes  CV: regular rate and rhythm, normal S1 S2, no S3 or S4, no murmur, click or rub, no peripheral edema and peripheral pulses strong  ABDOMEN: soft, nontender, no hepatosplenomegaly, no masses and bowel sounds normal  MS: no gross musculoskeletal defects noted, no edema    Diagnostic Test Results:  none     ASSESSMENT/PLAN:     1. Diarrhea, unspecified type  Reviewed recent laboratory work from 04/12/2018. Patient reports improvement of  symptoms over the past month. No identifiable trigger or event, no accompanying symptoms. Recommend supportive therapy at this time. OTC loperamide and fiber supplementation. Patient will call if symptoms not improving. Educational materials sent home with patient.     2. Chronic a-fib (H)  Patient has a cardiologist which he sees regularly. Aware of irregular heartbeat and PVCs. Defer treatment decisions to cardiologist.     3. Prostate cancer (H)  Patient underwent prostectomy in 07/2008. Denies any complications, weight loss, or pain.       Follow up with clinic as needed or sooner if conditions change, worsen or fail to improve as expected.      Dusty Carter PA-C  [facility]

## 2018-06-14 ENCOUNTER — ANTICOAGULATION THERAPY VISIT (OUTPATIENT)
Dept: ANTICOAGULATION | Facility: OTHER | Age: 78
End: 2018-06-14
Payer: COMMERCIAL

## 2018-06-14 DIAGNOSIS — I48.20 CHRONIC A-FIB (H): ICD-10-CM

## 2018-06-14 DIAGNOSIS — Z79.01 LONG-TERM (CURRENT) USE OF ANTICOAGULANTS: ICD-10-CM

## 2018-06-14 LAB — INR POINT OF CARE: 2 (ref 0.86–1.14)

## 2018-06-14 PROCEDURE — 85610 PROTHROMBIN TIME: CPT | Mod: QW

## 2018-06-14 PROCEDURE — 36416 COLLJ CAPILLARY BLOOD SPEC: CPT

## 2018-06-14 PROCEDURE — 99207 ZZC NO CHARGE NURSE ONLY: CPT

## 2018-06-14 NOTE — MR AVS SNAPSHOT
Uli SINGH Ihsan   6/14/2018 1:00 PM   Anticoagulation Therapy Visit    Description:  77 year old male   Provider:  [facility]   Department:  [facility]           INR as of 6/14/2018     Today's INR 2.0      Anticoagulation Summary as of 6/14/2018     INR goal 2.0-3.0   Today's INR 2.0   Full warfarin instructions 2.5 mg on Tue, Sat; 5 mg all other days   Next INR check 7/26/2018    Indications   Long-term (current) use of anticoagulants [Z79.01] [Z79.01]  Chronic a-fib (H) [I48.2]         Your next Anticoagulation Clinic appointment(s)     Jul 26, 2018  1:00 PM CDT   [facility] with [facility]   [facility]    [address] [facility] [id number] [phone]              Contact Numbers     Clinic Number:         June 2018 Details    Sun Mon Tue Wed Thu Fri Sat          1               2                 3               4               5               6               7               8               9                 10               11               12               13               14      5 mg   See details      15      5 mg         16      2.5 mg           17      5 mg         18      5 mg         19      2.5 mg         20      5 mg         21      5 mg         22      5 mg         23      2.5 mg           24      5 mg         25      5 mg         26      2.5 mg         27      5 mg         28      5 mg         29      5 mg         30      2.5 mg          Date Details   06/14 This INR check               How to take your warfarin dose     To take:  2.5 mg Take 0.5 of a 5 mg tablet.    To take:  5 mg Take 1 of the 5 mg tablets.           July 2018 Details    Sun Mon Tue Wed Thu Fri Sat     1      5 mg         2      5 mg         3      2.5 mg         4      5 mg         5      5 mg         6      5 mg         7      2.5 mg           8      5 mg         9      5 mg         10      2.5 mg         11      5 mg         12      5 mg         13      5 mg          14      2.5 mg           15      5 mg         16      5 mg         17      2.5 mg         18      5 mg         19      5 mg         20      5 mg         21      2.5 mg           22      5 mg         23      5 mg         24      2.5 mg         25      5 mg         26            27               28                 29               30               31                    Date Details   No additional details    Date of next INR:  7/26/2018         How to take your warfarin dose     To take:  2.5 mg Take 0.5 of a 5 mg tablet.    To take:  5 mg Take 1 of the 5 mg tablets.

## 2018-06-14 NOTE — PROGRESS NOTES
ANTICOAGULATION FOLLOW-UP CLINIC VISIT    Patient Name:  Uli Champagne  Date:  6/14/2018  Contact Type:  Face to Face    SUBJECTIVE:     Patient Findings     Positives No Problem Findings           OBJECTIVE    INR Protime   Date Value Ref Range Status   06/14/2018 2.0 (A) 0.86 - 1.14 Final       ASSESSMENT / PLAN  INR assessment THER    Recheck INR In: 6 WEEKS    INR Location Clinic      Anticoagulation Summary as of 6/14/2018     INR goal 2.0-3.0   Today's INR 2.0   Warfarin maintenance plan 2.5 mg (5 mg x 0.5) on Tue, Sat; 5 mg (5 mg x 1) all other days   Full warfarin instructions 2.5 mg on Tue, Sat; 5 mg all other days   Weekly warfarin total 30 mg   No change documented Rui Landers RN   Plan last modified Rui Landers RN (3/31/2016)   Next INR check 7/26/2018   Target end date     Indications   Long-term (current) use of anticoagulants [Z79.01] [Z79.01]  Chronic a-fib (H) [I48.2]         Anticoagulation Episode Summary     INR check location     Preferred lab     Send INR reminders to [facility] POOL    Comments 5 mg tab, AM dose        Anticoagulation Care Providers     Provider Role Specialty Phone number    Isra Carrillo MD [facility] [phone]            See the Encounter Report to view Anticoagulation Flowsheet and Dosing Calendar (Go to Encounters tab in chart review, and find the Anticoagulation Therapy Visit)    Dosage adjustment made based on physician directed care plan.    Rui Landers RN

## 2018-07-19 ENCOUNTER — OFFICE VISIT (OUTPATIENT)
Dept: FAMILY MEDICINE | Facility: OTHER | Age: 78
End: 2018-07-19
Payer: COMMERCIAL

## 2018-07-19 VITALS
HEART RATE: 68 BPM | DIASTOLIC BLOOD PRESSURE: 62 MMHG | TEMPERATURE: 97 F | RESPIRATION RATE: 18 BRPM | SYSTOLIC BLOOD PRESSURE: 126 MMHG | WEIGHT: 210.1 LBS | BODY MASS INDEX: 32.66 KG/M2 | OXYGEN SATURATION: 98 %

## 2018-07-19 DIAGNOSIS — I73.9 PERIPHERAL VASCULAR DISEASE (H): ICD-10-CM

## 2018-07-19 DIAGNOSIS — G56.03 BILATERAL CARPAL TUNNEL SYNDROME: Primary | ICD-10-CM

## 2018-07-19 PROCEDURE — 99213 OFFICE O/P EST LOW 20 MIN: CPT | Performed by: FAMILY MEDICINE

## 2018-07-19 ASSESSMENT — PAIN SCALES - GENERAL: PAINLEVEL: NO PAIN (0)

## 2018-07-19 NOTE — PATIENT INSTRUCTIONS
Carpal Tunnel Syndrome    Carpal tunnel syndrome is a painful condition of the wrist and arm. It is caused by pressure on the median nerve.  The median nerve is one of the nerves that give feeling and movement to the hand. It passes through a tunnel in the wrist called the carpal tunnel. This tunnel is made up of bones and ligaments. Narrowing of this tunnel or swelling of the tissues inside the tunnel puts pressure on the median nerve. This causes numbness, pins and needles, or electric shooting pains in your hand and forearm. Often the pain is worse at night and may wake you when you are asleep.  Carpal tunnel syndrome may occur during pregnancy and with use of birth control pills. It is more common in workers who must often bend their wrists. It is also common in people who work with power tools that cause strong vibrations.  Home care    Rest the painful wrist. Avoid repeated bending of the wrist back and forth. This puts pressure on the median nerve. Avoid using power tools with strong vibrations.    If you were given a splint, wear it at night while you sleep. You may also wear it during the day for comfort.    Move your fingers and wrists often to avoid stiffness.    Elevate your arms on pillows when you lie down.    Try using the unaffected hand more.    Try not to hold your wrists in a bent, downward position.    Sometimes changes in the work place may ease symptoms. If you type most of the day, it may help to change the position of your keyboard or add a wrist support. Your wrist should be in a neutral position and not bent back when typing.    You may use over-the-counter pain medicine to treat pain and inflammation, unless another medicine was prescribed. Anti-inflammatory pain medicines, such as ibuprofen or naproxen may be more effective than acetaminophen, which treats pain, but not inflammation. If you have chronic liver or kidney disease or ever had a stomach ulcer or GI bleeding, talk with your  doctor before using these medicines.    Opioid pain medicine will only give temporary relief and does not treat the problem. If pain continues, you may need a shot of a steroid drug into your wrist.    If the above methods fail, you may need surgery. This will open the carpal tunnel and release the pressure on the trapped nerve.  Follow-up care  Follow up with your healthcare provider, or as advised, if the pain doesn t begin to improve within the next week.  If X-rays were taken, you will be notified of any new findings that may affect your care.  When to seek medical advice  Call your healthcare provider right away if any of these occur:    Pain not improving with the above treatment    Fingers or hand become cold, blue, numb, or tingly    Your whole arm becomes swollen or weak  Date Last Reviewed: 11/23/2015 2000-2017 [employer]. [address] [place], [place] [id number]. All rights reserved. This information is not intended as a substitute for professional medical care. Always follow your healthcare professional's instructions.        Carpal Tunnel Syndrome Prevention Tips  Carpal tunnel syndrome is a painful condition in the hand and wrist. It occurs when there is too much pressure on the median nerve at the wrist. The median nerve runs from your forearm to the palm of your hand. It may get squeezed or pressed when it passes through the carpal tunnel from your wrist to your hand. You may then feel numbness, tingling, pain, or weakness in your hand and up your forearm.  Doing the same hand activities over and over can put you at higher risk for carpal tunnel syndrome. But you can reduce your risk. Learn how to change the way you use your hands. Below are tips for at home and on the job. Also follow the hand and wrist safety policies at your workplace.      Keep your wrist in a straight (neutral) position when exercising.      Keep your wrist in neutral  Keep a straight (neutral) wrist position  as often as you can. Don t use your wrist in a bent (flexed) position for long periods of time. This includes extended or twisted positions.  When you sleep, don't have your wrist flexed (don't sleep all curled up on your side). And don't put extra pressure on your wrist for long periods of time (don't sleep on your stomach with your hands under you).  Watch your   Don t use only your thumb and index finger to grasp or lift something. This can put stress on your wrist. When you can, use your whole hand and all its fingers to grasp an object.  Minimize repetition  Don t move your arms or hands the same way for long periods of time. And don't hold an object in the same way for long periods of time. Even simple, light tasks can cause injury this way. Instead, switch tasks or switch hands.  Rest your hands  Give your hands a break from time to time with a rest. Even a few minutes once an hour can help.  Reduce speed and force  Slow down when you do a forceful, repetitive motion. This gives your wrist time to recover from the effort. Use power tools to help reduce the force.  Strengthen the muscles  Weak muscles may lead to a poor wrist or arm position. Exercises will make your hand and arm muscles stronger. This can help you keep a better position.  Date Last Reviewed: 1/1/2018 2000-2017 [employer]. [address] [place] [address]. All rights reserved. This information is not intended as a substitute for professional medical care. Always follow your healthcare professional's instructions.        Understanding Carpal Tunnel Syndrome    The carpal tunnel is a narrow space inside the wrist. It is ringed by bone and a band of tough tissue called the transverse carpal ligament. A major nerve called the median nerve runs from the forearm into the hand through the carpal tunnel. Tendons also run through the carpal tunnel.  With carpal tunnel syndrome, the tendons or nearby tissues within the  carpal tunnel may swell or thicken. Or the transverse carpal ligament may harden and shorten. This narrows the space in the carpal tunnel and puts pressure on the median nerve. This pressure leads to tingling and numbness of the hand and wrist. In time, the condition can make even simple tasks hard to do.  What causes carpal tunnel syndrome?  Doctors aren t entirely clear why the condition occurs. Certain things may make a person more likely to have it. These include:    Being female    Being pregnant    Being overweight    Having diabetes or rheumatoid arthritis  Symptoms of carpal tunnel syndrome  Symptoms often come and go. At first, symptoms may occur mainly at night. Later, they may be noticed during the day as well. They may get worse with activities such as driving, reading, typing, or holding a phone. Symptoms can include:    Tingling and numbness in the hand or wrist    Sharp pain that shoots up the arm or down to the fingers    Hand stiffness or cramping, especially in the morning    Trouble making a fist    Hand weakness and clumsiness  Treatment for carpal tunnel syndrome  Certain treatments help reduce the pressure on the median nerve and relieve symptoms. Choices for treatment may include one or more of the following:    Wrist splint. This involves wearing a special brace on the wrist and hand. The splint holds the wrist straight, in a neutral position. This helps keep the carpal tunnel as open as possible.    Cortisone shots. Cortisone is a medicine that helps reduce swelling. It is injected directly into the wrist. It helps shrink tissues inside the carpal tunnel. This relieves symptoms for a time.    Pain medicines. You may take over-the-counter or prescription medicines to help reduce swelling and relieve symptoms.    Surgery. If the condition doesn t respond to other treatments and doesn t go away on its own, you may need surgery. During surgery, the surgeon cuts the transverse carpal ligament to  relieve pressure on the median nerve.     When to call your healthcare provider  Call your healthcare provider right away if you have any of these:    Fever of 100.4 F (38 C) or higher, or as directed    Symptoms that don t get better, or get worse    New symptoms   Date Last Reviewed: 3/10/2016    [id number] [employer]. [address] [place] [address], [place] [address]. All rights reserved. This information is not intended as a substitute for professional medical care. Always follow your healthcare professional's instructions.

## 2018-07-19 NOTE — MR AVS SNAPSHOT
After Visit Summary   7/19/2018    Uli Champagne    MRN: [id number]           Patient Information     Date Of Birth          1940        Visit Information        Provider Department      7/19/2018 3:50 PM Isra Carrillo MD [facility]        Today's Diagnoses     Bilateral carpal tunnel syndrome    -  1      Care Instructions      Carpal Tunnel Syndrome    Carpal tunnel syndrome is a painful condition of the wrist and arm. It is caused by pressure on the median nerve.  The median nerve is one of the nerves that give feeling and movement to the hand. It passes through a tunnel in the wrist called the carpal tunnel. This tunnel is made up of bones and ligaments. Narrowing of this tunnel or swelling of the tissues inside the tunnel puts pressure on the median nerve. This causes numbness, pins and needles, or electric shooting pains in your hand and forearm. Often the pain is worse at night and may wake you when you are asleep.  Carpal tunnel syndrome may occur during pregnancy and with use of birth control pills. It is more common in workers who must often bend their wrists. It is also common in people who work with power tools that cause strong vibrations.  Home care    Rest the painful wrist. Avoid repeated bending of the wrist back and forth. This puts pressure on the median nerve. Avoid using power tools with strong vibrations.    If you were given a splint, wear it at night while you sleep. You may also wear it during the day for comfort.    Move your fingers and wrists often to avoid stiffness.    Elevate your arms on pillows when you lie down.    Try using the unaffected hand more.    Try not to hold your wrists in a bent, downward position.    Sometimes changes in the work place may ease symptoms. If you type most of the day, it may help to change the position of your keyboard or add a wrist support. Your wrist should be in a neutral position and not bent back when  typing.    You may use over-the-counter pain medicine to treat pain and inflammation, unless another medicine was prescribed. Anti-inflammatory pain medicines, such as ibuprofen or naproxen may be more effective than acetaminophen, which treats pain, but not inflammation. If you have chronic liver or kidney disease or ever had a stomach ulcer or GI bleeding, talk with your doctor before using these medicines.    Opioid pain medicine will only give temporary relief and does not treat the problem. If pain continues, you may need a shot of a steroid drug into your wrist.    If the above methods fail, you may need surgery. This will open the carpal tunnel and release the pressure on the trapped nerve.  Follow-up care  Follow up with your healthcare provider, or as advised, if the pain doesn t begin to improve within the next week.  If X-rays were taken, you will be notified of any new findings that may affect your care.  When to seek medical advice  Call your healthcare provider right away if any of these occur:    Pain not improving with the above treatment    Fingers or hand become cold, blue, numb, or tingly    Your whole arm becomes swollen or weak  Date Last Reviewed: 11/23/2015 2000-2017 [email or web address]. [address] [place] [address]. All rights reserved. This information is not intended as a substitute for professional medical care. Always follow your healthcare professional's instructions.        Carpal Tunnel Syndrome Prevention Tips  Carpal tunnel syndrome is a painful condition in the hand and wrist. It occurs when there is too much pressure on the median nerve at the wrist. The median nerve runs from your forearm to the palm of your hand. It may get squeezed or pressed when it passes through the carpal tunnel from your wrist to your hand. You may then feel numbness, tingling, pain, or weakness in your hand and up your forearm.  Doing the same hand activities over and over can put you  at higher risk for carpal tunnel syndrome. But you can reduce your risk. Learn how to change the way you use your hands. Below are tips for at home and on the job. Also follow the hand and wrist safety policies at your workplace.      Keep your wrist in a straight (neutral) position when exercising.      Keep your wrist in neutral  Keep a straight (neutral) wrist position as often as you can. Don t use your wrist in a bent (flexed) position for long periods of time. This includes extended or twisted positions.  When you sleep, don't have your wrist flexed (don't sleep all curled up on your side). And don't put extra pressure on your wrist for long periods of time (don't sleep on your stomach with your hands under you).  Watch your   Don t use only your thumb and index finger to grasp or lift something. This can put stress on your wrist. When you can, use your whole hand and all its fingers to grasp an object.  Minimize repetition  Don t move your arms or hands the same way for long periods of time. And don't hold an object in the same way for long periods of time. Even simple, light tasks can cause injury this way. Instead, switch tasks or switch hands.  Rest your hands  Give your hands a break from time to time with a rest. Even a few minutes once an hour can help.  Reduce speed and force  Slow down when you do a forceful, repetitive motion. This gives your wrist time to recover from the effort. Use power tools to help reduce the force.  Strengthen the muscles  Weak muscles may lead to a poor wrist or arm position. Exercises will make your hand and arm muscles stronger. This can help you keep a better position.  Date Last Reviewed: 1/1/2018 2000-2017 [employer]. [address] [place] [address]. All rights reserved. This information is not intended as a substitute for professional medical care. Always follow your healthcare professional's instructions.        Understanding Carpal  Tunnel Syndrome    The carpal tunnel is a narrow space inside the wrist. It is ringed by bone and a band of tough tissue called the transverse carpal ligament. A major nerve called the median nerve runs from the forearm into the hand through the carpal tunnel. Tendons also run through the carpal tunnel.  With carpal tunnel syndrome, the tendons or nearby tissues within the carpal tunnel may swell or thicken. Or the transverse carpal ligament may harden and shorten. This narrows the space in the carpal tunnel and puts pressure on the median nerve. This pressure leads to tingling and numbness of the hand and wrist. In time, the condition can make even simple tasks hard to do.  What causes carpal tunnel syndrome?  Doctors aren t entirely clear why the condition occurs. Certain things may make a person more likely to have it. These include:    Being female    Being pregnant    Being overweight    Having diabetes or rheumatoid arthritis  Symptoms of carpal tunnel syndrome  Symptoms often come and go. At first, symptoms may occur mainly at night. Later, they may be noticed during the day as well. They may get worse with activities such as driving, reading, typing, or holding a phone. Symptoms can include:    Tingling and numbness in the hand or wrist    Sharp pain that shoots up the arm or down to the fingers    Hand stiffness or cramping, especially in the morning    Trouble making a fist    Hand weakness and clumsiness  Treatment for carpal tunnel syndrome  Certain treatments help reduce the pressure on the median nerve and relieve symptoms. Choices for treatment may include one or more of the following:    Wrist splint. This involves wearing a special brace on the wrist and hand. The splint holds the wrist straight, in a neutral position. This helps keep the carpal tunnel as open as possible.    Cortisone shots. Cortisone is a medicine that helps reduce swelling. It is injected directly into the wrist. It helps shrink  tissues inside the carpal tunnel. This relieves symptoms for a time.    Pain medicines. You may take over-the-counter or prescription medicines to help reduce swelling and relieve symptoms.    Surgery. If the condition doesn t respond to other treatments and doesn t go away on its own, you may need surgery. During surgery, the surgeon cuts the transverse carpal ligament to relieve pressure on the median nerve.     When to call your healthcare provider  Call your healthcare provider right away if you have any of these:    Fever of 100.4 F (38 C) or higher, or as directed    Symptoms that don t get better, or get worse    New symptoms   Date Last Reviewed: 3/10/2016    2580-9291 [employer]. [address] [place] [address], [place] [address]. All rights reserved. This information is not intended as a substitute for professional medical care. Always follow your healthcare professional's instructions.                Follow-ups after your visit        Follow-up notes from your care team     Return in about 1 month (around 8/19/2018) for recheck CTS.      Your next 10 appointments already scheduled     Jul 26, 2018  1:00 PM CDT   Anticoagulation Visit with [facility] ANTI COAG   [facility] ([facility])    [address] [facility] [address] [phone]              Who to contact     If you have questions or need follow up information about today's clinic visit or your schedule please contact [facility] directly at [phone].  Normal or non-critical lab and imaging results will be communicated to you by MyChart, letter or phone within 4 business days after the clinic has received the results. If you do not hear from us within 7 days, please contact the clinic through MyChart or phone. If you have a critical or abnormal lab result, we will notify you by phone as soon as possible.  Submit refill requests through [employer] or call your pharmacy and they will forward the refill request  to us. Please allow 3 business days for your refill to be completed.          Additional Information About Your Visit        MyChart Information     [employer] gives you secure access to your electronic health record. If you see a primary care provider, you can also send messages to your care team and make appointments. If you have questions, please call your primary care clinic.  If you do not have a primary care provider, please call [phone] and they will assist you.        Care EveryWhere ID     This is your Care EveryWhere ID. This could be used by other organizations to access your [facility] medical records  [id number]        Your Vitals Were     Pulse Temperature Respirations Pulse Oximetry BMI (Body Mass Index)       68 97  F (36.1  C) (Temporal) 18 98% 32.66 kg/m2        Blood Pressure from Last 3 Encounters:   07/19/18 126/62   05/15/18 124/60   05/04/18 124/62    Weight from Last 3 Encounters:   07/19/18 210 lb 1.6 oz (95.3 kg)   05/15/18 211 lb (95.7 kg)   05/04/18 214 lb 11.2 oz (97.4 kg)              Today, you had the following     No orders found for display       Primary Care Provider Office Phone # Fax #    Isra Carrillo [phone]       [address] [facility] [address]        Equal Access to Services     JAS GODOY AH: Hadii oxana ku hadasho Soomaali, waaxda luqadaha, qaybta kaalmada adeegyada, vesna verma. So [facility] [phone].    ATENCIÓN: Si habla español, tiene a lema disposición servicios gratuitos de asistencia lingüística. Llame al [phone].    We comply with applicable federal civil rights laws and [place] laws. We do not discriminate on the basis of race, color, national origin, age, disability, sex, sexual orientation, or gender identity.            Thank you!     Thank you for choosing [facility]  for your care. Our goal is always to provide you with excellent care. Hearing back from our patients is one way we can continue  to improve our services. Please take a few minutes to complete the written survey that you may receive in the mail after your visit with us. Thank you!             Your Updated Medication List - Protect others around you: Learn how to safely use, store and throw away your medicines at www.disposemymeds.org.          This list is accurate as of 7/19/18  4:20 PM.  Always use your most recent med list.                   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        nitroGLYcerin 0.4 MG sublingual tablet    NITROSTAT    25 tablet    Place 1 tablet (0.4 mg) under the tongue See Admin Instructions for chest pain    Cardiac dysrhythmia, unspecified       simvastatin 40 MG tablet    ZOCOR    90 tablet    Take 1 tablet (40 mg) by mouth At Bedtime    Hyperlipidemia LDL goal <100       warfarin 5 MG tablet    COUMADIN    90 tablet    TAKE ONE-HALF (1/2) TABLET ON TUESDAY AND SATURDAY AND 1 TABLET ON ALL OTHER DAYS OF THE WEEK OR AS DIRECTED BY THE COUMADIN CLINIC    Permanent atrial fibrillation (H)

## 2018-07-19 NOTE — PROGRESS NOTES
SUBJECTIVE:   Uli Champagne is a 77 year old male who presents to clinic today for the following health issues:      Hand, wrist numbness    Onset: 1 year    Description:   Location: left wrist and right wrist  Character: numbness     Intensity: mild    Progression of Symptoms: worse    Accompanying Signs & Symptoms:  Other symptoms: numbness, tingling and weakness of wrists    History:   Previous similar pain: no       Precipitating factors:   Trauma or overuse: YES- typing     Alleviating factors:  Improved by: putting hands upward helps numbness     Therapies Tried and outcome: none      Problem list and histories reviewed & adjusted, as indicated.  Additional history:   S: Uli Champagne is a 77 year old male who complains of numbness of lateral aspect of bilateral hand, especially with use of the hand and at night. Right hand dominant and walks with cane in right hand. Pain and numbness R>L.      Patient Active Problem List   Diagnosis     Cardiac dysrhythmia     Essential hypertension, benign     Neurogenic bladder     Prostate cancer (H)     Permanent atrial fibrillation (H)     Chronic idiopathic gout of multiple sites     HYPERLIPIDEMIA LDL GOAL <100     CAD (coronary artery disease)     Advanced directives, counseling/discussion     Health Care Home     St     Non morbid obesity due to excess calories     Lumbar spinal stenosis     S/P lumbar laminectomy     Chronic a-fib (H)     Essential hypertension     Hordeolum externum left upper eyelid     Long-term (current) use of anticoagulants [Z79.01]     Tear of medial meniscus of left knee, initial encounter     Primary osteoarthritis of left knee     Bradycardia     Abdominal aortic aneurysm (AAA) without rupture (H)     Adenocarcinoma of prostate (H)     Postsurgical aortocoronary bypass status     History of CVA (cerebrovascular accident)     Past Surgical History:   Procedure Laterality Date     C CABG, VEIN, THREE  1992     C  NONSPECIFIC PROCEDURE  1987    Bone fusion in neck     C REMV PROSTATE,RETROPUB,RAD,LTD NODES  7/14/2008    Radical retropubic prostatectomy and pelvic lymph node dissection.     HC REMOVAL OF TONSILS,12+ Y/O  1969    Tonsils 10+y.o.     HEMILAMINECTOMY, DISCECTOMY LUMBAR TWO LEVELS, COMBINED Left 12/16/2015    Procedure: COMBINED HEMILAMINECTOMY, DISCECTOMY LUMBAR TWO LEVELS;  Surgeon: Jung Finley MD;  Location: [facility]     STENT, CORONARY, ALEAH  2002    x5       Social History   Substance Use Topics     Smoking status: Former Smoker     Packs/day: 0.50     Years: 2.00     Types: Cigarettes     Quit date: 1/1/1960     Smokeless tobacco: Never Used     Alcohol use Yes      Comment: socially     Family History   Problem Relation Age of Onset     Arthritis Father      HEART DISEASE Father      Hypertension Brother      Cancer Brother      liver     HEART DISEASE Sister      HEART DISEASE Brother          Current Outpatient Prescriptions   Medication Sig Dispense Refill     allopurinol (ZYLOPRIM) 300 MG tablet TAKE 1 TABLET DAILY FOR GOUT 90 tablet 3     ASPIRIN 81 MG OR TABS 1 TABLET DAILY       Benazepril-Hydrochlorothiazide 20-25 MG TABS TAKE 1 TABLET TWICE A DAY FOR HYPERTENSION 180 tablet 3     fish oil-omega-3 fatty acids (FISH OIL) 1000 MG capsule Take 1 g by mouth daily.       simvastatin (ZOCOR) 40 MG tablet Take 1 tablet (40 mg) by mouth At Bedtime 90 tablet 3     warfarin (COUMADIN) 5 MG tablet TAKE ONE-HALF (1/2) TABLET ON TUESDAY AND SATURDAY AND 1 TABLET ON ALL OTHER DAYS OF THE WEEK OR AS DIRECTED BY THE COUMADIN CLINIC 90 tablet 3     nitroglycerin (NITROSTAT) 0.4 MG SL tablet Place 1 tablet (0.4 mg) under the tongue See Admin Instructions for chest pain (Patient not taking: Reported on 7/19/2018) 25 tablet 1     Allergies   Allergen Reactions     No Known Drug Allergies      Recent Labs   Lab Test 05/10/18  04/30/18   1244  08/31/17   1323 05/10/16  05/20/15   LDL  58.0   --   39  57.0   --    49.0  49   HDL  37*   --   43  43   --   35  35   TRIG  101   --   92  104   --   120  120   ALT  37   --   27  28   --   28  28   CR  0.9   --   0.92  1.0   < >  0.9  0.9   GFRESTIMATED  >60  81  80  >60   < >  >60   GFRESTBLACK   --   >90  >90   --    < >   --    POTASSIUM  3.7   --   4.0  3.9   < >  4.2  4.2   TSH   --    --   2.31   --    --   2.50    < > = values in this interval not displayed.      BP Readings from Last 3 Encounters:   07/19/18 126/62   05/15/18 124/60   05/04/18 124/62    Wt Readings from Last 3 Encounters:   07/19/18 210 lb 1.6 oz (95.3 kg)   05/15/18 211 lb (95.7 kg)   05/04/18 214 lb 11.2 oz (97.4 kg)                  Labs reviewed in EPIC    Reviewed and updated as needed this visit by clinical staff  Tobacco  Allergies  Meds  Med Hx  Surg Hx  Fam Hx  Soc Hx      Reviewed and updated as needed this visit by Provider         ROS:  CONSTITUTIONAL: NEGATIVE for fever, chills, change in weight  ENT/MOUTH: NEGATIVE for ear, mouth and throat problems  RESP: NEGATIVE for significant cough or SOB  CV: NEGATIVE for chest pain, palpitations or peripheral edema  MUSCULOSKELETAL: POSITIVE  for muscle weakness in AM and paresthesias and NEGATIVE for joint pain, joint stiffness and neck pain. Denies claudication or Raynaud's symptoms.  ROS otherwise negative    OBJECTIVE:     /62 (BP Location: Left arm, Patient Position: Sitting, Cuff Size: Adult Regular)  Pulse 68  Temp 97  F (36.1  C) (Temporal)  Resp 18  Wt 210 lb 1.6 oz (95.3 kg)  SpO2 98%  BMI 32.66 kg/m2  Body mass index is 32.66 kg/(m^2).  GENERAL: alert, no distress and over weight  NECK: no adenopathy, no asymmetry, masses, or scars and thyroid normal to palpation  RESP: lungs clear to auscultation - no rales, rhonchi or wheezes  CV: regular rates and rhythm, normal S1 S2, no S3 or S4, no murmur, click or rub, peripheral pulses strong and no peripheral edema  ABDOMEN: soft, nontender, no hepatosplenomegaly, no masses  and bowel sounds normal  MS: WRISTS: Exam reveals positive Phalen and Tinel sign on bilaterally. There is no muscle atrophy noted. Strength and sensation are normal.     Diagnostic Test Results:  none     ASSESSMENT/PLAN:     1. Bilateral carpal tunnel syndrome  Explanation of median nerve entrapment is given. Most likely due to use of cane in right hand and computer work. He used to drive transport bus.  A wrist splint is provided for prn use, especially at night.  The treatment spectrum is discussed, including possible surgical release if the symptoms are persistent and severe.  Return as needed for this problem.     2. Peripheral vascular disease (H)  Chronic and stable. No findings of vascular insufficiency contributing to current symptoms       FUTURE APPOINTMENTS:       - Follow-up visit in 1 month if not improving. If significant symptom improvement in 1-2 weeks then will OK splint for left wrist as well. If not improving then consider EMG and ortho referral.    Patient Instructions       Carpal Tunnel Syndrome    Carpal tunnel syndrome is a painful condition of the wrist and arm. It is caused by pressure on the median nerve.  The median nerve is one of the nerves that give feeling and movement to the hand. It passes through a tunnel in the wrist called the carpal tunnel. This tunnel is made up of bones and ligaments. Narrowing of this tunnel or swelling of the tissues inside the tunnel puts pressure on the median nerve. This causes numbness, pins and needles, or electric shooting pains in your hand and forearm. Often the pain is worse at night and may wake you when you are asleep.  Carpal tunnel syndrome may occur during pregnancy and with use of birth control pills. It is more common in workers who must often bend their wrists. It is also common in people who work with power tools that cause strong vibrations.  Home care    Rest the painful wrist. Avoid repeated bending of the wrist back and forth. This  puts pressure on the median nerve. Avoid using power tools with strong vibrations.    If you were given a splint, wear it at night while you sleep. You may also wear it during the day for comfort.    Move your fingers and wrists often to avoid stiffness.    Elevate your arms on pillows when you lie down.    Try using the unaffected hand more.    Try not to hold your wrists in a bent, downward position.    Sometimes changes in the work place may ease symptoms. If you type most of the day, it may help to change the position of your keyboard or add a wrist support. Your wrist should be in a neutral position and not bent back when typing.    You may use over-the-counter pain medicine to treat pain and inflammation, unless another medicine was prescribed. Anti-inflammatory pain medicines, such as ibuprofen or naproxen may be more effective than acetaminophen, which treats pain, but not inflammation. If you have chronic liver or kidney disease or ever had a stomach ulcer or GI bleeding, talk with your doctor before using these medicines.    Opioid pain medicine will only give temporary relief and does not treat the problem. If pain continues, you may need a shot of a steroid drug into your wrist.    If the above methods fail, you may need surgery. This will open the carpal tunnel and release the pressure on the trapped nerve.  Follow-up care  Follow up with your healthcare provider, or as advised, if the pain doesn t begin to improve within the next week.  If X-rays were taken, you will be notified of any new findings that may affect your care.  When to seek medical advice  Call your healthcare provider right away if any of these occur:    Pain not improving with the above treatment    Fingers or hand become cold, blue, numb, or tingly    Your whole arm becomes swollen or weak  Date Last Reviewed: 11/23/2015 2000-2017 [employer]. [address] [place] [address], [place] [address]. All rights reserved. This  information is not intended as a substitute for professional medical care. Always follow your healthcare professional's instructions.        Carpal Tunnel Syndrome Prevention Tips  Carpal tunnel syndrome is a painful condition in the hand and wrist. It occurs when there is too much pressure on the median nerve at the wrist. The median nerve runs from your forearm to the palm of your hand. It may get squeezed or pressed when it passes through the carpal tunnel from your wrist to your hand. You may then feel numbness, tingling, pain, or weakness in your hand and up your forearm.  Doing the same hand activities over and over can put you at higher risk for carpal tunnel syndrome. But you can reduce your risk. Learn how to change the way you use your hands. Below are tips for at home and on the job. Also follow the hand and wrist safety policies at your workplace.      Keep your wrist in a straight (neutral) position when exercising.      Keep your wrist in neutral  Keep a straight (neutral) wrist position as often as you can. Don t use your wrist in a bent (flexed) position for long periods of time. This includes extended or twisted positions.  When you sleep, don't have your wrist flexed (don't sleep all curled up on your side). And don't put extra pressure on your wrist for long periods of time (don't sleep on your stomach with your hands under you).  Watch your   Don t use only your thumb and index finger to grasp or lift something. This can put stress on your wrist. When you can, use your whole hand and all its fingers to grasp an object.  Minimize repetition  Don t move your arms or hands the same way for long periods of time. And don't hold an object in the same way for long periods of time. Even simple, light tasks can cause injury this way. Instead, switch tasks or switch hands.  Rest your hands  Give your hands a break from time to time with a rest. Even a few minutes once an hour can help.  Reduce speed  and force  Slow down when you do a forceful, repetitive motion. This gives your wrist time to recover from the effort. Use power tools to help reduce the force.  Strengthen the muscles  Weak muscles may lead to a poor wrist or arm position. Exercises will make your hand and arm muscles stronger. This can help you keep a better position.  Date Last Reviewed: 1/1/2018 2000-2017 [employer]. [address] [place] [address]. All rights reserved. This information is not intended as a substitute for professional medical care. Always follow your healthcare professional's instructions.        Understanding Carpal Tunnel Syndrome    The carpal tunnel is a narrow space inside the wrist. It is ringed by bone and a band of tough tissue called the transverse carpal ligament. A major nerve called the median nerve runs from the forearm into the hand through the carpal tunnel. Tendons also run through the carpal tunnel.  With carpal tunnel syndrome, the tendons or nearby tissues within the carpal tunnel may swell or thicken. Or the transverse carpal ligament may harden and shorten. This narrows the space in the carpal tunnel and puts pressure on the median nerve. This pressure leads to tingling and numbness of the hand and wrist. In time, the condition can make even simple tasks hard to do.  What causes carpal tunnel syndrome?  Doctors aren t entirely clear why the condition occurs. Certain things may make a person more likely to have it. These include:    Being female    Being pregnant    Being overweight    Having diabetes or rheumatoid arthritis  Symptoms of carpal tunnel syndrome  Symptoms often come and go. At first, symptoms may occur mainly at night. Later, they may be noticed during the day as well. They may get worse with activities such as driving, reading, typing, or holding a phone. Symptoms can include:    Tingling and numbness in the hand or wrist    Sharp pain that shoots up the arm or down  to the fingers    Hand stiffness or cramping, especially in the morning    Trouble making a fist    Hand weakness and clumsiness  Treatment for carpal tunnel syndrome  Certain treatments help reduce the pressure on the median nerve and relieve symptoms. Choices for treatment may include one or more of the following:    Wrist splint. This involves wearing a special brace on the wrist and hand. The splint holds the wrist straight, in a neutral position. This helps keep the carpal tunnel as open as possible.    Cortisone shots. Cortisone is a medicine that helps reduce swelling. It is injected directly into the wrist. It helps shrink tissues inside the carpal tunnel. This relieves symptoms for a time.    Pain medicines. You may take over-the-counter or prescription medicines to help reduce swelling and relieve symptoms.    Surgery. If the condition doesn t respond to other treatments and doesn t go away on its own, you may need surgery. During surgery, the surgeon cuts the transverse carpal ligament to relieve pressure on the median nerve.     When to call your healthcare provider  Call your healthcare provider right away if you have any of these:    Fever of 100.4 F (38 C) or higher, or as directed    Symptoms that don t get better, or get worse    New symptoms   Date Last Reviewed: 3/10/2016    2581-9231 [employer]. [address] [place] [address], [place] [address]. All rights reserved. This information is not intended as a substitute for professional medical care. Always follow your healthcare professional's instructions.            Isra Carrillo MD  [facility]

## 2018-07-26 ENCOUNTER — ANTICOAGULATION THERAPY VISIT (OUTPATIENT)
Dept: ANTICOAGULATION | Facility: OTHER | Age: 78
End: 2018-07-26
Payer: COMMERCIAL

## 2018-07-26 DIAGNOSIS — I48.20 CHRONIC A-FIB (H): ICD-10-CM

## 2018-07-26 DIAGNOSIS — Z79.01 LONG-TERM (CURRENT) USE OF ANTICOAGULANTS: ICD-10-CM

## 2018-07-26 LAB — INR POINT OF CARE: 1.6 (ref 0.86–1.14)

## 2018-07-26 PROCEDURE — 85610 PROTHROMBIN TIME: CPT | Mod: QW

## 2018-07-26 PROCEDURE — 36416 COLLJ CAPILLARY BLOOD SPEC: CPT

## 2018-07-26 PROCEDURE — 99207 ZZC NO CHARGE NURSE ONLY: CPT

## 2018-07-26 NOTE — MR AVS SNAPSHOT
Uli SINGH Ihsan   7/26/2018 1:00 PM   Anticoagulation Therapy Visit    Description:  77 year old male   Provider:  [facility]   Department:  [facility]           INR as of 7/26/2018     Today's INR 1.6!      Anticoagulation Summary as of 7/26/2018     INR goal 2.0-3.0   Today's INR 1.6!   Full warfarin instructions 7/26: 7.5 mg; Otherwise 2.5 mg on Tue, Sat; 5 mg all other days   Next INR check 9/6/2018    Indications   Long-term (current) use of anticoagulants [Z79.01] [Z79.01]  Chronic a-fib (H) [I48.2]         Your next Anticoagulation Clinic appointment(s)     Sep 06, 2018  1:00 PM CDT   [facility]    [address] [facility] [address]   [phone]              Contact Numbers     Clinic Number:         July 2018 Details    Sun Mon Tue Wed Thu Fri Sat     1               2               3               4               5               6               7                 8               9               10               11               12               13               14                 15               16               17               18               19               20               21                 22               23               24               25               26      7.5 mg   See details      27      5 mg         28      2.5 mg           29      5 mg         30      5 mg         31      2.5 mg              Date Details   07/26 This INR check               How to take your warfarin dose     To take:  2.5 mg Take 0.5 of a 5 mg tablet.    To take:  5 mg Take 1 of the 5 mg tablets.    To take:  7.5 mg Take 1.5 of the 5 mg tablets.           August 2018 Details    Sun Mon Tue Wed Thu Fri Sat        1      5 mg         2      5 mg         3      5 mg         4      2.5 mg           5      5 mg         6      5 mg         7      2.5 mg         8      5 mg         9      5 mg         10      5 mg         11       2.5 mg           12      5 mg         13      5 mg         14      2.5 mg         15      5 mg         16      5 mg         17      5 mg         18      2.5 mg           19      5 mg         20      5 mg         21      2.5 mg         22      5 mg         23      5 mg         24      5 mg         25      2.5 mg           26      5 mg         27      5 mg         28      2.5 mg         29      5 mg         30      5 mg         31      5 mg           Date Details   No additional details            How to take your warfarin dose     To take:  2.5 mg Take 0.5 of a 5 mg tablet.    To take:  5 mg Take 1 of the 5 mg tablets.           September 2018 Details    Sun Mon Tue Wed Thu Fri Sat           1      2.5 mg           2      5 mg         3      5 mg         4      2.5 mg         5      5 mg         6            7               8                 9               10               11               12               13               14               15                 16               17               18               19               20               21               22                 23               24               25               26               27               28               29                 30                      Date Details   No additional details    Date of next INR:  9/6/2018         How to take your warfarin dose     To take:  2.5 mg Take 0.5 of a 5 mg tablet.    To take:  5 mg Take 1 of the 5 mg tablets.

## 2018-07-26 NOTE — PROGRESS NOTES
ANTICOAGULATION FOLLOW-UP CLINIC VISIT    Patient Name:  Uli Champagne  Date:  7/26/2018  Contact Type:  Face to Face    SUBJECTIVE:     Patient Findings     Positives Unexplained INR or factor level change           OBJECTIVE    INR Protime   Date Value Ref Range Status   07/26/2018 1.6 (A) 0.86 - 1.14 Final       ASSESSMENT / PLAN  INR assessment SUB    Recheck INR In: 6 WEEKS    INR Location Clinic      Anticoagulation Summary as of 7/26/2018     INR goal 2.0-3.0   Today's INR 1.6!   Warfarin maintenance plan 2.5 mg (5 mg x 0.5) on Tue, Sat; 5 mg (5 mg x 1) all other days   Full warfarin instructions 7/26: 7.5 mg; Otherwise 2.5 mg on Tue, Sat; 5 mg all other days   Weekly warfarin total 30 mg   Plan last modified Rui Landers RN (3/31/2016)   Next INR check 9/6/2018   Target end date     Indications   Long-term (current) use of anticoagulants [Z79.01] [Z79.01]  Chronic a-fib (H) [I48.2]         Anticoagulation Episode Summary     INR check location     Preferred lab     Send INR reminders to [facility] POOL    Comments 5 mg tab, AM dose        Anticoagulation Care Providers     Provider Role Specialty Phone number    Isra Carrillo MD [facility] [phone]            See the Encounter Report to view Anticoagulation Flowsheet and Dosing Calendar (Go to Encounters tab in chart review, and find the Anticoagulation Therapy Visit)    Dosage adjustment made based on physician directed care plan.    Rui Landers, RN

## 2018-08-20 ENCOUNTER — OFFICE VISIT (OUTPATIENT)
Dept: FAMILY MEDICINE | Facility: OTHER | Age: 78
End: 2018-08-20
Payer: COMMERCIAL

## 2018-08-20 VITALS
SYSTOLIC BLOOD PRESSURE: 122 MMHG | TEMPERATURE: 95.1 F | BODY MASS INDEX: 32.13 KG/M2 | DIASTOLIC BLOOD PRESSURE: 62 MMHG | OXYGEN SATURATION: 97 % | WEIGHT: 206.7 LBS | RESPIRATION RATE: 16 BRPM | HEART RATE: 56 BPM

## 2018-08-20 DIAGNOSIS — G56.03 BILATERAL CARPAL TUNNEL SYNDROME: Primary | ICD-10-CM

## 2018-08-20 PROCEDURE — 99213 OFFICE O/P EST LOW 20 MIN: CPT | Performed by: PHYSICIAN ASSISTANT

## 2018-08-20 ASSESSMENT — PAIN SCALES - GENERAL: PAINLEVEL: NO PAIN (0)

## 2018-08-20 NOTE — MR AVS SNAPSHOT
After Visit Summary   8/20/2018    Uli Champagne    MRN: [id number]           Patient Information     Date Of Birth          1940        Visit Information        Provider Department      8/20/2018 3:00 PM Dusty Carter PA-C [facility]        Today's Diagnoses     Bilateral carpal tunnel syndrome    -  1      Care Instructions    1. Schedule orthopedic consultation for preferred glucocorticoid (steroid) injection in wrists for carpal tunnel.     2. Discussed Gabapentin (pill) as well as surgical resection of retinaculum as alternative treatment options.     3. Continue to use splint at night.     4. You can try ice to reduce inflammation           Follow-ups after your visit        Additional Services     ORTHOPEDICS ADULT REFERRAL       Your provider has referred you to: [facility]: [facility] [phone]   [email or web address]    Please be aware that coverage of these services is subject to the terms and limitations of your health insurance plan.  Call member services at your health plan with any benefit or coverage questions.      Please bring the following to your appointment:    >>   Any x-rays, CTs or MRIs which have been performed.  Contact the facility where they were done to arrange for  prior to your scheduled appointment.    >>   List of current medications   >>   This referral request   >>   Any documents/labs given to you for this referral                  Your next 10 appointments already scheduled     Aug 29, 2018 10:40 AM CDT   New Visit with Yovani Ogden DO   [facility]    [address] [facility] [address] [phone]            Sep 06, 2018  1:00 PM CDT   Anticoagulation Visit with JITENDRA ANTI COAG   [facility]    [address] [facility] [address] [phone]              Who to contact      If you have questions or need follow up information about today's clinic visit or your schedule please contact [facility] directly at [phone].  Normal or non-critical lab and imaging results will be communicated to you by MyChart, letter or phone within 4 business days after the clinic has received the results. If you do not hear from us within 7 days, please contact the clinic through Adyoulikehart or phone. If you have a critical or abnormal lab result, we will notify you by phone as soon as possible.  Submit refill requests through [employer] or call your pharmacy and they will forward the refill request to us. Please allow 3 business days for your refill to be completed.          Additional Information About Your Visit        AdyoulikeharLaunchups Information     [employer] gives you secure access to your electronic health record. If you see a primary care provider, you can also send messages to your care team and make appointments. If you have questions, please call your primary care clinic.  If you do not have a primary care provider, please call [phone] and they will assist you.        Care EveryWhere ID     This is your Care EveryWhere ID. This could be used by other organizations to access your [facility] medical records  [id number]        Your Vitals Were     Pulse Temperature Respirations Pulse Oximetry BMI (Body Mass Index)       56 95.1  F (35.1  C) (Oral) 16 97% 32.13 kg/m2        Blood Pressure from Last 3 Encounters:   08/20/18 122/62   07/19/18 126/62   05/15/18 124/60    Weight from Last 3 Encounters:   08/20/18 206 lb 11.2 oz (93.8 kg)   07/19/18 210 lb 1.6 oz (95.3 kg)   05/15/18 211 lb (95.7 kg)              We Performed the Following     ORTHOPEDICS ADULT REFERRAL        Primary Care Provider Office Phone # Fax #    Isra Carrillo [phone]       [address] [facility] [address]        Equal Access to Services     JAS GODOY : lidia Frey,  vesna nassar ah. So [facility] [phone].    ATENCIÓN: Si pasha boo, tiene a lema disposición servicios gratuitos de asistencia lingüística. Bernardo al [phone].    We comply with applicable federal civil rights laws and [place] laws. We do not discriminate on the basis of race, color, national origin, age, disability, sex, sexual orientation, or gender identity.            Thank you!     Thank you for choosing [facility]  for your care. Our goal is always to provide you with excellent care. Hearing back from our patients is one way we can continue to improve our services. Please take a few minutes to complete the written survey that you may receive in the mail after your visit with us. Thank you!             Your Updated Medication List - Protect others around you: Learn how to safely use, store and throw away your medicines at www.disposemymeds.org.          This list is accurate as of 8/20/18  3:49 PM.  Always use your most recent med list.                   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        nitroGLYcerin 0.4 MG sublingual tablet    NITROSTAT    25 tablet    Place 1 tablet (0.4 mg) under the tongue See Admin Instructions for chest pain    Cardiac dysrhythmia, unspecified       simvastatin 40 MG tablet    ZOCOR    90 tablet    Take 1 tablet (40 mg) by mouth At Bedtime    Hyperlipidemia LDL goal <100       warfarin 5 MG tablet    COUMADIN    90 tablet    TAKE ONE-HALF (1/2) TABLET ON TUESDAY AND SATURDAY AND 1  TABLET ON ALL OTHER DAYS OF THE WEEK OR AS DIRECTED BY THE COUMADIN CLINIC    Permanent atrial fibrillation (H)

## 2018-08-20 NOTE — PROGRESS NOTES
SUBJECTIVE:   Uli Champagne is a 77 year old male who presents to clinic today for the following health issues:      Concern - recheck carpal tunnel  Onset: recheck    Description:   Recheck carpal tunnel    Intensity: moderate    Progression of Symptoms:  same    Accompanying Signs & Symptoms:  nothing    Previous history of similar problem:   yes    Precipitating factors:   Worsened by: Using the computer, reading a book, playing cards    Alleviating factors:  Improved by: nothing    Therapies Tried and outcome: brace; slight relief    Patient is a 77 year old male who presents for follow up of carpal tunnel syndrome in wrists bilaterally. He was last last by Dr. Carrillo on 07/19 and treated with wrist brace to use at night and as needed during the day. Patient today reports that the brace has not helped to improve his symptoms, he has been using it on his right wrist as this was the worse of the two. Denies worsening of symptoms as well. He says that his numbness/tingling and discomfort are worse in the morning and improve throughout the day with use. He does notice that walking with his cane, reading books, using the mouse on his computer and similar activities that would bend the wrists trigger his pain. He also notes that some mornings when he wakes he will experience a wrist drop in the right wrist that improves with use. Discussed with the patient the continued use of the splint, but cautioning him not to have it on too tightly as he may be compressing the radial nerve. Reviewed carpal tunnel syndrome with the patient and the available treatment options. Patient would prefer not to take a medication and is interested in meeting with orthopedist to have glucocorticoid injections in his wrists.     Problem list and histories reviewed & adjusted, as indicated.  Additional history: as documented    Patient Active Problem List   Diagnosis     Cardiac dysrhythmia     Essential hypertension, benign      Neurogenic bladder     Prostate cancer (H)     Permanent atrial fibrillation (H)     Chronic idiopathic gout of multiple sites     HYPERLIPIDEMIA LDL GOAL <100     CAD (coronary artery disease)     Advanced directives, counseling/discussion     Health Care Home     Stye     Non morbid obesity due to excess calories     Lumbar spinal stenosis     S/P lumbar laminectomy     Chronic a-fib (H)     Essential hypertension     Hordeolum externum left upper eyelid     Long-term (current) use of anticoagulants [Z79.01]     Tear of medial meniscus of left knee, initial encounter     Primary osteoarthritis of left knee     Bradycardia     Abdominal aortic aneurysm (AAA) without rupture (H)     Adenocarcinoma of prostate (H)     Postsurgical aortocoronary bypass status     History of CVA (cerebrovascular accident)     Past Surgical History:   Procedure Laterality Date     C CABG, VEIN, THREE  1992     C NONSPECIFIC PROCEDURE  1987    Bone fusion in neck     C REMV PROSTATE,RETROPUB,RAD,LTD NODES  7/14/2008    Radical retropubic prostatectomy and pelvic lymph node dissection.     HC REMOVAL OF TONSILS,12+ Y/O  1969    Tonsils 10+y.o.     HEMILAMINECTOMY, DISCECTOMY LUMBAR TWO LEVELS, COMBINED Left 12/16/2015    Procedure: COMBINED HEMILAMINECTOMY, DISCECTOMY LUMBAR TWO LEVELS;  Surgeon: Jung Finley MD;  Location: [facility] OR     STENT, CORONARY, ALEAH  2002    x5       Social History   Substance Use Topics     Smoking status: Former Smoker     Packs/day: 0.50     Years: 2.00     Types: Cigarettes     Quit date: 1/1/1960     Smokeless tobacco: Never Used     Alcohol use Yes      Comment: socially     Family History   Problem Relation Age of Onset     Arthritis Father      HEART DISEASE Father      Hypertension Brother      Cancer Brother      liver     HEART DISEASE Sister      HEART DISEASE Brother          Current Outpatient Prescriptions   Medication Sig Dispense Refill     allopurinol (ZYLOPRIM) 300 MG tablet TAKE 1 TABLET  DAILY FOR GOUT 90 tablet 3     ASPIRIN 81 MG OR TABS 1 TABLET DAILY       Benazepril-Hydrochlorothiazide 20-25 MG TABS TAKE 1 TABLET TWICE A DAY FOR HYPERTENSION 180 tablet 3     fish oil-omega-3 fatty acids (FISH OIL) 1000 MG capsule Take 1 g by mouth daily.       simvastatin (ZOCOR) 40 MG tablet Take 1 tablet (40 mg) by mouth At Bedtime 90 tablet 3     warfarin (COUMADIN) 5 MG tablet TAKE ONE-HALF (1/2) TABLET ON TUESDAY AND SATURDAY AND 1 TABLET ON ALL OTHER DAYS OF THE WEEK OR AS DIRECTED BY THE COUMADIN CLINIC 90 tablet 3     nitroglycerin (NITROSTAT) 0.4 MG SL tablet Place 1 tablet (0.4 mg) under the tongue See Admin Instructions for chest pain (Patient not taking: Reported on 7/19/2018) 25 tablet 1     Allergies   Allergen Reactions     No Known Drug Allergies        Reviewed and updated as needed this visit by clinical staff  Tobacco  Allergies  Meds  Med Hx  Surg Hx  Fam Hx  Soc Hx      Reviewed and updated as needed this visit by Provider         ROS:  Constitutional, HEENT, cardiovascular, pulmonary, gi and gu systems are negative, except as otherwise noted.    OBJECTIVE:     /62 (BP Location: Left arm, Patient Position: Chair, Cuff Size: Adult Large)  Pulse 56  Temp 95.1  F (35.1  C) (Oral)  Resp 16  Wt 206 lb 11.2 oz (93.8 kg)  SpO2 97%  BMI 32.13 kg/m2  Body mass index is 32.13 kg/(m^2).  GENERAL: healthy, alert and no distress  RESP: lungs clear to auscultation - no rales, rhonchi or wheezes  CV: regular rate and rhythm, normal S1 S2, no S3 or S4, no murmur, click or rub, no peripheral edema and peripheral pulses strong  MS: no gross musculoskeletal defects noted, no edema, positive Tinel test, equal strength in hands and wrists bilaterally. Ambulates with assistance of cane.   SKIN: no suspicious lesions or rashes  NEURO: Normal strength and tone, mentation intact and speech normal  PSYCH: mentation appears normal, affect normal/bright    Diagnostic Test Results:  none      ASSESSMENT/PLAN:     1. Bilateral carpal tunnel syndrome  Using splint day and night on right wrist without improvement or worsening of symptoms. Reports left wrist marginally improved on his own. Sporadic wrist drop of right wrist in the AM which improves with use of joint and hand. Patient not interested in medication, would like glucocorticoid injection. Referral placed, patient will schedule at a time convenient to him.   - ORTHOPEDICS ADULT REFERRAL    Follow up with clinic as needed or sooner if conditions change, worsen or fail to improve as expected.      Dusty Carter PA-C  [facility]

## 2018-08-20 NOTE — NURSING NOTE
Health Maintenance Due   Topic Date Due     OP ANNUAL INR REFERRAL  04/24/2016     BMP Q1 YR  08/31/2018     Meghan GRIFFIN LPN

## 2018-08-20 NOTE — PATIENT INSTRUCTIONS
1. Schedule orthopedic consultation for preferred glucocorticoid (steroid) injection in wrists for carpal tunnel.     2. Discussed Gabapentin (pill) as well as surgical resection of retinaculum as alternative treatment options.     3. Continue to use splint at night.     4. You can try ice to reduce inflammation

## 2018-08-29 ENCOUNTER — RADIANT APPOINTMENT (OUTPATIENT)
Dept: GENERAL RADIOLOGY | Facility: CLINIC | Age: 78
End: 2018-08-29
Attending: ORTHOPAEDIC SURGERY
Payer: COMMERCIAL

## 2018-08-29 ENCOUNTER — OFFICE VISIT (OUTPATIENT)
Dept: ORTHOPEDICS | Facility: CLINIC | Age: 78
End: 2018-08-29
Attending: PHYSICIAN ASSISTANT
Payer: COMMERCIAL

## 2018-08-29 VITALS
DIASTOLIC BLOOD PRESSURE: 90 MMHG | WEIGHT: 211.5 LBS | HEIGHT: 67 IN | BODY MASS INDEX: 33.19 KG/M2 | SYSTOLIC BLOOD PRESSURE: 150 MMHG | TEMPERATURE: 97.6 F

## 2018-08-29 DIAGNOSIS — M25.531 PAIN IN BOTH WRISTS: ICD-10-CM

## 2018-08-29 DIAGNOSIS — G56.03 BILATERAL CARPAL TUNNEL SYNDROME: Primary | ICD-10-CM

## 2018-08-29 DIAGNOSIS — M25.532 PAIN IN BOTH WRISTS: ICD-10-CM

## 2018-08-29 PROCEDURE — 99213 OFFICE O/P EST LOW 20 MIN: CPT | Performed by: ORTHOPAEDIC SURGERY

## 2018-08-29 PROCEDURE — 73110 X-RAY EXAM OF WRIST: CPT | Mod: TC

## 2018-08-29 ASSESSMENT — PAIN SCALES - GENERAL: PAINLEVEL: NO PAIN (1)

## 2018-08-29 NOTE — LETTER
"    8/29/2018         RE: Uli Champagne  [address] [facility] [address]        Dear Colleague,    Thank you for referring your patient, Uli Champagne, to the [facility]. Please see a copy of my visit note below.    Office Visit-Follow up    Chief Complaint: Uli Champagne is a 77 year old male who is being seen for   Chief Complaint   Patient presents with     Musculoskeletal Problem     bilateral wrist pain     Consult       History of Present Illness:   Complains of progressive numbness to bilateral hands right slightly worse than left for 2 years.  Getting somewhat more progressive recently.  Uses a wrist brace at night on the right.  Left has minimal symptoms in it.  \"It was just left eye when.\".  Has some numbness and tingling when he is playing cards and reading a book.  Initially in the morning all of his hand will be numb.  However during the day it is more the radial digits.      REVIEW OF SYSTEMS  General: negative for, night sweats, dizziness, fatigue  Resp: No shortness of breath and no cough  CV: negative for chest pain, syncope or near-syncope  GI: negative for nausea, vomiting and diarrhea  : negative for dysuria and hematuria  Musculoskeletal: as above  Neurologic: negative for syncope   Hematologic: negative for bleeding disorder    Physical Exam:  Vitals: /90  Temp 97.6  F (36.4  C) (Temporal)  Ht 5' 7.25\" (1.708 m)  Wt 211 lb 8 oz (95.9 kg)  BMI 32.88 kg/m2  BMI= Body mass index is 32.88 kg/(m^2).  Constitutional: healthy, alert and no acute distress   Psychiatric: mentation appears normal and affect normal/bright  NEURO: no focal deficits  RESP: Normal with easy respirations and no use of accessory muscles to breathe, no audible wheezing or retractions  CV: bilateral upper extemity:  no edema         Regular rate and rhythm by palpation  SKIN: No erythema, rashes, excoriation, or breakdown. No evidence of infection.   JOINT/EXTREMITIES:bilateral " hands and wrists: No gross deformity.  Some stiffness with wrist flexion and extension.  No focal areas of tenderness.  No gross atrophy.  Flexors and extensors are intact.  The fingers are well-perfused with good capillary refill.  He has a positive Tinel's bilateral as well as carpal compression.  GAIT: not tested             Diagnostic Modalities:  bilateral wrist X-ray: No acute fractures or dislocations  Independent visualization of the images was performed.      Impression: bilateral carpal tunnel syndrome in a 77-year-old male on Coumadin    Plan:  All of the above pertinent physical exam and imaging modalities findings was reviewed with Uli.    I reviewed the pathology.  I discussed treatment options.  Symptoms are minimally bothersome during the day.  However it is the right worse than left.  He is on Coumadin.  Options include therapy injection surgery.  Given the Coumadin uses I recommended against injections.  Occupational therapy has been ordered.  Hopefully will improve symptoms.          Return to clinic 4, week(s), PRN, or sooner as needed for changes.  Re-x-ray on return: No    Isaiah Ogden D.O.          Again, thank you for allowing me to participate in the care of your patient.        Sincerely,        Yovani Ogden, DO

## 2018-08-29 NOTE — PROGRESS NOTES
Appropriate assistive devices provided during their visit. yes (Yes, No, N/A) cane (list device)    Exam table and/or cart  placed in the lowest position. yes (Yes, No, N/A)    Brakes on tables/carts/wheelchairs used at all times. na (Yes, No, N/A)    Non slip footwear applied. na (Yes, No, NA)    Patient was accompanied by staff throughout visit. yes (Yes, No, N/A)    Equipment safety straps used. na (Yes, No, N/A)    Assist with toileting. na (Yes, No, N/A)

## 2018-08-29 NOTE — MR AVS SNAPSHOT
"              After Visit Summary   8/29/2018    Uli Champagne    MRN: [id number]           Patient Information     Date Of Birth          1940        Visit Information        Provider Department      8/29/2018 10:40 AM Yovani Ogden,  [facility]        Today's Diagnoses     Bilateral carpal tunnel syndrome    -  1       Follow-ups after your visit        Additional Services     OCCUPATIONAL THERAPY REFERRAL       *This therapy referral will be filtered to a centralized scheduling office at [facility] and the patient will receive a call to schedule an appointment at a [facility] location most convenient for them. *     [facility] provides Occupational Therapy evaluation and treatment and many specialty services across the [facility] system.  If requesting a specialty program, please choose from the list below.    If you have not heard from the scheduling office within 2 business days, please call [phone] for all locations, with the exception of [place], please call [phone] and [facility], please call [phone]    Treatment: Evaluation & Treatment  Special Instructions/Modalities:   Special Programs:     Please be aware that coverage of these services is subject to the terms and limitations of your health insurance plan.  Call member services at your health plan with any benefit or coverage questions.      **Note to Provider:  If you are referring outside of [facility] for the therapy appointment, please list the name of the location in the \"special instructions\" above, print the referral and give to the patient to schedule the appointment.                  Your next 10 appointments already scheduled     Sep 06, 2018  1:00 PM CDT   [facility] with [facility]    [address] [facility] [address] [phone]              Who to contact     If you have " "questions or need follow up information about today's clinic visit or your schedule please contact [facility] directly at [phone].  Normal or non-critical lab and imaging results will be communicated to you by MyChart, letter or phone within 4 business days after the clinic has received the results. If you do not hear from us within 7 days, please contact the clinic through [employer] or phone. If you have a critical or abnormal lab result, we will notify you by phone as soon as possible.  Submit refill requests through [employer] or call your pharmacy and they will forward the refill request to us. Please allow 3 business days for your refill to be completed.          Additional Information About Your Visit        InnobitsharSmava Information     [employer] gives you secure access to your electronic health record. If you see a primary care provider, you can also send messages to your care team and make appointments. If you have questions, please call your primary care clinic.  If you do not have a primary care provider, please call [phone] and they will assist you.        Care EveryWhere ID     This is your Care EveryWhere ID. This could be used by other organizations to access your [facility] medical records  [id number]        Your Vitals Were     Temperature Height BMI (Body Mass Index)             97.6  F (36.4  C) (Temporal) 5' 7.25\" (1.708 m) 32.88 kg/m2          Blood Pressure from Last 3 Encounters:   08/29/18 150/90   08/20/18 122/62   07/19/18 126/62    Weight from Last 3 Encounters:   08/29/18 211 lb 8 oz (95.9 kg)   08/20/18 206 lb 11.2 oz (93.8 kg)   07/19/18 210 lb 1.6 oz (95.3 kg)              We Performed the Following     OCCUPATIONAL THERAPY REFERRAL        Primary Care Provider Office Phone # Fax #    Isra Carrillo [phone]       [address] [facility] [address]        Equal Access to Services     JAS GODOY AH: Elfego Maguire, lidia carbone, qaybta " vesna marsh jonyanira duvalmarti branham ah. [facility] [phone].    ATENCIÓN: Si pasha boo, tiene a lema disposición servicios gratuitos de asistencia lingüística. Bernardo al [phone].    We comply with applicable federal civil rights laws and [place] laws. We do not discriminate on the basis of race, color, national origin, age, disability, sex, sexual orientation, or gender identity.            Thank you!     Thank you for choosing [facility]  for your care. Our goal is always to provide you with excellent care. Hearing back from our patients is one way we can continue to improve our services. Please take a few minutes to complete the written survey that you may receive in the mail after your visit with us. Thank you!             Your Updated Medication List - Protect others around you: Learn how to safely use, store and throw away your medicines at www.disposemymeds.org.          This list is accurate as of 8/29/18 11:01 AM.  Always use your most recent med list.                   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        nitroGLYcerin 0.4 MG sublingual tablet    NITROSTAT    25 tablet    Place 1 tablet (0.4 mg) under the tongue See Admin Instructions for chest pain    Cardiac dysrhythmia, unspecified       simvastatin 40 MG tablet    ZOCOR    90 tablet    Take 1 tablet (40 mg) by mouth At Bedtime    Hyperlipidemia LDL goal <100       warfarin 5 MG tablet    COUMADIN    90 tablet    TAKE ONE-HALF (1/2) TABLET ON TUESDAY AND SATURDAY AND 1  TABLET ON ALL OTHER DAYS OF THE WEEK OR AS DIRECTED BY THE COUMADIN CLINIC    Permanent atrial fibrillation (H)

## 2018-08-29 NOTE — PROGRESS NOTES
"Office Visit-Follow up    Chief Complaint: Uli Champagne is a 77 year old male who is being seen for   Chief Complaint   Patient presents with     Musculoskeletal Problem     bilateral wrist pain     Consult       History of Present Illness:   Complains of progressive numbness to bilateral hands right slightly worse than left for 2 years.  Getting somewhat more progressive recently.  Uses a wrist brace at night on the right.  Left has minimal symptoms in it.  \"It was just left eye when.\".  Has some numbness and tingling when he is playing cards and reading a book.  Initially in the morning all of his hand will be numb.  However during the day it is more the radial digits.      REVIEW OF SYSTEMS  General: negative for, night sweats, dizziness, fatigue  Resp: No shortness of breath and no cough  CV: negative for chest pain, syncope or near-syncope  GI: negative for nausea, vomiting and diarrhea  : negative for dysuria and hematuria  Musculoskeletal: as above  Neurologic: negative for syncope   Hematologic: negative for bleeding disorder    Physical Exam:  Vitals: /90  Temp 97.6  F (36.4  C) (Temporal)  Ht 5' 7.25\" (1.708 m)  Wt 211 lb 8 oz (95.9 kg)  BMI 32.88 kg/m2  BMI= Body mass index is 32.88 kg/(m^2).  Constitutional: healthy, alert and no acute distress   Psychiatric: mentation appears normal and affect normal/bright  NEURO: no focal deficits  RESP: Normal with easy respirations and no use of accessory muscles to breathe, no audible wheezing or retractions  CV: bilateral upper extemity:  no edema         Regular rate and rhythm by palpation  SKIN: No erythema, rashes, excoriation, or breakdown. No evidence of infection.   JOINT/EXTREMITIES:bilateral hands and wrists: No gross deformity.  Some stiffness with wrist flexion and extension.  No focal areas of tenderness.  No gross atrophy.  Flexors and extensors are intact.  The fingers are well-perfused with good capillary refill.  He has a " positive Tinel's bilateral as well as carpal compression.  GAIT: not tested             Diagnostic Modalities:  bilateral wrist X-ray: No acute fractures or dislocations  Independent visualization of the images was performed.      Impression: bilateral carpal tunnel syndrome in a 77-year-old male on Coumadin    Plan:  All of the above pertinent physical exam and imaging modalities findings was reviewed with Uli.    I reviewed the pathology.  I discussed treatment options.  Symptoms are minimally bothersome during the day.  However it is the right worse than left.  He is on Coumadin.  Options include therapy injection surgery.  Given the Coumadin uses I recommended against injections.  Occupational therapy has been ordered.  Hopefully will improve symptoms.          Return to clinic 4, week(s), PRN, or sooner as needed for changes.  Re-x-ray on return: No    Isaiah Ogden D.O.

## 2018-09-06 ENCOUNTER — ANTICOAGULATION THERAPY VISIT (OUTPATIENT)
Dept: ANTICOAGULATION | Facility: OTHER | Age: 78
End: 2018-09-06
Payer: COMMERCIAL

## 2018-09-06 ENCOUNTER — TELEPHONE (OUTPATIENT)
Dept: ANTICOAGULATION | Facility: OTHER | Age: 78
End: 2018-09-06

## 2018-09-06 DIAGNOSIS — I48.20 CHRONIC A-FIB (H): ICD-10-CM

## 2018-09-06 DIAGNOSIS — Z79.01 LONG-TERM (CURRENT) USE OF ANTICOAGULANTS: ICD-10-CM

## 2018-09-06 DIAGNOSIS — Z86.73 HISTORY OF CVA (CEREBROVASCULAR ACCIDENT): Primary | ICD-10-CM

## 2018-09-06 LAB — INR POINT OF CARE: 2 (ref 0.86–1.14)

## 2018-09-06 PROCEDURE — 36416 COLLJ CAPILLARY BLOOD SPEC: CPT

## 2018-09-06 PROCEDURE — 85610 PROTHROMBIN TIME: CPT | Mod: QW

## 2018-09-06 PROCEDURE — 99207 ZZC NO CHARGE NURSE ONLY: CPT

## 2018-09-06 NOTE — PROGRESS NOTES
ANTICOAGULATION FOLLOW-UP CLINIC VISIT    Patient Name:  Uli Champagne  Date:  9/6/2018  Contact Type:  Face to Face    SUBJECTIVE:     Patient Findings     Positives No Problem Findings           OBJECTIVE    INR Protime   Date Value Ref Range Status   09/06/2018 2.0 (A) 0.86 - 1.14 Final       ASSESSMENT / PLAN  INR assessment THER    Recheck INR In: 6 WEEKS    INR Location Clinic      Anticoagulation Summary as of 9/6/2018     INR goal 2.0-3.0   Today's INR 2.0   Warfarin maintenance plan 2.5 mg (5 mg x 0.5) on Tue, Sat; 5 mg (5 mg x 1) all other days   Full warfarin instructions 2.5 mg on Tue, Sat; 5 mg all other days   Weekly warfarin total 30 mg   No change documented Rui Landers RN   Plan last modified Rui Landers RN (3/31/2016)   Next INR check 10/16/2018   Target end date     Indications   Long-term (current) use of anticoagulants [Z79.01] [Z79.01]  Chronic a-fib (H) [I48.2]         Anticoagulation Episode Summary     INR check location     Preferred lab     Send INR reminders to [facility] POOL    Comments 5 mg tab, AM dose        Anticoagulation Care Providers     Provider Role Specialty Phone number    Isra Carrillo MD [facility] [phone]            See the Encounter Report to view Anticoagulation Flowsheet and Dosing Calendar (Go to Encounters tab in chart review, and find the Anticoagulation Therapy Visit)    Dosage adjustment made based on physician directed care plan.    Rui Landers RN

## 2018-09-06 NOTE — TELEPHONE ENCOUNTER
Please review and renew this patients INR referral, orders pending. Thank you!      Rui Landers RN, BSN

## 2018-09-06 NOTE — MR AVS SNAPSHOT
Uli Mcdonaldpramod   9/6/2018 1:00 PM   Anticoagulation Therapy Visit    Description:  78 year old male   Provider:  JITENDRA ANTI COACECIL   Department:  [facility]           INR as of 9/6/2018     Today's INR 2.0      Anticoagulation Summary as of 9/6/2018     INR goal 2.0-3.0   Today's INR 2.0   Full warfarin instructions 2.5 mg on Tue, Sat; 5 mg all other days   Next INR check 10/16/2018    Indications   Long-term (current) use of anticoagulants [Z79.01] [Z79.01]  Chronic a-fib (H) [I48.2]         Your next Anticoagulation Clinic appointment(s)     Oct 16, 2018  1:00 PM CDT   Anticoagulation Visit with [facility]   [facility] ([facility]    [address] [facility] [id number] [phone]              Contact Numbers     Clinic Number:         September 2018 Details    Sun Mon Tue Wed Thu Fri Sat           1                 2               3               4               5               6      5 mg   See details      7      5 mg         8      2.5 mg           9      5 mg         10      5 mg         11      2.5 mg         12      5 mg         13      5 mg         14      5 mg         15      2.5 mg           16      5 mg         17      5 mg         18      2.5 mg         19      5 mg         20      5 mg         21      5 mg         22      2.5 mg           23      5 mg         24      5 mg         25      2.5 mg         26      5 mg         27      5 mg         28      5 mg         29      2.5 mg           30      5 mg                Date Details   09/06 This INR check               How to take your warfarin dose     To take:  2.5 mg Take 0.5 of a 5 mg tablet.    To take:  5 mg Take 1 of the 5 mg tablets.           October 2018 Details    Sun Mon Tue Wed Thu Fri Sat      1      5 mg         2      2.5 mg         3      5 mg         4      5 mg         5      5 mg         6      2.5 mg           7      5 mg         8      5 mg         9      2.5 mg         10      5 mg         11       5 mg         12      5 mg         13      2.5 mg           14      5 mg         15      5 mg         16            17               18               19               20                 21               22               23               24               25               26               27                 28               29               30               31                   Date Details   No additional details    Date of next INR:  10/16/2018         How to take your warfarin dose     To take:  2.5 mg Take 0.5 of a 5 mg tablet.    To take:  5 mg Take 1 of the 5 mg tablets.

## 2018-09-14 ENCOUNTER — HOSPITAL ENCOUNTER (OUTPATIENT)
Dept: OCCUPATIONAL THERAPY | Facility: CLINIC | Age: 78
Setting detail: THERAPIES SERIES
End: 2018-09-14
Attending: ORTHOPAEDIC SURGERY
Payer: COMMERCIAL

## 2018-09-14 PROCEDURE — 97166 OT EVAL MOD COMPLEX 45 MIN: CPT | Mod: GO | Performed by: OCCUPATIONAL THERAPIST

## 2018-09-14 PROCEDURE — G8988 SELF CARE GOAL STATUS: HCPCS | Mod: GO,CI | Performed by: OCCUPATIONAL THERAPIST

## 2018-09-14 PROCEDURE — G8987 SELF CARE CURRENT STATUS: HCPCS | Mod: GO,CJ | Performed by: OCCUPATIONAL THERAPIST

## 2018-09-14 PROCEDURE — 97110 THERAPEUTIC EXERCISES: CPT | Mod: GO | Performed by: OCCUPATIONAL THERAPIST

## 2018-09-14 PROCEDURE — 40000839 ZZH STATISTIC HAND THERAPY VISIT: Performed by: OCCUPATIONAL THERAPIST

## 2018-09-14 NOTE — PROGRESS NOTES
"                                  Occupational Therapy Evaluation       09/14/18 0821   General Information/History   Start Of Care Date 09/14/18   Referring Physician Dr Yovani Ogden   Orders Evaluate And Treat As Indicated   Orders Date 08/29/18   Medical Diagnosis bilateral CTS G56.83   Precautions/Limitations Coumidin   Additional Occupational Profile Info/Pertinent history of current problem The patient is an 77 y/o male who has been experiencing increased bilateral hand and finger numbing; the right hand is worse then the left per his report.  His main complaint is the index finger of the right hand numbing and tingling, especially in the morning.  He did report about 6 months ago the left hand became flaccid, but did return to normal ROM and strength.  PMH: cardiac/low HR at rest stated HR in the 40\"s with no increased symptoms.  He reported years prior this date, having cervical fusion (he believes C4- C7).  Patient reported sleeps on his sides vs back due to continued back pain.  PMH; CVA .  Currently has decreased functional use and tolerance with grasping activities for BADL/IADLs.   Previous treatment or current condition braces,    Past medical history CVA, cervical fusion   How/Where did it occur From insidious onset   Onset date of current episode/exacerbation 09/14/16   Chronicity Chronic   Hand Dominance Left   Affected side Bilateral   Functional limitations perform activities of daily living;perform desired leisure / sports activities   Reported Symptoms Pain;Loss of Motion/Stiffness;Tingling;Numbness;Loss of strength   QuickDASH [Functional Disability Questionnaire; 0-100 (0=no dysfunction; 100=dysfunction)] (UEFI; 67/80)   Prior level of function Independent ADL;Independent IADL   Important Activities reading, playing cards, computer   [facility]   Patient role/Employment history Retired   Patient/Family goals statement \"get rid of this problem (regarding numbing and pain)\" "   General observations During OT eval, the patient presented with frequent bilateral arm crossing/folding while seated.  OT instructed , to decrease this arm positioning, for improved median and ulnar nerve input.   Fall Risk Screen   Fall screen completed by OT   Have you fallen 2 or more times in the past year? No   Have you fallen and had an injury in the past year? No   Is patient a fall risk? Department fall risk interventions implemented   Fall screen comments Patient presented with poor balance, use of single point cane and hallway rail necessary for safety.  Denied PT services to address balance.   Pain   Pain Primary Pain Report   Primary Pain Report   Location right    Radiation Hand;Volar Forearm  (right dorsal index and middle finger)   Pain Quality Aching   Frequency Intermittent   Scale 9/10   Pain Is Worse In The A.m.   Pain Is Exacerbated By Pinching;Lying On Extremity;Activity/movement   Pain Is Relieved By Splints   Progression Since Onset Gradually Worsening   ROM   ROM AROM   AROM   AROM Elbow;Wrist   Comments bilateral UE all planes of movement , WFL   Strength   Strength Strength    Avg - Left 60#    Avg - Right 48#   Lateral Pinch - Left 18#   Lateral Pinch - Right 15#   Education Assessment   Preferred Learning Style Listening;Demonstration   Therapy Interventions   Planned Therapy Interventions Ultrasound;Strengthening;ROM;Stretching;Manual Therapy;Splinting;Education of splint wear, care, fit and precautions;Home Program;Self Care/Home Management   Clinical Impression   Criteria for Skilled Therapeutic Interventions Met yes;treatment indicated   OT Diagnosis Decreased pain free functional use of the hands for daily tasks and activities   Influenced by the following impairments Pain;Decreased strength;Impaired sensation   Assessment of Occupational Performance 3-5 Performance Deficits   Identified Performance Deficits dresssng, home mgmt, sleeping and leisure activities'   Clinical  Decision Making (Complexity) Moderate complexity   Therapy Frequency 2x week   Predicted Duration of Therapy Intervention (days/wks) 12 weeks   Risks and Benefits of Treatment have been explained. Yes   Patient, Family & other staff in agreement with plan of care Yes   Hand Goals   Hand Goals Dressing;Sleeping;Sports/Recreation   Dressing   Current Functional Task Dressing LB;Buttoning;Zipping;Tying   Previous Performance Level Independent   Current Performance Level Moderate difficulty   Goal Target Task Don/Doff pants;Don/Doff socks;Botton pants;Tie shoes;Zipping   Goal Target Performance Level No difficulty   Due Date 12/14/18   Sleeping   Current Functional Task Sleeping through the night   Previous Performance Level Mild difficulty   Current Performance Level Severe difficulty   Goal Target Task Staying asleep   Goal Target Performance Level Mild difficulty   Due Date 12/14/18   Sports/Recreation   Current Functional Task Holding   Previous Performance Level Independent   Curent Performance Level Severe difficulty   Goal Target Task Play cards  (reading and computer)   Goal Target Performance Level Mild difficulty   Due Date 12/14/18   Total Evaluation Time   Total Evaluation Time (Minutes) 15       Thank you for referring Uli  To OT services to assist with decrease bilateral hand numbing and pain, with improved functional use of the hands for BADL/IADLs.    If you have any questions or concerns, please contact me at [phone].    Jen Conti MA, OTR/L  [facility]

## 2018-09-18 NOTE — ADDENDUM NOTE
Encounter addended by: Jen Conti OT on: 9/18/2018  9:03 AM<BR>     Actions taken: Charge Capture section accepted

## 2018-09-24 ENCOUNTER — OFFICE VISIT (OUTPATIENT)
Dept: FAMILY MEDICINE | Facility: OTHER | Age: 78
End: 2018-09-24
Payer: COMMERCIAL

## 2018-09-24 VITALS
SYSTOLIC BLOOD PRESSURE: 138 MMHG | RESPIRATION RATE: 20 BRPM | TEMPERATURE: 95.6 F | BODY MASS INDEX: 32.97 KG/M2 | HEART RATE: 56 BPM | DIASTOLIC BLOOD PRESSURE: 80 MMHG | WEIGHT: 212.1 LBS | OXYGEN SATURATION: 97 %

## 2018-09-24 DIAGNOSIS — B99.9 INFECTION: Primary | ICD-10-CM

## 2018-09-24 DIAGNOSIS — Z79.01 LONG TERM CURRENT USE OF ANTICOAGULANT THERAPY: ICD-10-CM

## 2018-09-24 PROCEDURE — 99213 OFFICE O/P EST LOW 20 MIN: CPT | Performed by: PHYSICIAN ASSISTANT

## 2018-09-24 RX ORDER — CEPHALEXIN 500 MG/1
500 CAPSULE ORAL 2 TIMES DAILY
Qty: 8 CAPSULE | Refills: 0 | Status: SHIPPED | OUTPATIENT
Start: 2018-09-24 | End: 2019-02-04

## 2018-09-24 ASSESSMENT — PAIN SCALES - GENERAL: PAINLEVEL: NO PAIN (1)

## 2018-09-24 NOTE — MR AVS SNAPSHOT
After Visit Summary   9/24/2018    Uli Champagne    MRN: [id number]           Patient Information     Date Of Birth          1940        Visit Information        Provider Department      9/24/2018 1:40 PM Dusty Carter PA-C [facility]        Today's Diagnoses     Infection    -  1    Long term current use of anticoagulant therapy           Follow-ups after your visit        Your next 10 appointments already scheduled     Sep 25, 2018 11:00 AM CDT   Hand Treatment with Mary Richter, OT   [facility] ([facility])    [address] [place] [address] [place] [address]   [phone]            Sep 28, 2018 11:30 AM CDT   Hand Treatment with Jen Conti, OT   [facility] ([facility])    [address] [place] [address]   [phone]            Oct 02, 2018  2:00 PM CDT   Hand Treatment with Jen Conti, OT   [facility] ([facility])    [address] [place] [address] [address] [place] [address]   [phone]            Oct 05, 2018 11:00 AM CDT   Hand Treatment with Jen Conti, OT   [facility] ([facility])    [address] [place] [address]   [phone]            Oct 09, 2018  1:45 PM CDT   Hand Treatment with Jen Conti, OT   [facility] ([facility])    [address] [place] [address]   [phone]            Oct 11, 2018  1:00 PM CDT   Hand Treatment with Jen Conti, OT   [facility] ([facility])    [address] [place] [address]   [phone]            Oct 16, 2018  1:00 PM CDT   Anticoagulation Visit with JITENDRA ANTI ROCKY   [facility]    [address]  [facility] [address] [phone]            Oct  17, 2018 11:15 AM CDT   Hand Treatment with Jen Conti, OT   [facility] ([facility])    [address] [place] [address]   [phone]            Oct 19, 2018 11:15 AM CDT   Hand Treatment with Jen Conti, OT   [facility] ([facility])    [address] [place] [address]   [phone]            Oct 23, 2018  2:00 PM CDT   Hand Treatment with Jen Conti, OT   [facility] ([facility])    [address] [place] [address]   [phone]              Future tests that were ordered for you today     Open Future Orders        Priority Expected Expires Ordered    INR Routine  9/24/2019 9/24/2018            Who to contact     If you have questions or need follow up information about today's clinic visit or your schedule please contact [facility] directly at [phone].  Normal or non-critical lab and imaging results will be communicated to you by [email or web address], letter or phone within 4 business days after the clinic has received the results. If you do not hear from us within 7 days, please contact the clinic through [employer] or phone. If you have a critical or abnormal lab result, we will notify you by phone as soon as possible.  Submit refill requests through [employer] or call your pharmacy and they will forward the refill request to us. Please allow 3 business days for your refill to be completed.          Additional Information About Your Visit        [employer] Information     [employer] gives you secure access to your electronic health record. If you see a primary care provider, you can also send messages to your care team and make appointments. If you have questions, please call your primary care clinic.  If you do not have a primary care provider, please call [phone] and they will assist you.        Care EveryWhere ID      This is your Care EveryWhere ID. This could be used by other organizations to access your [facility] medical records  [id number]        Your Vitals Were     Pulse Temperature Respirations Pulse Oximetry BMI (Body Mass Index)       56 95.6  F (35.3  C) (Oral) 20 97% 32.97 kg/m2        Blood Pressure from Last 3 Encounters:   09/24/18 138/80   08/29/18 150/90   08/20/18 122/62    Weight from Last 3 Encounters:   09/24/18 212 lb 1.6 oz (96.2 kg)   08/29/18 211 lb 8 oz (95.9 kg)   08/20/18 206 lb 11.2 oz (93.8 kg)                 Today's Medication Changes          These changes are accurate as of 9/24/18  3:42 PM.  If you have any questions, ask your nurse or doctor.               Start taking these medicines.        Dose/Directions    cephALEXin 500 MG capsule   Commonly known as:  KEFLEX   Used for:  Infection   Started by:  Dusty Carter PA-C        Dose:  500 mg   Take 1 capsule (500 mg) by mouth 2 times daily for 4 days   Quantity:  8 capsule   Refills:  0            Where to get your medicines      These medications were sent to [facility] [place] [address]   [address] [facility] [address]     Phone:  [phone]     cephALEXin 500 MG capsule                Primary Care Provider Office Phone # Fax #    Isra Carrillo [phone]       [address] [facility] [address]        Equal Access to Services     JAS GODOY AH: Elfego rosenthalo Soaustyn, waaxda luqadaha, qaybta kaalmada philyada, waxpablo michelle verma. So [facility] [phone].    ATENCIÓN: Si habla español, tiene a lema disposición servicios gratuitos de asistencia lingüística. Llame al [phone].    We comply with applicable federal civil rights laws and [place] laws. We do not discriminate on the basis of race, color, national origin, age, disability, sex, sexual orientation, or gender identity.            Thank you!     Thank you for choosing [facility]  for your  care. Our goal is always to provide you with excellent care. Hearing back from our patients is one way we can continue to improve our services. Please take a few minutes to complete the written survey that you may receive in the mail after your visit with us. Thank you!             Your Updated Medication List - Protect others around you: Learn how to safely use, store and throw away your medicines at www.disposemymeds.org.          This list is accurate as of 9/24/18  3:42 PM.  Always use your most recent med list.                   Brand Name Dispense Instructions for use Diagnosis    allopurinol 300 MG tablet    ZYLOPRIM    90 tablet    TAKE 1 TABLET DAILY FOR GOUT    Idiopathic chronic gout of multiple sites without tophus       aspirin 81 MG tablet      1 TABLET DAILY        Benazepril-Hydrochlorothiazide 20-25 MG Tabs     180 tablet    TAKE 1 TABLET TWICE A DAY FOR HYPERTENSION    Benign essential hypertension       cephALEXin 500 MG capsule    KEFLEX    8 capsule    Take 1 capsule (500 mg) by mouth 2 times daily for 4 days    Infection       fish oil-omega-3 fatty acids 1000 MG capsule      Take 1 g by mouth daily.        nitroGLYcerin 0.4 MG sublingual tablet    NITROSTAT    25 tablet    Place 1 tablet (0.4 mg) under the tongue See Admin Instructions for chest pain    Cardiac dysrhythmia, unspecified       simvastatin 40 MG tablet    ZOCOR    90 tablet    Take 1 tablet (40 mg) by mouth At Bedtime    Hyperlipidemia LDL goal <100       warfarin 5 MG tablet    COUMADIN    90 tablet    TAKE ONE-HALF (1/2) TABLET ON TUESDAY AND SATURDAY AND 1 TABLET ON ALL OTHER DAYS OF THE WEEK OR AS DIRECTED BY THE COUMADIN CLINIC    Permanent atrial fibrillation (H)

## 2018-09-24 NOTE — NURSING NOTE
Health Maintenance Due   Topic Date Due     OP ANNUAL INR REFERRAL  04/24/2016     BMP Q1 YR  08/31/2018     INFLUENZA VACCINE (1) 09/01/2018     Meghan GRIFFIN LPN

## 2018-09-24 NOTE — PROGRESS NOTES
SUBJECTIVE:   Uli Champagne is a 78 year old male who presents to clinic today for the following health issues:      Concern - Swelling in right lower leg  Onset: 5-6 days    Description:   swelling    Intensity: moderate    Progression of Symptoms:  improving    Accompanying Signs & Symptoms:  Wounds on right lower leg    Previous history of similar problem:   no    Precipitating factors:   Worsened by: nothing    Alleviating factors:  Improved by: bacitracin    Therapies Tried and outcome: Bacitracin, band-aid; slight relief      Problem list and histories reviewed & adjusted, as indicated.  Additional history: as documented    Patient is a 78 year old male who presents today with swelling in his lower right leg. He said that he slipped on his cane earlier last week and scraped his leg. Since then the area that was scraped and the right ankle have been swollen. He is concerned about infection as there appears to be a small amount of white discharge coming from the wound. Denies numbness, tingling, weakness, fever, instability in the ankle or leg, red streaking, change in bowel/bladder. Patient is currently on anticoagulation due to atrial fibrillation.     Reviewed and updated as needed this visit by clinical staff  Tobacco  Allergies  Meds  Med Hx  Surg Hx  Fam Hx  Soc Hx      Reviewed and updated as needed this visit by Provider         ROS:  Constitutional, HEENT, cardiovascular, pulmonary, gi and gu systems are negative, except as otherwise noted.    OBJECTIVE:     /80 (BP Location: Left arm, Patient Position: Chair, Cuff Size: Adult Large)  Pulse 56  Temp 95.6  F (35.3  C) (Oral)  Resp 20  Wt 212 lb 1.6 oz (96.2 kg)  SpO2 97%  BMI 32.97 kg/m2  Body mass index is 32.97 kg/(m^2).  GENERAL: healthy, alert and no distress  RESP: lungs clear to auscultation - no rales, rhonchi or wheezes  CV: regular rate and rhythm, normal S1 S2, no S3 or S4, no murmur, click or rub, no peripheral  edema and peripheral pulses strong  MS: no gross musculoskeletal defects noted, no edema  SKIN: abrasion over the right anterior lower leg with erythema surrounding, mildly warm to touch and tender. Crusted abrasion over right knee, no evidence of drainage or discharge, cool and non-tender to touch.   NEURO: Normal strength and tone, mentation intact and speech normal  PSYCH: mentation appears normal, affect normal/bright    Diagnostic Test Results:  none     ASSESSMENT/PLAN:     1. Infection  Discussed with patient short course of Keflex to treat suspected infection. Reviewed dosing and possible adverse effects with patient.   - cephALEXin (KEFLEX) 500 MG capsule; Take 1 capsule (500 mg) by mouth 2 times daily for 4 days  Dispense: 8 capsule; Refill: 0    2. Long term current use of anticoagulant therapy  Explained to patient that the antibiotic will likely affect his INR level, patient will return for FLOAT visit to have INR rechecked. We will adjust if needed.   - INR; Future    Follow up with clinic as needed or sooner if conditions change, worsen or fail to improve as expected.      Dusty Carter PA-C  [facility]

## 2018-09-25 ENCOUNTER — HOSPITAL ENCOUNTER (OUTPATIENT)
Dept: OCCUPATIONAL THERAPY | Facility: CLINIC | Age: 78
Setting detail: THERAPIES SERIES
End: 2018-09-25
Attending: ORTHOPAEDIC SURGERY
Payer: COMMERCIAL

## 2018-09-25 PROCEDURE — 40000839 ZZH STATISTIC HAND THERAPY VISIT

## 2018-09-25 PROCEDURE — 97035 APP MDLTY 1+ULTRASOUND EA 15: CPT | Mod: GO

## 2018-09-25 PROCEDURE — 97110 THERAPEUTIC EXERCISES: CPT | Mod: GO

## 2018-09-28 ENCOUNTER — ANTICOAGULATION THERAPY VISIT (OUTPATIENT)
Dept: ANTICOAGULATION | Facility: OTHER | Age: 78
End: 2018-09-28
Payer: COMMERCIAL

## 2018-09-28 ENCOUNTER — HOSPITAL ENCOUNTER (OUTPATIENT)
Dept: OCCUPATIONAL THERAPY | Facility: CLINIC | Age: 78
Setting detail: THERAPIES SERIES
End: 2018-09-28
Attending: ORTHOPAEDIC SURGERY
Payer: COMMERCIAL

## 2018-09-28 DIAGNOSIS — Z79.01 LONG-TERM (CURRENT) USE OF ANTICOAGULANTS: ICD-10-CM

## 2018-09-28 DIAGNOSIS — I48.20 CHRONIC A-FIB (H): ICD-10-CM

## 2018-09-28 LAB — INR POINT OF CARE: 1.7 (ref 0.86–1.14)

## 2018-09-28 PROCEDURE — 97035 APP MDLTY 1+ULTRASOUND EA 15: CPT | Mod: GO | Performed by: OCCUPATIONAL THERAPIST

## 2018-09-28 PROCEDURE — 40000839 ZZH STATISTIC HAND THERAPY VISIT: Performed by: OCCUPATIONAL THERAPIST

## 2018-09-28 PROCEDURE — 99207 ZZC NO CHARGE NURSE ONLY: CPT

## 2018-09-28 PROCEDURE — 36416 COLLJ CAPILLARY BLOOD SPEC: CPT

## 2018-09-28 PROCEDURE — 97140 MANUAL THERAPY 1/> REGIONS: CPT | Mod: GO | Performed by: OCCUPATIONAL THERAPIST

## 2018-09-28 PROCEDURE — 85610 PROTHROMBIN TIME: CPT | Mod: QW

## 2018-09-28 NOTE — MR AVS SNAPSHOT
Uli Mcdonaldpramod   9/28/2018 9:30 AM   Anticoagulation Therapy Visit    Description:  78 year old male   Provider:  JITENDRA ANTI COACECIL   Department:  [facility]           INR as of 9/28/2018     Today's INR 1.7!      Anticoagulation Summary as of 9/28/2018     INR goal 2.0-3.0   Today's INR 1.7!   Full warfarin instructions 2.5 mg on Tue; 5 mg all other days   Next INR check 10/16/2018    Indications   Long-term (current) use of anticoagulants [Z79.01] [Z79.01]  Chronic a-fib (H) [I48.2]         Your next Anticoagulation Clinic appointment(s)     Oct 16, 2018  1:00 PM CDT   [facility] with [facility]   [facility]    [address] [facility] [address]   [phone]              Contact Numbers     Clinic Number:         September 2018 Details    Sun Mon Tue Wed Thu Fri Sat           1                 2               3               4               5               6               7               8                 9               10               11               12               13               14               15                 16               17               18               19               20               21               22                 23               24               25               26               27               28      5 mg   See details      29      5 mg           30      5 mg                Date Details   09/28 This INR check               How to take your warfarin dose     To take:  5 mg Take 1 of the 5 mg tablets.           October 2018 Details    Sun Mon Tue Wed Thu Fri Sat      1      5 mg         2      2.5 mg         3      5 mg         4      5 mg         5      5 mg         6      5 mg           7      5 mg         8      5 mg         9      2.5 mg         10      5 mg         11      5 mg         12      5 mg         13      5 mg           14      5 mg         15      5 mg         16            17               18                19               20                 21               22               23               24               25               26               27                 28               29               30               31                   Date Details   No additional details    Date of next INR:  10/16/2018         How to take your warfarin dose     To take:  2.5 mg Take 0.5 of a 5 mg tablet.    To take:  5 mg Take 1 of the 5 mg tablets.

## 2018-09-28 NOTE — PROGRESS NOTES
ANTICOAGULATION FOLLOW-UP CLINIC VISIT    Patient Name:  Uli Champagne  Date:  9/28/2018  Contact Type:  Face to Face    SUBJECTIVE:     Patient Findings     Positives Antibiotic use or infection (Just finished a round of Keflex today), Unexplained INR or factor level change           OBJECTIVE    INR Protime   Date Value Ref Range Status   09/28/2018 1.7 (A) 0.86 - 1.14 Final       ASSESSMENT / PLAN  INR assessment SUB    Recheck INR In: 2 WEEKS    INR Location Clinic      Anticoagulation Summary as of 9/28/2018     INR goal 2.0-3.0   Today's INR 1.7!   Warfarin maintenance plan 2.5 mg (5 mg x 0.5) on Tue; 5 mg (5 mg x 1) all other days   Full warfarin instructions 2.5 mg on Tue; 5 mg all other days   Weekly warfarin total 32.5 mg   Plan last modified Rui Landers RN (9/28/2018)   Next INR check 10/16/2018   Target end date     Indications   Long-term (current) use of anticoagulants [Z79.01] [Z79.01]  Chronic a-fib (H) [I48.2]         Anticoagulation Episode Summary     INR check location     Preferred lab     Send INR reminders to [facility] POOL    Comments 5 mg tab, AM dose        Anticoagulation Care Providers     Provider Role Specialty Phone number    Isra Carrillo MD [facility] [phone]            See the Encounter Report to view Anticoagulation Flowsheet and Dosing Calendar (Go to Encounters tab in chart review, and find the Anticoagulation Therapy Visit)    Dosage adjustment made based on physician directed care plan.    Rui Landers, RN

## 2018-10-02 ENCOUNTER — HOSPITAL ENCOUNTER (OUTPATIENT)
Dept: OCCUPATIONAL THERAPY | Facility: CLINIC | Age: 78
Setting detail: THERAPIES SERIES
End: 2018-10-02
Attending: ORTHOPAEDIC SURGERY
Payer: COMMERCIAL

## 2018-10-02 PROCEDURE — 97035 APP MDLTY 1+ULTRASOUND EA 15: CPT | Mod: GO | Performed by: OCCUPATIONAL THERAPIST

## 2018-10-02 PROCEDURE — 40000839 ZZH STATISTIC HAND THERAPY VISIT: Performed by: OCCUPATIONAL THERAPIST

## 2018-10-02 PROCEDURE — 97110 THERAPEUTIC EXERCISES: CPT | Mod: GO | Performed by: OCCUPATIONAL THERAPIST

## 2018-10-02 PROCEDURE — 97140 MANUAL THERAPY 1/> REGIONS: CPT | Mod: GO | Performed by: OCCUPATIONAL THERAPIST

## 2018-10-08 ENCOUNTER — TELEPHONE (OUTPATIENT)
Dept: FAMILY MEDICINE | Facility: OTHER | Age: 78
End: 2018-10-08

## 2018-10-08 DIAGNOSIS — I10 BENIGN ESSENTIAL HYPERTENSION: ICD-10-CM

## 2018-10-08 RX ORDER — BENAZEPRIL/HYDROCHLOROTHIAZIDE 20 MG-25MG
TABLET ORAL
Qty: 180 TABLET | Refills: 0 | Status: SHIPPED | OUTPATIENT
Start: 2018-10-08 | End: 2019-01-19

## 2018-10-08 NOTE — TELEPHONE ENCOUNTER
Reason for Call:  Other prescription    Detailed comments: Patient states the mail order pharmacy is out of the Benazepril-Hydrochlorothiazide 20-25 MG TABS.  He called [facility] and they have it.  He is requesting a prescription be sent to [facility] for 90 days.  He is aware Dr. Carrillo is out but asks that another provider take care of this.    Phone Number Patient can be reached at: Home number on file [phone] (home)    Best Time: any    Can we leave a detailed message on this number? YES    Call taken on 10/8/2018 at 3:03 PM by Yassine Cortes

## 2018-10-09 ENCOUNTER — HOSPITAL ENCOUNTER (OUTPATIENT)
Dept: OCCUPATIONAL THERAPY | Facility: CLINIC | Age: 78
Setting detail: THERAPIES SERIES
End: 2018-10-09
Attending: ORTHOPAEDIC SURGERY
Payer: COMMERCIAL

## 2018-10-09 PROCEDURE — 97110 THERAPEUTIC EXERCISES: CPT | Mod: GO | Performed by: OCCUPATIONAL THERAPIST

## 2018-10-09 PROCEDURE — 40000839 ZZH STATISTIC HAND THERAPY VISIT: Performed by: OCCUPATIONAL THERAPIST

## 2018-10-09 PROCEDURE — 97140 MANUAL THERAPY 1/> REGIONS: CPT | Mod: GO | Performed by: OCCUPATIONAL THERAPIST

## 2018-10-09 PROCEDURE — 97035 APP MDLTY 1+ULTRASOUND EA 15: CPT | Mod: GO | Performed by: OCCUPATIONAL THERAPIST

## 2018-10-09 NOTE — TELEPHONE ENCOUNTER
I have attempted to contact this patient by phone with the following results: left message to return my call on answering machine.    Garima Burgos MA     10/9/2018

## 2018-10-11 ENCOUNTER — HOSPITAL ENCOUNTER (OUTPATIENT)
Dept: OCCUPATIONAL THERAPY | Facility: CLINIC | Age: 78
Setting detail: THERAPIES SERIES
End: 2018-10-11
Attending: ORTHOPAEDIC SURGERY
Payer: COMMERCIAL

## 2018-10-11 PROCEDURE — 97035 APP MDLTY 1+ULTRASOUND EA 15: CPT | Mod: GO | Performed by: OCCUPATIONAL THERAPIST

## 2018-10-11 PROCEDURE — 40000839 ZZH STATISTIC HAND THERAPY VISIT: Performed by: OCCUPATIONAL THERAPIST

## 2018-10-11 PROCEDURE — 97110 THERAPEUTIC EXERCISES: CPT | Mod: GO | Performed by: OCCUPATIONAL THERAPIST

## 2018-10-11 PROCEDURE — 97140 MANUAL THERAPY 1/> REGIONS: CPT | Mod: GO | Performed by: OCCUPATIONAL THERAPIST

## 2018-10-12 NOTE — ADDENDUM NOTE
Encounter addended by: Jen Conti OT on: 10/12/2018  9:51 AM<BR>     Actions taken: Flowsheet accepted, Charge Capture section accepted

## 2018-10-15 ENCOUNTER — ANTICOAGULATION THERAPY VISIT (OUTPATIENT)
Dept: ANTICOAGULATION | Facility: OTHER | Age: 78
End: 2018-10-15
Payer: COMMERCIAL

## 2018-10-15 DIAGNOSIS — I48.20 CHRONIC A-FIB (H): ICD-10-CM

## 2018-10-15 LAB — INR POINT OF CARE: 2.1 (ref 0.86–1.14)

## 2018-10-15 PROCEDURE — 99207 ZZC NO CHARGE NURSE ONLY: CPT

## 2018-10-15 PROCEDURE — 85610 PROTHROMBIN TIME: CPT | Mod: QW

## 2018-10-15 PROCEDURE — 36416 COLLJ CAPILLARY BLOOD SPEC: CPT

## 2018-10-15 NOTE — MR AVS SNAPSHOT
Uli SINGH Ihsan   10/15/2018 1:00 PM   Anticoagulation Therapy Visit    Description:  78 year old male   Provider:  JIT[facility]A ANTI COAG   Department:  [facility]           INR as of 10/15/2018     Today's INR 2.1      Anticoagulation Summary as of 10/15/2018     INR goal 2.0-3.0   Today's INR 2.1   Full warfarin instructions 2.5 mg on Tue; 5 mg all other days   Next INR check 11/12/2018    Indications   Long-term (current) use of anticoagulants [Z79.01] [Z79.01]  Chronic a-fib (H) [I48.2]         Your next Anticoagulation Clinic appointment(s)     Nov 12, 2018  1:00 PM CST   Anticoagulation Visit with [facility] ANTI ROCKY   [facility]    [address] [facility] [id number] [phone]              Contact Numbers     Clinic Number:         October 2018 Details    Sun Mon Tue Wed Thu Fri Sat      1               2               3               4               5               6                 7               8               9               10               11               12               13                 14               15      5 mg   See details      16      2.5 mg         17      5 mg         18      5 mg         19      5 mg         20      5 mg           21      5 mg         22      5 mg         23      2.5 mg         24      5 mg         25      5 mg         26      5 mg         27      5 mg           28      5 mg         29      5 mg         30      2.5 mg         31      5 mg             Date Details   10/15 This INR check               How to take your warfarin dose     To take:  2.5 mg Take 0.5 of a 5 mg tablet.    To take:  5 mg Take 1 of the 5 mg tablets.           November 2018 Details    Sun Mon Tue Wed Thu Fri Sat         1      5 mg         2      5 mg         3      5 mg           4      5 mg         5      5 mg         6      2.5 mg         7      5 mg         8      5 mg         9      5 mg         10      5 mg           11      5 mg         12             13               14               15               16               17                 18               19               20               21               22               23               24                 25               26               27               28               29               30                 Date Details   No additional details    Date of next INR:  11/12/2018         How to take your warfarin dose     To take:  2.5 mg Take 0.5 of a 5 mg tablet.    To take:  5 mg Take 1 of the 5 mg tablets.

## 2018-10-15 NOTE — PROGRESS NOTES
ANTICOAGULATION FOLLOW-UP CLINIC VISIT    Patient Name:  Uli Champagne  Date:  10/15/2018  Contact Type:  Face to Face    SUBJECTIVE:     Patient Findings     Positives No Problem Findings           OBJECTIVE    INR Protime   Date Value Ref Range Status   10/15/2018 2.1 (A) 0.86 - 1.14 Final       ASSESSMENT / PLAN  INR assessment THER    Recheck INR In: 4 WEEKS    INR Location Clinic      Anticoagulation Summary as of 10/15/2018     INR goal 2.0-3.0   Today's INR 2.1   Warfarin maintenance plan 2.5 mg (5 mg x 0.5) on Tue; 5 mg (5 mg x 1) all other days   Full warfarin instructions 2.5 mg on Tue; 5 mg all other days   Weekly warfarin total 32.5 mg   No change documented Rui Landers RN   Plan last modified Rui Landers RN (9/28/2018)   Next INR check 11/12/2018   Target end date     Indications   Long-term (current) use of anticoagulants [Z79.01] [Z79.01]  Chronic a-fib (H) [I48.2]         Anticoagulation Episode Summary     INR check location     Preferred lab     Send INR reminders to [facility] POOL    Comments 5 mg tab, AM dose        Anticoagulation Care Providers     Provider Role Specialty Phone number    Isra Carrillo MD [facility] [phone]            See the Encounter Report to view Anticoagulation Flowsheet and Dosing Calendar (Go to Encounters tab in chart review, and find the Anticoagulation Therapy Visit)    Dosage adjustment made based on physician directed care plan.    Rui Landers, RN

## 2018-10-17 ENCOUNTER — HOSPITAL ENCOUNTER (OUTPATIENT)
Dept: OCCUPATIONAL THERAPY | Facility: CLINIC | Age: 78
Setting detail: THERAPIES SERIES
End: 2018-10-17
Attending: ORTHOPAEDIC SURGERY
Payer: COMMERCIAL

## 2018-10-17 PROCEDURE — 97035 APP MDLTY 1+ULTRASOUND EA 15: CPT | Mod: GO | Performed by: OCCUPATIONAL THERAPIST

## 2018-10-17 PROCEDURE — 97110 THERAPEUTIC EXERCISES: CPT | Mod: GO | Performed by: OCCUPATIONAL THERAPIST

## 2018-10-17 PROCEDURE — 97140 MANUAL THERAPY 1/> REGIONS: CPT | Mod: GO | Performed by: OCCUPATIONAL THERAPIST

## 2018-10-17 PROCEDURE — 40000839 ZZH STATISTIC HAND THERAPY VISIT: Performed by: OCCUPATIONAL THERAPIST

## 2018-10-19 ENCOUNTER — HOSPITAL ENCOUNTER (OUTPATIENT)
Dept: OCCUPATIONAL THERAPY | Facility: CLINIC | Age: 78
Setting detail: THERAPIES SERIES
End: 2018-10-19
Attending: ORTHOPAEDIC SURGERY
Payer: COMMERCIAL

## 2018-10-19 DIAGNOSIS — G56.03 BILATERAL CARPAL TUNNEL SYNDROME: Primary | ICD-10-CM

## 2018-10-19 PROCEDURE — 97110 THERAPEUTIC EXERCISES: CPT | Mod: GO | Performed by: OCCUPATIONAL THERAPIST

## 2018-10-19 PROCEDURE — 97140 MANUAL THERAPY 1/> REGIONS: CPT | Mod: GO | Performed by: OCCUPATIONAL THERAPIST

## 2018-10-19 PROCEDURE — 40000839 ZZH STATISTIC HAND THERAPY VISIT: Performed by: OCCUPATIONAL THERAPIST

## 2018-10-19 PROCEDURE — 97035 APP MDLTY 1+ULTRASOUND EA 15: CPT | Mod: GO | Performed by: OCCUPATIONAL THERAPIST

## 2018-10-23 ENCOUNTER — HOSPITAL ENCOUNTER (OUTPATIENT)
Dept: OCCUPATIONAL THERAPY | Facility: CLINIC | Age: 78
Setting detail: THERAPIES SERIES
End: 2018-10-23
Attending: ORTHOPAEDIC SURGERY
Payer: COMMERCIAL

## 2018-10-23 PROCEDURE — G8987 SELF CARE CURRENT STATUS: HCPCS | Mod: GO,CJ | Performed by: OCCUPATIONAL THERAPIST

## 2018-10-23 PROCEDURE — 97110 THERAPEUTIC EXERCISES: CPT | Mod: GO | Performed by: OCCUPATIONAL THERAPIST

## 2018-10-23 PROCEDURE — 97140 MANUAL THERAPY 1/> REGIONS: CPT | Mod: GO | Performed by: OCCUPATIONAL THERAPIST

## 2018-10-23 PROCEDURE — 97033 APP MDLTY 1+IONTPHRSIS EA 15: CPT | Mod: GO | Performed by: OCCUPATIONAL THERAPIST

## 2018-10-23 PROCEDURE — 40000839 ZZH STATISTIC HAND THERAPY VISIT: Performed by: OCCUPATIONAL THERAPIST

## 2018-10-23 PROCEDURE — 97035 APP MDLTY 1+ULTRASOUND EA 15: CPT | Mod: GO | Performed by: OCCUPATIONAL THERAPIST

## 2018-10-23 PROCEDURE — G8988 SELF CARE GOAL STATUS: HCPCS | Mod: GO,CI | Performed by: OCCUPATIONAL THERAPIST

## 2018-10-23 NOTE — PROGRESS NOTES
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   Patient Reported % Functional Improvement 50%   Functional Improvement Reported Self care activities;Gripping   QuickDASH [Functional Disability Questionnaire; 0-100 (0=no dysfunction; 100=dysfunction)] (UEFI; 67/80 (stayed same))   Objective Measures   Objective Measures Strength   Strength   Location Bilat    right 40# and left 60#   Lateral Pinch right 18# and left 19.5#  (improved both hands)   Other 9 hole peg test: right hand only 21 seconds   Modalities   Modalities Ultrasound;Iontophoresis   Ultrasound -Type 2 cm sound head;Continuous   Skilled Interventions To Increase Blood Flow For Improved Healing;To Enhance Joint Rom;To Decrease Pain   Intensity 0.8 w/cm2   Duration (does not include the 3-5 min set up/clean up time) 16 min  (3 min. set-up/clean up; 8 minutes each hand)   Frequency 3 MHz   Location bilaterally  volar forearm and wrist, thenar crease of wrist   Positioning sitting   Iontophoresis -Type Patch   Skilled Interventions To Increase Blood Flow For Improved Healing;To Increase Tissue Extensibility;To Decrease Pain;To Decrease Inflammation   Intensity auto patch   Dose 40 mA*min   Location right palmar crease   Electrode Size medium   Medication dexamethasone   Therapeutic Exercise   Therapeutic Exercise ROM/AROM;Other  Exercises/Activities   Skilled Interventions To Increase Blood Flow For Improved Healing;To Increase Tissue Extensibility;To Enhance Joint Rom;To Decrease Pain   Minutes of Treatment 10   Other Exer/Activities/Educ   Exercise 1 Reviewed this date 10/23/18: Educated, demosntrated, participated and recommend to initate light weight strengthening   Description 1 2# wt in wrist flexion, extension, rad/uln deviation and pro/sup 10x each   Exercise 2 OT educated and recommended Ionto with dex, patient agreed   Manual   Manual STM   Skilled Interventions To Increase Blood Flow For Improved Healing;To Increase Tissue Extensibility;To Decrease Pain;To Decrease Inflammation   Minutes of Treatment 15   STM Tool Assisted;1st dc;Thenars   Position Sitting   Description/ Technique right palms and thenar eminenceSmall Gau Sha tool assist with mild to moderate pressure applied to bilateral thenar crease, pulling away from palm crease radially and ulnar aspects.  STM with moderate pressure applied to the right forarm/volar surface area.   Hand Goals   Hand Goals Dressing;Sleeping;Sports/Recreation   Dressing   Current Functional Task Dressing LB;Buttoning;Zipping;Tying   Previous Performance Level Independent   Current Performance Level Moderate difficulty   Goal Target Task Don/Doff pants;Don/Doff socks;[place] pants;Tie shoes;Zipping   Goal Target Performance Level No difficulty   Due Date 12/14/18   Sleeping   Current Functional Task Sleeping through the night   Previous Performance Level Mild difficulty   Goal Target Task Staying asleep   Goal Target Performance Level Mild difficulty   Due Date 12/14/18   Date Goal Met 10/23/18   Sports/Recreation   Current Functional Task Holding   Previous Performance Level Independent   Curent Performance Level Moderate difficulty  (improved)   Goal Target Task Play cards  (reading and computer)   Goal Target Performance Level Mild difficulty   Due Date 12/14/18   Assessment   Clinical  Impression(s) Comments Making progress, patient reports ability to sleep now, and increased functional grasp with some items and activities.   Response to Therapy: Improvements ROM;Flexibility;Sensitivity;Paresthesias;Self Care Skills;Pain   Education   Learner Patient   Readiness Acceptance   Method Explanation;Demonstration   Response Verbalizes understanding   Education Notes Ionto with Dex patch risks and benefits   Plan   Home program as instructed   Updates to plan of care Making progress; extend POC x 4 weeks   Plan US, STM, stretch/UE,TE/TA and home programing   Comments   Comments UCARE request completed this date   Total Session Time   Timed Code Treatment Minutes 45   Total Treatment Time (sum of timed and untimed services) 45       Thank you for referring Uli  To OT services to assist with decrease hand numbing and increase hand function for BADL/IADLs.    If you have any questions or concerns, please contact me at [phone].    Jen Conti MA, OTR/L  [facility]

## 2018-10-25 ENCOUNTER — HOSPITAL ENCOUNTER (OUTPATIENT)
Dept: OCCUPATIONAL THERAPY | Facility: CLINIC | Age: 78
Setting detail: THERAPIES SERIES
End: 2018-10-25
Attending: ORTHOPAEDIC SURGERY
Payer: COMMERCIAL

## 2018-10-25 PROCEDURE — 97110 THERAPEUTIC EXERCISES: CPT | Mod: GO | Performed by: OCCUPATIONAL THERAPIST

## 2018-10-25 PROCEDURE — 40000839 ZZH STATISTIC HAND THERAPY VISIT: Performed by: OCCUPATIONAL THERAPIST

## 2018-10-25 PROCEDURE — 97035 APP MDLTY 1+ULTRASOUND EA 15: CPT | Mod: GO | Performed by: OCCUPATIONAL THERAPIST

## 2018-10-25 PROCEDURE — 97140 MANUAL THERAPY 1/> REGIONS: CPT | Mod: GO | Performed by: OCCUPATIONAL THERAPIST

## 2018-10-25 PROCEDURE — 97033 APP MDLTY 1+IONTPHRSIS EA 15: CPT | Mod: GO | Performed by: OCCUPATIONAL THERAPIST

## 2018-11-02 ENCOUNTER — HOSPITAL ENCOUNTER (OUTPATIENT)
Dept: OCCUPATIONAL THERAPY | Facility: CLINIC | Age: 78
Setting detail: THERAPIES SERIES
End: 2018-11-02
Attending: ORTHOPAEDIC SURGERY
Payer: COMMERCIAL

## 2018-11-02 PROCEDURE — 40000839 ZZH STATISTIC HAND THERAPY VISIT: Performed by: OCCUPATIONAL THERAPIST

## 2018-11-02 PROCEDURE — 97035 APP MDLTY 1+ULTRASOUND EA 15: CPT | Mod: GO | Performed by: OCCUPATIONAL THERAPIST

## 2018-11-02 PROCEDURE — 97110 THERAPEUTIC EXERCISES: CPT | Mod: GO | Performed by: OCCUPATIONAL THERAPIST

## 2018-11-02 PROCEDURE — 97140 MANUAL THERAPY 1/> REGIONS: CPT | Mod: GO | Performed by: OCCUPATIONAL THERAPIST

## 2018-11-02 NOTE — ADDENDUM NOTE
Encounter addended by: Jen Conti OT on: 11/2/2018 11:42 AM<BR>     Actions taken: Charge Capture section accepted, Flowsheet accepted

## 2018-11-02 NOTE — ADDENDUM NOTE
Encounter addended by: Jen Conti OT on: 11/2/2018 11:45 AM<BR>     Actions taken: Charge Capture section accepted, Flowsheet accepted

## 2018-11-06 ENCOUNTER — HOSPITAL ENCOUNTER (OUTPATIENT)
Dept: OCCUPATIONAL THERAPY | Facility: CLINIC | Age: 78
Setting detail: THERAPIES SERIES
End: 2018-11-06
Attending: ORTHOPAEDIC SURGERY
Payer: COMMERCIAL

## 2018-11-06 PROCEDURE — 40000839 ZZH STATISTIC HAND THERAPY VISIT: Performed by: OCCUPATIONAL THERAPIST

## 2018-11-06 PROCEDURE — 97110 THERAPEUTIC EXERCISES: CPT | Mod: GO | Performed by: OCCUPATIONAL THERAPIST

## 2018-11-06 PROCEDURE — 97140 MANUAL THERAPY 1/> REGIONS: CPT | Mod: GO | Performed by: OCCUPATIONAL THERAPIST

## 2018-11-06 PROCEDURE — 97035 APP MDLTY 1+ULTRASOUND EA 15: CPT | Mod: GO | Performed by: OCCUPATIONAL THERAPIST

## 2018-11-08 ENCOUNTER — HOSPITAL ENCOUNTER (OUTPATIENT)
Dept: OCCUPATIONAL THERAPY | Facility: CLINIC | Age: 78
Setting detail: THERAPIES SERIES
End: 2018-11-08
Attending: ORTHOPAEDIC SURGERY
Payer: COMMERCIAL

## 2018-11-08 PROCEDURE — 97140 MANUAL THERAPY 1/> REGIONS: CPT | Mod: GO | Performed by: OCCUPATIONAL THERAPIST

## 2018-11-08 PROCEDURE — 97035 APP MDLTY 1+ULTRASOUND EA 15: CPT | Mod: GO | Performed by: OCCUPATIONAL THERAPIST

## 2018-11-08 PROCEDURE — G8988 SELF CARE GOAL STATUS: HCPCS | Mod: GO,CI | Performed by: OCCUPATIONAL THERAPIST

## 2018-11-08 PROCEDURE — 40000839 ZZH STATISTIC HAND THERAPY VISIT: Performed by: OCCUPATIONAL THERAPIST

## 2018-11-08 PROCEDURE — G8989 SELF CARE D/C STATUS: HCPCS | Mod: GO,CI | Performed by: OCCUPATIONAL THERAPIST

## 2018-11-08 PROCEDURE — 97110 THERAPEUTIC EXERCISES: CPT | Mod: GO | Performed by: OCCUPATIONAL THERAPIST

## 2018-11-08 NOTE — PROGRESS NOTES
Occupational Therapy Discharge Progress Note     11/08/18 1429   Notes   Note Type Discharge Summary   Signing Clinician's Name / Credentials   Signing clinician's name / credentials Jen MARK   Session Number   Session Number 12   Providers   Referring Physician Dr Yovani Ogden   Reporting Period   Reporting period to 10/23/18   Progress Note/Recertification   Progress Note Completed Date 10/23/18   OT Medicare Only G-code   G-code Self Care   Self Care   Self Care Goal,  (eval/re-eval, every progress note & discharge) CI: 1-19% impairment   Self Care Discharge Status,  (discharge) CI: 1-19% impairment   Discharge Self Care Modifier Rationale POG and UEFI clincial judgement   General Information   Rxs Authorized 11/15/18 end date   Rxs Used 12   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 and continue home programming.  Hands do not wake him up anymore.   Patient Reported % Functional Improvement 20%   Functional Improvement Reported Self care activities   QuickDASH [Functional Disability Questionnaire; 0-100 (0=no dysfunction; 100=dysfunction)] (UEFI: 72/80 Improved)   Strength   Location Bilat    right 52# and left 75#  (improved both hands )   Lateral Pinch right 20# and left 21#  (Improved)   Modalities   Modalities Ultrasound   Ultrasound -Type 2 cm sound head;Continuous   Skilled Interventions To Increase Blood Flow For Improved Healing;To Enhance Joint Rom;To Decrease Pain   Intensity 1.0 cm2   Duration (does not include the 3-5 min set up/clean up time) 16 min  (3 min. set-up/clean up; 8 minutes each hand)   Frequency 3 MHz   Location bilaterally  volar forearm and wrist, thenar crease of wrist   Positioning sitting   Therapeutic Exercise   Therapeutic  Exercise Other Exercises/Activities;Strengthening   Skilled Interventions To Increase Strength   Minutes of Treatment 23   Other Exer/Activities/Educ   Other Exer/Activities/Educ - All exercises are part of HEP unless otherwise noted Exercise 1;Exercise 2;Exercise 3;Exercise 4   Exercise 1 Reviewed: educated, demonstrated, provided handout for home programming   Description 1 1# wt pro/sup and wrist flexion/ext 10x each   Exercise 2 Reviewed: educated, demonstrated, provided handout for home programming   Description 2 increase sets for light strengthening HEP   Exercise 3 pro/sup wheel 2 minutes each hand   Description 3 Reveiwed and updated goals   Manual   Manual STM   Skilled Interventions To Increase Blood Flow For Improved Healing;To Increase Tissue Extensibility;To Decrease Pain;To Decrease Inflammation   Minutes of Treatment 12   STM Tool Assisted;1st dc;Thenars   Position Sitting   Description/ Technique right palms and thenar eminenceSmall Gau Sha tool assist with mild to moderate pressure applied to bilateral thenar crease, pulling away from palm crease radially and ulnar aspects.  STM with moderate pressure applied to the right forarm/volar surface area.   Hand Goals   Hand Goals Dressing;Sleeping;Sports/Recreation   Dressing   Current Functional Task Dressing LB;Buttoning;Zipping;Tying   Previous Performance Level Independent   Goal Target Task Don/Doff pants;Don/Doff socks;[place] pants;Tie shoes;Zipping   Goal Target Performance Level No difficulty   Due Date 12/14/18   Date Goal Met 11/08/18   Sleeping   Current Functional Task Sleeping through the night   Previous Performance Level Mild difficulty   Goal Target Task Staying asleep   Goal Target Performance Level Mild difficulty   Due Date 12/14/18   Sports/Recreation   Current Functional Task Holding   Previous Performance Level Independent   Curent Performance Level (improved)   Goal Target Task Play cards  (reading and computer)   Goal Target  Performance Level Mild difficulty   Due Date 12/14/18   Date Goal Met 11/08/18   Assessment   Clinical Impression(s) Comments Increased strength and reduced numbing/pain overall.  Improved sleep with less increased hand pain and numbing.  He does state only at night the right ulnar nerve path and numbing of the little and ring finger occurs, once up resolves fairly quickly.     Response to Therapy: Improvements Pain;Sensitivity;Paresthesias   Education   Learner Patient   Readiness Acceptance   Method Booklet/handout;Explanation;Demonstration   Response Verbalizes understanding   Education Notes light strengthening and self massage   Plan   Home program continue home program   Updates to plan of care discharge this date   Total Session Time   Timed Code Treatment Minutes 46   Total Treatment Time (sum of timed and untimed services) 46       Thank you for referring Uli  To OT services to assist with reduce pain and numbing and increase functional use of the hands for BADL/IADLs.    If you have any questions or concerns, please contact me at [phone].    Jen Conti MA, OTR/L  [facility]

## 2018-11-12 ENCOUNTER — ANTICOAGULATION THERAPY VISIT (OUTPATIENT)
Dept: ANTICOAGULATION | Facility: OTHER | Age: 78
End: 2018-11-12
Payer: COMMERCIAL

## 2018-11-12 ENCOUNTER — ALLIED HEALTH/NURSE VISIT (OUTPATIENT)
Dept: FAMILY MEDICINE | Facility: OTHER | Age: 78
End: 2018-11-12
Payer: COMMERCIAL

## 2018-11-12 DIAGNOSIS — I48.20 CHRONIC A-FIB (H): ICD-10-CM

## 2018-11-12 DIAGNOSIS — Z23 NEED FOR PROPHYLACTIC VACCINATION AND INOCULATION AGAINST INFLUENZA: Primary | ICD-10-CM

## 2018-11-12 LAB — INR POINT OF CARE: 2.3 (ref 0.86–1.14)

## 2018-11-12 PROCEDURE — 36416 COLLJ CAPILLARY BLOOD SPEC: CPT

## 2018-11-12 PROCEDURE — G0008 ADMIN INFLUENZA VIRUS VAC: HCPCS

## 2018-11-12 PROCEDURE — 90662 IIV NO PRSV INCREASED AG IM: CPT

## 2018-11-12 PROCEDURE — 85610 PROTHROMBIN TIME: CPT | Mod: QW

## 2018-11-12 PROCEDURE — 99207 ZZC NO CHARGE NURSE ONLY: CPT

## 2018-11-12 NOTE — PROGRESS NOTES
Injectable Influenza Immunization Documentation    1.  Is the person to be vaccinated sick today?   No    2. Does the person to be vaccinated have an allergy to a component   of the vaccine?   No  Egg Allergy Algorithm Link    3. Has the person to be vaccinated ever had a serious reaction   to influenza vaccine in the past?   No    4. Has the person to be vaccinated ever had Guillain-Barré syndrome?   No  Prior to injection verified patient identity using patient's name and date of birth.  Due to injection administration, patient instructed to remain in clinic for 15 minutes  afterwards, and to report any adverse reaction to me immediately.      Form completed by Minnie BORGES MA

## 2018-11-12 NOTE — MR AVS SNAPSHOT
Uli SINGH Ihsan   11/12/2018 1:00 PM   Anticoagulation Therapy Visit    Description:  78 year old male   Provider:  JITENDRA ANTI COAG   Department:  [facility]           INR as of 11/12/2018     Today's INR 2.3      Anticoagulation Summary as of 11/12/2018     INR goal 2.0-3.0   Today's INR 2.3   Full warfarin instructions 2.5 mg on Tue; 5 mg all other days   Next INR check 12/17/2018    Indications   Long-term (current) use of anticoagulants [Z79.01] [Z79.01]  Chronic a-fib (H) [I48.2]         Your next Anticoagulation Clinic appointment(s)     Dec 17, 2018  1:00 PM CST   Anticoagulation Visit with JITENDRA ANTI COACECIL   [facility]    [address] [facility] [id number] [phone]              Contact Numbers     Clinic Number:         November 2018 Details    Sun Mon Tue Wed Thu Fri Sat         1               2               3                 4               5               6               7               8               9               10                 11               12      5 mg   See details      13      2.5 mg         14      5 mg         15      5 mg         16      5 mg         17      5 mg           18      5 mg         19      5 mg         20      2.5 mg         21      5 mg         22      5 mg         23      5 mg         24      5 mg           25      5 mg         26      5 mg         27      2.5 mg         28      5 mg         29      5 mg         30      5 mg           Date Details   11/12 This INR check               How to take your warfarin dose     To take:  2.5 mg Take 0.5 of a 5 mg tablet.    To take:  5 mg Take 1 of the 5 mg tablets.           December 2018 Details    Sun Mon Tue Wed Thu Fri Sat           1      5 mg           2      5 mg         3      5 mg         4      2.5 mg         5      5 mg         6      5 mg         7      5 mg         8      5 mg           9      5 mg         10      5 mg         11      2.5 mg         12      5 mg          13      5 mg         14      5 mg         15      5 mg           16      5 mg         17            18               19               20               21               22                 23               24               25               26               27               28               29                 30               31                     Date Details   No additional details    Date of next INR:  12/17/2018         How to take your warfarin dose     To take:  2.5 mg Take 0.5 of a 5 mg tablet.    To take:  5 mg Take 1 of the 5 mg tablets.

## 2018-11-12 NOTE — MR AVS SNAPSHOT
After Visit Summary   11/12/2018    Uli Champagne    MRN: [id number]           Patient Information     Date Of Birth          1940        Visit Information        Provider Department      11/12/2018 1:30 PM JITENDRA TY TO [facility]        Today's Diagnoses     Need for prophylactic vaccination and inoculation against influenza    -  1       Follow-ups after your visit        Your next 10 appointments already scheduled     Nov 12, 2018  1:00 PM CST   Anticoagulation Visit with  ANTI COAG   [facility]    [address] [facility] [address] [phone]            Nov 12, 2018  1:30 PM CST   Nurse Only with  FLOAT MA   [facility]    [address] [facility] [address]   [phone]            Nov 15, 2018  9:45 AM CST   Hand Treatment with Jen Conti OT   [facility] ([facility])    [address] [place] [address] [phone]              Who to contact     If you have questions or need follow up information about today's clinic visit or your schedule please contact [facility] directly at [phone].  Normal or non-critical lab and imaging results will be communicated to you by MyChart, letter or phone within 4 business days after the clinic has received the results. If you do not hear from us within 7 days, please contact the clinic through [employer] or phone. If you have a critical or abnormal lab result, we will notify you by phone as soon as possible.  Submit refill requests through [employer] or call your pharmacy and they will forward the refill request to us. Please allow 3 business days for your refill to be completed.          Additional Information About Your Visit        [employer] Information     [employer] gives you secure access to your electronic health record. If you see a primary care provider, you can  also send messages to your care team and make appointments. If you have questions, please call your primary care clinic.  If you do not have a primary care provider, please call [phone] and they will assist you.        Care EveryWhere ID     This is your Care EveryWhere ID. This could be used by other organizations to access your [facility] medical records  [id number]         Blood Pressure from Last 3 Encounters:   09/24/18 138/80   08/29/18 150/90   08/20/18 122/62    Weight from Last 3 Encounters:   09/24/18 212 lb 1.6 oz (96.2 kg)   08/29/18 211 lb 8 oz (95.9 kg)   08/20/18 206 lb 11.2 oz (93.8 kg)              We Performed the Following     FLU VACCINE, INCREASED ANTIGEN, PRESV FREE, AGE 65+ [06278]     Vaccine Administration, Initial [[id number]]        Primary Care Provider Office Phone # Fax #    Isra Carrillo [phone]       [address] [facility] [address]        Equal Access to Services     MERCEDES GODOY : Hadii aad ku hadasho Soomaali, waaxda luqadaha, qaybta kaalmada adeegyada, vesna branham . So [facility] [phone].    ATENCIÓN: Si habla español, tiene a lema disposición servicios gratuitos de asistencia lingüística. Llame al [phone].    We comply with applicable federal civil rights laws and [place] laws. We do not discriminate on the basis of race, color, national origin, age, disability, sex, sexual orientation, or gender identity.            Thank you!     Thank you for choosing [facility]  for your care. Our goal is always to provide you with excellent care. Hearing back from our patients is one way we can continue to improve our services. Please take a few minutes to complete the written survey that you may receive in the mail after your visit with us. Thank you!             Your Updated Medication List - Protect others around you: Learn how to safely use, store and throw away your medicines at www.disposemymeds.org.          This list  is accurate as of 11/12/18 12:59 PM.  Always use your most recent med list.                   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        nitroGLYcerin 0.4 MG sublingual tablet    NITROSTAT    25 tablet    Place 1 tablet (0.4 mg) under the tongue See Admin Instructions for chest pain    Cardiac dysrhythmia, unspecified       simvastatin 40 MG tablet    ZOCOR    90 tablet    Take 1 tablet (40 mg) by mouth At Bedtime    Hyperlipidemia LDL goal <100       warfarin 5 MG tablet    COUMADIN    90 tablet    TAKE ONE-HALF (1/2) TABLET ON TUESDAY AND SATURDAY AND 1 TABLET ON ALL OTHER DAYS OF THE WEEK OR AS DIRECTED BY THE COUMADIN CLINIC    Permanent atrial fibrillation (H)

## 2018-11-12 NOTE — PROGRESS NOTES
ANTICOAGULATION FOLLOW-UP CLINIC VISIT    Patient Name:  Uli Champagne  Date:  11/12/2018  Contact Type:  Face to Face    SUBJECTIVE:     Patient Findings     Positives No Problem Findings           OBJECTIVE    INR Protime   Date Value Ref Range Status   11/12/2018 2.3 (A) 0.86 - 1.14 Final       ASSESSMENT / PLAN  INR assessment THER    Recheck INR In: 5 WEEKS    INR Location Clinic      Anticoagulation Summary as of 11/12/2018     INR goal 2.0-3.0   Today's INR 2.3   Warfarin maintenance plan 2.5 mg (5 mg x 0.5) on Tue; 5 mg (5 mg x 1) all other days   Full warfarin instructions 2.5 mg on Tue; 5 mg all other days   Weekly warfarin total 32.5 mg   No change documented Rui Landers RN   Plan last modified Rui Landers RN (9/28/2018)   Next INR check 12/17/2018   Target end date     Indications   Long-term (current) use of anticoagulants [Z79.01] [Z79.01]  Chronic a-fib (H) [I48.2]         Anticoagulation Episode Summary     INR check location     Preferred lab     Send INR reminders to [facility] POOL    Comments 5 mg tab, AM dose        Anticoagulation Care Providers     Provider Role Specialty Phone number    Isra Carrillo MD [facility] [phone]            See the Encounter Report to view Anticoagulation Flowsheet and Dosing Calendar (Go to Encounters tab in chart review, and find the Anticoagulation Therapy Visit)    Dosage adjustment made based on physician directed care plan.    Rui Landers, RN

## 2018-12-17 ENCOUNTER — ANTICOAGULATION THERAPY VISIT (OUTPATIENT)
Dept: ANTICOAGULATION | Facility: OTHER | Age: 78
End: 2018-12-17
Payer: COMMERCIAL

## 2018-12-17 DIAGNOSIS — I48.20 CHRONIC A-FIB (H): ICD-10-CM

## 2018-12-17 LAB — INR POINT OF CARE: 1.9 (ref 0.86–1.14)

## 2018-12-17 PROCEDURE — 36416 COLLJ CAPILLARY BLOOD SPEC: CPT

## 2018-12-17 PROCEDURE — 99207 ZZC NO CHARGE NURSE ONLY: CPT

## 2018-12-17 PROCEDURE — 85610 PROTHROMBIN TIME: CPT | Mod: QW

## 2018-12-17 NOTE — PROGRESS NOTES
ANTICOAGULATION FOLLOW-UP CLINIC VISIT    Patient Name:  Uli Champagne  Date:  2018  Contact Type:  Face to Face    SUBJECTIVE:     Patient Findings     Positives:   No Problem Findings           OBJECTIVE    INR Protime   Date Value Ref Range Status   2018 1.9 (A) 0.86 - 1.14 Final       ASSESSMENT / PLAN  INR assessment THER    Recheck INR In: 6 WEEKS    INR Location Clinic      Anticoagulation Summary  As of 2018    INR goal:   2.0-3.0   TTR:   77.7 % (2.7 y)   INR used for dosin.9! (2018)   Warfarin maintenance plan:   2.5 mg (5 mg x 0.5) every Tue; 5 mg (5 mg x 1) all other days   Full warfarin instructions:   2.5 mg every Tue; 5 mg all other days   Weekly warfarin total:   32.5 mg   No change documented:   Rui Landers RN   Plan last modified:   Rui Landers RN (2018)   Next INR check:   2019   Target end date:       Indications    Long-term (current) use of anticoagulants [Z79.01] [Z79.01]  Chronic a-fib (H) [I48.2]             Anticoagulation Episode Summary     INR check location:       Preferred lab:       Send INR reminders to:   VICKY MEHTA    Comments:   5 mg tab, AM dose        Anticoagulation Care Providers     Provider Role Specialty Phone number    Isra Carrillo MD [facility] [phone]            See the Encounter Report to view Anticoagulation Flowsheet and Dosing Calendar (Go to Encounters tab in chart review, and find the Anticoagulation Therapy Visit)    Dosage adjustment made based on physician directed care plan.    Rui Landers RN

## 2018-12-26 DIAGNOSIS — I48.21 PERMANENT ATRIAL FIBRILLATION (H): Chronic | ICD-10-CM

## 2018-12-26 NOTE — TELEPHONE ENCOUNTER
"warfarin  Last Written Prescription Date:  1/10/2018  Last Fill Quantity: 90,  # refills: 3   Last office visit: 11/12/2018 with prescribing provider:  7/19/2018   Future Office Visit:      Requested Prescriptions   Pending Prescriptions Disp Refills     warfarin (COUMADIN) 5 MG tablet [Pharmacy Med Name: WARFARIN TABS 5MG] 90 tablet 3     Sig: TAKE ONE-HALF (1/2) TABLET ON TUESDAY AND SATURDAY AND ONE TABLET ON ALL OTHER DAYS OF THE WEEK OR AS DIRECTED BY THE COUMADIN CLINIC    Vitamin K Antagonists Failed - 12/26/2018  4:16 PM       Failed - INR is within goal in the past 6 weeks    Confirm INR is within goal in the past 6 weeks.     Recent Labs   Lab Test 12/17/18   INR 1.9*                      Passed - Recent (12 mo) or future (30 days) visit within the authorizing provider's specialty    Patient had office visit in the last 12 months or has a visit in the next 30 days with authorizing provider or within the authorizing provider's specialty.  See \"Patient Info\" tab in inbasket, or \"Choose Columns\" in Meds & Orders section of the refill encounter.             Passed - Patient is 18 years of age or older        Anju Tobin RN on 12/26/2018 at 4:54 PM    "

## 2018-12-27 RX ORDER — WARFARIN SODIUM 5 MG/1
TABLET ORAL
Qty: 90 TABLET | Refills: 3 | Status: SHIPPED | OUTPATIENT
Start: 2018-12-27 | End: 2020-03-04

## 2019-01-07 ENCOUNTER — TELEPHONE (OUTPATIENT)
Dept: CARDIOLOGY | Facility: CLINIC | Age: 79
End: 2019-01-07

## 2019-01-07 NOTE — TELEPHONE ENCOUNTER
Asking for cardiologist to see in the wake of the intermediate of Dr. Amanda.   Instructed to call [place] [facility] [place] to get set up with another cardiologist. We have other cardiologist that already go to [place].   Verbalized understanding.   Stated he has a fit bit. Noted that at times at rest where his heart rate is in the 30's. Denies being woozy nor dizzy when his heart rate is low. Informed we need to have documented symptomatic bradycardia in order to qualify for a pacemaker.   Will call if having symptoms.   Will call specialty clinic in [place] to set up appointment this spring.

## 2019-01-19 DIAGNOSIS — I10 BENIGN ESSENTIAL HYPERTENSION: ICD-10-CM

## 2019-01-22 RX ORDER — BENAZEPRIL/HYDROCHLOROTHIAZIDE 20 MG-25MG
TABLET ORAL
Qty: 180 TABLET | Refills: 0 | Status: SHIPPED | OUTPATIENT
Start: 2019-01-22 | End: 2019-04-01

## 2019-01-22 NOTE — TELEPHONE ENCOUNTER
"Prescription approved per RN refill protocol.  Anju oTbin RN, BSN    Lotensin HCT  Last Written Prescription Date:  10/8/2018  Last Fill Quantity: 180,  # refills: 0   Last office visit: 11/12/2018 with prescribing provider:  4/12/2018   Future Office Visit:   Next 5 appointments (look out 90 days)    Feb 04, 2019  3:00 PM CST  Return Visit with Miguelito Wells MD  [facility] ([address] [address] [phone]           Requested Prescriptions   Pending Prescriptions Disp Refills     benazepril-hydrochlorothiazide (LOTENSIN HCT) 20-25 MG tablet [Pharmacy Med Name: BENAZEPRIL/HCTZ 20-25MG TAB] 180 tablet 0     Sig: TAKE ONE TABLET BY MOUTH TWICE A DAY FOR HYPERTENSION    Diuretics (Including Combos) Protocol Failed - 1/19/2019  1:21 PM       Failed - Normal serum sodium on file in past 12 months    Recent Labs   Lab Test 08/31/17  1323                Passed - Blood pressure under 140/90 in past 12 months    BP Readings from Last 3 Encounters:   09/24/18 138/80   08/29/18 150/90   08/20/18 122/62                Passed - Recent (12 mo) or future (30 days) visit within the authorizing provider's specialty    Patient had office visit in the last 12 months or has a visit in the next 30 days with authorizing provider or within the authorizing provider's specialty.  See \"Patient Info\" tab in inbasket, or \"Choose Columns\" in Meds & Orders section of the refill encounter.             Passed - Medication is active on med list       Passed - Patient is age 18 or older       Passed - Normal serum creatinine on file in past 12 months    Recent Labs   Lab Test 05/10/18   CR 0.9             Passed - Normal serum potassium on file in past 12 months    Recent Labs   Lab Test 05/10/18   POTASSIUM 3.7                    Anju Tobin RN on 1/22/2019 at 2:21 PM    "

## 2019-01-23 ENCOUNTER — TELEPHONE (OUTPATIENT)
Dept: FAMILY MEDICINE | Facility: OTHER | Age: 79
End: 2019-01-23

## 2019-01-23 NOTE — TELEPHONE ENCOUNTER
Uli calls asking to let Rui know he is having a tooth pulled on 2/13/19, and the dentist wants his INR to be 2.5 or less.

## 2019-01-28 ENCOUNTER — ANTICOAGULATION THERAPY VISIT (OUTPATIENT)
Dept: ANTICOAGULATION | Facility: OTHER | Age: 79
End: 2019-01-28
Payer: COMMERCIAL

## 2019-01-28 DIAGNOSIS — I48.20 CHRONIC A-FIB (H): ICD-10-CM

## 2019-01-28 LAB — INR POINT OF CARE: 3.4 (ref 0.86–1.14)

## 2019-01-28 PROCEDURE — 85610 PROTHROMBIN TIME: CPT | Mod: QW

## 2019-01-28 PROCEDURE — 36416 COLLJ CAPILLARY BLOOD SPEC: CPT

## 2019-01-28 PROCEDURE — 99207 ZZC NO CHARGE NURSE ONLY: CPT

## 2019-01-28 NOTE — PROGRESS NOTES
ANTICOAGULATION FOLLOW-UP CLINIC VISIT    Patient Name:  Uli Champagne  Date:  1/28/2019  Contact Type:  Face to Face    SUBJECTIVE:     Patient Findings     Positives:   Unexplained INR or factor level change    Comments:   Dental appt on 2/13. INR needs to be <2.5. I have lowered his dose slightly and will recheck his INR in 2 weeks.            OBJECTIVE    INR Protime   Date Value Ref Range Status   01/28/2019 3.4 (A) 0.86 - 1.14 Final       ASSESSMENT / PLAN  INR assessment SUPRA    Recheck INR In: 2 WEEKS    INR Location Clinic      Anticoagulation Summary  As of 1/28/2019    INR goal:   2.0-3.0   TTR:   77.3 % (2.8 y)   INR used for dosing:   3.4! (1/28/2019)   Warfarin maintenance plan:   2.5 mg (5 mg x 0.5) every Tue; 5 mg (5 mg x 1) all other days   Full warfarin instructions:   2/2: 2.5 mg; 2/9: 2.5 mg; Otherwise 2.5 mg every Tue; 5 mg all other days   Weekly warfarin total:   32.5 mg   Plan last modified:   Rui Landers RN (9/28/2018)   Next INR check:   2/11/2019   Target end date:       Indications    Long-term (current) use of anticoagulants [Z79.01] [Z79.01]  Chronic a-fib (H) [I48.2]             Anticoagulation Episode Summary     INR check location:       Preferred lab:       Send INR reminders to:   VICKY MEHTA    Comments:   5 mg tab, AM dose        Anticoagulation Care Providers     Provider Role Specialty Phone number    Isra Carrillo MD [facility] [phone]            See the Encounter Report to view Anticoagulation Flowsheet and Dosing Calendar (Go to Encounters tab in chart review, and find the Anticoagulation Therapy Visit)    Dosage adjustment made based on physician directed care plan.    Rui Landers RN

## 2019-02-04 ENCOUNTER — HOSPITAL ENCOUNTER (OUTPATIENT)
Dept: CARDIOLOGY | Facility: CLINIC | Age: 79
Discharge: HOME | End: 2019-02-04
Attending: INTERNAL MEDICINE | Admitting: INTERNAL MEDICINE
Payer: COMMERCIAL

## 2019-02-04 ENCOUNTER — OFFICE VISIT (OUTPATIENT)
Dept: CARDIOLOGY | Facility: CLINIC | Age: 79
End: 2019-02-04
Payer: COMMERCIAL

## 2019-02-04 VITALS
HEIGHT: 67 IN | DIASTOLIC BLOOD PRESSURE: 82 MMHG | SYSTOLIC BLOOD PRESSURE: 146 MMHG | OXYGEN SATURATION: 97 % | WEIGHT: 222.9 LBS | BODY MASS INDEX: 34.99 KG/M2

## 2019-02-04 DIAGNOSIS — I10 ESSENTIAL HYPERTENSION: ICD-10-CM

## 2019-02-04 DIAGNOSIS — E78.5 HYPERLIPIDEMIA LDL GOAL <100: ICD-10-CM

## 2019-02-04 DIAGNOSIS — I48.20 CHRONIC A-FIB (H): ICD-10-CM

## 2019-02-04 DIAGNOSIS — R00.1 BRADYCARDIA: ICD-10-CM

## 2019-02-04 DIAGNOSIS — Z79.01 LONG TERM CURRENT USE OF ANTICOAGULANT THERAPY: ICD-10-CM

## 2019-02-04 DIAGNOSIS — E66.09 NON MORBID OBESITY DUE TO EXCESS CALORIES: ICD-10-CM

## 2019-02-04 PROCEDURE — 99214 OFFICE O/P EST MOD 30 MIN: CPT | Performed by: INTERNAL MEDICINE

## 2019-02-04 PROCEDURE — 93005 ELECTROCARDIOGRAM TRACING: CPT | Performed by: REHABILITATION PRACTITIONER

## 2019-02-04 PROCEDURE — 93010 ELECTROCARDIOGRAM REPORT: CPT | Performed by: INTERNAL MEDICINE

## 2019-02-04 ASSESSMENT — MIFFLIN-ST. JEOR: SCORE: 1683.66

## 2019-02-04 NOTE — LETTER
2/4/2019    Isra Carrillo MD  [address] [facility] [address]    RE: Uli Champagne       Dear Colleague,    I had the pleasure of seeing Uli Champagne in the [facility].    HISTORY:    Uli Champagne is a 78-year-old pleasant gentleman seen in cardiology clinic today for annual follow-up.  He has been seen by Dr. Amanda in the past.  He has a history of chronic atrial fibrillation with slow ventricular response, previous Holter showing an average heart rate with a range of 33-80 bpm.  Remarkably, he has been asymptomatic with this bradycardia.    Today Uli presents with a Fitbit on his wrist.  He states that the Fitbit is telling him most of the time that his heart rate is around 45 and in fact an ECG done today shows atrial fibrillation with a heart rate of 45.  He insists that he is completely asymptomatic.  He denies any symptoms of easy fatigability, dizziness, lightheadedness, or syncope.  He states that he goes to the gym several days per week walks on a treadmill for about 10 minutes at a time.  He gets bored with this easily but states that he really does not get short of breath.  He can walk up a flight of steps without difficulty.  He denies any symptoms of exertional chest, arm, neck, or jaw discomfort, as well as any symptoms of syncope or near syncope, sense of palpitations, PND/orthopnea orthostasis, significant peripheral edema, or claudication.  He reports that he sleeps well at night and uses a CPAP religiously.      ASSESSMENT/PLAN:    1.  Chronic atrial fibrillation using warfarin on a daily basis.  2.  Hyperlipidemia using simvastatin adults, LDL 58.  3.  Chronic bradycardia.  Completely asymptomatic, remains physically active without limitations.  We talked about the possibility of pacemaker placement but there is certainly no indication for such an approach at this time.  4.  Hypertension.  Blood pressure is generally acceptable with  some fluctuation using Lotensin 20-25    Thank you for inviting me to participate in your patient's care today.  Please do not hesitate to call if I can be of further assistance.  Follow-up will be planned in 1 year.    Orders Placed This Encounter   Procedures     Follow-Up with Cardiologist     EKG 12-LEAD CLINIC READ     No orders of the defined types were placed in this encounter.    Medications Discontinued During This Encounter   Medication Reason     cephALEXin (KEFLEX) 500 MG capsule Therapy completed       10 year ASCVD risk: The ASCVD Risk score (Karribryson TRONCOSO Jr., et al., 2013) failed to calculate for the following reasons:    The 2013 ASCVD risk score is only valid for ages 40 to 79    The patient has a prior MI or stroke diagnosis    Encounter Diagnoses   Name Primary?     Non morbid obesity due to excess calories      Hyperlipidemia LDL goal <100      Bradycardia      Chronic a-fib (H)      Long term current use of anticoagulant therapy      Essential hypertension        CURRENT MEDICATIONS:  Current Outpatient Medications   Medication Sig Dispense Refill     allopurinol (ZYLOPRIM) 300 MG tablet TAKE 1 TABLET DAILY FOR GOUT 90 tablet 3     ASPIRIN 81 MG OR TABS 1 TABLET DAILY       benazepril-hydrochlorothiazide (LOTENSIN HCT) 20-25 MG tablet TAKE ONE TABLET BY MOUTH TWICE A DAY FOR HYPERTENSION 180 tablet 0     fish oil-omega-3 fatty acids (FISH OIL) 1000 MG capsule Take 1 g by mouth daily.       simvastatin (ZOCOR) 40 MG tablet Take 1 tablet (40 mg) by mouth At Bedtime 90 tablet 3     warfarin (COUMADIN) 5 MG tablet TAKE ONE-HALF (1/2) TABLET ON TUESDAY AND ONE TABLET ON ALL OTHER DAYS OF THE WEEK OR AS DIRECTED BY THE COUMADIN CLINIC 90 tablet 3     nitroglycerin (NITROSTAT) 0.4 MG SL tablet Place 1 tablet (0.4 mg) under the tongue See Admin Instructions for chest pain (Patient not taking: Reported on 2/4/2019) 25 tablet 1       ALLERGIES     Allergies   Allergen Reactions     No Known Drug Allergies         PAST MEDICAL HISTORY:  Past Medical History:   Diagnosis Date     Atrial fibrillation (H)      Coronary atherosclerosis of unspecified type of vessel, native or graft      Hernia of unspecified site of abdominal cavity without mention of obstruction or gangrene     umbilical hernia; s/p repair 3/1998     History of CVA (cerebrovascular accident) 3/16/2018     Hordeolum externum left upper eyelid 1/21/2016     Malignant neoplasm of prostate (H) 07/14/08    Admit.Discharged 07/17/08     Other and unspecified hyperlipidemia      Postsurgical aortocoronary bypass status 1992     Respiratory complications      Unspecified essential hypertension      Unspecified tinnitus     left ear       PAST SURGICAL HISTORY:  Past Surgical History:   Procedure Laterality Date     C CABG, VEIN, THREE  1992     C NONSPECIFIC PROCEDURE  1987    Bone fusion in neck     C REMV PROSTATE,RETROPUB,RAD,LTD NODES  7/14/2008    Radical retropubic prostatectomy and pelvic lymph node dissection.     HC REMOVAL OF TONSILS,12+ Y/O  1969    Tonsils 10+y.o.     HEMILAMINECTOMY, DISCECTOMY LUMBAR TWO LEVELS, COMBINED Left 12/16/2015    Procedure: COMBINED HEMILAMINECTOMY, DISCECTOMY LUMBAR TWO LEVELS;  Surgeon: Jung Finley MD;  Location: [facility]     STENT, CORONARY, ALEAH  2002    x5       FAMILY HISTORY:  Family History   Problem Relation Age of Onset     Arthritis Father      Heart Disease Father      Hypertension Brother      Cancer Brother         liver     Heart Disease Sister      Heart Disease Brother        SOCIAL HISTORY:  Social History     Socioeconomic History     Marital status:      Spouse name: Not on file     Number of children: Not on file     Years of education: Not on file     Highest education level: Not on file   Social Needs     Financial resource strain: Not on file     Food insecurity - worry: Not on file     Food insecurity - inability: Not on file     Transportation needs - medical: Not on file      "Transportation needs - non-medical: Not on file   Occupational History     Not on file   Tobacco Use     Smoking status: Former Smoker     Packs/day: 0.50     Years: 2.00     Pack years: 1.00     Types: Cigarettes     Last attempt to quit: 1960     Years since quittin.1     Smokeless tobacco: Never Used   Substance and Sexual Activity     Alcohol use: Yes     Comment: socially     Drug use: No     Sexual activity: No   Other Topics Concern     Parent/sibling w/ CABG, MI or angioplasty before 65F 55M? No   Social History Narrative     Not on file       Review of Systems:  Skin:  Negative     Eyes:  Positive for glasses  ENT:  Positive for tinnitus  Respiratory:  Negative    Cardiovascular:  Negative for;palpitations;chest pain;lightheadedness;dizziness;fatigue Positive for;edema  Gastroenterology: Negative    Genitourinary:  Negative    Musculoskeletal:  Negative    Neurologic:  Negative    Psychiatric:  Negative    Heme/Lymph/Imm:  Negative    Endocrine:  Negative      Physical Exam:  Vitals: /82 (BP Location: Right arm, Patient Position: Fowlers, Cuff Size: Adult Regular)   Ht 1.708 m (5' 7.25\")   Wt 101.1 kg (222 lb 14.4 oz)   SpO2 97%   BMI 34.65 kg/m       Constitutional:  cooperative, alert and oriented, well developed, well nourished, in no acute distress obese      Skin:  warm and dry to the touch        Head:  normocephalic        Eyes:  no xanthalasma        ENT:           Neck:  carotid pulses are full and equal bilaterally        Chest:  normal breath sounds, clear to auscultation, normal A-P diameter, normal symmetry, normal respiratory excursion, no use of accessory muscles        Cardiac: normal S1 and S2;no S3 or S4;apical impulse not displaced irregularly irregular rhythm;bradycardic                Abdomen:  abdomen soft;BS normoactive        Vascular: pulses full and equal                                      Extremities and Back:  no edema        Neurological:  no gross motor " deficits          Recent Lab Results:  LIPID RESULTS:  Lab Results   Component Value Date    CHOL 115 05/10/2018    HDL 37 (L) 05/10/2018    LDL 58.0 05/10/2018    TRIG 101 05/10/2018    CHOLHDLRATIO 2.5 12/16/2014       LIVER ENZYME RESULTS:  Lab Results   Component Value Date    AST 35 05/10/2018    ALT 37 05/10/2018       CBC RESULTS:  Lab Results   Component Value Date    WBC 7.6 11/27/2015    RBC 5.23 11/27/2015    HGB 14.7 12/29/2015    HCT 47.3 11/27/2015    MCV 90 11/27/2015    MCH 31.9 11/27/2015    MCHC 35.3 11/27/2015    RDW 13.7 11/27/2015     11/27/2015       BMP RESULTS:  Lab Results   Component Value Date     08/31/2017    POTASSIUM 3.7 05/10/2018    CHLORIDE 107 08/31/2017    CO2 25 08/31/2017    ANIONGAP 10 08/31/2017     (H) 05/10/2018    BUN 16 08/31/2017    CR 0.9 05/10/2018    GFRESTIMATED >60 05/10/2018    GFRESTBLACK >90 04/30/2018    ANKIT 8.8 08/31/2017        A1C RESULTS:  No results found for: A1C    INR RESULTS:  Lab Results   Component Value Date    INR 3.4 (A) 01/28/2019    INR 1.9 (A) 12/17/2018    INR 1.99 (H) 01/07/2016    INR 1.55 (H) 12/31/2015         Miguelito Wells MD, [facility]    CC  No referring provider defined for this encounter.                    Thank you for allowing me to participate in the care of your patient.      Sincerely,     Miguelito Wells MD     [facility]    cc:   No referring provider defined for this encounter.

## 2019-02-04 NOTE — LETTER
2/4/2019    Isra Carrillo MD  [address] [facility] [address]    RE: Uli Champagne       Dear Colleague,    I had the pleasure of seeing Uli Champagne in the [facility].    HISTORY:    Uli Champagne is a 78-year-old pleasant gentleman seen in cardiology clinic today for annual follow-up.  He has been seen by Dr. Amanda in the past.  He has a history of chronic atrial fibrillation with slow ventricular response, previous Holter showing an average heart rate with a range of 33-80 bpm.  Remarkably, he has been asymptomatic with this bradycardia.    Today Uli presents with a Fitbit on his wrist.  He states that the Fitbit is telling him most of the time that his heart rate is around 45 and in fact an ECG done today shows atrial fibrillation with a heart rate of 45.  He insists that he is completely asymptomatic.  He denies any symptoms of easy fatigability, dizziness, lightheadedness, or syncope.  He states that he goes to the gym several days per week walks on a treadmill for about 10 minutes at a time.  He gets bored with this easily but states that he really does not get short of breath.  He can walk up a flight of steps without difficulty.  He denies any symptoms of exertional chest, arm, neck, or jaw discomfort, as well as any symptoms of syncope or near syncope, sense of palpitations, PND/orthopnea orthostasis, significant peripheral edema, or claudication.  He reports that he sleeps well at night and uses a CPAP religiously.      ASSESSMENT/PLAN:    1.  Chronic atrial fibrillation using warfarin on a daily basis.  2.  Hyperlipidemia using simvastatin adults, LDL 58.  3.  Chronic bradycardia.  Completely asymptomatic, remains physically active without limitations.  We talked about the possibility of pacemaker placement but there is certainly no indication for such an approach at this time.  4.  Hypertension.  Blood pressure is generally acceptable with  some fluctuation using Lotensin 20-25    Thank you for inviting me to participate in your patient's care today.  Please do not hesitate to call if I can be of further assistance.  Follow-up will be planned in 1 year.    Orders Placed This Encounter   Procedures     Follow-Up with Cardiologist     EKG 12-LEAD CLINIC READ     No orders of the defined types were placed in this encounter.    Medications Discontinued During This Encounter   Medication Reason     cephALEXin (KEFLEX) 500 MG capsule Therapy completed       10 year ASCVD risk: The ASCVD Risk score (Karribryson TRONCOSO Jr., et al., 2013) failed to calculate for the following reasons:    The 2013 ASCVD risk score is only valid for ages 40 to 79    The patient has a prior MI or stroke diagnosis    Encounter Diagnoses   Name Primary?     Non morbid obesity due to excess calories      Hyperlipidemia LDL goal <100      Bradycardia      Chronic a-fib (H)      Long term current use of anticoagulant therapy      Essential hypertension        CURRENT MEDICATIONS:  Current Outpatient Medications   Medication Sig Dispense Refill     allopurinol (ZYLOPRIM) 300 MG tablet TAKE 1 TABLET DAILY FOR GOUT 90 tablet 3     ASPIRIN 81 MG OR TABS 1 TABLET DAILY       benazepril-hydrochlorothiazide (LOTENSIN HCT) 20-25 MG tablet TAKE ONE TABLET BY MOUTH TWICE A DAY FOR HYPERTENSION 180 tablet 0     fish oil-omega-3 fatty acids (FISH OIL) 1000 MG capsule Take 1 g by mouth daily.       simvastatin (ZOCOR) 40 MG tablet Take 1 tablet (40 mg) by mouth At Bedtime 90 tablet 3     warfarin (COUMADIN) 5 MG tablet TAKE ONE-HALF (1/2) TABLET ON TUESDAY AND ONE TABLET ON ALL OTHER DAYS OF THE WEEK OR AS DIRECTED BY THE COUMADIN CLINIC 90 tablet 3     nitroglycerin (NITROSTAT) 0.4 MG SL tablet Place 1 tablet (0.4 mg) under the tongue See Admin Instructions for chest pain (Patient not taking: Reported on 2/4/2019) 25 tablet 1       ALLERGIES     Allergies   Allergen Reactions     No Known Drug Allergies         PAST MEDICAL HISTORY:  Past Medical History:   Diagnosis Date     Atrial fibrillation (H)      Coronary atherosclerosis of unspecified type of vessel, native or graft      Hernia of unspecified site of abdominal cavity without mention of obstruction or gangrene     umbilical hernia; s/p repair 3/1998     History of CVA (cerebrovascular accident) 3/16/2018     Hordeolum externum left upper eyelid 1/21/2016     Malignant neoplasm of prostate (H) 07/14/08    Admit.Discharged 07/17/08     Other and unspecified hyperlipidemia      Postsurgical aortocoronary bypass status 1992     Respiratory complications      Unspecified essential hypertension      Unspecified tinnitus     left ear       PAST SURGICAL HISTORY:  Past Surgical History:   Procedure Laterality Date     C CABG, VEIN, THREE  1992     C NONSPECIFIC PROCEDURE  1987    Bone fusion in neck     C REMV PROSTATE,RETROPUB,RAD,LTD NODES  7/14/2008    Radical retropubic prostatectomy and pelvic lymph node dissection.     HC REMOVAL OF TONSILS,12+ Y/O  1969    Tonsils 10+y.o.     HEMILAMINECTOMY, DISCECTOMY LUMBAR TWO LEVELS, COMBINED Left 12/16/2015    Procedure: COMBINED HEMILAMINECTOMY, DISCECTOMY LUMBAR TWO LEVELS;  Surgeon: Jung Finley MD;  Location: [facility]     STENT, CORONARY, ALEAH  2002    x5       FAMILY HISTORY:  Family History   Problem Relation Age of Onset     Arthritis Father      Heart Disease Father      Hypertension Brother      Cancer Brother         liver     Heart Disease Sister      Heart Disease Brother        SOCIAL HISTORY:  Social History     Socioeconomic History     Marital status:      Spouse name: Not on file     Number of children: Not on file     Years of education: Not on file     Highest education level: Not on file   Social Needs     Financial resource strain: Not on file     Food insecurity - worry: Not on file     Food insecurity - inability: Not on file     Transportation needs - medical: Not on file      "Transportation needs - non-medical: Not on file   Occupational History     Not on file   Tobacco Use     Smoking status: Former Smoker     Packs/day: 0.50     Years: 2.00     Pack years: 1.00     Types: Cigarettes     Last attempt to quit: 1960     Years since quittin.1     Smokeless tobacco: Never Used   Substance and Sexual Activity     Alcohol use: Yes     Comment: socially     Drug use: No     Sexual activity: No   Other Topics Concern     Parent/sibling w/ CABG, MI or angioplasty before 65F 55M? No   Social History Narrative     Not on file       Review of Systems:  Skin:  Negative     Eyes:  Positive for glasses  ENT:  Positive for tinnitus  Respiratory:  Negative    Cardiovascular:  Negative for;palpitations;chest pain;lightheadedness;dizziness;fatigue Positive for;edema  Gastroenterology: Negative    Genitourinary:  Negative    Musculoskeletal:  Negative    Neurologic:  Negative    Psychiatric:  Negative    Heme/Lymph/Imm:  Negative    Endocrine:  Negative      Physical Exam:  Vitals: /82 (BP Location: Right arm, Patient Position: Fowlers, Cuff Size: Adult Regular)   Ht 1.708 m (5' 7.25\")   Wt 101.1 kg (222 lb 14.4 oz)   SpO2 97%   BMI 34.65 kg/m       Constitutional:  cooperative, alert and oriented, well developed, well nourished, in no acute distress obese      Skin:  warm and dry to the touch        Head:  normocephalic        Eyes:  no xanthalasma        ENT:           Neck:  carotid pulses are full and equal bilaterally        Chest:  normal breath sounds, clear to auscultation, normal A-P diameter, normal symmetry, normal respiratory excursion, no use of accessory muscles        Cardiac: normal S1 and S2;no S3 or S4;apical impulse not displaced irregularly irregular rhythm;bradycardic                Abdomen:  abdomen soft;BS normoactive        Vascular: pulses full and equal                                      Extremities and Back:  no edema        Neurological:  no gross motor " deficits          Recent Lab Results:  LIPID RESULTS:  Lab Results   Component Value Date    CHOL 115 05/10/2018    HDL 37 (L) 05/10/2018    LDL 58.0 05/10/2018    TRIG 101 05/10/2018    CHOLHDLRATIO 2.5 12/16/2014       LIVER ENZYME RESULTS:  Lab Results   Component Value Date    AST 35 05/10/2018    ALT 37 05/10/2018       CBC RESULTS:  Lab Results   Component Value Date    WBC 7.6 11/27/2015    RBC 5.23 11/27/2015    HGB 14.7 12/29/2015    HCT 47.3 11/27/2015    MCV 90 11/27/2015    MCH 31.9 11/27/2015    MCHC 35.3 11/27/2015    RDW 13.7 11/27/2015     11/27/2015       BMP RESULTS:  Lab Results   Component Value Date     08/31/2017    POTASSIUM 3.7 05/10/2018    CHLORIDE 107 08/31/2017    CO2 25 08/31/2017    ANIONGAP 10 08/31/2017     (H) 05/10/2018    BUN 16 08/31/2017    CR 0.9 05/10/2018    GFRESTIMATED >60 05/10/2018    GFRESTBLACK >90 04/30/2018    ANKIT 8.8 08/31/2017        A1C RESULTS:  No results found for: A1C    INR RESULTS:  Lab Results   Component Value Date    INR 3.4 (A) 01/28/2019    INR 1.9 (A) 12/17/2018    INR 1.99 (H) 01/07/2016    INR 1.55 (H) 12/31/2015       Thank you for allowing me to participate in the care of your patient.    Sincerely,     Miguelito Wells MD     [facility]

## 2019-02-04 NOTE — PROGRESS NOTES
HISTORY:    Uli Champagne is a 78-year-old pleasant gentleman seen in cardiology clinic today for annual follow-up.  He has been seen by Dr. Amanda in the past.  He has a history of chronic atrial fibrillation with slow ventricular response, previous Holter showing an average heart rate with a range of 33-80 bpm.  Remarkably, he has been asymptomatic with this bradycardia.    Today Uli presents with a Fitbit on his wrist.  He states that the Fitbit is telling him most of the time that his heart rate is around 45 and in fact an ECG done today shows atrial fibrillation with a heart rate of 45.  He insists that he is completely asymptomatic.  He denies any symptoms of easy fatigability, dizziness, lightheadedness, or syncope.  He states that he goes to the gym several days per week walks on a treadmill for about 10 minutes at a time.  He gets bored with this easily but states that he really does not get short of breath.  He can walk up a flight of steps without difficulty.  He denies any symptoms of exertional chest, arm, neck, or jaw discomfort, as well as any symptoms of syncope or near syncope, sense of palpitations, PND/orthopnea orthostasis, significant peripheral edema, or claudication.  He reports that he sleeps well at night and uses a CPAP religiously.      ASSESSMENT/PLAN:    1.  Chronic atrial fibrillation using warfarin on a daily basis.  2.  Hyperlipidemia using simvastatin adults, LDL 58.  3.  Chronic bradycardia.  Completely asymptomatic, remains physically active without limitations.  We talked about the possibility of pacemaker placement but there is certainly no indication for such an approach at this time.  4.  Hypertension.  Blood pressure is generally acceptable with some fluctuation using Lotensin 20-25    Thank you for inviting me to participate in your patient's care today.  Please do not hesitate to call if I can be of further assistance.  Follow-up will be planned in 1  year.    Orders Placed This Encounter   Procedures     Follow-Up with Cardiologist     EKG 12-LEAD CLINIC READ     No orders of the defined types were placed in this encounter.    Medications Discontinued During This Encounter   Medication Reason     cephALEXin (KEFLEX) 500 MG capsule Therapy completed       10 year ASCVD risk: The ASCVD Risk score (Karri TRONCOSO Jr., et al., 2013) failed to calculate for the following reasons:    The 2013 ASCVD risk score is only valid for ages 40 to 79    The patient has a prior MI or stroke diagnosis    Encounter Diagnoses   Name Primary?     Non morbid obesity due to excess calories      Hyperlipidemia LDL goal <100      Bradycardia      Chronic a-fib (H)      Long term current use of anticoagulant therapy      Essential hypertension        CURRENT MEDICATIONS:  Current Outpatient Medications   Medication Sig Dispense Refill     allopurinol (ZYLOPRIM) 300 MG tablet TAKE 1 TABLET DAILY FOR GOUT 90 tablet 3     ASPIRIN 81 MG OR TABS 1 TABLET DAILY       benazepril-hydrochlorothiazide (LOTENSIN HCT) 20-25 MG tablet TAKE ONE TABLET BY MOUTH TWICE A DAY FOR HYPERTENSION 180 tablet 0     fish oil-omega-3 fatty acids (FISH OIL) 1000 MG capsule Take 1 g by mouth daily.       simvastatin (ZOCOR) 40 MG tablet Take 1 tablet (40 mg) by mouth At Bedtime 90 tablet 3     warfarin (COUMADIN) 5 MG tablet TAKE ONE-HALF (1/2) TABLET ON TUESDAY AND ONE TABLET ON ALL OTHER DAYS OF THE WEEK OR AS DIRECTED BY THE COUMADIN CLINIC 90 tablet 3     nitroglycerin (NITROSTAT) 0.4 MG SL tablet Place 1 tablet (0.4 mg) under the tongue See Admin Instructions for chest pain (Patient not taking: Reported on 2/4/2019) 25 tablet 1       ALLERGIES     Allergies   Allergen Reactions     No Known Drug Allergies        PAST MEDICAL HISTORY:  Past Medical History:   Diagnosis Date     Atrial fibrillation (H)      Coronary atherosclerosis of unspecified type of vessel, native or graft      Hernia of unspecified site of  abdominal cavity without mention of obstruction or gangrene     umbilical hernia; s/p repair 3/1998     History of CVA (cerebrovascular accident) 3/16/2018     Hordeolum externum left upper eyelid 1/21/2016     Malignant neoplasm of prostate (H) 07/14/08    Admit.Discharged 07/17/08     Other and unspecified hyperlipidemia      Postsurgical aortocoronary bypass status 1992     Respiratory complications      Unspecified essential hypertension      Unspecified tinnitus     left ear       PAST SURGICAL HISTORY:  Past Surgical History:   Procedure Laterality Date     C CABG, VEIN, THREE  1992     C NONSPECIFIC PROCEDURE  1987    Bone fusion in neck     C REMV PROSTATE,RETROPUB,RAD,LTD NODES  7/14/2008    Radical retropubic prostatectomy and pelvic lymph node dissection.     HC REMOVAL OF TONSILS,12+ Y/O  1969    Tonsils 10+y.o.     HEMILAMINECTOMY, DISCECTOMY LUMBAR TWO LEVELS, COMBINED Left 12/16/2015    Procedure: COMBINED HEMILAMINECTOMY, DISCECTOMY LUMBAR TWO LEVELS;  Surgeon: Jung Finley MD;  Location: [facility]     STENT, CORONARY, ALEAH  2002    x5       FAMILY HISTORY:  Family History   Problem Relation Age of Onset     Arthritis Father      Heart Disease Father      Hypertension Brother      Cancer Brother         liver     Heart Disease Sister      Heart Disease Brother        SOCIAL HISTORY:  Social History     Socioeconomic History     Marital status:      Spouse name: Not on file     Number of children: Not on file     Years of education: Not on file     Highest education level: Not on file   Social Needs     Financial resource strain: Not on file     Food insecurity - worry: Not on file     Food insecurity - inability: Not on file     Transportation needs - medical: Not on file     Transportation needs - non-medical: Not on file   Occupational History     Not on file   Tobacco Use     Smoking status: Former Smoker     Packs/day: 0.50     Years: 2.00     Pack years: 1.00     Types: Cigarettes      "Last attempt to quit: 1960     Years since quittin.1     Smokeless tobacco: Never Used   Substance and Sexual Activity     Alcohol use: Yes     Comment: socially     Drug use: No     Sexual activity: No   Other Topics Concern     Parent/sibling w/ CABG, MI or angioplasty before 65F 55M? No   Social History Narrative     Not on file       Review of Systems:  Skin:  Negative     Eyes:  Positive for glasses  ENT:  Positive for tinnitus  Respiratory:  Negative    Cardiovascular:  Negative for;palpitations;chest pain;lightheadedness;dizziness;fatigue Positive for;edema  Gastroenterology: Negative    Genitourinary:  Negative    Musculoskeletal:  Negative    Neurologic:  Negative    Psychiatric:  Negative    Heme/Lymph/Imm:  Negative    Endocrine:  Negative      Physical Exam:  Vitals: /82 (BP Location: Right arm, Patient Position: Fowlers, Cuff Size: Adult Regular)   Ht 1.708 m (5' 7.25\")   Wt 101.1 kg (222 lb 14.4 oz)   SpO2 97%   BMI 34.65 kg/m      Constitutional:  cooperative, alert and oriented, well developed, well nourished, in no acute distress obese      Skin:  warm and dry to the touch        Head:  normocephalic        Eyes:  no xanthalasma        ENT:           Neck:  carotid pulses are full and equal bilaterally        Chest:  normal breath sounds, clear to auscultation, normal A-P diameter, normal symmetry, normal respiratory excursion, no use of accessory muscles        Cardiac: normal S1 and S2;no S3 or S4;apical impulse not displaced irregularly irregular rhythm;bradycardic                Abdomen:  abdomen soft;BS normoactive        Vascular: pulses full and equal                                      Extremities and Back:  no edema        Neurological:  no gross motor deficits          Recent Lab Results:  LIPID RESULTS:  Lab Results   Component Value Date    CHOL 115 05/10/2018    HDL 37 (L) 05/10/2018    LDL 58.0 05/10/2018    TRIG 101 05/10/2018    CHOLHDLRATIO 2.5 2014 "       LIVER ENZYME RESULTS:  Lab Results   Component Value Date    AST 35 05/10/2018    ALT 37 05/10/2018       CBC RESULTS:  Lab Results   Component Value Date    WBC 7.6 11/27/2015    RBC 5.23 11/27/2015    HGB 14.7 12/29/2015    HCT 47.3 11/27/2015    MCV 90 11/27/2015    MCH 31.9 11/27/2015    MCHC 35.3 11/27/2015    RDW 13.7 11/27/2015     11/27/2015       BMP RESULTS:  Lab Results   Component Value Date     08/31/2017    POTASSIUM 3.7 05/10/2018    CHLORIDE 107 08/31/2017    CO2 25 08/31/2017    ANIONGAP 10 08/31/2017     (H) 05/10/2018    BUN 16 08/31/2017    CR 0.9 05/10/2018    GFRESTIMATED >60 05/10/2018    GFRESTBLACK >90 04/30/2018    ANKIT 8.8 08/31/2017        A1C RESULTS:  No results found for: A1C    INR RESULTS:  Lab Results   Component Value Date    INR 3.4 (A) 01/28/2019    INR 1.9 (A) 12/17/2018    INR 1.99 (H) 01/07/2016    INR 1.55 (H) 12/31/2015         Miguelito Wells MD, [facility]    CC  No referring provider defined for this encounter.

## 2019-02-11 ENCOUNTER — ANTICOAGULATION THERAPY VISIT (OUTPATIENT)
Dept: ANTICOAGULATION | Facility: OTHER | Age: 79
End: 2019-02-11
Payer: COMMERCIAL

## 2019-02-11 DIAGNOSIS — I48.20 CHRONIC A-FIB (H): ICD-10-CM

## 2019-02-11 LAB — INR POINT OF CARE: 2.1 (ref 0.86–1.14)

## 2019-02-11 PROCEDURE — 99207 ZZC NO CHARGE NURSE ONLY: CPT

## 2019-02-11 PROCEDURE — 36416 COLLJ CAPILLARY BLOOD SPEC: CPT

## 2019-02-11 PROCEDURE — 85610 PROTHROMBIN TIME: CPT | Mod: QW

## 2019-02-11 NOTE — PROGRESS NOTES
ANTICOAGULATION FOLLOW-UP CLINIC VISIT    Patient Name:  Uli Champagne  Date:  2019  Contact Type:  Face to Face    SUBJECTIVE:     Patient Findings     Positives:   No Problem Findings           OBJECTIVE    INR Protime   Date Value Ref Range Status   2019 2.1 (A) 0.86 - 1.14 Final       ASSESSMENT / PLAN  INR assessment THER    Recheck INR In: 6 WEEKS    INR Location Clinic      Anticoagulation Summary  As of 2019    INR goal:   2.0-3.0   TTR:   77.2 % (2.8 y)   INR used for dosin.1 (2019)   Warfarin maintenance plan:   2.5 mg (5 mg x 0.5) every Tue; 5 mg (5 mg x 1) all other days   Full warfarin instructions:   2.5 mg every Tue; 5 mg all other days   Weekly warfarin total:   32.5 mg   No change documented:   Noehre, Edda, RN   Plan last modified:   Rui Landers RN (2018)   Next INR check:   3/25/2019   Target end date:       Indications    Long-term (current) use of anticoagulants [Z79.01] [Z79.01]  Chronic a-fib (H) [I48.2]             Anticoagulation Episode Summary     INR check location:       Preferred lab:       Send INR reminders to:   [facility]    Comments:   5 mg tab, AM dose        Anticoagulation Care Providers     Provider Role Specialty Phone number    Isra Carrillo MD [facility] [phone]            See the Encounter Report to view Anticoagulation Flowsheet and Dosing Calendar (Go to Encounters tab in chart review, and find the Anticoagulation Therapy Visit)    Dosage adjustment made based on physician directed care plan.    Edda Arroyo RN

## 2019-02-28 ENCOUNTER — HOSPITAL ENCOUNTER (OUTPATIENT)
Dept: CARDIOLOGY | Facility: CLINIC | Age: 79
Discharge: HOME | End: 2019-02-28
Attending: INTERNAL MEDICINE | Admitting: INTERNAL MEDICINE
Payer: COMMERCIAL

## 2019-02-28 DIAGNOSIS — E66.09 NON MORBID OBESITY DUE TO EXCESS CALORIES: ICD-10-CM

## 2019-02-28 DIAGNOSIS — I48.20 CHRONIC A-FIB (H): ICD-10-CM

## 2019-02-28 DIAGNOSIS — Z79.01 LONG TERM CURRENT USE OF ANTICOAGULANT THERAPY: ICD-10-CM

## 2019-02-28 DIAGNOSIS — I10 ESSENTIAL HYPERTENSION: ICD-10-CM

## 2019-02-28 DIAGNOSIS — E78.5 HYPERLIPIDEMIA LDL GOAL <100: ICD-10-CM

## 2019-02-28 DIAGNOSIS — R00.1 BRADYCARDIA: ICD-10-CM

## 2019-02-28 PROCEDURE — 93227 XTRNL ECG REC<48 HR R&I: CPT | Performed by: INTERNAL MEDICINE

## 2019-02-28 PROCEDURE — 93225 XTRNL ECG REC<48 HRS REC: CPT

## 2019-03-22 ENCOUNTER — ANTICOAGULATION THERAPY VISIT (OUTPATIENT)
Dept: ANTICOAGULATION | Facility: CLINIC | Age: 79
End: 2019-03-22
Payer: COMMERCIAL

## 2019-03-22 DIAGNOSIS — I48.20 CHRONIC A-FIB (H): ICD-10-CM

## 2019-03-22 LAB — INR POINT OF CARE: 2.6 (ref 0.9–1.1)

## 2019-03-22 PROCEDURE — 36416 COLLJ CAPILLARY BLOOD SPEC: CPT

## 2019-03-22 PROCEDURE — 85610 PROTHROMBIN TIME: CPT | Mod: QW

## 2019-03-22 PROCEDURE — 99207 ZZC NO CHARGE NURSE ONLY: CPT

## 2019-03-22 NOTE — PROGRESS NOTES
ANTICOAGULATION FOLLOW-UP CLINIC VISIT    Patient Name:  Uli Champagne  Date:  3/22/2019  Contact Type:  Face to Face    SUBJECTIVE:     Patient Findings     Comments:   The patient was assessed for diet, medication, and activity level changes, missed or extra doses, bruising or bleeding, with no problem findings.  Pt states he has been taking 2.5 mg Tues, Sat and 5 mg ROW since his last visit    Mahsa Julien RN               OBJECTIVE    INR Protime   Date Value Ref Range Status   2019 2.6 (A) 0.9 - 1.1 Final       ASSESSMENT / PLAN  INR assessment THER    Recheck INR In: 6 WEEKS    INR Location Clinic      Anticoagulation Summary  As of 3/22/2019    INR goal:   2.0-3.0   TTR:   78.0 % (2.9 y)   INR used for dosin.6 (3/22/2019)   Warfarin maintenance plan:   2.5 mg (5 mg x 0.5) every Tue, Sat; 5 mg (5 mg x 1) all other days   Full warfarin instructions:   2.5 mg every Tue, Sat; 5 mg all other days   Weekly warfarin total:   30 mg   Plan last modified:   Mahsa Julien RN (3/22/2019)   Next INR check:   5/3/2019   Target end date:       Indications    Long-term (current) use of anticoagulants [Z79.01] [Z79.01]  Chronic a-fib (H) [I48.2]             Anticoagulation Episode Summary     INR check location:       Preferred lab:       Send INR reminders to:   VICKY MEHTA    Comments:   5 mg tab, AM dose        Anticoagulation Care Providers     Provider Role Specialty Phone number    Isra Carrillo MD [facility] [phone]            See the Encounter Report to view Anticoagulation Flowsheet and Dosing Calendar (Go to Encounters tab in chart review, and find the Anticoagulation Therapy Visit)    Dosage adjustment made based on physician directed care plan.      Mahsa Julien RN

## 2019-03-29 DIAGNOSIS — E78.5 HYPERLIPIDEMIA LDL GOAL <100: ICD-10-CM

## 2019-03-29 DIAGNOSIS — M1A.09X0 IDIOPATHIC CHRONIC GOUT OF MULTIPLE SITES WITHOUT TOPHUS: ICD-10-CM

## 2019-04-01 DIAGNOSIS — I10 BENIGN ESSENTIAL HYPERTENSION: ICD-10-CM

## 2019-04-01 RX ORDER — ALLOPURINOL 300 MG/1
TABLET ORAL
Qty: 30 TABLET | Refills: 0 | Status: SHIPPED | OUTPATIENT
Start: 2019-04-01 | End: 2019-04-12

## 2019-04-01 RX ORDER — SIMVASTATIN 40 MG
TABLET ORAL
Qty: 30 TABLET | Refills: 0 | Status: SHIPPED | OUTPATIENT
Start: 2019-04-01 | End: 2019-04-12

## 2019-04-01 NOTE — TELEPHONE ENCOUNTER
"Cande refill given per RN protocol.   Please contact patient to have them schedule the following:  Due for annual exam after 1/10/2019  Anju Tobin RN, BSN      Zocor  Last Written Prescription Date:  1/10/2018  Last Fill Quantity: 90,  # refills: 3   Last office visit: 11/12/2018 with prescribing provider:  7/19/2018   Allopuriol  Last Written Prescription Date:  1/10/2018  Last Fill Quantity: 90,  # refills: 3   Last office visit: 11/12/2018 with prescribing provider:  7/19/2018   Future Office Visit:   Next 5 appointments (look out 90 days)    Apr 04, 2019 11:00 AM CDT  Return Visit with Miguelito Wells MD  [facility] ([address] [address]  [phone]   Apr 18, 2019  9:30 AM CDT  Return Visit with Yovani Ogden DO  [facility] ([address] [address]  [phone]           Requested Prescriptions   Pending Prescriptions Disp Refills     simvastatin (ZOCOR) 40 MG tablet [Pharmacy Med Name: SIMVASTATIN TABS 40MG] 90 tablet 3     Sig: TAKE 1 TABLET AT BEDTIME    Statins Protocol Passed - 3/29/2019  7:48 PM       Passed - LDL on file in past 12 months    Recent Labs   Lab Test 05/10/18   LDL 58.0            Passed - No abnormal creatine kinase in past 12 months    No lab results found.            Passed - Recent (12 mo) or future (30 days) visit within the authorizing provider's specialty    Patient had office visit in the last 12 months or has a visit in the next 30 days with authorizing provider or within the authorizing provider's specialty.  See \"Patient Info\" tab in inbasket, or \"Choose Columns\" in Meds & Orders section of the refill encounter.             Passed - Medication is active on med list       Passed - Patient is age 18 or older        allopurinol (ZYLOPRIM) 300 MG tablet [Pharmacy Med Name: ALLOPURINOL TABS 300MG] 90 " "tablet 3     Sig: TAKE 1 TABLET DAILY FOR GOUT    Gout Agents Protocol Failed - 3/29/2019  7:48 PM       Failed - CBC on file in past 12 months    Recent Labs   Lab Test 12/29/15  0530 11/27/15  1425   WBC  --  7.6   RBC  --  5.23   HGB 14.7 16.7   HCT  --  47.3   PLT  --  169                Failed - Has Uric Acid on file in past 12 months and value is less than 6    No lab results found.  If level is 6mg/dL or greater, ok to refill one time and refer to provider.          Passed - ALT on file in past 12 months    Recent Labs   Lab Test 05/10/18   ALT 37            Passed - Recent (12 mo) or future (30 days) visit within the authorizing provider's specialty    Patient had office visit in the last 12 months or has a visit in the next 30 days with authorizing provider or within the authorizing provider's specialty.  See \"Patient Info\" tab in inbasket, or \"Choose Columns\" in Meds & Orders section of the refill encounter.             Passed - Medication is active on med list       Passed - Patient is age 18 or older       Passed - Normal serum creatinine on file in the past 12 months    Recent Labs   Lab Test 05/10/18 04/30/18  1244   CR 0.9  --    CREAT  --  0.9               Anju Tobin RN on 4/1/2019 at 2:29 PM    "

## 2019-04-02 RX ORDER — BENAZEPRIL/HYDROCHLOROTHIAZIDE 20 MG-25MG
TABLET ORAL
Qty: 180 TABLET | Refills: 1 | Status: SHIPPED | OUTPATIENT
Start: 2019-04-02 | End: 2019-05-08 | Stop reason: ALTCHOICE

## 2019-04-02 NOTE — TELEPHONE ENCOUNTER
"Requested Prescriptions   Pending Prescriptions Disp Refills     benazepril-hydrochlorothiazide (LOTENSIN HCT) 20-25 MG tablet [Pharmacy Med Name: BENAZEPRIL/HCTZ TABS 20/25MG] 180 tablet 3    Last Written Prescription Date:  1/22/19  Last Fill Quantity: 180,  # refills: 0   Last office visit: 11/12/2018 with prescribing provider:  7/19/18   Future Office Visit:   Next 5 appointments (look out 90 days)    Apr 04, 2019 11:00 AM CDT  Return Visit with Miguelito Wells MD  [facility] ([address] [address]  [phone]   Apr 12, 2019  9:00 AM CDT  Office Visit with Isra Carrillo MD  [facility] ([facility]) [address] [facility] [address]  [phone]   Apr 18, 2019  9:30 AM CDT  Return Visit with Yovani Ogden DO  [facility] ([facility]) [address] [place] [id number]  [phone]        Sig: TAKE 1 TABLET TWICE A DAY FOR HYPERTENSION    Diuretics (Including Combos) Protocol Failed - 4/1/2019  3:41 PM       Failed - Blood pressure under 140/90 in past 12 months    BP Readings from Last 3 Encounters:   02/04/19 146/82   09/24/18 138/80   08/29/18 150/90                Failed - Normal serum sodium on file in past 12 months    Recent Labs   Lab Test 08/31/17  1323                Passed - Recent (12 mo) or future (30 days) visit within the authorizing provider's specialty    Patient had office visit in the last 12 months or has a visit in the next 30 days with authorizing provider or within the authorizing provider's specialty.  See \"Patient Info\" tab in inbasket, or \"Choose Columns\" in Meds & Orders section of the refill encounter.             Passed - Medication is active on med list       Passed - Patient is age 18 or older       Passed - Normal serum creatinine on file in past 12 months    Recent Labs   Lab Test " 05/10/18   CR 0.9             Passed - Normal serum potassium on file in past 12 months    Recent Labs   Lab Test 05/10/18   POTASSIUM 3.7

## 2019-04-02 NOTE — TELEPHONE ENCOUNTER
Routing refill request to provider for review/approval because:  Labs out of range:  BP  Labs not current:  Sodium    CROW Randolph, RN  [facility]

## 2019-04-04 ENCOUNTER — TELEPHONE (OUTPATIENT)
Dept: FAMILY MEDICINE | Facility: OTHER | Age: 79
End: 2019-04-04

## 2019-04-04 ENCOUNTER — OFFICE VISIT (OUTPATIENT)
Dept: CARDIOLOGY | Facility: CLINIC | Age: 79
End: 2019-04-04
Attending: INTERNAL MEDICINE
Payer: COMMERCIAL

## 2019-04-04 VITALS
OXYGEN SATURATION: 97 % | BODY MASS INDEX: 35.41 KG/M2 | HEART RATE: 52 BPM | WEIGHT: 225.6 LBS | SYSTOLIC BLOOD PRESSURE: 170 MMHG | HEIGHT: 67 IN | DIASTOLIC BLOOD PRESSURE: 94 MMHG

## 2019-04-04 DIAGNOSIS — I10 ESSENTIAL HYPERTENSION, BENIGN: Primary | ICD-10-CM

## 2019-04-04 DIAGNOSIS — I48.20 CHRONIC A-FIB (H): ICD-10-CM

## 2019-04-04 PROCEDURE — 99214 OFFICE O/P EST MOD 30 MIN: CPT | Performed by: INTERNAL MEDICINE

## 2019-04-04 RX ORDER — CHLORTHALIDONE 25 MG/1
25 TABLET ORAL DAILY
Qty: 90 TABLET | Refills: 3 | Status: SHIPPED | OUTPATIENT
Start: 2019-04-04 | End: 2019-11-06

## 2019-04-04 RX ORDER — LOSARTAN POTASSIUM 100 MG/1
100 TABLET ORAL DAILY
Qty: 90 TABLET | Refills: 3 | Status: SHIPPED | OUTPATIENT
Start: 2019-04-04 | End: 2019-06-17

## 2019-04-04 ASSESSMENT — MIFFLIN-ST. JEOR: SCORE: 1695.9

## 2019-04-04 NOTE — PROGRESS NOTES
HISTORY:    Uli Champagne is a pleasant 78-year-old gentleman who was seen 2 months ago in cardiology clinic.  He has a history of chronic atrial fibrillation with slow ventricular response.  He is on chronic warfarin and he has a history of very slow heart rate with a range on previous Holter of 33-80 bpm.  He is completely asymptomatic with that bradycardia.  He also has a history of hypertension, coronary artery disease with previous bypass surgery and stenting of 1 of the grafts more than 10 years ago, and hyperlipidemia.    Today's visit was set up to discuss the results of his Holter monitor.  This study revealed no significant changes compared to his last Holter with a mean heart rate of 46, ranging from a low of 34 bpm to a high of 85.  The study also showed frequent PVCs, 6% of total, up from about 1% on the last Holter.  The patient denies any symptoms of palpitations.  He also denies any exertional chest, arm, neck, or jaw discomfort to suggest ischemia.  He is not aware of his atrial fibrillation and has no sense of palpitations.    Uli also has a history of hypertension and reports that he checks his blood pressure at least twice a day.  He states that his home values are always less than 140 and typically in the 120s or 130s.  His blood pressure in the office today is much higher with an initial reading of 160 systolic and then when rechecked by myself during my visit even higher at 170/94.  The patient reports that his benazepril hydrochlorothiazide is become very expensive costing him $168 for 3-month supply.      ASSESSMENT/PLAN:    1.  Chronic atrial fibrillation using warfarin with slow ventricular response.  On careful questioning he remains asymptomatic.  I discussed the results of his recent Holter monitor with him in detail today I assured him that results are unchanged from previous study.  2.  Hypertension.  Office blood pressures are high but home blood pressures are normal.  I  think this likely represents white coat hypertension.  I have switched him off of his benazepril hydrochlorothiazide 20-25 tablet (although he has over a month supply left and wants to finish it up, which is fine with me), and have instead switched him to losartan 100 mg and chlorthalidone 25 mg.  Once he has switched medications I will have him in for nurse blood pressure visit and I have asked him to bring his machine with him.  If his blood pressure machine is accurate we will count on his home readings and not push antihypertensives further.  If his home machine is inaccurate we should be more aggressive with his blood pressure and consider adding amlodipine 5 mg/day.    Thank you for inviting me to participate in your patient's care.  We spent 25 minutes face-to-face time today, greater than 50% counseling.    No orders of the defined types were placed in this encounter.    Orders Placed This Encounter   Medications     losartan (COZAAR) 100 MG tablet     Sig: Take 1 tablet (100 mg) by mouth daily     Dispense:  90 tablet     Refill:  3     chlorthalidone (HYGROTON) 25 MG tablet     Sig: Take 1 tablet (25 mg) by mouth daily     Dispense:  90 tablet     Refill:  3     There are no discontinued medications.    10 year ASCVD risk: The ASCVD Risk score ([place] DC Jr., et al., 2013) failed to calculate for the following reasons:    The 2013 ASCVD risk score is only valid for ages 40 to 79    The patient has a prior MI or stroke diagnosis    Encounter Diagnoses   Name Primary?     Chronic a-fib (H)      Essential hypertension, benign Yes       CURRENT MEDICATIONS:  Current Outpatient Medications   Medication Sig Dispense Refill     allopurinol (ZYLOPRIM) 300 MG tablet TAKE 1 TABLET DAILY FOR GOUT 30 tablet 0     ASPIRIN 81 MG OR TABS 1 TABLET DAILY       benazepril-hydrochlorothiazide (LOTENSIN HCT) 20-25 MG tablet TAKE 1 TABLET TWICE A DAY FOR HYPERTENSION 180 tablet 1     chlorthalidone (HYGROTON) 25 MG tablet Take 1  tablet (25 mg) by mouth daily 90 tablet 3     fish oil-omega-3 fatty acids (FISH OIL) 1000 MG capsule Take 1 g by mouth daily.       losartan (COZAAR) 100 MG tablet Take 1 tablet (100 mg) by mouth daily 90 tablet 3     simvastatin (ZOCOR) 40 MG tablet TAKE 1 TABLET AT BEDTIME 30 tablet 0     warfarin (COUMADIN) 5 MG tablet TAKE ONE-HALF (1/2) TABLET ON TUESDAY AND ONE TABLET ON ALL OTHER DAYS OF THE WEEK OR AS DIRECTED BY THE COUMADIN CLINIC 90 tablet 3     nitroglycerin (NITROSTAT) 0.4 MG SL tablet Place 1 tablet (0.4 mg) under the tongue See Admin Instructions for chest pain (Patient not taking: Reported on 2/4/2019) 25 tablet 1       ALLERGIES     Allergies   Allergen Reactions     No Known Drug Allergies        PAST MEDICAL HISTORY:  Past Medical History:   Diagnosis Date     Atrial fibrillation (H)      Coronary atherosclerosis of unspecified type of vessel, native or graft      Hernia of unspecified site of abdominal cavity without mention of obstruction or gangrene     umbilical hernia; s/p repair 3/1998     History of CVA (cerebrovascular accident) 3/16/2018     Hordeolum externum left upper eyelid 1/21/2016     Malignant neoplasm of prostate (H) 07/14/08    Admit.Discharged 07/17/08     Other and unspecified hyperlipidemia      Postsurgical aortocoronary bypass status 1992     Respiratory complications      Unspecified essential hypertension      Unspecified tinnitus     left ear       PAST SURGICAL HISTORY:  Past Surgical History:   Procedure Laterality Date     C CABG, VEIN, THREE  1992     C NONSPECIFIC PROCEDURE  1987    Bone fusion in neck     C REMV PROSTATE,RETROPUB,RAD,LTD NODES  7/14/2008    Radical retropubic prostatectomy and pelvic lymph node dissection.     HC REMOVAL OF TONSILS,12+ Y/O  1969    Tonsils 10+y.o.     HEMILAMINECTOMY, DISCECTOMY LUMBAR TWO LEVELS, COMBINED Left 12/16/2015    Procedure: COMBINED HEMILAMINECTOMY, DISCECTOMY LUMBAR TWO LEVELS;  Surgeon: Jung Finley MD;   Location: [facility]     STENT, CORONARY, ALEAH  2002    x5       FAMILY HISTORY:  Family History   Problem Relation Age of Onset     Arthritis Father      Heart Disease Father      Hypertension Brother      Cancer Brother         liver     Heart Disease Sister      Heart Disease Brother        SOCIAL HISTORY:  Social History     Socioeconomic History     Marital status:      Spouse name: Not on file     Number of children: Not on file     Years of education: Not on file     Highest education level: Not on file   Occupational History     Not on file   Social Needs     Financial resource strain: Not on file     Food insecurity:     Worry: Not on file     Inability: Not on file     Transportation needs:     Medical: Not on file     Non-medical: Not on file   Tobacco Use     Smoking status: Former Smoker     Packs/day: 0.50     Years: 2.00     Pack years: 1.00     Types: Cigarettes     Last attempt to quit: 1960     Years since quittin.2     Smokeless tobacco: Never Used   Substance and Sexual Activity     Alcohol use: Yes     Comment: socially     Drug use: No     Sexual activity: No   Lifestyle     Physical activity:     Days per week: Not on file     Minutes per session: Not on file     Stress: Not on file   Relationships     Social connections:     Talks on phone: Not on file     Gets together: Not on file     Attends Baptism service: Not on file     Active member of club or organization: Not on file     Attends meetings of clubs or organizations: Not on file     Relationship status: Not on file     Intimate partner violence:     Fear of current or ex partner: Not on file     Emotionally abused: Not on file     Physically abused: Not on file     Forced sexual activity: Not on file   Other Topics Concern     Parent/sibling w/ CABG, MI or angioplasty before 65F 55M? No   Social History Narrative     Not on file       Review of Systems:  Skin:  Negative     Eyes:  Positive for glasses  ENT:      "  Respiratory:    shortness of breath;dyspnea on exertion;cough;wheezing  Cardiovascular:  Negative for;palpitations;chest pain;lightheadedness;dizziness;fatigue Positive for;edema  Gastroenterology: Negative    Genitourinary:  Negative    Musculoskeletal:  Positive for arthritis;back pain  Neurologic:  Negative    Psychiatric:  Negative    Heme/Lymph/Imm:  Negative    Endocrine:  Negative      Physical Exam:  Vitals: /90 (BP Location: Right arm, Patient Position: Fowlers, Cuff Size: Adult Regular)   Pulse 52   Ht 1.708 m (5' 7.25\")   Wt 102.3 kg (225 lb 9.6 oz)   SpO2 97%   BMI 35.07 kg/m      Constitutional:           Skin:           Head:           Eyes:           ENT:           Neck:           Chest:           Cardiac:                    Abdomen:           Vascular:                                        Extremities and Back:           Neurological:             Recent Lab Results:  LIPID RESULTS:  Lab Results   Component Value Date    CHOL 115 05/10/2018    HDL 37 (L) 05/10/2018    LDL 58.0 05/10/2018    TRIG 101 05/10/2018    CHOLHDLRATIO 2.5 12/16/2014       LIVER ENZYME RESULTS:  Lab Results   Component Value Date    AST 35 05/10/2018    ALT 37 05/10/2018       CBC RESULTS:  Lab Results   Component Value Date    WBC 7.6 11/27/2015    RBC 5.23 11/27/2015    HGB 14.7 12/29/2015    HCT 47.3 11/27/2015    MCV 90 11/27/2015    MCH 31.9 11/27/2015    MCHC 35.3 11/27/2015    RDW 13.7 11/27/2015     11/27/2015       BMP RESULTS:  Lab Results   Component Value Date     08/31/2017    POTASSIUM 3.7 05/10/2018    CHLORIDE 107 08/31/2017    CO2 25 08/31/2017    ANIONGAP 10 08/31/2017     (H) 05/10/2018    BUN 16 08/31/2017    CR 0.9 05/10/2018    GFRESTIMATED >60 05/10/2018    GFRESTBLACK >90 04/30/2018    ANKIT 8.8 08/31/2017        A1C RESULTS:  No results found for: A1C    INR RESULTS:  Lab Results   Component Value Date    INR 2.6 (A) 03/22/2019    INR 2.1 (A) 02/11/2019    INR 1.99 (H) " 01/07/2016    INR 1.55 (H) 12/31/2015         Miguelito Wells MD, FACC    CC  Miguelito Wells MD  [address] [place] [id number]

## 2019-04-04 NOTE — TELEPHONE ENCOUNTER
Panel Management Review      Patient has the following on his problem list:       Composite cancer screening  Chart review shows that this patient is due/due soon for the following   Summary:    Patient is due/failing the following:   Medicare wellness visit and BP CHECK    Action needed:   Patient needs office visit for Medicare wellness visit, and blood pressure recheck.    Type of outreach:    Sent [employer] message.    Questions for provider review:    None                                                                                                                                    Meghan GRIFFIN LPN       Chart routed to Meghan GRIFFIN LPN .

## 2019-04-04 NOTE — LETTER
4/4/2019    Isra Carrillo MD  [address] [facility] [address]    RE: Uli Champagne       Dear Colleague,    I had the pleasure of seeing Uli Champagne in the [facility].    HISTORY:    Uli Champagne is a pleasant 78-year-old gentleman who was seen 2 months ago in cardiology clinic.  He has a history of chronic atrial fibrillation with slow ventricular response.  He is on chronic warfarin and he has a history of very slow heart rate with a range on previous Holter of 33-80 bpm.  He is completely asymptomatic with that bradycardia.  He also has a history of hypertension, coronary artery disease with previous bypass surgery and stenting of 1 of the grafts more than 10 years ago, and hyperlipidemia.    Today's visit was set up to discuss the results of his Holter monitor.  This study revealed no significant changes compared to his last Holter with a mean heart rate of 46, ranging from a low of 34 bpm to a high of 85.  The study also showed frequent PVCs, 6% of total, up from about 1% on the last Holter.  The patient denies any symptoms of palpitations.  He also denies any exertional chest, arm, neck, or jaw discomfort to suggest ischemia.  He is not aware of his atrial fibrillation and has no sense of palpitations.    Uli also has a history of hypertension and reports that he checks his blood pressure at least twice a day.  He states that his home values are always less than 140 and typically in the 120s or 130s.  His blood pressure in the office today is much higher with an initial reading of 160 systolic and then when rechecked by myself during my visit even higher at 170/94.  The patient reports that his benazepril hydrochlorothiazide is become very expensive costing him $168 for 3-month supply.      ASSESSMENT/PLAN:    1.  Chronic atrial fibrillation using warfarin with slow ventricular response.  On careful questioning he remains asymptomatic.  I discussed  the results of his recent Holter monitor with him in detail today I assured him that results are unchanged from previous study.  2.  Hypertension.  Office blood pressures are high but home blood pressures are normal.  I think this likely represents white coat hypertension.  I have switched him off of his benazepril hydrochlorothiazide 20-25 tablet (although he has over a month supply left and wants to finish it up, which is fine with me), and have instead switched him to losartan 100 mg and chlorthalidone 25 mg.  Once he has switched medications I will have him in for nurse blood pressure visit and I have asked him to bring his machine with him.  If his blood pressure machine is accurate we will count on his home readings and not push antihypertensives further.  If his home machine is inaccurate we should be more aggressive with his blood pressure and consider adding amlodipine 5 mg/day.    Thank you for inviting me to participate in your patient's care.  We spent 25 minutes face-to-face time today, greater than 50% counseling.    No orders of the defined types were placed in this encounter.    Orders Placed This Encounter   Medications     losartan (COZAAR) 100 MG tablet     Sig: Take 1 tablet (100 mg) by mouth daily     Dispense:  90 tablet     Refill:  3     chlorthalidone (HYGROTON) 25 MG tablet     Sig: Take 1 tablet (25 mg) by mouth daily     Dispense:  90 tablet     Refill:  3     There are no discontinued medications.    10 year ASCVD risk: The ASCVD Risk score ([place] ABA Jr., et al., 2013) failed to calculate for the following reasons:    The 2013 ASCVD risk score is only valid for ages 40 to 79    The patient has a prior MI or stroke diagnosis    Encounter Diagnoses   Name Primary?     Chronic a-fib (H)      Essential hypertension, benign Yes       CURRENT MEDICATIONS:  Current Outpatient Medications   Medication Sig Dispense Refill     allopurinol (ZYLOPRIM) 300 MG tablet TAKE 1 TABLET DAILY FOR GOUT 30  tablet 0     ASPIRIN 81 MG OR TABS 1 TABLET DAILY       benazepril-hydrochlorothiazide (LOTENSIN HCT) 20-25 MG tablet TAKE 1 TABLET TWICE A DAY FOR HYPERTENSION 180 tablet 1     chlorthalidone (HYGROTON) 25 MG tablet Take 1 tablet (25 mg) by mouth daily 90 tablet 3     fish oil-omega-3 fatty acids (FISH OIL) 1000 MG capsule Take 1 g by mouth daily.       losartan (COZAAR) 100 MG tablet Take 1 tablet (100 mg) by mouth daily 90 tablet 3     simvastatin (ZOCOR) 40 MG tablet TAKE 1 TABLET AT BEDTIME 30 tablet 0     warfarin (COUMADIN) 5 MG tablet TAKE ONE-HALF (1/2) TABLET ON TUESDAY AND ONE TABLET ON ALL OTHER DAYS OF THE WEEK OR AS DIRECTED BY THE COUMADIN CLINIC 90 tablet 3     nitroglycerin (NITROSTAT) 0.4 MG SL tablet Place 1 tablet (0.4 mg) under the tongue See Admin Instructions for chest pain (Patient not taking: Reported on 2/4/2019) 25 tablet 1       ALLERGIES     Allergies   Allergen Reactions     No Known Drug Allergies        PAST MEDICAL HISTORY:  Past Medical History:   Diagnosis Date     Atrial fibrillation (H)      Coronary atherosclerosis of unspecified type of vessel, native or graft      Hernia of unspecified site of abdominal cavity without mention of obstruction or gangrene     umbilical hernia; s/p repair 3/1998     History of CVA (cerebrovascular accident) 3/16/2018     Hordeolum externum left upper eyelid 1/21/2016     Malignant neoplasm of prostate (H) 07/14/08    Admit.Discharged 07/17/08     Other and unspecified hyperlipidemia      Postsurgical aortocoronary bypass status 1992     Respiratory complications      Unspecified essential hypertension      Unspecified tinnitus     left ear       PAST SURGICAL HISTORY:  Past Surgical History:   Procedure Laterality Date     C CABG, VEIN, THREE  1992     C NONSPECIFIC PROCEDURE  1987    Bone fusion in neck     C REMV PROSTATE,RETROPUB,RAD,LTD NODES  7/14/2008    Radical retropubic prostatectomy and pelvic lymph node dissection.     HC REMOVAL OF  TONSILS,12+ Y/O  1969    Tonsils 10+y.o.     HEMILAMINECTOMY, DISCECTOMY LUMBAR TWO LEVELS, COMBINED Left 2015    Procedure: COMBINED HEMILAMINECTOMY, DISCECTOMY LUMBAR TWO LEVELS;  Surgeon: Jung Finley MD;  Location: [facility]     STENT, CORONARY, ALEAH  2002    x5       FAMILY HISTORY:  Family History   Problem Relation Age of Onset     Arthritis Father      Heart Disease Father      Hypertension Brother      Cancer Brother         liver     Heart Disease Sister      Heart Disease Brother        SOCIAL HISTORY:  Social History     Socioeconomic History     Marital status:      Spouse name: Not on file     Number of children: Not on file     Years of education: Not on file     Highest education level: Not on file   Occupational History     Not on file   Social Needs     Financial resource strain: Not on file     Food insecurity:     Worry: Not on file     Inability: Not on file     Transportation needs:     Medical: Not on file     Non-medical: Not on file   Tobacco Use     Smoking status: Former Smoker     Packs/day: 0.50     Years: 2.00     Pack years: 1.00     Types: Cigarettes     Last attempt to quit: 1960     Years since quittin.2     Smokeless tobacco: Never Used   Substance and Sexual Activity     Alcohol use: Yes     Comment: socially     Drug use: No     Sexual activity: No   Lifestyle     Physical activity:     Days per week: Not on file     Minutes per session: Not on file     Stress: Not on file   Relationships     Social connections:     Talks on phone: Not on file     Gets together: Not on file     Attends [employer] service: Not on file     Active member of club or organization: Not on file     Attends meetings of clubs or organizations: Not on file     Relationship status: Not on file     Intimate partner violence:     Fear of current or ex partner: Not on file     Emotionally abused: Not on file     Physically abused: Not on file     Forced sexual activity: Not on file  "  Other Topics Concern     Parent/sibling w/ CABG, MI or angioplasty before 65F 55M? No   Social History Narrative     Not on file       Review of Systems:  Skin:  Negative     Eyes:  Positive for glasses  ENT:       Respiratory:    shortness of breath;dyspnea on exertion;cough;wheezing  Cardiovascular:  Negative for;palpitations;chest pain;lightheadedness;dizziness;fatigue Positive for;edema  Gastroenterology: Negative    Genitourinary:  Negative    Musculoskeletal:  Positive for arthritis;back pain  Neurologic:  Negative    Psychiatric:  Negative    Heme/Lymph/Imm:  Negative    Endocrine:  Negative      Physical Exam:  Vitals: /90 (BP Location: Right arm, Patient Position: Fowlers, Cuff Size: Adult Regular)   Pulse 52   Ht 1.708 m (5' 7.25\")   Wt 102.3 kg (225 lb 9.6 oz)   SpO2 97%   BMI 35.07 kg/m       Constitutional:           Skin:           Head:           Eyes:           ENT:           Neck:           Chest:           Cardiac:                    Abdomen:           Vascular:                                        Extremities and Back:           Neurological:             Recent Lab Results:  LIPID RESULTS:  Lab Results   Component Value Date    CHOL 115 05/10/2018    HDL 37 (L) 05/10/2018    LDL 58.0 05/10/2018    TRIG 101 05/10/2018    CHOLHDLRATIO 2.5 12/16/2014       LIVER ENZYME RESULTS:  Lab Results   Component Value Date    AST 35 05/10/2018    ALT 37 05/10/2018       CBC RESULTS:  Lab Results   Component Value Date    WBC 7.6 11/27/2015    RBC 5.23 11/27/2015    HGB 14.7 12/29/2015    HCT 47.3 11/27/2015    MCV 90 11/27/2015    MCH 31.9 11/27/2015    MCHC 35.3 11/27/2015    RDW 13.7 11/27/2015     11/27/2015       BMP RESULTS:  Lab Results   Component Value Date     08/31/2017    POTASSIUM 3.7 05/10/2018    CHLORIDE 107 08/31/2017    CO2 25 08/31/2017    ANIONGAP 10 08/31/2017     (H) 05/10/2018    BUN 16 08/31/2017    CR 0.9 05/10/2018    GFRESTIMATED >60 05/10/2018    " GFRESTBLACK >90 04/30/2018    ANKIT 8.8 08/31/2017        A1C RESULTS:  No results found for: A1C    INR RESULTS:  Lab Results   Component Value Date    INR 2.6 (A) 03/22/2019    INR 2.1 (A) 02/11/2019    INR 1.99 (H) 01/07/2016    INR 1.55 (H) 12/31/2015         Thank you for allowing me to participate in the care of your patient.    Sincerely,     Miguelito Wells MD     [facility]

## 2019-04-04 NOTE — LETTER
4/4/2019    Isra Carrillo MD  [address] [facility] [address]    RE: Uli Champagne       Dear Colleague,    I had the pleasure of seeing Uli Champagne in the [facility].    HISTORY:    Uli Champagne is a pleasant 78-year-old gentleman who was seen 2 months ago in cardiology clinic.  He has a history of chronic atrial fibrillation with slow ventricular response.  He is on chronic warfarin and he has a history of very slow heart rate with a range on previous Holter of 33-80 bpm.  He is completely asymptomatic with that bradycardia.  He also has a history of hypertension, coronary artery disease with previous bypass surgery and stenting of 1 of the grafts more than 10 years ago, and hyperlipidemia.    Today's visit was set up to discuss the results of his Holter monitor.  This study revealed no significant changes compared to his last Holter with a mean heart rate of 46, ranging from a low of 34 bpm to a high of 85.  The study also showed frequent PVCs, 6% of total, up from about 1% on the last Holter.  The patient denies any symptoms of palpitations.  He also denies any exertional chest, arm, neck, or jaw discomfort to suggest ischemia.  He is not aware of his atrial fibrillation and has no sense of palpitations.    Uli also has a history of hypertension and reports that he checks his blood pressure at least twice a day.  He states that his home values are always less than 140 and typically in the 120s or 130s.  His blood pressure in the office today is much higher with an initial reading of 160 systolic and then when rechecked by myself during my visit even higher at 170/94.  The patient reports that his benazepril hydrochlorothiazide is become very expensive costing him $168 for 3-month supply.      ASSESSMENT/PLAN:    1.  Chronic atrial fibrillation using warfarin with slow ventricular response.  On careful questioning he remains asymptomatic.  I discussed  the results of his recent Holter monitor with him in detail today I assured him that results are unchanged from previous study.  2.  Hypertension.  Office blood pressures are high but home blood pressures are normal.  I think this likely represents white coat hypertension.  I have switched him off of his benazepril hydrochlorothiazide 20-25 tablet (although he has over a month supply left and wants to finish it up, which is fine with me), and have instead switched him to losartan 100 mg and chlorthalidone 25 mg.  Once he has switched medications I will have him in for nurse blood pressure visit and I have asked him to bring his machine with him.  If his blood pressure machine is accurate we will count on his home readings and not push antihypertensives further.  If his home machine is inaccurate we should be more aggressive with his blood pressure and consider adding amlodipine 5 mg/day.    Thank you for inviting me to participate in your patient's care.  We spent 25 minutes face-to-face time today, greater than 50% counseling.    No orders of the defined types were placed in this encounter.    Orders Placed This Encounter   Medications     losartan (COZAAR) 100 MG tablet     Sig: Take 1 tablet (100 mg) by mouth daily     Dispense:  90 tablet     Refill:  3     chlorthalidone (HYGROTON) 25 MG tablet     Sig: Take 1 tablet (25 mg) by mouth daily     Dispense:  90 tablet     Refill:  3     There are no discontinued medications.    10 year ASCVD risk: The ASCVD Risk score ([place] ABA Jr., et al., 2013) failed to calculate for the following reasons:    The 2013 ASCVD risk score is only valid for ages 40 to 79    The patient has a prior MI or stroke diagnosis    Encounter Diagnoses   Name Primary?     Chronic a-fib (H)      Essential hypertension, benign Yes       CURRENT MEDICATIONS:  Current Outpatient Medications   Medication Sig Dispense Refill     allopurinol (ZYLOPRIM) 300 MG tablet TAKE 1 TABLET DAILY FOR GOUT 30  tablet 0     ASPIRIN 81 MG OR TABS 1 TABLET DAILY       benazepril-hydrochlorothiazide (LOTENSIN HCT) 20-25 MG tablet TAKE 1 TABLET TWICE A DAY FOR HYPERTENSION 180 tablet 1     chlorthalidone (HYGROTON) 25 MG tablet Take 1 tablet (25 mg) by mouth daily 90 tablet 3     fish oil-omega-3 fatty acids (FISH OIL) 1000 MG capsule Take 1 g by mouth daily.       losartan (COZAAR) 100 MG tablet Take 1 tablet (100 mg) by mouth daily 90 tablet 3     simvastatin (ZOCOR) 40 MG tablet TAKE 1 TABLET AT BEDTIME 30 tablet 0     warfarin (COUMADIN) 5 MG tablet TAKE ONE-HALF (1/2) TABLET ON TUESDAY AND ONE TABLET ON ALL OTHER DAYS OF THE WEEK OR AS DIRECTED BY THE COUMADIN CLINIC 90 tablet 3     nitroglycerin (NITROSTAT) 0.4 MG SL tablet Place 1 tablet (0.4 mg) under the tongue See Admin Instructions for chest pain (Patient not taking: Reported on 2/4/2019) 25 tablet 1       ALLERGIES     Allergies   Allergen Reactions     No Known Drug Allergies        PAST MEDICAL HISTORY:  Past Medical History:   Diagnosis Date     Atrial fibrillation (H)      Coronary atherosclerosis of unspecified type of vessel, native or graft      Hernia of unspecified site of abdominal cavity without mention of obstruction or gangrene     umbilical hernia; s/p repair 3/1998     History of CVA (cerebrovascular accident) 3/16/2018     Hordeolum externum left upper eyelid 1/21/2016     Malignant neoplasm of prostate (H) 07/14/08    Admit.Discharged 07/17/08     Other and unspecified hyperlipidemia      Postsurgical aortocoronary bypass status 1992     Respiratory complications      Unspecified essential hypertension      Unspecified tinnitus     left ear       PAST SURGICAL HISTORY:  Past Surgical History:   Procedure Laterality Date     C CABG, VEIN, THREE  1992     C NONSPECIFIC PROCEDURE  1987    Bone fusion in neck     C REMV PROSTATE,RETROPUB,RAD,LTD NODES  7/14/2008    Radical retropubic prostatectomy and pelvic lymph node dissection.     HC REMOVAL OF  TONSILS,12+ Y/O  1969    Tonsils 10+y.o.     HEMILAMINECTOMY, DISCECTOMY LUMBAR TWO LEVELS, COMBINED Left 2015    Procedure: COMBINED HEMILAMINECTOMY, DISCECTOMY LUMBAR TWO LEVELS;  Surgeon: Jung Finley MD;  Location: [facility]     STENT, CORONARY, LAEAH  2002    x5       FAMILY HISTORY:  Family History   Problem Relation Age of Onset     Arthritis Father      Heart Disease Father      Hypertension Brother      Cancer Brother         liver     Heart Disease Sister      Heart Disease Brother        SOCIAL HISTORY:  Social History     Socioeconomic History     Marital status:      Spouse name: Not on file     Number of children: Not on file     Years of education: Not on file     Highest education level: Not on file   Occupational History     Not on file   Social Needs     Financial resource strain: Not on file     Food insecurity:     Worry: Not on file     Inability: Not on file     Transportation needs:     Medical: Not on file     Non-medical: Not on file   Tobacco Use     Smoking status: Former Smoker     Packs/day: 0.50     Years: 2.00     Pack years: 1.00     Types: Cigarettes     Last attempt to quit: 1960     Years since quittin.2     Smokeless tobacco: Never Used   Substance and Sexual Activity     Alcohol use: Yes     Comment: socially     Drug use: No     Sexual activity: No   Lifestyle     Physical activity:     Days per week: Not on file     Minutes per session: Not on file     Stress: Not on file   Relationships     Social connections:     Talks on phone: Not on file     Gets together: Not on file     Attends Christianity service: Not on file     Active member of club or organization: Not on file     Attends meetings of clubs or organizations: Not on file     Relationship status: Not on file     Intimate partner violence:     Fear of current or ex partner: Not on file     Emotionally abused: Not on file     Physically abused: Not on file     Forced sexual activity: Not on file  "  Other Topics Concern     Parent/sibling w/ CABG, MI or angioplasty before 65F 55M? No   Social History Narrative     Not on file       Review of Systems:  Skin:  Negative     Eyes:  Positive for glasses  ENT:       Respiratory:    shortness of breath;dyspnea on exertion;cough;wheezing  Cardiovascular:  Negative for;palpitations;chest pain;lightheadedness;dizziness;fatigue Positive for;edema  Gastroenterology: Negative    Genitourinary:  Negative    Musculoskeletal:  Positive for arthritis;back pain  Neurologic:  Negative    Psychiatric:  Negative    Heme/Lymph/Imm:  Negative    Endocrine:  Negative      Physical Exam:  Vitals: /90 (BP Location: Right arm, Patient Position: Fowlers, Cuff Size: Adult Regular)   Pulse 52   Ht 1.708 m (5' 7.25\")   Wt 102.3 kg (225 lb 9.6 oz)   SpO2 97%   BMI 35.07 kg/m       Constitutional:           Skin:           Head:           Eyes:           ENT:           Neck:           Chest:           Cardiac:                    Abdomen:           Vascular:                                        Extremities and Back:           Neurological:             Recent Lab Results:  LIPID RESULTS:  Lab Results   Component Value Date    CHOL 115 05/10/2018    HDL 37 (L) 05/10/2018    LDL 58.0 05/10/2018    TRIG 101 05/10/2018    CHOLHDLRATIO 2.5 12/16/2014       LIVER ENZYME RESULTS:  Lab Results   Component Value Date    AST 35 05/10/2018    ALT 37 05/10/2018       CBC RESULTS:  Lab Results   Component Value Date    WBC 7.6 11/27/2015    RBC 5.23 11/27/2015    HGB 14.7 12/29/2015    HCT 47.3 11/27/2015    MCV 90 11/27/2015    MCH 31.9 11/27/2015    MCHC 35.3 11/27/2015    RDW 13.7 11/27/2015     11/27/2015       BMP RESULTS:  Lab Results   Component Value Date     08/31/2017    POTASSIUM 3.7 05/10/2018    CHLORIDE 107 08/31/2017    CO2 25 08/31/2017    ANIONGAP 10 08/31/2017     (H) 05/10/2018    BUN 16 08/31/2017    CR 0.9 05/10/2018    GFRESTIMATED >60 05/10/2018    " GFRESTBLACK >90 04/30/2018    ANKIT 8.8 08/31/2017        A1C RESULTS:  No results found for: A1C    INR RESULTS:  Lab Results   Component Value Date    INR 2.6 (A) 03/22/2019    INR 2.1 (A) 02/11/2019    INR 1.99 (H) 01/07/2016    INR 1.55 (H) 12/31/2015         Miguelito Wells MD, FACC    CC  Miguelito Wells MD  [address] [place] [id number]                    Thank you for allowing me to participate in the care of your patient.      Sincerely,     Miguelito Wells MD     [facility]    cc:   Miguelito Wlels MD  [address] [place] [id number]

## 2019-04-12 ENCOUNTER — OFFICE VISIT (OUTPATIENT)
Dept: FAMILY MEDICINE | Facility: OTHER | Age: 79
End: 2019-04-12
Payer: COMMERCIAL

## 2019-04-12 VITALS
TEMPERATURE: 97.6 F | OXYGEN SATURATION: 97 % | DIASTOLIC BLOOD PRESSURE: 68 MMHG | SYSTOLIC BLOOD PRESSURE: 134 MMHG | WEIGHT: 221.19 LBS | BODY MASS INDEX: 34.39 KG/M2 | RESPIRATION RATE: 22 BRPM | HEART RATE: 40 BPM

## 2019-04-12 DIAGNOSIS — I73.9 PERIPHERAL VASCULAR DISEASE (H): ICD-10-CM

## 2019-04-12 DIAGNOSIS — C61 PROSTATE CANCER (H): ICD-10-CM

## 2019-04-12 DIAGNOSIS — I10 ESSENTIAL HYPERTENSION, BENIGN: Primary | ICD-10-CM

## 2019-04-12 DIAGNOSIS — Z23 NEED FOR PROPHYLACTIC VACCINATION AND INOCULATION AGAINST INFLUENZA: ICD-10-CM

## 2019-04-12 DIAGNOSIS — E78.5 HYPERLIPIDEMIA LDL GOAL <100: ICD-10-CM

## 2019-04-12 DIAGNOSIS — M1A.09X0 IDIOPATHIC CHRONIC GOUT OF MULTIPLE SITES WITHOUT TOPHUS: ICD-10-CM

## 2019-04-12 LAB
ALT SERPL W P-5'-P-CCNC: 26 U/L (ref 0–70)
ANION GAP SERPL CALCULATED.3IONS-SCNC: 11 MMOL/L (ref 3–14)
BUN SERPL-MCNC: 22 MG/DL (ref 7–30)
CALCIUM SERPL-MCNC: 9.3 MG/DL (ref 8.5–10.1)
CHLORIDE SERPL-SCNC: 104 MMOL/L (ref 94–109)
CHOLEST SERPL-MCNC: 96 MG/DL
CO2 SERPL-SCNC: 25 MMOL/L (ref 20–32)
CREAT SERPL-MCNC: 0.89 MG/DL (ref 0.66–1.25)
GFR SERPL CREATININE-BSD FRML MDRD: 81 ML/MIN/{1.73_M2}
GLUCOSE SERPL-MCNC: 98 MG/DL (ref 70–99)
HDLC SERPL-MCNC: 48 MG/DL
LDLC SERPL CALC-MCNC: 33 MG/DL
NONHDLC SERPL-MCNC: 48 MG/DL
POTASSIUM SERPL-SCNC: 4 MMOL/L (ref 3.4–5.3)
SODIUM SERPL-SCNC: 140 MMOL/L (ref 133–144)
TRIGL SERPL-MCNC: 74 MG/DL

## 2019-04-12 PROCEDURE — 90714 TD VACC NO PRESV 7 YRS+ IM: CPT | Performed by: FAMILY MEDICINE

## 2019-04-12 PROCEDURE — 90471 IMMUNIZATION ADMIN: CPT | Performed by: FAMILY MEDICINE

## 2019-04-12 PROCEDURE — 80061 LIPID PANEL: CPT | Performed by: FAMILY MEDICINE

## 2019-04-12 PROCEDURE — 99214 OFFICE O/P EST MOD 30 MIN: CPT | Mod: 25 | Performed by: FAMILY MEDICINE

## 2019-04-12 PROCEDURE — 80048 BASIC METABOLIC PNL TOTAL CA: CPT | Performed by: FAMILY MEDICINE

## 2019-04-12 PROCEDURE — 84460 ALANINE AMINO (ALT) (SGPT): CPT | Performed by: FAMILY MEDICINE

## 2019-04-12 PROCEDURE — 36415 COLL VENOUS BLD VENIPUNCTURE: CPT | Performed by: FAMILY MEDICINE

## 2019-04-12 RX ORDER — SIMVASTATIN 40 MG
40 TABLET ORAL AT BEDTIME
Qty: 90 TABLET | Refills: 3 | Status: SHIPPED | OUTPATIENT
Start: 2019-04-12 | End: 2019-04-12

## 2019-04-12 RX ORDER — ALLOPURINOL 300 MG/1
TABLET ORAL
Qty: 90 TABLET | Refills: 3 | Status: SHIPPED | OUTPATIENT
Start: 2019-04-12 | End: 2020-05-14

## 2019-04-12 ASSESSMENT — PAIN SCALES - GENERAL: PAINLEVEL: NO PAIN (0)

## 2019-04-12 NOTE — PROGRESS NOTES
SUBJECTIVE:   Uli Champagne is a 78 year old male who presents to clinic today for the following   health issues:        Hyperlipidemia Follow-Up      Rate your low fat/cholesterol diet?: good    Taking statin?  Yes, no muscle aches from statin    Other lipid medications/supplements?:  Fish oil/Omega 3, dose 1000 without side effects      Amount of exercise or physical activity: 2-3 days/week for an average of less than 15 minutes    Problems taking medications regularly: No    Medication side effects: none    Diet: regular (no restrictions), low salt and low fat/cholesterol      Medication Followup of Allopurinol    Taking Medication as prescribed: yes    Side Effects:  None    Medication Helping Symptoms:  yes           Additional history:  He saw Cardiology 1 week ago and BP medication changes suggested due to cost. Those cost changes may not actually be realized. He will continue with Benzapril HCZT for 1 month and then have BP recheck with nurse only. If at goal will continue Benzapril hydrochlorothiazide.    Reviewed  and updated as needed this visit by clinical staff  Tobacco  Allergies  Meds  Med Hx  Surg Hx  Fam Hx  Soc Hx        Reviewed and updated as needed this visit by Provider  Tobacco  Allergies  Meds  Problems  Med Hx  Surg Hx  Fam Hx         Patient Active Problem List   Diagnosis     Cardiac dysrhythmia     Essential hypertension, benign     Neurogenic bladder     Prostate cancer (H)     Permanent atrial fibrillation (H)     Chronic idiopathic gout of multiple sites     HYPERLIPIDEMIA LDL GOAL <100     CAD (coronary artery disease)     Advanced directives, counseling/discussion     Health Care Home     Stye     Non morbid obesity due to excess calories     Lumbar spinal stenosis     S/P lumbar laminectomy     Chronic a-fib (H)     Essential hypertension     Hordeolum externum left upper eyelid     Long-term (current) use of anticoagulants [Z79.01]     Tear of medial meniscus  of left knee, initial encounter     Primary osteoarthritis of left knee     Bradycardia     Abdominal aortic aneurysm (AAA) without rupture (H)     Adenocarcinoma of prostate (H)     Postsurgical aortocoronary bypass status     History of CVA (cerebrovascular accident)     Bilateral carpal tunnel syndrome     Past Surgical History:   Procedure Laterality Date     C CABG, VEIN, THREE       C NONSPECIFIC PROCEDURE      Bone fusion in neck     C REMV PROSTATE,RETROPUB,RAD,LTD NODES  2008    Radical retropubic prostatectomy and pelvic lymph node dissection.     HC REMOVAL OF TONSILS,10+ Y/O      Tonsils 10+y.o.     HEMILAMINECTOMY, DISCECTOMY LUMBAR TWO LEVELS, COMBINED Left 2015    Procedure: COMBINED HEMILAMINECTOMY, DISCECTOMY LUMBAR TWO LEVELS;  Surgeon: Jung Finley MD;  Location: [facility]     STENT, CORONARY, ALEAH  2002    x5       Social History     Tobacco Use     Smoking status: Former Smoker     Packs/day: 0.50     Years: 2.00     Pack years: 1.00     Types: Cigarettes     Last attempt to quit: 1960     Years since quittin.3     Smokeless tobacco: Never Used   Substance Use Topics     Alcohol use: Yes     Comment: socially     Family History   Problem Relation Age of Onset     Arthritis Father      Heart Disease Father      Hypertension Brother      Cancer Brother         liver     Heart Disease Sister      Heart Disease Brother          Current Outpatient Medications   Medication Sig Dispense Refill     allopurinol (ZYLOPRIM) 300 MG tablet TAKE 1 TABLET DAILY FOR GOUT 30 tablet 0     ASPIRIN 81 MG OR TABS 1 TABLET DAILY       benazepril-hydrochlorothiazide (LOTENSIN HCT) 20-25 MG tablet TAKE 1 TABLET TWICE A DAY FOR HYPERTENSION 180 tablet 1     chlorthalidone (HYGROTON) 25 MG tablet Take 1 tablet (25 mg) by mouth daily 90 tablet 3     fish oil-omega-3 fatty acids (FISH OIL) 1000 MG capsule Take 1 g by mouth daily.       losartan (COZAAR) 100 MG tablet Take 1 tablet (100  mg) by mouth daily 90 tablet 3     simvastatin (ZOCOR) 40 MG tablet TAKE 1 TABLET AT BEDTIME 30 tablet 0     warfarin (COUMADIN) 5 MG tablet TAKE ONE-HALF (1/2) TABLET ON TUESDAY AND ONE TABLET ON ALL OTHER DAYS OF THE WEEK OR AS DIRECTED BY THE COUMADIN CLINIC 90 tablet 3     nitroglycerin (NITROSTAT) 0.4 MG SL tablet Place 1 tablet (0.4 mg) under the tongue See Admin Instructions for chest pain (Patient not taking: Reported on 2/4/2019) 25 tablet 1     Allergies   Allergen Reactions     No Known Drug Allergies      Recent Labs   Lab Test 05/10/18 04/30/18  1244 08/31/17  1323 05/10/16  05/20/15   LDL 58.0  --  39 57.0  --  49.0  49   HDL 37*  --  43 43  --  35  35   TRIG 101  --  92 104  --  120  120   ALT 37  --  27 28  --  28  28   CR 0.9  --  0.92 1.0   < > 0.9  0.9   GFRESTIMATED >60 81 80 >60   < > >60   GFRESTBLACK  --  >90 >90  --    < >  --    POTASSIUM 3.7  --  4.0 3.9   < > 4.2  4.2   TSH  --   --  2.31  --   --  2.50    < > = values in this interval not displayed.      BP Readings from Last 3 Encounters:   04/12/19 134/68   04/04/19 (!) 170/94   02/04/19 146/82    Wt Readings from Last 3 Encounters:   04/12/19 100.3 kg (221 lb 3 oz)   04/04/19 102.3 kg (225 lb 9.6 oz)   02/04/19 101.1 kg (222 lb 14.4 oz)                  Labs reviewed in EPIC    ROS:  Constitutional, HEENT, cardiovascular, pulmonary, gi and gu systems are negative, except as otherwise noted.    OBJECTIVE:     /68 (BP Location: Right arm, Patient Position: Chair, Cuff Size: Adult Regular)   Pulse (!) 40   Temp 97.6  F (36.4  C) (Temporal)   Resp 22   Wt 100.3 kg (221 lb 3 oz)   SpO2 97%   BMI 34.39 kg/m    Body mass index is 34.39 kg/m .  GENERAL: healthy, alert and no distress  NECK: no adenopathy, no asymmetry, masses, or scars and thyroid normal to palpation  RESP: lungs clear to auscultation - no rales, rhonchi or wheezes  CV: bradycardia, irregularly irregular rhythm, normal S1 S2, no S3 or S4, no murmur, click or  rub, peripheral pulses strong and no peripheral edema  ABDOMEN: soft, nontender, no hepatosplenomegaly, no masses and bowel sounds normal  MS: no gross musculoskeletal defects noted, no edema    Diagnostic Test Results:  No results found for this or any previous visit (from the past 24 hour(s)).    ASSESSMENT/PLAN:     Hypertension; controlled   Associated with the following complications:    Heart Disease   Plan:  No changes in the patient's current treatment plan  Labs:   BMP, Lipid and ALT        1. Hyperlipidemia LDL goal <100  Chronic controlled. Fasting lipids today. Continue current cares.   - Lipid panel reflex to direct LDL Fasting  - simvastatin (ZOCOR) 40 MG tablet; Take 1 tablet (40 mg) by mouth At Bedtime  Dispense: 90 tablet; Refill: 3  - ALT    2. Idiopathic chronic gout of multiple sites without tophus  Chronic stable. Recheck renal profile. The current medical regimen is effective;  continue present plan and medications.   - Basic metabolic panel  (Ca, Cl, CO2, Creat, Gluc, K, Na, BUN)  - allopurinol (ZYLOPRIM) 300 MG tablet; TAKE 1 TABLET DAILY FOR GOUT  Dispense: 90 tablet; Refill: 3      4. Need for prophylactic vaccination and inoculation against influenza  Needs Td today. Prior TDap in 2008. Will get Shingrix at [facility]  - TD PRESERV FREE, IM (7+ YRS)        5. Peripheral vascular disease (H)  Chronic stable. The current medical regimen is effective;  continue present plan and medications.     6. Prostate cancer (H)  PSA   Date Value Ref Range Status   08/31/2017 <0.01 0 - 4 ug/L Final     Comment:     Assay Method:  Chemiluminescence using Siemens Vista analyzer     status post RRPP. Not detectible. Follow up with Urology or with PSA recheck in 6 months.  Work on weight loss  Regular exercise  Return in about 6 months (around 10/12/2019) for Medicare Annual Well Visit.     Isra Carrillo MD  [facility]

## 2019-04-12 NOTE — NURSING NOTE
Screening Questionnaire for Adult Immunization    Are you sick today?   No   Do you have allergies to medications, food, a vaccine component or latex?   No   Have you ever had a serious reaction after receiving a vaccination?   No   Do you have a long-term health problem with heart disease, lung disease, asthma, kidney disease, metabolic disease (e.g. diabetes), anemia, or other blood disorder?   No   Do you have cancer, leukemia, HIV/AIDS, or any other immune system problem?   No   In the past 3 months, have you taken medications that affect  your immune system, such as prednisone, other steroids, or anticancer drugs; drugs for the treatment of rheumatoid arthritis, Crohn s disease, or psoriasis; or have you had radiation treatments?   No   Have you had a seizure, or a brain or other nervous system problem?   No   During the past year, have you received a transfusion of blood or blood     products, or been given immune (gamma) globulin or antiviral drug?   No   For women: Are you pregnant or is there a chance you could become        pregnant during the next month?   No   Have you received any vaccinations in the past 4 weeks?   No     Immunization questionnaire answers were all negative.       Patient instructed to remain in clinic for 15 minutes afterwards, and to report any adverse reaction to me immediately.       Screening performed by Garima Burgos on 4/12/2019 at 9:36 AM.

## 2019-04-18 ENCOUNTER — OFFICE VISIT (OUTPATIENT)
Dept: ORTHOPEDICS | Facility: CLINIC | Age: 79
End: 2019-04-18
Payer: COMMERCIAL

## 2019-04-18 ENCOUNTER — TELEPHONE (OUTPATIENT)
Dept: ORTHOPEDICS | Facility: OTHER | Age: 79
End: 2019-04-18

## 2019-04-18 VITALS
HEIGHT: 67 IN | DIASTOLIC BLOOD PRESSURE: 75 MMHG | WEIGHT: 221 LBS | SYSTOLIC BLOOD PRESSURE: 162 MMHG | BODY MASS INDEX: 34.69 KG/M2 | HEART RATE: 45 BPM

## 2019-04-18 DIAGNOSIS — G56.01 RIGHT CARPAL TUNNEL SYNDROME: Primary | ICD-10-CM

## 2019-04-18 PROCEDURE — 99213 OFFICE O/P EST LOW 20 MIN: CPT | Performed by: ORTHOPAEDIC SURGERY

## 2019-04-18 ASSESSMENT — PAIN SCALES - GENERAL: PAINLEVEL: MILD PAIN (3)

## 2019-04-18 ASSESSMENT — MIFFLIN-ST. JEOR: SCORE: 1675.04

## 2019-04-18 NOTE — Clinical Note
Lawson Dhaliwal, can you help me come up with a plan for Omega coumadin. Planning carpal tunnel release under local. Need INR 1.5 or less. Would restart that day. Thank you.

## 2019-04-18 NOTE — LETTER
"    4/18/2019         RE: Uli Champagne  [address] [facility] [address]        Dear Colleague,    Thank you for referring your patient, Uli Champagne, to the [facility]. Please see a copy of my visit note below.    Office Visit-Follow up    Chief Complaint: Uli Champagne is a 78 year old male who is being seen for   Chief Complaint   Patient presents with     RECHECK      bilateral carpal tunnel syndrome        History of Present Illness:   Today's visit:  Returns for bilateral hands but right is much worse than left.  Did hand therapy which has improved his symptoms.  Although feels like his right hand is starting to come back.  Complains of numbness and tingling particularly to the index.  It comes and goes.  Also painfully sore in the morning with numbness and tingling.  Small finger is preserved.  He is continue to use his cockup wrist splints at night.  He is on Coumadin.    REVIEW OF SYSTEMS  General: negative for, night sweats, dizziness, fatigue  Resp: No shortness of breath and no cough  CV: negative for chest pain, syncope or near-syncope  GI: negative for nausea, vomiting and diarrhea  : negative for dysuria and hematuria  Musculoskeletal: as above  Neurologic: negative for syncope   Hematologic: negative for bleeding disorder    Physical Exam:  Vitals: /75   Pulse (!) 45   Ht 1.708 m (5' 7.25\")   Wt 100.2 kg (221 lb)   BMI 34.36 kg/m     BMI= Body mass index is 34.36 kg/m .  Constitutional: healthy, alert and no acute distress   Psychiatric: mentation appears normal and affect normal/bright  NEURO: no focal deficits  RESP: Normal with easy respirations and no use of accessory muscles to breathe, no audible wheezing or retractions  CV: RUE:  no edema         Regular rate and rhythm by palpation  SKIN: No erythema, rashes, excoriation, or breakdown. No evidence of infection.   JOINT/EXTREMITIES:right and and wrist: No gross deformity or atrophy.  Fingers " are warm and dry with good capillary refill.  He has full flexion and extension.  Negative Tinel's.  Positive carpal compression.  GAIT: not tested             Diagnostic Modalities:  None today.  Independent visualization of the images was performed.      Impression: right carpal  tunnel syndrome    Plan:  All of the above pertinent physical exam and imaging modalities findings was reviewed with Uli.    Options discussed.  He denied symptoms for approximately 2-1/2 years.  Is been refractory to rest, activity modification, cockup response, and hand therapy.  Given his continued symptoms we discussed a right carpal tunnel release under local anesthetic.    He is on Coumadin he will need to come off for surgery.  We can resume that day.    The risks, benefits, comp occasions discussed risks including bleeding, infection, neurovascular injury, numbness, failure to relieve all symptoms discussed.  Outpatient procedure.    Return to clinic 10 days post-operatively. , or sooner as needed for changes.  Re-x-ray on return: No    Isaiah Ogden D.O.          Again, thank you for allowing me to participate in the care of your patient.        Sincerely,        Yovani Ogden, DO

## 2019-04-18 NOTE — TELEPHONE ENCOUNTER
Type of surgery: Right carpal tunnel release  Location of surgery: [facility]   Date of surgery: 5/17/19  Surgeon: Dr. Ogden  Pre-Op Appt Date: NA  Post-Op Appt Date: 5/29/19   Packet sent out: Surgery packet was given to patient in clinic.   Pre-cert/Authorization completed: NA  Date: 4/18/2019    Renu Lester  Surgery Scheduler

## 2019-04-18 NOTE — PROGRESS NOTES
"Office Visit-Follow up    Chief Complaint: Uli Champagne is a 78 year old male who is being seen for   Chief Complaint   Patient presents with     RECHECK      bilateral carpal tunnel syndrome        History of Present Illness:   Today's visit:  Returns for bilateral hands but right is much worse than left.  Did hand therapy which has improved his symptoms.  Although feels like his right hand is starting to come back.  Complains of numbness and tingling particularly to the index.  It comes and goes.  Also painfully sore in the morning with numbness and tingling.  Small finger is preserved.  He is continue to use his cockup wrist splints at night.  He is on Coumadin.    REVIEW OF SYSTEMS  General: negative for, night sweats, dizziness, fatigue  Resp: No shortness of breath and no cough  CV: negative for chest pain, syncope or near-syncope  GI: negative for nausea, vomiting and diarrhea  : negative for dysuria and hematuria  Musculoskeletal: as above  Neurologic: negative for syncope   Hematologic: negative for bleeding disorder    Physical Exam:  Vitals: /75   Pulse (!) 45   Ht 1.708 m (5' 7.25\")   Wt 100.2 kg (221 lb)   BMI 34.36 kg/m    BMI= Body mass index is 34.36 kg/m .  Constitutional: healthy, alert and no acute distress   Psychiatric: mentation appears normal and affect normal/bright  NEURO: no focal deficits  RESP: Normal with easy respirations and no use of accessory muscles to breathe, no audible wheezing or retractions  CV: RUE:  no edema         Regular rate and rhythm by palpation  SKIN: No erythema, rashes, excoriation, or breakdown. No evidence of infection.   JOINT/EXTREMITIES:right and and wrist: No gross deformity or atrophy.  Fingers are warm and dry with good capillary refill.  He has full flexion and extension.  Negative Tinel's.  Positive carpal compression.  GAIT: not tested             Diagnostic Modalities:  None today.  Independent visualization of the images was " performed.      Impression: right carpal  tunnel syndrome    Plan:  All of the above pertinent physical exam and imaging modalities findings was reviewed with Uli.    Options discussed.  He denied symptoms for approximately 2-1/2 years.  Is been refractory to rest, activity modification, cockup response, and hand therapy.  Given his continued symptoms we discussed a right carpal tunnel release under local anesthetic.    He is on Coumadin he will need to come off for surgery.  We can resume that day.    The risks, benefits, comp occasions discussed risks including bleeding, infection, neurovascular injury, numbness, failure to relieve all symptoms discussed.  Outpatient procedure.    Return to clinic 10 days post-operatively. , or sooner as needed for changes.  Re-x-ray on return: No    Isaiah Ogden D.O.

## 2019-05-02 ENCOUNTER — ANTICOAGULATION THERAPY VISIT (OUTPATIENT)
Dept: ANTICOAGULATION | Facility: OTHER | Age: 79
End: 2019-05-02
Payer: COMMERCIAL

## 2019-05-02 ENCOUNTER — TELEPHONE (OUTPATIENT)
Dept: ANTICOAGULATION | Facility: OTHER | Age: 79
End: 2019-05-02

## 2019-05-02 DIAGNOSIS — I48.20 CHRONIC A-FIB (H): ICD-10-CM

## 2019-05-02 LAB — INR POINT OF CARE: 2.2 (ref 0.86–1.14)

## 2019-05-02 PROCEDURE — 99207 ZZC NO CHARGE NURSE ONLY: CPT

## 2019-05-02 PROCEDURE — 36416 COLLJ CAPILLARY BLOOD SPEC: CPT

## 2019-05-02 PROCEDURE — 85610 PROTHROMBIN TIME: CPT | Mod: QW

## 2019-05-02 NOTE — PROGRESS NOTES
ANTICOAGULATION FOLLOW-UP CLINIC VISIT    Patient Name:  Uli Champagne  Date:  2019  Contact Type:  Face to Face    SUBJECTIVE:     Patient Findings     Comments:   The patient was assessed for diet, medication, and activity level changes, missed or extra doses, bruising or bleeding, with no problem findings.    Pt is having an upcoming carpal tunnel release on 19. Informed to just hold his coumadin for 5 days prior unless he hears otherwise and to recheck his INR on 19. Patient verbalized understanding and agrees with plan of care. Pt had no further questions or concerns at this time.             OBJECTIVE    INR Protime   Date Value Ref Range Status   2019 2.2 (A) 0.86 - 1.14 Final       ASSESSMENT / PLAN  INR assessment THER    Recheck INR In: 4 WEEKS    INR Location Clinic      Anticoagulation Summary  As of 2019    INR goal:   2.0-3.0   TTR:   78.8 % (3.1 y)   INR used for dosin.2 (2019)   Warfarin maintenance plan:   2.5 mg (5 mg x 0.5) every Tue, Sat; 5 mg (5 mg x 1) all other days   Full warfarin instructions:   : Hold; : Hold; : Hold; 5/15: Hold; : Hold; : 7.5 mg; : 5 mg; Otherwise 2.5 mg every Tue, Sat; 5 mg all other days   Weekly warfarin total:   30 mg   Plan last modified:   Mahsa Julien RN (3/22/2019)   Next INR check:   2019   Target end date:       Indications    Long-term (current) use of anticoagulants [Z79.01] [Z79.01]  Chronic a-fib (H) [I48.2]             Anticoagulation Episode Summary     INR check location:       Preferred lab:       Send INR reminders to:   VICKY MEHTA    Comments:   5 mg tab, AM dose        Anticoagulation Care Providers     Provider Role Specialty Phone number    Isra Carrillo MD [facility] [phone]            See the Encounter Report to view Anticoagulation Flowsheet and Dosing Calendar (Go to Encounters tab in chart review, and find the Anticoagulation Therapy  Visit)    Dosage adjustment made based on physician directed care plan.    Rui Landers RN

## 2019-05-02 NOTE — TELEPHONE ENCOUNTER
Patient called wanting Dr. Wells's input on holding his coumadin for 5 days prior to procedure and bridging with Lovenox. Per patient's primary Dr. Carrillo, patient should bridge due to his history of atrial fibrillation, aortocoronary bypass, prior CVA and history of prostate cancer. Message routed to Dr. Wells for review.

## 2019-05-02 NOTE — TELEPHONE ENCOUNTER
There is a lot of controversy about bridging anticoagulation.  Generally I don't bridge for afib unless risk of stroke is high--bridging tends to increase bleeding risk.  Uli's risk is moderate.  If his surgery can be done on warfarin, that would be best.  It probably does not need to be stopped for 5 days with starting INR of 2.2.  If bridging is decided on, lowest dose would be best.  He should have his aspirin stopped (and not restarted) regardless of which approach is taken.

## 2019-05-02 NOTE — TELEPHONE ENCOUNTER
With history of atrial fibrillation, Aortocoronary bypass, prior CVA and history of prostate cancer would recommend bridging at 1.5mg/kg daily.  Electronically signed by Isra Carrillo MD

## 2019-05-02 NOTE — TELEPHONE ENCOUNTER
"Informed pt of this. Advised we should get him in for teaching etc. and he states that he would like to discuss this with his \"new\" cardiologist Dr Wells and will call me back with his findings. Rui Landers, RN, BSN    "

## 2019-05-02 NOTE — TELEPHONE ENCOUNTER
Pt will be having Carpal Tunnel release on 5/17/19. Please review pt's history and advise if he/she will need to be bridged with Lovenox. If so, 1 mg/kg BID or 1.5 mg/kg daily? If not, OK to stop coumadin 5 days prior and resume usual dose 12-24 hours after procedure? Please advise.  Rui Landers RN, BSN

## 2019-05-03 NOTE — TELEPHONE ENCOUNTER
SM sent to Dr Ogden in [facility] to review. Awaiting his opinion. Rui Landers RN, BSN

## 2019-05-06 NOTE — TELEPHONE ENCOUNTER
Patient informed we are waiting to hear back from Ortho (see messages below). We will call him as soon as we hear something. Rui Landers, RN, BSN

## 2019-05-08 ENCOUNTER — TELEPHONE (OUTPATIENT)
Dept: CARDIOLOGY | Facility: CLINIC | Age: 79
End: 2019-05-08

## 2019-05-08 NOTE — TELEPHONE ENCOUNTER
Received a call from patient. Patient wanted to verify that he is supposed to take Losartan and Chlorthalidone once a day. He was taking Benzapril HCTZ 20-25 mg BID.    Last OV, Benzapril HCTZ was stopped, patient was changed to Losartan 100 mg/d and Chlorthalidone 25 mg/d.     Reviewed with patient, that his medications are only to be taken once a day, and that he should continue Losartan 100 mg/d and Chlorthalidone 25 mg/d.    Patient stated his BP readings are the same and have not improved. Patient was taking his BP readings in the morning prior to medications.     Patient was advised to take his BP readings after he takes his Losartan and Chlorthalidone. Patient takes his Losartan and Chlorthalidone at 7 am.    Patient will not be available to take his BP readings until lunch time. Patient was instructed to take his BP readings at lunch time and again in the evening.     Patient is going to record and bring his BP readings and BP monitor to his RN visit on 05-16-19.     Messaged Dr. Wells to review if he would like to draw a BMP at patient's visit.     TGarbers RN

## 2019-05-08 NOTE — TELEPHONE ENCOUNTER
VM left to call [facility] back.    Pt has procedure on 5/17. Dr. Ogden will do the procedure while patient is on coumadin, but he wants his INR to be 2.0 or less. Pt should see [facility] on 5/13 before the procedure to see where his INR is and if adjustments need to be done beforehand.     Mahsa Julien RN

## 2019-05-13 ENCOUNTER — ANTICOAGULATION THERAPY VISIT (OUTPATIENT)
Dept: ANTICOAGULATION | Facility: OTHER | Age: 79
End: 2019-05-13
Payer: COMMERCIAL

## 2019-05-13 DIAGNOSIS — I48.20 CHRONIC A-FIB (H): ICD-10-CM

## 2019-05-13 LAB — INR POINT OF CARE: 2.3 (ref 0.86–1.14)

## 2019-05-13 PROCEDURE — 85610 PROTHROMBIN TIME: CPT | Mod: QW

## 2019-05-13 PROCEDURE — 36416 COLLJ CAPILLARY BLOOD SPEC: CPT

## 2019-05-13 PROCEDURE — 99207 ZZC NO CHARGE NURSE ONLY: CPT

## 2019-05-13 NOTE — PROGRESS NOTES
ANTICOAGULATION FOLLOW-UP CLINIC VISIT    Patient Name:  Uli Champagne  Date:  2019  Contact Type:  Face to Face    SUBJECTIVE:  Patient Findings     Comments:   INR needs to be below 2.0 for Carpal Tunnel surgery on Friday. INR was 2.3 today. I have advised to take 2.5 mg Tues, Wed, Thursday and then his INR should be below 2.0 for Friday. Rui Landers RN, BSN          Clinical Outcomes     Negatives:   Major bleeding event, Thromboembolic event, Anticoagulation-related hospital admission, Anticoagulation-related ED visit, Anticoagulation-related fatality    Comments:   INR needs to be below 2.0 for Carpal Tunnel surgery on Friday. INR was 2.3 today. I have advised to take 2.5 mg Tues, Wed, Thursday and then his INR should be below 2.0 for Friday. Rui Landers RN, BSN             OBJECTIVE    INR Protime   Date Value Ref Range Status   2019 2.3 (A) 0.86 - 1.14 Final       ASSESSMENT / PLAN  INR assessment THER    Recheck INR In: 2 WEEKS    INR Location Clinic      Anticoagulation Summary  As of 2019    INR goal:   2.0-3.0   TTR:   79.0 % (3.1 y)   INR used for dosin.3 (2019)   Warfarin maintenance plan:   2.5 mg (5 mg x 0.5) every Tue, Sat; 5 mg (5 mg x 1) all other days   Full warfarin instructions:   5/15: 2.5 mg; : 2.5 mg; Otherwise 2.5 mg every Tue, Sat; 5 mg all other days   Weekly warfarin total:   30 mg   Plan last modified:   Mahsa Julien RN (3/22/2019)   Next INR check:   2019   Target end date:       Indications    Long-term (current) use of anticoagulants [Z79.01] [Z79.01]  Chronic a-fib (H) [I48.2]             Anticoagulation Episode Summary     INR check location:       Preferred lab:       Send INR reminders to:   VICKY MEHTA    Comments:   5 mg tab, AM dose        Anticoagulation Care Providers     Provider Role Specialty Phone number    Isra Carrillo MD [facility] [phone]            See the Encounter Report to view  Anticoagulation Flowsheet and Dosing Calendar (Go to Encounters tab in chart review, and find the Anticoagulation Therapy Visit)    Dosage adjustment made based on physician directed care plan.    Rui Landers RN

## 2019-05-14 ENCOUNTER — TRANSFERRED RECORDS (OUTPATIENT)
Dept: HEALTH INFORMATION MANAGEMENT | Facility: CLINIC | Age: 79
End: 2019-05-14

## 2019-05-14 LAB
ALT SERPL-CCNC: 20 U/L (ref 0–55)
AST SERPL-CCNC: 28 U/L (ref 5–40)
CHOLEST SERPL-MCNC: 102 MG/DL
CREAT SERPL-MCNC: 0.9 MG/DL (ref 0.5–1.5)
GFR SERPL CREATININE-BSD FRML MDRD: >60 ML/MIN/1.73M2
GLUCOSE SERPL-MCNC: 102 MG/DL (ref 70–100)
HDLC SERPL-MCNC: 42 MG/DL
LDLC SERPL CALC-MCNC: 45 MG/DL
POTASSIUM SERPL-SCNC: 3.9 MMOL/L (ref 3.5–5)
TRIGL SERPL-MCNC: 74 MG/DL

## 2019-05-16 ENCOUNTER — APPOINTMENT (OUTPATIENT)
Dept: FAMILY MEDICINE | Facility: CLINIC | Age: 79
End: 2019-05-16
Payer: COMMERCIAL

## 2019-05-16 ENCOUNTER — TELEPHONE (OUTPATIENT)
Dept: CARDIOLOGY | Facility: CLINIC | Age: 79
End: 2019-05-16

## 2019-05-16 NOTE — TELEPHONE ENCOUNTER
Uli came into clinic today for a blood pressure check after medication changes were made by Dr Wells. On his old medication regimen his numbers were 130-160 systolic/ 60-70 diastolic. After changing to his new medication changes he numbers have not changed for the better. Numbers are almost identical.

## 2019-05-17 ENCOUNTER — HOSPITAL ENCOUNTER (OUTPATIENT)
Facility: CLINIC | Age: 79
Discharge: HOME | End: 2019-05-17
Attending: ORTHOPAEDIC SURGERY | Admitting: ORTHOPAEDIC SURGERY
Payer: COMMERCIAL

## 2019-05-17 VITALS
RESPIRATION RATE: 16 BRPM | SYSTOLIC BLOOD PRESSURE: 157 MMHG | OXYGEN SATURATION: 98 % | DIASTOLIC BLOOD PRESSURE: 80 MMHG | TEMPERATURE: 97.6 F

## 2019-05-17 DIAGNOSIS — G56.03 BILATERAL CARPAL TUNNEL SYNDROME: Primary | ICD-10-CM

## 2019-05-17 LAB — INR PPP: 1.6 (ref 0.86–1.14)

## 2019-05-17 PROCEDURE — 71000027 ZZH RECOVERY PHASE 2 EACH 15 MINS: Performed by: ORTHOPAEDIC SURGERY

## 2019-05-17 PROCEDURE — 85610 PROTHROMBIN TIME: CPT | Performed by: ORTHOPAEDIC SURGERY

## 2019-05-17 PROCEDURE — 64721 CARPAL TUNNEL SURGERY: CPT | Mod: RT | Performed by: ORTHOPAEDIC SURGERY

## 2019-05-17 PROCEDURE — 25000128 H RX IP 250 OP 636: Performed by: ORTHOPAEDIC SURGERY

## 2019-05-17 PROCEDURE — 36000052 ZZH SURGERY LEVEL 2 EA 15 ADDTL MIN: Performed by: ORTHOPAEDIC SURGERY

## 2019-05-17 PROCEDURE — 36000050 ZZH SURGERY LEVEL 2 1ST 30 MIN: Performed by: ORTHOPAEDIC SURGERY

## 2019-05-17 PROCEDURE — 36416 COLLJ CAPILLARY BLOOD SPEC: CPT | Performed by: ORTHOPAEDIC SURGERY

## 2019-05-17 PROCEDURE — 40000306 ZZH STATISTIC PRE PROC ASSESS II: Performed by: ORTHOPAEDIC SURGERY

## 2019-05-17 PROCEDURE — 27210794 ZZH OR GENERAL SUPPLY STERILE: Performed by: ORTHOPAEDIC SURGERY

## 2019-05-17 RX ORDER — HYDROCODONE BITARTRATE AND ACETAMINOPHEN 5; 325 MG/1; MG/1
1 TABLET ORAL
Status: DISCONTINUED | OUTPATIENT
Start: 2019-05-17 | End: 2019-05-17 | Stop reason: HOSPADM

## 2019-05-17 RX ORDER — BUPIVACAINE HYDROCHLORIDE 5 MG/ML
INJECTION, SOLUTION PERINEURAL PRN
Status: DISCONTINUED | OUTPATIENT
Start: 2019-05-17 | End: 2019-05-17 | Stop reason: HOSPADM

## 2019-05-17 RX ORDER — HYDROCODONE BITARTRATE AND ACETAMINOPHEN 5; 325 MG/1; MG/1
1 TABLET ORAL EVERY 6 HOURS PRN
Qty: 6 TABLET | Refills: 0 | Status: SHIPPED | OUTPATIENT
Start: 2019-05-17 | End: 2019-07-08

## 2019-05-17 NOTE — OP NOTE
PREOPERATIVE DIAGNOSES:   1. Right carpal tunnel syndrome.     POSTOPERATIVE DIAGNOSES:   1. Right carpal tunnel syndrome.     PROCEDURE:   1. Right carpal tunnel release.     SURGEON: Yovani Ogden DO   ASSISTANT: Chuck Ochoa APRN, CNP, DNP  ANESTHESIA: Local   COMPLICATIONS: None.   ESTIMATED BLOOD LOSS: Minimal.   COUNTS: Correct.   TOURNIQUET TIME: 4 minutes at 250mm Hg  GROSS FINDINGS: There was evidence of compression of the median nerve. There was an hourglass deformity to the nerve. There were no space-occupying lesions or masses in the carpal tunnel.   DISPOSITION: To PACU in stable condition.     NOTE/INDICATIONS:Mr. Champagne is a pleasant 78 year old year-old male with complaints of Right hand numbness and tingling. He has had symptoms for approximately 2-1/2 years.  Is been refractory to rest, activity modification, cockup response, and hand therapy.  Given his continued symptoms we discussed a right carpal tunnel release under local anesthetic. Preop there was concerns from cardio/pcp regarding discontinuing coumadin and the need to bridge. Because of these concerns for safety we opted to continue coumadin. INR presurgery 1.6. Discussed increased risk for bleeding.      All risks, benefits, complications, alternatives, anticipated postop course were discussed at length prior to surgery.  Complications such as infection, bleeding, nerve damage, incomplete release, repeat surgery, scar sensitivity, and skin problems were discussed. Also reviewed were expectations and the goal of surgery. With surgery the goal would be to prevent further damage to the nerve, the surgery may not be able to reverse any current damage. Postoperatively there will be limitation in use for approximately 3-4 weeks or as clinically indicated. All questions were answered. The patient agrees to proceed with surgery.   DETAILS OF PROCEDURE: Mr. Champagne was met in the preop care unit. Again, informed consent was verified. The  appropriate extremity was marked and the patient was wheeled back to the Operative Suite at [facility]. he was transferred off the cart to the OR table without incident. All bony prominence were padded and the patient was secured to the table.     A time-out was performed. The appropriate patient, extremity and surgery were verified. Then the skin and carpal tunnel on the surgical wrist was injected with a total of 10 mL of 0.25% Marcaine. ,The Right upper extremity was prepped and draped in a normal manner. An Esmarch was utilized to exsanguinate the extremity. The tourniquet was inflated. A longitudinal skin incision was made through the skin over the carpal tunnel. Care was taken to protect all pertinent neurovascular structures. Using a combination of sharp and blunt dissection the incision was advanced down to the transverse carpal ligament. Utilizing a knife and a scissors, the transverse carpal ligament was released in its entirety. There were no space-occupying lesions or masses. There was evidence of compression on the nerve as noted above. Next, the tourniquet was deflated. Hemostasis was achieved. The skin was closed with 4-0 nylon suture in interrupted format. A sterile dressing  was applied to the Right upper extremity.     When deemed appropriate the patient was transferred to the PACU in stable condition. The patient will be discharged home with pain medication and detailed post-operative care instructions.     Yovani Ogden D.O.

## 2019-05-17 NOTE — DISCHARGE INSTRUCTIONS
Carpal Tunnel Release Discharge Instructions  Dr. Ogden                                       [phone]  Bone and Joint Service Line for issues or concerns    Home Prescriptions:  Norco 5/325 1-2 every 4-6 hours as needed #50      General Care:  After surgery you may feel tired/sleepy. This is normal. Please have someone stay with you for 24 hours after surgery. You should avoid driving until released by your physician to do so. If you have any question along the way please contact the office. If you feel it is an issue cannot wait for normal office hours, contact the on-call physician.    Bandages:   Remove your bandage at 3-4 days after surgery. Keep this bandage clean and dry. Then keep the area clean, dry, and covered.    Bathing:  Do not submerge your incision in water such as a bath or pool. Keep your splint/bandage clean and dry. Keep your incision/splint covered with a sealed bandage when bathing. Saran wrap and a bag works well.     Follow up:  Your follow up appointment should already be scheduled. If its not, please call the office to verify an appointment about 10 days after surgery.     Diet:  Start with non-alcoholic liquids at first, particularly water or sports drinks after surgery. Progress to bland foods such as crackers and bread and finally to your normal diet if you have no problems.     Pain control:  Take your pain medications as prescribed. These medications may make you sleepy. Do not drive, operate equipment, or drink alcohol when taking these.  You may take Tylenol (Generic name is acetaminophen) as directed on the bottle in place of the prescribed pain medications as your pain gets better. You may take NSAIDs (Motrin, Ibuprofen, Aleve, Naproxen) as directed on the bottle for minor discomfort. If the medications cause a reaction such as nausea or skin rash, stop taking them and contact your doctor. Please plan accordingly, pain medications will not be re-filled on the weekends or at  night. Call the office during the day if you need more medications.    Physical Therapy/Occupational Therapy:  At your first post-operative visit, your doctor will direct you on your personal therapy program if needed.     Activity:  Keep your hand elevated for the first couple days after surgery. Avoid lifting, pushing, and or pulling with the operated hand.       Normal findings after surgery:  Numbness and tenderness around the incisions is normal.  You may have bruising around the incisions.  Low grade fevers less than 100.5 degrees Fahrenheit are normal.     When to call the Office:  Temperature greater than 101.5 degrees Fahreheit.  Fever, chills, and increasing pain in the hand and wrist.  Excessive drainage from the incisions that include bright red blood.  Drainage from the incisions sites that appear yellow, pus-like, or foul smelling.  Persistent nausea or vomiting.  Any other effects you feel are significant.

## 2019-05-17 NOTE — TELEPHONE ENCOUNTER
At our last visit his office BP numbers were high and his home numbers were acceptable.  I switched meds for him because his previous ARB/diuretic combination had gone up considerably in price.  I wanted to:  1. make certain that his blood pressure remained controlled at home, and  2. Make sure that the cuff he was using at home is accurate.   It sounds like his BP is still acceptable but not sure if we can count on accuracy of his machine.    Have him make a f/u visit with me (anytime) to review.  He should bring his home readings and his machine with him

## 2019-05-17 NOTE — BRIEF OP NOTE
[facility] Brief Operative Note    Pre-operative diagnosis: Right carpal tunnel syndrome   Post-operative diagnosis: Same   Procedure: Procedure(s):  RELEASE CARPAL TUNNEL-RIGHT   Surgeon:  Anesthesia: Yovani Ogden DO  Local only   Assistant(s): MARIA D Salcedo, CNP, DNP   Estimated blood loss:  Fluids: Minimal  0 Crystalloids   Specimens: None   Findings: See dictated operative report for full details      Isaiah Ogden D.O.

## 2019-05-18 NOTE — PROGRESS NOTES
[facility]  Discharge Call Back  Uli Champagne  1940  MRN: [id number]  Home: [phone] (home)   PCP: Isra Carrillo    We are calling to see how you're doing since your surgery/procedure with us?   Comments: Doing pretty good  Clinical Questions  1. Have you had time to look at your discharge instructions? Do you have any questions in regards to the instructions?   Comment: yes, normal to have swollen fingers?  2. Do you feel your pain is being controlled with the regimen the surgeon sent you home on? (ie: prescription medications, over the counter pain medications, ice packs)   Comments: yes  3. Have you noticed any drainage on your dressing? Do you know what to do if you have bleeding as a result of your procedure?   Comments: no  4. Have you had any nausea/vomiting? Do you know how to treat this?   Comment: no  5. Have you had any signs/symptoms of infection? (ie: fever, swelling, heat, drainage or redness) Do you know what to do if you have?   Comment: no  6. Do you have a follow up appointment made with your surgeon? Do you have a number to contact them at if you need it?   Comment: yes, yes  Retained Foreign Object (VEE, Hemovac, Penrose, Wound Packing, Vaginal Packing, Nasal Splints, Urethral Stents, Astorga Catheter)  1. Do you still have na in place?   2. If the item is still in place, can you review the plan for removal with me? na      You may be randomly selected to fill out a [facility] survey. We would appreciate you taking the time to fill this out. It is important to us if you would answer all of the questions on the survey.

## 2019-05-20 NOTE — TELEPHONE ENCOUNTER
Unable to reach patient. Left message to return call to schedule a follow up visit with Dr. Wells.

## 2019-05-20 NOTE — TELEPHONE ENCOUNTER
Patient returned call. He will check his calendar and call specialty scheduling to make a follow up visit with Dr. Wells.

## 2019-05-21 ENCOUNTER — TELEPHONE (OUTPATIENT)
Dept: FAMILY MEDICINE | Facility: OTHER | Age: 79
End: 2019-05-21

## 2019-05-21 NOTE — TELEPHONE ENCOUNTER
Panel Management Review      Patient has the following on his problem list:       Composite cancer screening  Chart review shows that this patient is due/due soon for the following   Summary:    Patient is due/failing the following:   BP CHECK and PHYSICAL    Action needed:   Patient needs office visit for Wellness visit.    Type of outreach:    Sent [employer] message.    Questions for provider review:    None                                                                                                                                    Meghan GRIFFIN LPN       Chart routed to Meghan GRIFFIN LPN   .

## 2019-05-29 ENCOUNTER — OFFICE VISIT (OUTPATIENT)
Dept: ORTHOPEDICS | Facility: CLINIC | Age: 79
End: 2019-05-29
Payer: COMMERCIAL

## 2019-05-29 VITALS
HEIGHT: 67 IN | BODY MASS INDEX: 34.37 KG/M2 | WEIGHT: 219 LBS | SYSTOLIC BLOOD PRESSURE: 142 MMHG | DIASTOLIC BLOOD PRESSURE: 62 MMHG

## 2019-05-29 DIAGNOSIS — G56.01 RIGHT CARPAL TUNNEL SYNDROME: Primary | ICD-10-CM

## 2019-05-29 PROCEDURE — 99024 POSTOP FOLLOW-UP VISIT: CPT | Performed by: ORTHOPAEDIC SURGERY

## 2019-05-29 ASSESSMENT — MIFFLIN-ST. JEOR: SCORE: 1665.97

## 2019-05-29 NOTE — PROGRESS NOTES
"Orthopedic Clinic Post-Operative Note    CHIEF COMPLAINT:   Chief Complaint   Patient presents with     Surgical Followup     DOS; 5/17/2019~Right carpal tunnel release~12 days postop       HISTORY OF PRESENT ILLNESS  Very happy.  Numbness and tingling is much better.  Some remaining amounts in his tips of the fingers.  However much better than preoperatively.  No significant pain.  Only took \"a couple of Tylenol\".    Patient's past medical, surgical, social and family histories reviewed.     Past Medical History:   Diagnosis Date     Atrial fibrillation (H)      Coronary atherosclerosis of unspecified type of vessel, native or graft      Hernia of unspecified site of abdominal cavity without mention of obstruction or gangrene     umbilical hernia; s/p repair 3/1998     History of CVA (cerebrovascular accident) 3/16/2018     Hordeolum externum left upper eyelid 1/21/2016     Malignant neoplasm of prostate (H) 07/14/08    Admit.Discharged 07/17/08     Other and unspecified hyperlipidemia      Postsurgical aortocoronary bypass status 1992     Respiratory complications      Unspecified essential hypertension      Unspecified tinnitus     left ear       Past Surgical History:   Procedure Laterality Date     C CABG, VEIN, THREE  1992     C NONSPECIFIC PROCEDURE  1987    Bone fusion in neck     C REMV PROSTATE,RETROPUB,RAD,LTD NODES  7/14/2008    Radical retropubic prostatectomy and pelvic lymph node dissection.     HC REMOVAL OF TONSILS,10+ Y/O  1969    Tonsils 10+y.o.     HEMILAMINECTOMY, DISCECTOMY LUMBAR TWO LEVELS, COMBINED Left 12/16/2015    Procedure: COMBINED HEMILAMINECTOMY, DISCECTOMY LUMBAR TWO LEVELS;  Surgeon: Jung Finley MD;  Location: [facility] OR     RELEASE CARPAL TUNNEL Right 5/17/2019    Procedure: RELEASE CARPAL TUNNEL-RIGHT;  Surgeon: Yovani Ogden DO;  Location: [facility] OR     STENT, CORONARY, ALEAH  2002    x5       Medications:    Current Outpatient Medications on File Prior to " Visit:  allopurinol (ZYLOPRIM) 300 MG tablet TAKE 1 TABLET DAILY FOR GOUT   ASPIRIN 81 MG OR TABS 1 TABLET DAILY   chlorthalidone (HYGROTON) 25 MG tablet Take 1 tablet (25 mg) by mouth daily   fish oil-omega-3 fatty acids (FISH OIL) 1000 MG capsule Take 1 g by mouth daily.   HYDROcodone-acetaminophen (NORCO) 5-325 MG tablet Take 1 tablet by mouth every 6 hours as needed for moderate to severe pain (Patient not taking: Reported on 2019)   losartan (COZAAR) 100 MG tablet Take 1 tablet (100 mg) by mouth daily   nitroglycerin (NITROSTAT) 0.4 MG SL tablet Place 1 tablet (0.4 mg) under the tongue See Admin Instructions for chest pain (Patient not taking: Reported on 2019)   simvastatin (ZOCOR) 40 MG tablet Take 1 tablet (40 mg) by mouth At Bedtime   warfarin (COUMADIN) 5 MG tablet TAKE ONE-HALF (1/2) TABLET ON TUESDAY AND ONE TABLET ON ALL OTHER DAYS OF THE WEEK OR AS DIRECTED BY THE COUMADIN CLINIC     No current facility-administered medications on file prior to visit.     Allergies   Allergen Reactions     No Known Drug Allergies        Social History     Occupational History     Not on file   Tobacco Use     Smoking status: Former Smoker     Packs/day: 0.50     Years: 2.00     Pack years: 1.00     Types: Cigarettes     Last attempt to quit: 1960     Years since quittin.4     Smokeless tobacco: Never Used   Substance and Sexual Activity     Alcohol use: Yes     Comment: socially     Drug use: No     Sexual activity: Never       Family History   Problem Relation Age of Onset     Arthritis Father      Heart Disease Father      Hypertension Brother      Cancer Brother         liver     Heart Disease Sister      Heart Disease Brother        REVIEW OF SYSTEMS  General: negative for, night sweats, dizziness, fatigue  Resp: No shortness of breath and no cough  CV: negative for chest pain, syncope or near-syncope  GI: negative for nausea, vomiting and diarrhea  : negative for dysuria and  "hematuria  Musculoskeletal: as above  Neurologic: negative for syncope   Hematologic: negative for bleeding disorder    Physical Exam:  Vitals: /62   Ht 1.708 m (5' 7.25\")   Wt 99.3 kg (219 lb)   BMI 34.05 kg/m    BMI= Body mass index is 34.05 kg/m .  Constitutional: healthy, alert and no acute distress   Psychiatric: mentation appears normal and affect normal/bright  NEURO: no focal deficits  SKIN: .healing well, well approximated skin edges, without signs of infection including no erythema, incision breakdown or purlent drainage  JOINT/EXTREMITIES: Fingers well perfused.  Some swelling into the hand however very minimal.  No ecchymosis.  Fingers warm and dry with good capillary refill.  GAIT: not tested     Diagnostic Modalities:  None today.  Independent visualization of the images was performed.      Impression:   Chief Complaint   Patient presents with     Surgical Followup     DOS; 5/17/2019~Right carpal tunnel release~12 days postop   Doing well.    Plan:   Activity: Gradual return back to full activity  Staples/Sutures out: Yes.  Pain controlled: Yes. Continue to use: Nothing  Immobilzation: No  Physical Therapy: none at this time.   Rest  Return to clinic PRN, or sooner as needed for changes.    Re-x-ray on return: No    Isaiah Ogden D.O.  "

## 2019-05-29 NOTE — LETTER
"    5/29/2019         RE: Uli Champagne  [address] [facility] [address]        Dear Colleague,    Thank you for referring your patient, Uli Champagne, to the [facility]. Please see a copy of my visit note below.    Orthopedic Clinic Post-Operative Note    CHIEF COMPLAINT:   Chief Complaint   Patient presents with     Surgical Followup     DOS; 5/17/2019~Right carpal tunnel release~12 days postop       HISTORY OF PRESENT ILLNESS  Very happy.  Numbness and tingling is much better.  Some remaining amounts in his tips of the fingers.  However much better than preoperatively.  No significant pain.  Only took \"a couple of Tylenol\".    Patient's past medical, surgical, social and family histories reviewed.     Past Medical History:   Diagnosis Date     Atrial fibrillation (H)      Coronary atherosclerosis of unspecified type of vessel, native or graft      Hernia of unspecified site of abdominal cavity without mention of obstruction or gangrene     umbilical hernia; s/p repair 3/1998     History of CVA (cerebrovascular accident) 3/16/2018     Hordeolum externum left upper eyelid 1/21/2016     Malignant neoplasm of prostate (H) 07/14/08    Admit.Discharged 07/17/08     Other and unspecified hyperlipidemia      Postsurgical aortocoronary bypass status 1992     Respiratory complications      Unspecified essential hypertension      Unspecified tinnitus     left ear       Past Surgical History:   Procedure Laterality Date     C CABG, VEIN, THREE  1992     C NONSPECIFIC PROCEDURE  1987    Bone fusion in neck     C REMV PROSTATE,RETROPUB,RAD,LTD NODES  7/14/2008    Radical retropubic prostatectomy and pelvic lymph node dissection.     HC REMOVAL OF TONSILS,10+ Y/O  1969    Tonsils 10+y.o.     HEMILAMINECTOMY, DISCECTOMY LUMBAR TWO LEVELS, COMBINED Left 12/16/2015    Procedure: COMBINED HEMILAMINECTOMY, DISCECTOMY LUMBAR TWO LEVELS;  Surgeon: Jung Finley MD;  Location: [facility]     RELEASE " CARPAL TUNNEL Right 2019    Procedure: RELEASE CARPAL TUNNEL-RIGHT;  Surgeon: Yovani Ogden DO;  Location: [facility] OR     STENT, CORONARY, ALEAH  2002    x5       Medications:    Current Outpatient Medications on File Prior to Visit:  allopurinol (ZYLOPRIM) 300 MG tablet TAKE 1 TABLET DAILY FOR GOUT   ASPIRIN 81 MG OR TABS 1 TABLET DAILY   chlorthalidone (HYGROTON) 25 MG tablet Take 1 tablet (25 mg) by mouth daily   fish oil-omega-3 fatty acids (FISH OIL) 1000 MG capsule Take 1 g by mouth daily.   HYDROcodone-acetaminophen (NORCO) 5-325 MG tablet Take 1 tablet by mouth every 6 hours as needed for moderate to severe pain (Patient not taking: Reported on 2019)   losartan (COZAAR) 100 MG tablet Take 1 tablet (100 mg) by mouth daily   nitroglycerin (NITROSTAT) 0.4 MG SL tablet Place 1 tablet (0.4 mg) under the tongue See Admin Instructions for chest pain (Patient not taking: Reported on 2019)   simvastatin (ZOCOR) 40 MG tablet Take 1 tablet (40 mg) by mouth At Bedtime   warfarin (COUMADIN) 5 MG tablet TAKE ONE-HALF (1/2) TABLET ON TUESDAY AND ONE TABLET ON ALL OTHER DAYS OF THE WEEK OR AS DIRECTED BY THE COUMADIN CLINIC     No current facility-administered medications on file prior to visit.     Allergies   Allergen Reactions     No Known Drug Allergies        Social History     Occupational History     Not on file   Tobacco Use     Smoking status: Former Smoker     Packs/day: 0.50     Years: 2.00     Pack years: 1.00     Types: Cigarettes     Last attempt to quit: 1960     Years since quittin.4     Smokeless tobacco: Never Used   Substance and Sexual Activity     Alcohol use: Yes     Comment: socially     Drug use: No     Sexual activity: Never       Family History   Problem Relation Age of Onset     Arthritis Father      Heart Disease Father      Hypertension Brother      Cancer Brother         liver     Heart Disease Sister      Heart Disease Brother        REVIEW OF SYSTEMS  General:  "negative for, night sweats, dizziness, fatigue  Resp: No shortness of breath and no cough  CV: negative for chest pain, syncope or near-syncope  GI: negative for nausea, vomiting and diarrhea  : negative for dysuria and hematuria  Musculoskeletal: as above  Neurologic: negative for syncope   Hematologic: negative for bleeding disorder    Physical Exam:  Vitals: /62   Ht 1.708 m (5' 7.25\")   Wt 99.3 kg (219 lb)   BMI 34.05 kg/m     BMI= Body mass index is 34.05 kg/m .  Constitutional: healthy, alert and no acute distress   Psychiatric: mentation appears normal and affect normal/bright  NEURO: no focal deficits  SKIN: .healing well, well approximated skin edges, without signs of infection including no erythema, incision breakdown or purlent drainage  JOINT/EXTREMITIES: Fingers well perfused.  Some swelling into the hand however very minimal.  No ecchymosis.  Fingers warm and dry with good capillary refill.  GAIT: not tested     Diagnostic Modalities:  None today.  Independent visualization of the images was performed.      Impression:   Chief Complaint   Patient presents with     Surgical Followup     DOS; 5/17/2019~Right carpal tunnel release~12 days postop   Doing well.    Plan:   Activity: Gradual return back to full activity  Staples/Sutures out: Yes.  Pain controlled: Yes. Continue to use: Nothing  Immobilzation: No  Physical Therapy: none at this time.   Rest  Return to clinic PRN, or sooner as needed for changes.    Re-x-ray on return: No    Isaiah Ogden D.O.    Again, thank you for allowing me to participate in the care of your patient.        Sincerely,        Yovani Ogden, DO    "

## 2019-05-30 ENCOUNTER — ANTICOAGULATION THERAPY VISIT (OUTPATIENT)
Dept: ANTICOAGULATION | Facility: OTHER | Age: 79
End: 2019-05-30
Payer: COMMERCIAL

## 2019-05-30 DIAGNOSIS — I48.20 CHRONIC A-FIB (H): ICD-10-CM

## 2019-05-30 LAB — INR POINT OF CARE: 1.8 (ref 0.86–1.14)

## 2019-05-30 PROCEDURE — 85610 PROTHROMBIN TIME: CPT | Mod: QW

## 2019-05-30 PROCEDURE — 36416 COLLJ CAPILLARY BLOOD SPEC: CPT

## 2019-05-30 NOTE — PROGRESS NOTES
ANTICOAGULATION FOLLOW-UP CLINIC VISIT    Patient Name:  Uli Champagne  Date:  2019  Contact Type:  Face to Face    SUBJECTIVE:  Patient Findings     Comments:   Will take an extra 2.5 mg today then go back to his maintenance plan an recheck his INR in 3 weeks. Rui Landers RN, BSN          Clinical Outcomes     Comments:   Will take an extra 2.5 mg today then go back to his maintenance plan an recheck his INR in 3 weeks. Rui Landers RN, BSN             OBJECTIVE    INR Protime   Date Value Ref Range Status   2019 1.8 (A) 0.86 - 1.14 Final       ASSESSMENT / PLAN  INR assessment SUB    Recheck INR In: 3 WEEKS    INR Location Clinic      Anticoagulation Summary  As of 2019    INR goal:   2.0-3.0   TTR:   78.0 % (3.1 y)   INR used for dosin.8! (2019)   Warfarin maintenance plan:   2.5 mg (5 mg x 0.5) every Tue, Sat; 5 mg (5 mg x 1) all other days   Full warfarin instructions:   : 7.5 mg; Otherwise 2.5 mg every Tue, Sat; 5 mg all other days   Weekly warfarin total:   30 mg   Plan last modified:   Mahsa Julien RN (3/22/2019)   Next INR check:   2019   Target end date:       Indications    Long-term (current) use of anticoagulants [Z79.01] [Z79.01]  Chronic a-fib (H) [I48.2]             Anticoagulation Episode Summary     INR check location:       Preferred lab:       Send INR reminders to:   VICKY MEHTA    Comments:   5 mg tab, AM dose        Anticoagulation Care Providers     Provider Role Specialty Phone number    Isra Carrillo MD [facility] [phone]            See the Encounter Report to view Anticoagulation Flowsheet and Dosing Calendar (Go to Encounters tab in chart review, and find the Anticoagulation Therapy Visit)    Dosage adjustment made based on physician directed care plan.    Rui Landers RN

## 2019-06-03 ENCOUNTER — TELEPHONE (OUTPATIENT)
Dept: CARDIOLOGY | Facility: CLINIC | Age: 79
End: 2019-06-03

## 2019-06-03 ENCOUNTER — NURSE TRIAGE (OUTPATIENT)
Dept: NURSING | Facility: CLINIC | Age: 79
End: 2019-06-03

## 2019-06-04 NOTE — TELEPHONE ENCOUNTER
BP's were taken from patient's home BP monitor.     BP monitor was check by RN in [place], Machine was reading fine.     BP today at 230 pm was 162/81, HR 41 bpm. Patient is asymptomatic.    Patient has an OV w/Dr. Wells on 06-17-19.    Will await Dr. Wells's recommendations. Per last OV notes, may consider adding Amlodipine 5 mg/day.    TGarbers RN

## 2019-06-04 NOTE — TELEPHONE ENCOUNTER
Clinic Action Needed: YES. Please follow up with Uli regarding his elevated BP's x 2 days. Reports he is asymptomatic and has not missed any doses of his medications. Please see details in [facility]NA triage encounter dated today.    last BP's    Sat 147/68, 117/67  Sun 143/60, 177/83  Mon 169/77, 199/71      [facility]NA Triage Call  Presenting Problem: elevated Bps x 2 days    Guideline Used: HIGH BLOOD PRESSURE-A-    Patient Recommendations/Teaching: Triage Disposition: advised caller to follow up with his cardiologist tomorrow and call FNA back right away, if ANY new symptoms develop or his BP is >200/100 (even if no symptoms).     Phone number patient can be reached at: [phone] (Cell)    Can we leave a detailed message on this number? yes    Best Time: any    Routed to: Dr. Wells' care team pool    Thank you,    Aarti Juarez RN  [facility]

## 2019-06-04 NOTE — TELEPHONE ENCOUNTER
"Caller: self  Reason for call: \"my blood pressure tonight is 199/71, I have a new cardiologist and he's been changing my meds around\"  Reports hx of unsteady gait and walks with a cane. Reports he uses one machine to log his blood pressure, uses his left arm and takes his pills in the morning.  last BP's  Sat 147/68, 117/67  Sun 143/60, 177/83  Mon 169/77, 199/71  Symptoms: elevated Bps x 2 days, asymptomatic  Symptoms started: yesterday   Denies chest pain, numbness/weakness, difficulty breathing, headache, vision change or other new symptoms  Emergent symptoms reviewed.  Care advice given per triage protocol. Triage Disposition: advised caller to follow up with his cardiologist tomorrow and call FNA back right away, if ANY new symptoms develop or his BP is >200/100 (even if no symptoms).     Caller verbalized understanding of care advice given and plans to call Dr. Wells' office tomorrow. Caller had no further questions. Gave caller the [facility] number as resource ([phone]) for follow up tomorrow.     Encouraged call back to [facility] 24/7 for nurse line services, new/worsening symptoms or further questions.    Aarti Juarez RN  [facility]    Reason for Disposition    Systolic BP  >= 180 OR Diastolic >= 110    Additional Information    Negative: Difficult to awaken or acting confused (e.g., disoriented, slurred speech)    Negative: Severe difficulty breathing (e.g., struggling for each breath, speaks in single words)    Negative: [1] Weakness of the face, arm or leg on one side of the body AND [2] new onset    Negative: [1] Numbness (i.e., loss of sensation) of the face, arm or leg on one side of the body AND [2] new onset    Negative: [1] Chest pain lasts > 5 minutes AND [2] history of heart disease  (i.e., heart attack, bypass surgery, angina, angioplasty, CHF)    Negative: [1] Chest pain AND [2] took nitrogylcerin AND [3] pain was not relieved    Negative: Sounds like a " life-threatening emergency to the triager    Negative: Symptom is main concern  (e.g., headache, chest pain)    Negative: Low blood pressure is main concern    Negative: [1] Systolic BP  >= 160 OR Diastolic >= 100 AND [2] cardiac or neurologic symptoms (e.g., chest pain, difficulty breathing, unsteady gait, blurred vision)    Negative: [1] Pregnant > 20 weeks AND [2] new hand or face swelling    Negative: [1] Pregnant > 20 weeks AND [2] BP Systolic BP  >= 140 OR Diastolic >= 90    Negative: [1] Systolic BP  >= 200 OR Diastolic >= 120  AND [2] having NO cardiac or neurologic symptoms    Negative: [1] Postpartum < 6 weeks AND [2] BP Systolic BP  >= 140 OR Diastolic >= 90    Negative: [1] Systolic BP  >= 180 OR Diastolic >= 110 AND [2] missed most recent dose of blood pressure medication    Protocols used: HIGH BLOOD PRESSURE-A-AH

## 2019-06-05 NOTE — TELEPHONE ENCOUNTER
Blood pressure elevation is only mild to moderate poses no immediate risk.  I will address when I see him in 2 weeks in the clinic.

## 2019-06-17 ENCOUNTER — OFFICE VISIT (OUTPATIENT)
Dept: CARDIOLOGY | Facility: CLINIC | Age: 79
End: 2019-06-17
Payer: COMMERCIAL

## 2019-06-17 VITALS
DIASTOLIC BLOOD PRESSURE: 70 MMHG | SYSTOLIC BLOOD PRESSURE: 150 MMHG | BODY MASS INDEX: 34.56 KG/M2 | HEIGHT: 67 IN | WEIGHT: 220.2 LBS | OXYGEN SATURATION: 96 % | HEART RATE: 42 BPM

## 2019-06-17 DIAGNOSIS — I10 ESSENTIAL HYPERTENSION, BENIGN: Primary | ICD-10-CM

## 2019-06-17 DIAGNOSIS — I48.20 CHRONIC A-FIB (H): ICD-10-CM

## 2019-06-17 DIAGNOSIS — R00.1 BRADYCARDIA: ICD-10-CM

## 2019-06-17 PROCEDURE — 99214 OFFICE O/P EST MOD 30 MIN: CPT | Performed by: INTERNAL MEDICINE

## 2019-06-17 RX ORDER — OLMESARTAN MEDOXOMIL 40 MG/1
40 TABLET ORAL DAILY
Qty: 90 TABLET | Refills: 3 | Status: SHIPPED | OUTPATIENT
Start: 2019-06-17 | End: 2020-06-09

## 2019-06-17 RX ORDER — NITROGLYCERIN 0.4 MG/1
0.4 TABLET SUBLINGUAL SEE ADMIN INSTRUCTIONS
Qty: 25 TABLET | Refills: 1 | Status: SHIPPED | OUTPATIENT
Start: 2019-06-17 | End: 2021-01-01

## 2019-06-17 ASSESSMENT — MIFFLIN-ST. JEOR: SCORE: 1671.41

## 2019-06-17 NOTE — PROGRESS NOTES
HISTORY:    Uli Champagne is a pleasant 78-year-old gentleman with a history of chronic atrial fibrillation with slow ventricular response and history of hypertension.  His bradycardia is asymptomatic but is extremely low with heart rate range of 33-80 on Holter monitor.  He also has a history of coronary artery disease with previous bypass surgery and stenting of 1 of the grafts more than 10 years ago.  He has hyperlipidemia which has been well controlled.    Today Uli is seen primarily for blood pressure management.  He presents with a long list of daily blood pressures, done twice daily.  More than 50% of the values appear to be greater than 140.  Occasional systolic pressures are in the 1 teens but in general they are higher than we would like.  At our last visit he complained of the cost of his benazepril hydrochlorothiazide so I switched him to losartan and chlorthalidone.  There has not been a lot of change in his blood pressure with these medication changes.  He has tolerated the new medication without difficulty.    Once again Uli reports that he remains asymptomatic.  He has been going to a gym and doing some light exercises on a fairly regular basis recently and no sense of palpitations, syncope/near syncope, orthostasis, peripheral edema, or claudication.      ASSESSMENT/PLAN:    1.  Atrial fibrillation with slow ventricular response.  Asymptomatic.  Continue current medications.  2.  Hypertension.  This is been fairly difficult to control.  I have switched him off of losartan and put him on olmesartan 40 mg daily.  This has a longer half-life and probably is a better antihypertensive agent.  I spoke with him extensively about limiting his sodium intake.  He eats a lot of cheese and a lot of home cooking consisting of hot dishes made by his wife.  I suggested that he start paying close attention to labels and try to salt in the hot dishes that his wife makes.  He reports that he sleeps  well at night and uses a CPAP for his sleep apnea.  He does not smoke or use significant alcohol.  There are no other apparent reversible causes of his hypertension.    Thank you for inviting me to participate in your patient's care.  Please do not hesitate to call if I can be of further assistance.    Orders Placed This Encounter   Procedures     Follow-Up with [facility]     Orders Placed This Encounter   Medications     nitroGLYcerin (NITROSTAT) 0.4 MG sublingual tablet     Sig: Place 1 tablet (0.4 mg) under the tongue See Admin Instructions for chest pain     Dispense:  25 tablet     Refill:  1     olmesartan (BENICAR) 40 MG tablet     Sig: Take 1 tablet (40 mg) by mouth daily     Dispense:  90 tablet     Refill:  3     Medications Discontinued During This Encounter   Medication Reason     nitroglycerin (NITROSTAT) 0.4 MG SL tablet Reorder     losartan (COZAAR) 100 MG tablet        10 year ASCVD risk: The ASCVD Risk score ([place] ABA Jr., et al., 2013) failed to calculate for the following reasons:    The 2013 ASCVD risk score is only valid for ages 40 to 79    The patient has a prior MI or stroke diagnosis    Encounter Diagnosis   Name Primary?     Essential hypertension, benign Yes       CURRENT MEDICATIONS:  Current Outpatient Medications   Medication Sig Dispense Refill     allopurinol (ZYLOPRIM) 300 MG tablet TAKE 1 TABLET DAILY FOR GOUT 90 tablet 3     ASPIRIN 81 MG OR TABS 1 TABLET DAILY       chlorthalidone (HYGROTON) 25 MG tablet Take 1 tablet (25 mg) by mouth daily 90 tablet 3     fish oil-omega-3 fatty acids (FISH OIL) 1000 MG capsule Take 1 g by mouth daily.       nitroGLYcerin (NITROSTAT) 0.4 MG sublingual tablet Place 1 tablet (0.4 mg) under the tongue See Admin Instructions for chest pain 25 tablet 1     olmesartan (BENICAR) 40 MG tablet Take 1 tablet (40 mg) by mouth daily 90 tablet 3     simvastatin (ZOCOR) 40 MG tablet Take 1 tablet (40 mg) by mouth At Bedtime 14 tablet 0     warfarin  (COUMADIN) 5 MG tablet TAKE ONE-HALF (1/2) TABLET ON TUESDAY AND ONE TABLET ON ALL OTHER DAYS OF THE WEEK OR AS DIRECTED BY THE COUMADIN CLINIC 90 tablet 3     HYDROcodone-acetaminophen (NORCO) 5-325 MG tablet Take 1 tablet by mouth every 6 hours as needed for moderate to severe pain (Patient not taking: Reported on 5/29/2019) 6 tablet 0       ALLERGIES     Allergies   Allergen Reactions     No Known Drug Allergies        PAST MEDICAL HISTORY:  Past Medical History:   Diagnosis Date     Atrial fibrillation (H)      Coronary atherosclerosis of unspecified type of vessel, native or graft      Hernia of unspecified site of abdominal cavity without mention of obstruction or gangrene     umbilical hernia; s/p repair 3/1998     History of CVA (cerebrovascular accident) 3/16/2018     Hordeolum externum left upper eyelid 1/21/2016     Malignant neoplasm of prostate (H) 07/14/08    Admit.Discharged 07/17/08     Other and unspecified hyperlipidemia      Postsurgical aortocoronary bypass status 1992     Respiratory complications      Unspecified essential hypertension      Unspecified tinnitus     left ear       PAST SURGICAL HISTORY:  Past Surgical History:   Procedure Laterality Date     C CABG, VEIN, THREE  1992     C NONSPECIFIC PROCEDURE  1987    Bone fusion in neck     C REMV PROSTATE,RETROPUB,RAD,LTD NODES  7/14/2008    Radical retropubic prostatectomy and pelvic lymph node dissection.     HC REMOVAL OF TONSILS,12+ Y/O  1969    Tonsils 10+y.o.     HEMILAMINECTOMY, DISCECTOMY LUMBAR TWO LEVELS, COMBINED Left 12/16/2015    Procedure: COMBINED HEMILAMINECTOMY, DISCECTOMY LUMBAR TWO LEVELS;  Surgeon: Jung Finley MD;  [facility]     RELEASE CARPAL TUNNEL Right 5/17/2019    Procedure: RELEASE CARPAL TUNNEL-RIGHT;  Surgeon: Yovani Ogden DO;  Location: [facility]     STENT, CORONARY, ALEAH  2002    x5       FAMILY HISTORY:  Family History   Problem Relation Age of Onset     Arthritis Father      Heart  Disease Father      Hypertension Brother      Cancer Brother         liver     Heart Disease Sister      Heart Disease Brother        SOCIAL HISTORY:  Social History     Socioeconomic History     Marital status:      Spouse name: Not on file     Number of children: Not on file     Years of education: Not on file     Highest education level: Not on file   Occupational History     Not on file   Social Needs     Financial resource strain: Not on file     Food insecurity:     Worry: Not on file     Inability: Not on file     Transportation needs:     Medical: Not on file     Non-medical: Not on file   Tobacco Use     Smoking status: Former Smoker     Packs/day: 0.50     Years: 2.00     Pack years: 1.00     Types: Cigarettes     Last attempt to quit: 1960     Years since quittin.4     Smokeless tobacco: Never Used   Substance and Sexual Activity     Alcohol use: Yes     Comment: socially     Drug use: No     Sexual activity: Never   Lifestyle     Physical activity:     Days per week: Not on file     Minutes per session: Not on file     Stress: Not on file   Relationships     Social connections:     Talks on phone: Not on file     Gets together: Not on file     Attends Gnosticist service: Not on file     Active member of club or organization: Not on file     Attends meetings of clubs or organizations: Not on file     Relationship status: Not on file     Intimate partner violence:     Fear of current or ex partner: Not on file     Emotionally abused: Not on file     Physically abused: Not on file     Forced sexual activity: Not on file   Other Topics Concern     Parent/sibling w/ CABG, MI or angioplasty before 65F 55M? No   Social History Narrative     Not on file       Review of Systems:  Skin:  Negative     Eyes:  Positive for glasses  ENT:  Positive for tinnitus  Respiratory:  Negative    Cardiovascular:  Negative for;palpitations;chest pain;edema;lightheadedness;dizziness    Gastroenterology: Negative   "  Genitourinary:  Negative    Musculoskeletal:  Positive for arthritis;back pain  Neurologic:  Negative    Psychiatric:  Negative    Heme/Lymph/Imm:  Negative    Endocrine:  Negative      Physical Exam:  Vitals: /70 (BP Location: Left arm, Patient Position: Fowlers, Cuff Size: Adult Large)   Pulse (!) 42   Ht 1.708 m (5' 7.25\")   Wt 99.9 kg (220 lb 3.2 oz)   SpO2 96%   BMI 34.23 kg/m      Constitutional:  cooperative, alert and oriented, well developed, well nourished, in no acute distress obese      Skin:  warm and dry to the touch        Head:  normocephalic        Eyes:  no xanthalasma        ENT:           Neck:  carotid pulses are full and equal bilaterally        Chest:  normal breath sounds, clear to auscultation, normal A-P diameter, normal symmetry, normal respiratory excursion, no use of accessory muscles        Cardiac: normal S1 and S2;no S3 or S4;apical impulse not displaced irregularly irregular rhythm;bradycardic                Abdomen:  abdomen soft;BS normoactive        Vascular: pulses full and equal                                      Extremities and Back:  no edema        Neurological:  no gross motor deficits          Recent Lab Results:  LIPID RESULTS:  Lab Results   Component Value Date    CHOL 96 04/12/2019    HDL 48 04/12/2019    LDL 33 04/12/2019    TRIG 74 04/12/2019    CHOLHDLRATIO 2.5 12/16/2014       LIVER ENZYME RESULTS:  Lab Results   Component Value Date    AST 35 05/10/2018    ALT 26 04/12/2019       CBC RESULTS:  Lab Results   Component Value Date    WBC 7.6 11/27/2015    RBC 5.23 11/27/2015    HGB 14.7 12/29/2015    HCT 47.3 11/27/2015    MCV 90 11/27/2015    MCH 31.9 11/27/2015    MCHC 35.3 11/27/2015    RDW 13.7 11/27/2015     11/27/2015       BMP RESULTS:  Lab Results   Component Value Date     04/12/2019    POTASSIUM 4.0 04/12/2019    CHLORIDE 104 04/12/2019    CO2 25 04/12/2019    ANIONGAP 11 04/12/2019    GLC 98 04/12/2019    BUN 22 04/12/2019    CR " 0.89 04/12/2019    GFRESTIMATED 81 04/12/2019    GFRESTBLACK >90 04/12/2019    ANKIT 9.3 04/12/2019        A1C RESULTS:  No results found for: A1C    INR RESULTS:  Lab Results   Component Value Date    INR 1.8 (A) 05/30/2019    INR 1.60 (H) 05/17/2019    INR 2.3 (A) 05/13/2019    INR 1.99 (H) 01/07/2016         Miguelito Wells MD, [facility]    CC  No referring provider defined for this encounter.

## 2019-06-17 NOTE — LETTER
6/17/2019    Isra Carrillo MD  [address] [facility] [address]    RE: Uli Champagne       Dear Colleague,    I had the pleasure of seeing Uli Champagne in the [facility].    HISTORY:    Uli Champagne is a pleasant 78-year-old gentleman with a history of chronic atrial fibrillation with slow ventricular response and history of hypertension.  His bradycardia is asymptomatic but is extremely low with heart rate range of 33-80 on Holter monitor.  He also has a history of coronary artery disease with previous bypass surgery and stenting of 1 of the grafts more than 10 years ago.  He has hyperlipidemia which has been well controlled.    Today Uli is seen primarily for blood pressure management.  He presents with a long list of daily blood pressures, done twice daily.  More than 50% of the values appear to be greater than 140.  Occasional systolic pressures are in the 1 teens but in general they are higher than we would like.  At our last visit he complained of the cost of his benazepril hydrochlorothiazide so I switched him to losartan and chlorthalidone.  There has not been a lot of change in his blood pressure with these medication changes.  He has tolerated the new medication without difficulty.    Once again Uli reports that he remains asymptomatic.  He has been going to a gym and doing some light exercises on a fairly regular basis recently and no sense of palpitations, syncope/near syncope, orthostasis, peripheral edema, or claudication.      ASSESSMENT/PLAN:    1.  Atrial fibrillation with slow ventricular response.  Asymptomatic.  Continue current medications.  2.  Hypertension.  This is been fairly difficult to control.  I have switched him off of losartan and put him on olmesartan 40 mg daily.  This has a longer half-life and probably is a better antihypertensive agent.  I spoke with him extensively about limiting his sodium intake.  He eats a lot  of cheese and a lot of home cooking consisting of hot dishes made by his wife.  I suggested that he start paying close attention to labels and try to salt in the hot dishes that his wife makes.  He reports that he sleeps well at night and uses a CPAP for his sleep apnea.  He does not smoke or use significant alcohol.  There are no other apparent reversible causes of his hypertension.    Thank you for inviting me to participate in your patient's care.  Please do not hesitate to call if I can be of further assistance.    Orders Placed This Encounter   Procedures     [facility] with [facility]- [facility]     Orders Placed This Encounter   Medications     nitroGLYcerin (NITROSTAT) 0.4 MG sublingual tablet     Sig: Place 1 tablet (0.4 mg) under the tongue See Admin Instructions for chest pain     Dispense:  25 tablet     Refill:  1     olmesartan (BENICAR) 40 MG tablet     Sig: Take 1 tablet (40 mg) by mouth daily     Dispense:  90 tablet     Refill:  3     Medications Discontinued During This Encounter   Medication Reason     nitroglycerin (NITROSTAT) 0.4 MG SL tablet Reorder     losartan (COZAAR) 100 MG tablet        10 year ASCVD risk: The ASCVD Risk score ([place] DC Jr., et al., 2013) failed to calculate for the following reasons:    The 2013 ASCVD risk score is only valid for ages 40 to 79    The patient has a prior MI or stroke diagnosis    Encounter Diagnosis   Name Primary?     Essential hypertension, benign Yes       CURRENT MEDICATIONS:  Current Outpatient Medications   Medication Sig Dispense Refill     allopurinol (ZYLOPRIM) 300 MG tablet TAKE 1 TABLET DAILY FOR GOUT 90 tablet 3     ASPIRIN 81 MG OR TABS 1 TABLET DAILY       chlorthalidone (HYGROTON) 25 MG tablet Take 1 tablet (25 mg) by mouth daily 90 tablet 3     fish oil-omega-3 fatty acids (FISH OIL) 1000 MG capsule Take 1 g by mouth daily.       nitroGLYcerin (NITROSTAT) 0.4 MG sublingual tablet Place 1 tablet (0.4 mg) under the tongue See Admin  Instructions for chest pain 25 tablet 1     olmesartan (BENICAR) 40 MG tablet Take 1 tablet (40 mg) by mouth daily 90 tablet 3     simvastatin (ZOCOR) 40 MG tablet Take 1 tablet (40 mg) by mouth At Bedtime 14 tablet 0     warfarin (COUMADIN) 5 MG tablet TAKE ONE-HALF (1/2) TABLET ON TUESDAY AND ONE TABLET ON ALL OTHER DAYS OF THE WEEK OR AS DIRECTED BY THE COUMADIN CLINIC 90 tablet 3     HYDROcodone-acetaminophen (NORCO) 5-325 MG tablet Take 1 tablet by mouth every 6 hours as needed for moderate to severe pain (Patient not taking: Reported on 5/29/2019) 6 tablet 0       ALLERGIES     Allergies   Allergen Reactions     No Known Drug Allergies        PAST MEDICAL HISTORY:  Past Medical History:   Diagnosis Date     Atrial fibrillation (H)      Coronary atherosclerosis of unspecified type of vessel, native or graft      Hernia of unspecified site of abdominal cavity without mention of obstruction or gangrene     umbilical hernia; s/p repair 3/1998     History of CVA (cerebrovascular accident) 3/16/2018     Hordeolum externum left upper eyelid 1/21/2016     Malignant neoplasm of prostate (H) 07/14/08    Admit.Discharged 07/17/08     Other and unspecified hyperlipidemia      Postsurgical aortocoronary bypass status 1992     Respiratory complications      Unspecified essential hypertension      Unspecified tinnitus     left ear       PAST SURGICAL HISTORY:  Past Surgical History:   Procedure Laterality Date     C CABG, VEIN, THREE  1992     C NONSPECIFIC PROCEDURE  1987    Bone fusion in neck     C REMV PROSTATE,RETROPUB,RAD,LTD NODES  7/14/2008    Radical retropubic prostatectomy and pelvic lymph node dissection.     HC REMOVAL OF TONSILS,12+ Y/O  1969    Tonsils 10+y.o.     HEMILAMINECTOMY, DISCECTOMY LUMBAR TWO LEVELS, COMBINED Left 12/16/2015    Procedure: COMBINED HEMILAMINECTOMY, DISCECTOMY LUMBAR TWO LEVELS;  Surgeon: Jung Finley MD;  Location[facility]     RELEASE CARPAL TUNNEL Right 5/17/2019    Procedure:  RELEASE CARPAL TUNNEL-RIGHT;  Surgeon: Yovani Ogden DO;  Location: [facility]     STENT, CORONARY, ALEAH  2002    x5       FAMILY HISTORY:  Family History   Problem Relation Age of Onset     Arthritis Father      Heart Disease Father      Hypertension Brother      Cancer Brother         liver     Heart Disease Sister      Heart Disease Brother        SOCIAL HISTORY:  Social History     Socioeconomic History     Marital status:      Spouse name: Not on file     Number of children: Not on file     Years of education: Not on file     Highest education level: Not on file   Occupational History     Not on file   Social Needs     Financial resource strain: Not on file     Food insecurity:     Worry: Not on file     Inability: Not on file     Transportation needs:     Medical: Not on file     Non-medical: Not on file   Tobacco Use     Smoking status: Former Smoker     Packs/day: 0.50     Years: 2.00     Pack years: 1.00     Types: Cigarettes     Last attempt to quit: 1960     Years since quittin.4     Smokeless tobacco: Never Used   Substance and Sexual Activity     Alcohol use: Yes     Comment: socially     Drug use: No     Sexual activity: Never   Lifestyle     Physical activity:     Days per week: Not on file     Minutes per session: Not on file     Stress: Not on file   Relationships     Social connections:     Talks on phone: Not on file     Gets together: Not on file     Attends Latter day service: Not on file     Active member of club or organization: Not on file     Attends meetings of clubs or organizations: Not on file     Relationship status: Not on file     Intimate partner violence:     Fear of current or ex partner: Not on file     Emotionally abused: Not on file     Physically abused: Not on file     Forced sexual activity: Not on file   Other Topics Concern     Parent/sibling w/ CABG, MI or angioplasty before 65F 55M? No   Social History Narrative     Not on file       Review of  "Systems:  Skin:  Negative     Eyes:  Positive for glasses  ENT:  Positive for tinnitus  Respiratory:  Negative    Cardiovascular:  Negative for;palpitations;chest pain;edema;lightheadedness;dizziness    Gastroenterology: Negative    Genitourinary:  Negative    Musculoskeletal:  Positive for arthritis;back pain  Neurologic:  Negative    Psychiatric:  Negative    Heme/Lymph/Imm:  Negative    Endocrine:  Negative      Physical Exam:  Vitals: /70 (BP Location: Left arm, Patient Position: Fowlers, Cuff Size: Adult Large)   Pulse (!) 42   Ht 1.708 m (5' 7.25\")   Wt 99.9 kg (220 lb 3.2 oz)   SpO2 96%   BMI 34.23 kg/m       Constitutional:  cooperative, alert and oriented, well developed, well nourished, in no acute distress obese      Skin:  warm and dry to the touch        Head:  normocephalic        Eyes:  no xanthalasma        ENT:           Neck:  carotid pulses are full and equal bilaterally        Chest:  normal breath sounds, clear to auscultation, normal A-P diameter, normal symmetry, normal respiratory excursion, no use of accessory muscles        Cardiac: normal S1 and S2;no S3 or S4;apical impulse not displaced irregularly irregular rhythm;bradycardic                Abdomen:  abdomen soft;BS normoactive        Vascular: pulses full and equal                                      Extremities and Back:  no edema        Neurological:  no gross motor deficits          Recent Lab Results:  LIPID RESULTS:  Lab Results   Component Value Date    CHOL 96 04/12/2019    HDL 48 04/12/2019    LDL 33 04/12/2019    TRIG 74 04/12/2019    CHOLHDLRATIO 2.5 12/16/2014       LIVER ENZYME RESULTS:  Lab Results   Component Value Date    AST 35 05/10/2018    ALT 26 04/12/2019       CBC RESULTS:  Lab Results   Component Value Date    WBC 7.6 11/27/2015    RBC 5.23 11/27/2015    HGB 14.7 12/29/2015    HCT 47.3 11/27/2015    MCV 90 11/27/2015    MCH 31.9 11/27/2015    MCHC 35.3 11/27/2015    RDW 13.7 11/27/2015     " 11/27/2015       BMP RESULTS:  Lab Results   Component Value Date     04/12/2019    POTASSIUM 4.0 04/12/2019    CHLORIDE 104 04/12/2019    CO2 25 04/12/2019    ANIONGAP 11 04/12/2019    GLC 98 04/12/2019    BUN 22 04/12/2019    CR 0.89 04/12/2019    GFRESTIMATED 81 04/12/2019    GFRESTBLACK >90 04/12/2019    ANKIT 9.3 04/12/2019        A1C RESULTS:  No results found for: A1C    INR RESULTS:  Lab Results   Component Value Date    INR 1.8 (A) 05/30/2019    INR 1.60 (H) 05/17/2019    INR 2.3 (A) 05/13/2019    INR 1.99 (H) 01/07/2016         Miguelito Wells MD, [facility]    CC  No referring provider defined for this encounter.                        Thank you for allowing me to participate in the care of your patient.    Sincerely,     Miugelito Wells MD     [facility]

## 2019-06-20 ENCOUNTER — ANTICOAGULATION THERAPY VISIT (OUTPATIENT)
Dept: ANTICOAGULATION | Facility: OTHER | Age: 79
End: 2019-06-20
Payer: COMMERCIAL

## 2019-06-20 DIAGNOSIS — I48.20 CHRONIC A-FIB (H): ICD-10-CM

## 2019-06-20 DIAGNOSIS — Z79.01 LONG TERM CURRENT USE OF ANTICOAGULANT THERAPY: ICD-10-CM

## 2019-06-20 LAB — INR POINT OF CARE: 2.1 (ref 0.86–1.14)

## 2019-06-20 PROCEDURE — 36416 COLLJ CAPILLARY BLOOD SPEC: CPT

## 2019-06-20 PROCEDURE — 85610 PROTHROMBIN TIME: CPT | Mod: QW

## 2019-06-20 PROCEDURE — 99207 ZZC NO CHARGE NURSE ONLY: CPT

## 2019-06-20 NOTE — PROGRESS NOTES
ANTICOAGULATION FOLLOW-UP CLINIC VISIT    Patient Name:  Uli Champagne  Date:  2019  Contact Type:  Face to Face    SUBJECTIVE:  Patient Findings     Comments:   The patient was assessed for diet, medication, and activity level changes, missed or extra doses, bruising or bleeding, with no problem findings.          Clinical Outcomes     Negatives:   Major bleeding event, Thromboembolic event, Anticoagulation-related hospital admission, Anticoagulation-related ED visit, Anticoagulation-related fatality    Comments:   The patient was assessed for diet, medication, and activity level changes, missed or extra doses, bruising or bleeding, with no problem findings.             OBJECTIVE    INR Protime   Date Value Ref Range Status   2019 2.1 (A) 0.86 - 1.14 Final       ASSESSMENT / PLAN  INR assessment THER    Recheck INR In: 6 WEEKS    INR Location Clinic      Anticoagulation Summary  As of 2019    INR goal:   2.0-3.0   TTR:   77.2 % (3.2 y)   INR used for dosin.1 (2019)   Warfarin maintenance plan:   2.5 mg (5 mg x 0.5) every Tue, Sat; 5 mg (5 mg x 1) all other days   Full warfarin instructions:   2.5 mg every Tue, Sat; 5 mg all other days   Weekly warfarin total:   30 mg   No change documented:   Rui Landers, RN   Plan last modified:   Mahsa Julien RN (3/22/2019)   Next INR check:   2019   Target end date:       Indications    Long-term (current) use of anticoagulants [Z79.01] [Z79.01]  Chronic a-fib (H) [I48.2]             Anticoagulation Episode Summary     INR check location:       Preferred lab:       Send INR reminders to:   [facility]    Comments:   5 mg tab, AM dose, appt card        Anticoagulation Care Providers     Provider Role Specialty Phone number    Isra Carrillo MD [facility] [phone]            See the Encounter Report to view Anticoagulation Flowsheet and Dosing Calendar (Go to Encounters tab in chart review, and find the  Anticoagulation Therapy Visit)    Dosage adjustment made based on physician directed care plan.    Rui Landers RN

## 2019-07-08 ENCOUNTER — OFFICE VISIT (OUTPATIENT)
Dept: FAMILY MEDICINE | Facility: OTHER | Age: 79
End: 2019-07-08
Payer: COMMERCIAL

## 2019-07-08 VITALS
DIASTOLIC BLOOD PRESSURE: 70 MMHG | HEART RATE: 56 BPM | WEIGHT: 209.7 LBS | OXYGEN SATURATION: 97 % | RESPIRATION RATE: 16 BRPM | BODY MASS INDEX: 32.6 KG/M2 | TEMPERATURE: 96 F | SYSTOLIC BLOOD PRESSURE: 120 MMHG

## 2019-07-08 DIAGNOSIS — I48.20 CHRONIC A-FIB (H): ICD-10-CM

## 2019-07-08 DIAGNOSIS — M70.61 TROCHANTERIC BURSITIS OF RIGHT HIP: Primary | ICD-10-CM
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
## 2019-07-08 LAB — INR BLD: 2.4 (ref 0.86–1.14)

## 2019-07-08 PROCEDURE — 85610 PROTHROMBIN TIME: CPT | Mod: QW | Performed by: PHYSICIAN ASSISTANT

## 2019-07-08 PROCEDURE — 36416 COLLJ CAPILLARY BLOOD SPEC: CPT | Performed by: PHYSICIAN ASSISTANT

## 2019-07-08 PROCEDURE — 99214 OFFICE O/P EST MOD 30 MIN: CPT | Performed by: PHYSICIAN ASSISTANT

## 2019-07-08 RX ORDER — PREDNISONE 20 MG/1
20 TABLET ORAL DAILY
Qty: 5 TABLET | Refills: 0 | Status: SHIPPED | OUTPATIENT
Start: 2019-07-08 | End: 2019-08-30

## 2019-07-08 ASSESSMENT — PAIN SCALES - GENERAL: PAINLEVEL: SEVERE PAIN (6)

## 2019-07-08 NOTE — PROGRESS NOTES
Subjective     Uli Champagne is a 78 year old adult who presents to clinic today for the following health issues:    HPI   Concern - fall  Onset: 11 days ago    Description:   Fell and pain in right buttocks, right leg, and right hip    Intensity: moderate    Progression of Symptoms:  same    Accompanying Signs & Symptoms:  none    Previous history of similar problem:   no    Precipitating factors:   Worsened by: walking    Alleviating factors:  Improved by: nothing    Therapies Tried and outcome: Tylenol,ice; slight relief      Patient is an 78 year old male who is brought in by his family for concern about a fall. He said that he was working on a window when he tripped over the chord of a fan and landed hard on his buttocks and right hip. He recalls that he also had a billfold in his back pocket. Over the past week he says that he has felt pain along the right hip, buttocks and into the right leg. Patient has a history of stroke in the past and does rely on a walker to ambulate. He is also on warfarin for chronic afib. However, there does not appear to be any bruising.       Reviewed and updated as needed this visit by Provider         Review of Systems   ROS COMP: Constitutional, HEENT, cardiovascular, pulmonary, gi and gu systems are negative, except as otherwise noted.      Objective    /70 (BP Location: Right arm, Patient Position: Chair, Cuff Size: Adult Large)   Pulse 56   Temp 96  F (35.6  C) (Oral)   Resp 16   Wt 95.1 kg (209 lb 11.2 oz)   SpO2 97%   BMI 32.60 kg/m    Body mass index is 32.6 kg/m .  Physical Exam   GENERAL: healthy, alert and no distress  RESP: lungs clear to auscultation - no rales, rhonchi or wheezes  CV: regular rate and rhythm, normal S1 S2, no S3 or S4, no murmur, click or rub, no peripheral edema and peripheral pulses strong  MS: no gross musculoskeletal defects noted, no edema, normal AROM of hips, spine and knees, tenderness to palpation over the right greater  trochanter, minimal tenderness to palpation along hamstring muscles or ischial tuberosity.   NEURO: Normal strength and tone, mentation intact and speech normal  PSYCH: mentation appears normal, affect normal/bright         Assessment & Plan     1. Trochanteric bursitis of right hip  Discussed with patient treatment options for bursitis of his hips. Low dose anti-inflammatory to help reduce inflammation/irritation. Icing 20min at a time as tolerated. Educational information sent home with patient. Orthopedics referral placed for possible injection if not improving with conservative measures.   - ORTHOPEDICS ADULT REFERRAL  - predniSONE (DELTASONE) 20 MG tablet; Take 1 tablet (20 mg) by mouth daily . Take with food  Dispense: 5 tablet; Refill: 0    2. Fall, initial encounter  See above.   - ORTHOPEDICS ADULT REFERRAL  - predniSONE (DELTASONE) 20 MG tablet; Take 1 tablet (20 mg) by mouth daily . Take with food  Dispense: 5 tablet; Refill: 0    3. Chronic a-fib (H)  INR at this time returned at 2.4. Patient is aware that prednisone can reduce effectiveness of warfarin. Next INR appointment is at the end of the month.   - INR point of care    Follow up with clinic for annual checkup or sooner if conditions change, worsen or fail to improve as expected.      No follow-ups on file.    Dusty Carter PA-C  [facility]

## 2019-07-08 NOTE — NURSING NOTE
Health Maintenance Due   Topic Date Due     ZOSTER IMMUNIZATION (2 of 3) 07/04/2012     MEDICARE ANNUAL WELLNESS VISIT  01/04/2014     OP ANNUAL INR REFERRAL  04/24/2016     Meghan GRIFFIN LPN

## 2019-07-09 ENCOUNTER — TELEPHONE (OUTPATIENT)
Dept: FAMILY MEDICINE | Facility: OTHER | Age: 79
End: 2019-07-09
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
## 2019-07-09 DIAGNOSIS — Z86.73 HISTORY OF CVA (CEREBROVASCULAR ACCIDENT): ICD-10-CM

## 2019-07-09 DIAGNOSIS — Z98.890 S/P LUMBAR LAMINECTOMY: ICD-10-CM

## 2019-07-09 NOTE — TELEPHONE ENCOUNTER
Spoke with patient and informed that hard copy is ready. Patient would like this mailed to him.     Mailed to patient.     Chioma Foley MA

## 2019-07-09 NOTE — TELEPHONE ENCOUNTER
Reason for Call: Request for an order or referral:    Order or referral being requested: Walker with seat     Date needed: at your convenience    Has the patient been seen by the PCP for this problem? NO- But has seen Dusty Carter    Additional comments: Patient calling stating he had seen Dusty Carter regarding bursitus and requesting an order for a walker with a seat. Please advise.     Phone number Patient can be reached at:  Cell number on file:    Telephone Information:   Mobile [phone]       Best Time:  Any     Can we leave a detailed message on this number?  YES    Call taken on 7/9/2019 at 1:06 PM by Latia Holt

## 2019-07-24 ENCOUNTER — ANCILLARY PROCEDURE (OUTPATIENT)
Dept: GENERAL RADIOLOGY | Facility: CLINIC | Age: 79
End: 2019-07-24
Attending: ORTHOPAEDIC SURGERY
Payer: COMMERCIAL

## 2019-07-24 ENCOUNTER — OFFICE VISIT (OUTPATIENT)
Dept: ORTHOPEDICS | Facility: CLINIC | Age: 79
End: 2019-07-24
Payer: COMMERCIAL

## 2019-07-24 DIAGNOSIS — M16.11 PRIMARY OSTEOARTHRITIS OF RIGHT HIP: ICD-10-CM

## 2019-07-24 DIAGNOSIS — M71.50 TRAUMATIC BURSITIS: ICD-10-CM

## 2019-07-24 DIAGNOSIS — M25.551 RIGHT HIP PAIN: ICD-10-CM

## 2019-07-24 DIAGNOSIS — M25.551 RIGHT HIP PAIN: Primary | ICD-10-CM

## 2019-07-24 PROCEDURE — 99213 OFFICE O/P EST LOW 20 MIN: CPT | Mod: 24 | Performed by: ORTHOPAEDIC SURGERY

## 2019-07-24 PROCEDURE — 73502 X-RAY EXAM HIP UNI 2-3 VIEWS: CPT | Mod: TC

## 2019-07-24 ASSESSMENT — MIFFLIN-ST. JEOR: SCORE: 1647.82

## 2019-07-24 ASSESSMENT — PAIN SCALES - GENERAL: PAINLEVEL: MODERATE PAIN (4)

## 2019-07-24 NOTE — LETTER
"    7/24/2019         RE: Uli Champagne  [address] [facility] [address]        Dear Colleague,    Thank you for referring your patient, Uli Champagne, to the [facility]. Please see a copy of my visit note below.    ORTHOPEDIC CONSULT      Chief Complaint: Uli Champagne is a 78 year old adult who is being seen for Chief Complaint   Patient presents with     Musculoskeletal Problem     right hip pain     Consult       History of Present Illness:   Uli Champagne is a 78 year old adult who is seen in consultation at the request of AMY Dozier for evaluation of right hip pain.  Seen multiple times in past, first time for right hip. States has had off on deep hip pain into the groin for 6-8 months. Then about 2 weeks ago, fell, landing on the buttock/back pocket. Developed moderate to severe lateral hip pain especially with walking, laying on it, or sit to stand.  New type of pain for him. Eldon.  Saw his PCP on 7/8/19 who diagnosed him with trochanteric bursitis, gave him some \"pain pills\". Over the last 2 weeks took 3 of them, states the lateral hip pain has gotten better and close to full resolution. States little pain when he sleeps on it, otherwise no pain with walking or other activity. Denies back pain. States now has more of the mild aching deep groin pain. Happy with progress. Seeing a chiropractor and this is going well.  Otherwise rest, activity modification has all been helpful.  No radiating pain, no numbness/tingling.   Chronic back pain that he states is at baseline.  Uses a walker/cane.  On coumadin.         Patient's past medical, surgical, social and family histories reviewed.     Past Medical History:   Diagnosis Date     Atrial fibrillation (H)      Coronary atherosclerosis of unspecified type of vessel, native or graft      Hernia of unspecified site of abdominal cavity without mention of obstruction or gangrene     umbilical hernia; s/p repair " 3/1998     History of CVA (cerebrovascular accident) 3/16/2018     Hordeolum externum left upper eyelid 1/21/2016     Malignant neoplasm of prostate (H) 07/14/08    Admit.Discharged 07/17/08     Other and unspecified hyperlipidemia      Postsurgical aortocoronary bypass status 1992     Respiratory complications      Unspecified essential hypertension      Unspecified tinnitus     left ear       Past Surgical History:   Procedure Laterality Date     C CABG, VEIN, THREE  1992     C NONSPECIFIC PROCEDURE  1987    Bone fusion in neck     C REMV PROSTATE,RETROPUB,RAD,LTD NODES  7/14/2008    Radical retropubic prostatectomy and pelvic lymph node dissection.     HC REMOVAL OF TONSILS,12+ Y/O  1969    Tonsils 10+y.o.     HEMILAMINECTOMY, DISCECTOMY LUMBAR TWO LEVELS, COMBINED Left 12/16/2015    Procedure: COMBINED HEMILAMINECTOMY, DISCECTOMY LUMBAR TWO LEVELS;  Surgeon: Jung Finley MD;  [facility]     RELEASE CARPAL TUNNEL Right 5/17/2019    Procedure: RELEASE CARPAL TUNNEL-RIGHT;  Surgeon: Yovani Ogden DO;  Location: [facility]     STENT, CORONARY, ALEAH  2002    x5       Medications:    Current Outpatient Medications on File Prior to Visit:  allopurinol (ZYLOPRIM) 300 MG tablet TAKE 1 TABLET DAILY FOR GOUT   ASPIRIN 81 MG OR TABS 1 TABLET DAILY   chlorthalidone (HYGROTON) 25 MG tablet Take 1 tablet (25 mg) by mouth daily   fish oil-omega-3 fatty acids (FISH OIL) 1000 MG capsule Take 1 g by mouth daily.   olmesartan (BENICAR) 40 MG tablet Take 1 tablet (40 mg) by mouth daily   order for DME Equipment being ordered: Walker with seat   simvastatin (ZOCOR) 40 MG tablet Take 1 tablet (40 mg) by mouth At Bedtime   warfarin (COUMADIN) 5 MG tablet TAKE ONE-HALF (1/2) TABLET ON TUESDAY AND ONE TABLET ON ALL OTHER DAYS OF THE WEEK OR AS DIRECTED BY THE COUMADIN CLINIC   nitroGLYcerin (NITROSTAT) 0.4 MG sublingual tablet Place 1 tablet (0.4 mg) under the tongue See Admin Instructions for chest pain (Patient not  "taking: Reported on 2019)   predniSONE (DELTASONE) 20 MG tablet Take 1 tablet (20 mg) by mouth daily . Take with food (Patient not taking: Reported on 2019)     No current facility-administered medications on file prior to visit.     Allergies   Allergen Reactions     No Known Drug Allergies        Social History     Occupational History     Not on file   Tobacco Use     Smoking status: Former Smoker     Packs/day: 0.50     Years: 2.00     Pack years: 1.00     Types: Cigarettes     Last attempt to quit: 1960     Years since quittin.6     Smokeless tobacco: Never Used   Substance and Sexual Activity     Alcohol use: Yes     Comment: socially     Drug use: No     Sexual activity: Never       Family History   Problem Relation Age of Onset     Arthritis Father      Heart Disease Father      Hypertension Brother      Cancer Brother         liver     Heart Disease Sister      Heart Disease Brother        REVIEW OF SYSTEMS  10 point review systems performed otherwise negative as noted as per history of present illness.    Physical Exam:  Vitals: BP (P) 128/64 (BP Location: Right arm, Patient Position: Sitting, Cuff Size: Adult Regular)   Ht (P) 1.708 m (5' 7.25\")   Wt (P) 97.5 kg (215 lb)   BMI (P) 33.42 kg/m     BMI= Body mass index is 33.42 kg/m  (pended).  Constitutional: healthy, alert and no acute distress   Psychiatric: mentation appears normal and affect normal/bright  NEURO: no focal deficits  RESP: Normal with easy respirations and no use of accessory muscles to breathe, no audible wheezing or retractions  CV: RLE: no edema  JOINT/EXTREMITIES:right hip: stiffness with motion in all planes, mildly decreased flexion compared to left.  Some groin pain with hip flexion.  No pain with internal/external rotation. Unable to do FABERs due to stiffness.  Straight leg raise negative.  Quick sit to stand without pain.  No focal tenderness along greater trochanteric bursa, SI joint.  No focal or bony " tenderness.     Lymph: no appreciated lymphedema  GAIT: not tested     Diagnostic Modalities:  right hip X-ray: No fracture, dislocation and or lesion. Normal alignment.  Mild/early degenerative changes to the Joint space but overall maintained with no significant arthritis. No appreciable soft tissue abnormality.   Independent visualization of the images was performed.      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  He is doing much better and barely has any pain at this point. Not focally tender.  He is stiff with motion, but sounds more like a chronic issue with groin pain.  He likely has some underlying early OA but not very bothersome.  Recommended physical therapy for the stiffness, groin pain.  He states is seeing a chiropractor. If he does not continue to get better with the chiropractor, will call for physical therapy orders and attend, otherwise he is happy with where he is.      Return to clinic PRN, or sooner as needed for changes.  Re-x-ray on return: No    Scribed by:  MARIA D Salcedo, CNP  2:42 PM  7/24/2019  I attest I have seen and evaluated the patient.  I agree with above impression and plan.  Isaiah Ogden D.O.    Again, thank you for allowing me to participate in the care of your patient.        Sincerely,        Yovani Ogden, DO

## 2019-07-24 NOTE — PROGRESS NOTES
"ORTHOPEDIC CONSULT      Chief Complaint: Uli Champagne is a 78 year old adult who is being seen for Chief Complaint   Patient presents with     Musculoskeletal Problem     right hip pain     Consult       History of Present Illness:   Uli Champagne is a 78 year old adult who is seen in consultation at the request of AMY Dozier for evaluation of right hip pain.  Seen multiple times in past, first time for right hip. States has had off on deep hip pain into the groin for 6-8 months. Then about 2 weeks ago, fell, landing on the buttock/back pocket. Developed moderate to severe lateral hip pain especially with walking, laying on it, or sit to stand.  New type of pain for him. [place].  Saw his PCP on 7/8/19 who diagnosed him with trochanteric bursitis, gave him some \"pain pills\". Over the last 2 weeks took 3 of them, states the lateral hip pain has gotten better and close to full resolution. States little pain when he sleeps on it, otherwise no pain with walking or other activity. Denies back pain. States now has more of the mild aching deep groin pain. Happy with progress. Seeing a chiropractor and this is going well.  Otherwise rest, activity modification has all been helpful.  No radiating pain, no numbness/tingling.   Chronic back pain that he states is at baseline.  Uses a walker/cane.  On coumadin.         Patient's past medical, surgical, social and family histories reviewed.     Past Medical History:   Diagnosis Date     Atrial fibrillation (H)      Coronary atherosclerosis of unspecified type of vessel, native or graft      Hernia of unspecified site of abdominal cavity without mention of obstruction or gangrene     umbilical hernia; s/p repair 3/1998     History of CVA (cerebrovascular accident) 3/16/2018     Hordeolum externum left upper eyelid 1/21/2016     Malignant neoplasm of prostate (H) 07/14/08    Admit.Discharged 07/17/08     Other and unspecified hyperlipidemia      " Postsurgical aortocoronary bypass status 1992     Respiratory complications      Unspecified essential hypertension      Unspecified tinnitus     left ear       Past Surgical History:   Procedure Laterality Date     C CABG, VEIN, THREE  1992     C NONSPECIFIC PROCEDURE  1987    Bone fusion in neck     C REMV PROSTATE,RETROPUB,RAD,LTD NODES  7/14/2008    Radical retropubic prostatectomy and pelvic lymph node dissection.     HC REMOVAL OF TONSILS,12+ Y/O  1969    Tonsils 10+y.o.     HEMILAMINECTOMY, DISCECTOMY LUMBAR TWO LEVELS, COMBINED Left 12/16/2015    Procedure: COMBINED HEMILAMINECTOMY, DISCECTOMY LUMBAR TWO LEVELS;  Surgeon: Jung Finley MD;  [facility]     RELEASE CARPAL TUNNEL Right 5/17/2019    Procedure: RELEASE CARPAL TUNNEL-RIGHT;  Surgeon: Yovani Ogden DO;  Location: [facility]     STENT, CORONARY, ALEAH  2002    x5       Medications:    Current Outpatient Medications on File Prior to Visit:  allopurinol (ZYLOPRIM) 300 MG tablet TAKE 1 TABLET DAILY FOR GOUT   ASPIRIN 81 MG OR TABS 1 TABLET DAILY   chlorthalidone (HYGROTON) 25 MG tablet Take 1 tablet (25 mg) by mouth daily   fish oil-omega-3 fatty acids (FISH OIL) 1000 MG capsule Take 1 g by mouth daily.   olmesartan (BENICAR) 40 MG tablet Take 1 tablet (40 mg) by mouth daily   order for DME Equipment being ordered: Walker with seat   simvastatin (ZOCOR) 40 MG tablet Take 1 tablet (40 mg) by mouth At Bedtime   warfarin (COUMADIN) 5 MG tablet TAKE ONE-HALF (1/2) TABLET ON TUESDAY AND ONE TABLET ON ALL OTHER DAYS OF THE WEEK OR AS DIRECTED BY THE COUMADIN CLINIC   nitroGLYcerin (NITROSTAT) 0.4 MG sublingual tablet Place 1 tablet (0.4 mg) under the tongue See Admin Instructions for chest pain (Patient not taking: Reported on 7/24/2019)   predniSONE (DELTASONE) 20 MG tablet Take 1 tablet (20 mg) by mouth daily . Take with food (Patient not taking: Reported on 7/24/2019)     No current facility-administered medications on file prior to  "visit.     Allergies   Allergen Reactions     No Known Drug Allergies        Social History     Occupational History     Not on file   Tobacco Use     Smoking status: Former Smoker     Packs/day: 0.50     Years: 2.00     Pack years: 1.00     Types: Cigarettes     Last attempt to quit: 1960     Years since quittin.6     Smokeless tobacco: Never Used   Substance and Sexual Activity     Alcohol use: Yes     Comment: socially     Drug use: No     Sexual activity: Never       Family History   Problem Relation Age of Onset     Arthritis Father      Heart Disease Father      Hypertension Brother      Cancer Brother         liver     Heart Disease Sister      Heart Disease Brother        REVIEW OF SYSTEMS  10 point review systems performed otherwise negative as noted as per history of present illness.    Physical Exam:  Vitals: BP (P) 128/64 (BP Location: Right arm, Patient Position: Sitting, Cuff Size: Adult Regular)   Ht (P) 1.708 m (5' 7.25\")   Wt (P) 97.5 kg (215 lb)   BMI (P) 33.42 kg/m    BMI= Body mass index is 33.42 kg/m  (pended).  Constitutional: healthy, alert and no acute distress   Psychiatric: mentation appears normal and affect normal/bright  NEURO: no focal deficits  RESP: Normal with easy respirations and no use of accessory muscles to breathe, no audible wheezing or retractions  CV: RLE: no edema  JOINT/EXTREMITIES:right hip: stiffness with motion in all planes, mildly decreased flexion compared to left.  Some groin pain with hip flexion.  No pain with internal/external rotation. Unable to do FABERs due to stiffness.  Straight leg raise negative.  Quick sit to stand without pain.  No focal tenderness along greater trochanteric bursa, SI joint.  No focal or bony tenderness.     Lymph: no appreciated lymphedema  GAIT: not tested     Diagnostic Modalities:  right hip X-ray: No fracture, dislocation and or lesion. Normal alignment.  Mild/early degenerative changes to the Joint space but overall " maintained with no significant arthritis. No appreciable soft tissue abnormality.   Independent visualization of the images was performed.      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  He is doing much better and barely has any pain at this point. Not focally tender.  He is stiff with motion, but sounds more like a chronic issue with groin pain.  He likely has some underlying early OA but not very bothersome.  Recommended physical therapy for the stiffness, groin pain.  He states is seeing a chiropractor. If he does not continue to get better with the chiropractor, will call for physical therapy orders and attend, otherwise he is happy with where he is.      Return to clinic PRN, or sooner as needed for changes.  Re-x-ray on return: No    Scribed by:  MARIA D Salcedo, CNP  2:42 PM  7/24/2019  I attest I have seen and evaluated the patient.  I agree with above impression and plan.  Isaiah Ogden D.O.

## 2019-08-01 ENCOUNTER — ANTICOAGULATION THERAPY VISIT (OUTPATIENT)
Dept: ANTICOAGULATION | Facility: OTHER | Age: 79
End: 2019-08-01
Payer: COMMERCIAL

## 2019-08-01 DIAGNOSIS — I48.20 CHRONIC A-FIB (H): ICD-10-CM

## 2019-08-01 DIAGNOSIS — Z79.01 LONG TERM CURRENT USE OF ANTICOAGULANT THERAPY: ICD-10-CM

## 2019-08-01 LAB — INR POINT OF CARE: 2 (ref 0.86–1.14)

## 2019-08-01 PROCEDURE — 99207 ZZC NO CHARGE NURSE ONLY: CPT

## 2019-08-01 PROCEDURE — 36416 COLLJ CAPILLARY BLOOD SPEC: CPT

## 2019-08-01 PROCEDURE — 85610 PROTHROMBIN TIME: CPT | Mod: QW

## 2019-08-01 NOTE — PROGRESS NOTES
ANTICOAGULATION FOLLOW-UP CLINIC VISIT    Patient Name:  Uli Champagne  Date:  2019  Contact Type:  Face to Face    SUBJECTIVE:  Patient Findings     Comments:   The patient was assessed for diet, medication, and activity level changes, missed or extra doses, bruising or bleeding, with no problem findings.          Clinical Outcomes     Negatives:   Major bleeding event, Thromboembolic event, Anticoagulation-related hospital admission, Anticoagulation-related ED visit, Anticoagulation-related fatality    Comments:   The patient was assessed for diet, medication, and activity level changes, missed or extra doses, bruising or bleeding, with no problem findings.             OBJECTIVE    INR Protime   Date Value Ref Range Status   2019 2.0 (A) 0.86 - 1.14 Final       ASSESSMENT / PLAN  INR assessment THER    Recheck INR In: 6 WEEKS    INR Location Clinic      Anticoagulation Summary  As of 2019    INR goal:   2.0-3.0   TTR:   78.0 % (3.3 y)   INR used for dosin.0 (2019)   Warfarin maintenance plan:   2.5 mg (5 mg x 0.5) every Tue, Sat; 5 mg (5 mg x 1) all other days   Full warfarin instructions:   2.5 mg every Tue, Sat; 5 mg all other days   Weekly warfarin total:   30 mg   No change documented:   Rui Landers, RN   Plan last modified:   Mahsa Julien RN (3/22/2019)   Next INR check:   2019   Target end date:       Indications    Long-term (current) use of anticoagulants [Z79.01] [Z79.01]  Chronic a-fib (H) [I48.2]             Anticoagulation Episode Summary     INR check location:       Preferred lab:       Send INR reminders to:   [facility]    Comments:   5 mg tab, AM dose, appt card        Anticoagulation Care Providers     Provider Role Specialty Phone number    Isra Carrillo MD [facility] [phone]            See the Encounter Report to view Anticoagulation Flowsheet and Dosing Calendar (Go to Encounters tab in chart review, and find the  Anticoagulation Therapy Visit)    Dosage adjustment made based on physician directed care plan.    Rui Landers RN

## 2019-08-20 ENCOUNTER — TELEPHONE (OUTPATIENT)
Dept: CARDIOLOGY | Facility: CLINIC | Age: 79
End: 2019-08-20

## 2019-08-30 ENCOUNTER — OFFICE VISIT (OUTPATIENT)
Dept: FAMILY MEDICINE | Facility: OTHER | Age: 79
End: 2019-08-30
Payer: COMMERCIAL

## 2019-08-30 VITALS
TEMPERATURE: 97.3 F | RESPIRATION RATE: 18 BRPM | SYSTOLIC BLOOD PRESSURE: 130 MMHG | HEART RATE: 63 BPM | DIASTOLIC BLOOD PRESSURE: 78 MMHG | OXYGEN SATURATION: 99 % | WEIGHT: 222.19 LBS | BODY MASS INDEX: 34.54 KG/M2

## 2019-08-30 DIAGNOSIS — E78.5 HYPERLIPIDEMIA LDL GOAL <100: ICD-10-CM

## 2019-08-30 DIAGNOSIS — Z00.00 ENCOUNTER FOR MEDICARE ANNUAL WELLNESS EXAM: Primary | ICD-10-CM

## 2019-08-30 PROCEDURE — G0438 PPPS, INITIAL VISIT: HCPCS | Performed by: FAMILY MEDICINE

## 2019-08-30 RX ORDER — SIMVASTATIN 40 MG
40 TABLET ORAL AT BEDTIME
Qty: 90 TABLET | Refills: 3 | Status: SHIPPED | OUTPATIENT
Start: 2019-08-30 | End: 2020-01-01

## 2019-08-30 ASSESSMENT — ENCOUNTER SYMPTOMS
NERVOUS/ANXIOUS: 0
PALPITATIONS: 0
DIARRHEA: 0
SHORTNESS OF BREATH: 0
SORE THROAT: 0
DIZZINESS: 0
FREQUENCY: 0
JOINT SWELLING: 0
ABDOMINAL PAIN: 0
EYE PAIN: 0
HEADACHES: 0
ARTHRALGIAS: 1
HEMATURIA: 0
FEVER: 0
DYSURIA: 0
WEAKNESS: 0
NAUSEA: 0
HEMATOCHEZIA: 0
COUGH: 0
CHILLS: 0
MYALGIAS: 0
HEARTBURN: 0
CONSTIPATION: 0

## 2019-08-30 ASSESSMENT — PAIN SCALES - GENERAL: PAINLEVEL: NO PAIN (0)

## 2019-08-30 ASSESSMENT — ACTIVITIES OF DAILY LIVING (ADL): CURRENT_FUNCTION: NO ASSISTANCE NEEDED

## 2019-08-30 NOTE — PROGRESS NOTES
"SUBJECTIVE:   Uli Champagne is a 78 year old adult who presents for Preventive Visit.      Are you in the first 12 months of your Medicare coverage?  No    Healthy Habits:     In general, how would you rate your overall health?  Fair    Frequency of exercise:  None    Duration of exercise:  Other    Do you usually eat at least 4 servings of fruit and vegetables a day, include whole grains    & fiber and avoid regularly eating high fat or \"junk\" foods?  No    Taking medications regularly:  Yes    Medication side effects:  None    Ability to successfully perform activities of daily living:  No assistance needed    Home Safety:  No safety concerns identified    Hearing Impairment:  Difficulty understanding soft or whispered speech    In the past 6 months, have you been bothered by leaking of urine? Yes    In general, how would you rate your overall mental or emotional health?  Good      PHQ-2 Total Score: 0    Additional concerns today:  No    Do you feel safe in your environment? Yes    Do you have a Health Care Directive? No: Advance care planning was reviewed with patient; patient declined at this time.      Fall risk     click delete button to remove this line now  Cognitive Screening   1) Repeat 3 items (Leader, Season, Table)    2) Clock draw: NORMAL  3) 3 item recall: Recalls 2 objects   Results: NORMAL clock, 1-2 items recalled: COGNITIVE IMPAIRMENT LESS LIKELY    Mini-CogTM Copyright S Brenda. Licensed by the author for use in [facility]; reprinted with permission (kecia@.[facility]). All rights reserved.      Do you have sleep apnea, excessive snoring or daytime drowsiness?: yes    Reviewed and updated as needed this visit by clinical staff         Reviewed and updated as needed this visit by Provider        Social History     Tobacco Use     Smoking status: Former Smoker     Packs/day: 0.50     Years: 2.00     Pack years: 1.00     Types: Cigarettes     Last attempt to quit: 1/1/1960     Years " since quittin.7     Smokeless tobacco: Never Used   Substance Use Topics     Alcohol use: Yes     Comment: socially     If you drink alcohol do you typically have >3 drinks per day or >7 drinks per week? No    Alcohol Use 2013   Prescreen: >3 drinks/day or >7 drinks/week? The patient does not drink >3 drinks per day nor >7 drinks per week.         Current providers sharing in care for this patient include:   Patient Care Team:  Isra Carrillo MD as PCP - General (Family Practice)  Dusty Carter PA-C as Assigned PCP    The following health maintenance items are reviewed in Epic and correct as of today:  Health Maintenance   Topic Date Due     ZOSTER IMMUNIZATION (2 of 3) 2012     MEDICARE ANNUAL WELLNESS VISIT  2014     OP ANNUAL INR REFERRAL  2016     INFLUENZA VACCINE (1) 2019     BMP  2020     FALL RISK ASSESSMENT  2020     ALT  2020     LIPID  2020     ADVANCE CARE PLANNING  01/10/2023     DTAP/TDAP/TD IMMUNIZATION (4 - Td) 2029     PHQ-2  Completed     PNEUMOCOCCAL IMMUNIZATION 65+ LOW/MEDIUM RISK  Completed     IPV IMMUNIZATION  Aged Out     MENINGITIS IMMUNIZATION  Aged Out     Labs reviewed in EPIC  BP Readings from Last 3 Encounters:   19 130/78   19 (P) 128/64   19 120/70    Wt Readings from Last 3 Encounters:   19 100.8 kg (222 lb 3 oz)   19 (P) 97.5 kg (215 lb)   19 95.1 kg (209 lb 11.2 oz)                  Patient Active Problem List   Diagnosis     Cardiac dysrhythmia     Essential hypertension, benign     Neurogenic bladder     Prostate cancer (H)     Permanent atrial fibrillation (H)     Chronic idiopathic gout of multiple sites     HYPERLIPIDEMIA LDL GOAL <100     CAD (coronary artery disease)     Advanced directives, counseling/discussion     Health Care Home     Stye     Non morbid obesity due to excess calories     Lumbar spinal stenosis     S/P lumbar laminectomy     Chronic a-fib (H)      Essential hypertension     Hordeolum externum left upper eyelid     Long-term (current) use of anticoagulants [Z79.01]     Tear of medial meniscus of left knee, initial encounter     Primary osteoarthritis of left knee     Bradycardia     Abdominal aortic aneurysm (AAA) without rupture (H)     Adenocarcinoma of prostate (H)     Postsurgical aortocoronary bypass status     History of CVA (cerebrovascular accident)     Bilateral carpal tunnel syndrome     Right hip pain     Traumatic bursitis     Primary osteoarthritis of right hip     Past Surgical History:   Procedure Laterality Date     C CABG, VEIN, THREE       C NONSPECIFIC PROCEDURE      Bone fusion in neck     C REMV PROSTATE,RETROPUB,RAD,LTD NODES  2008    Radical retropubic prostatectomy and pelvic lymph node dissection.     HC REMOVAL OF TONSILS,10+ Y/O      Tonsils 10+y.o.     HEMILAMINECTOMY, DISCECTOMY LUMBAR TWO LEVELS, COMBINED Left 2015    Procedure: COMBINED HEMILAMINECTOMY, DISCECTOMY LUMBAR TWO LEVELS;  Surgeon: Jung Finley MD;  Location: [facility] OR     RELEASE CARPAL TUNNEL Right 2019    Procedure: RELEASE CARPAL TUNNEL-RIGHT;  Surgeon: Yovani Ogden DO;  Location: [facility] OR     STENT, CORONARY, ALEAH  2002    x5       Social History     Tobacco Use     Smoking status: Former Smoker     Packs/day: 0.50     Years: 2.00     Pack years: 1.00     Types: Cigarettes     Last attempt to quit: 1960     Years since quittin.7     Smokeless tobacco: Never Used   Substance Use Topics     Alcohol use: Yes     Comment: socially     Family History   Problem Relation Age of Onset     Arthritis Father      Heart Disease Father      Hypertension Brother      Cancer Brother         liver     Heart Disease Sister      Heart Disease Brother          Current Outpatient Medications   Medication Sig Dispense Refill     allopurinol (ZYLOPRIM) 300 MG tablet TAKE 1 TABLET DAILY FOR GOUT 90 tablet 3     ASPIRIN 81 MG OR TABS 1  TABLET DAILY       chlorthalidone (HYGROTON) 25 MG tablet Take 1 tablet (25 mg) by mouth daily 90 tablet 3     fish oil-omega-3 fatty acids (FISH OIL) 1000 MG capsule Take 1 g by mouth daily.       olmesartan (BENICAR) 40 MG tablet Take 1 tablet (40 mg) by mouth daily 90 tablet 3     order for DME Equipment being ordered: Walker with seat 1 each 0     simvastatin (ZOCOR) 40 MG tablet Take 1 tablet (40 mg) by mouth At Bedtime 14 tablet 0     warfarin (COUMADIN) 5 MG tablet TAKE ONE-HALF (1/2) TABLET ON TUESDAY AND ONE TABLET ON ALL OTHER DAYS OF THE WEEK OR AS DIRECTED BY THE COUMADIN CLINIC 90 tablet 3     nitroGLYcerin (NITROSTAT) 0.4 MG sublingual tablet Place 1 tablet (0.4 mg) under the tongue See Admin Instructions for chest pain (Patient not taking: Reported on 7/24/2019) 25 tablet 1     Allergies   Allergen Reactions     No Known Drug Allergies      Recent Labs   Lab Test 05/14/19 04/12/19  0929 05/10/18 04/30/18  1244 08/31/17  1323  05/20/15   LDL 45.0 33 58.0  --  39   < > 49.0  49   HDL 42 48 37*  --  43   < > 35  35   TRIG 74 74 101  --  92   < > 120  120   ALT 20 26 37  --  27   < > 28  28   CR 0.9 0.89 0.9  --  0.92   < > 0.9  0.9   GFRESTIMATED >60 81 >60 81 80   < > >60   GFRESTBLACK  --  >90  --  >90 >90   < >  --    POTASSIUM 3.9 4.0 3.7  --  4.0   < > 4.2  4.2   TSH  --   --   --   --  2.31  --  2.50    < > = values in this interval not displayed.          Review of Systems   Constitutional: Negative for chills and fever.   HENT: Positive for hearing loss. Negative for congestion, ear pain and sore throat.    Eyes: Negative for pain and visual disturbance.   Respiratory: Negative for cough and shortness of breath.    Cardiovascular: Negative for chest pain and palpitations.   Gastrointestinal: Negative for abdominal pain, constipation, diarrhea and nausea.   Genitourinary: Negative for dysuria, frequency, genital sores, hematuria and urgency.   Musculoskeletal: Positive for arthralgias.  "Negative for joint swelling and myalgias.   Skin: Negative for rash.   Neurological: Negative for dizziness, weakness and headaches.   Psychiatric/Behavioral: The patient is not nervous/anxious.      Constitutional, HEENT, cardiovascular, pulmonary, gi and gu systems are negative, except as otherwise noted.    OBJECTIVE:   There were no vitals taken for this visit. Estimated body mass index is 33.42 kg/m  (pended) as calculated from the following:    Height as of 7/24/19: (P) 1.708 m (5' 7.25\").    Weight as of 7/24/19: (P) 97.5 kg (215 lb).  Physical Exam  GENERAL: healthy, alert, no distress and elderly  EYES: Eyes grossly normal to inspection, PERRL and conjunctivae and sclerae normal  HENT: ear canals and TM's normal, nose and mouth without ulcers or lesions  NECK: no adenopathy, no asymmetry, masses, or scars and thyroid normal to palpation  RESP: lungs clear to auscultation - no rales, rhonchi or wheezes  CV: regular rate and rhythm, normal S1 S2, no S3 or S4, no murmur, click or rub, no peripheral edema and peripheral pulses strong  ABDOMEN: soft, nontender, no hepatosplenomegaly, no masses and bowel sounds normal  MS: bilateral knee osteoarthritis  SKIN: no suspicious lesions or rashes  NEURO: Normal strength and tone, mentation intact and speech normal  PSYCH: mentation appears normal, affect normal/bright    Diagnostic Test Results:  Labs reviewed in Epic    ASSESSMENT / PLAN:       ICD-10-CM    1. Encounter for Medicare annual wellness exam Z00.00 HONORING CHOICES REFERRAL   2. Hyperlipidemia LDL goal <100 E78.5 simvastatin (ZOCOR) 40 MG tablet       End of Life Planning:  Patient currently has an advanced directive: No.  I have verified the patient's ablity to prepare an advanced directive/make health care decisions.  Literature was provided to assist patient in preparing an advanced directive.    COUNSELING:  Reviewed preventive health counseling, as reflected in patient instructions       Regular " "exercise       Healthy diet/nutrition       Vision screening       Hearing screening       Fall risk prevention       Immunizations    Vaccinated for: Zoster at [facility]           Aspirin Prophylaxsis    Estimated body mass index is 33.42 kg/m  (pended) as calculated from the following:    Height as of 7/24/19: (P) 1.708 m (5' 7.25\").    Weight as of 7/24/19: (P) 97.5 kg (215 lb).    Weight management plan: Discussed healthy diet and exercise guidelines     reports that he quit smoking about 59 years ago. His smoking use included cigarettes. He has a 1.00 pack-year smoking history. He has never used smokeless tobacco.      Appropriate preventive services were discussed with this patient, including applicable screening as appropriate for cardiovascular disease, diabetes, osteopenia/osteoporosis, and glaucoma.  As appropriate for age/gender, discussed screening for colorectal cancer, prostate cancer, breast cancer, and cervical cancer. Checklist reviewing preventive services available has been given to the patient.    Reviewed patients plan of care and provided an AVS. The Basic Care Plan (routine screening as documented in Health Maintenance) for Uli meets the Care Plan requirement. This Care Plan has been established and reviewed with the Patient.    Counseling Resources:  ATP IV Guidelines  Pooled Cohorts Equation Calculator  Breast Cancer Risk Calculator  FRAX Risk Assessment  ICSI Preventive Guidelines  Dietary Guidelines for Americans, 2010  [employer] MyPlate  ASA Prophylaxis  Lung CA Screening    Isra Carrillo MD  [facility]    Identified Health Risks:  Answers for HPI/ROS submitted by the patient on 8/30/2019   Annual Exam:  Blood in stool: No  heartburn: No  peripheral edema: No  mood changes: No      The patient was provided with suggestions to help him develop a healthy physical lifestyle.  He is at risk for lack of exercise and has been provided with information to increase physical " activity for the benefit of his well-being.  The patient was counseled and encouraged to consider modifying their diet and eating habits. He was provided with information on recommended healthy diet options.  The patient was provided with written information regarding signs of hearing loss.  Information on urinary incontinence and treatment options given to patient.  Information on urinary incontinence and treatment options given to patient.

## 2019-08-30 NOTE — PATIENT INSTRUCTIONS
Patient Education   Personalized Prevention Plan  You are due for the preventive services outlined below.  Your care team is available to assist you in scheduling these services.  If you have already completed any of these items, please share that information with your care team to update in your medical record.  Health Maintenance Due   Topic Date Due     Zoster (Shingles) Vaccine (2 of 3) 07/04/2012     Annual Wellness Visit  01/04/2014     [facility] REFERRAL - yearly  04/24/2016     Your Health Risk Assessment indicates you feel you are not in good health    A healthy lifestyle helps keep the body fit and the mind alert. It helps protect you from disease, helps you fight disease, and helps prevent chronic disease (disease that doesn't go away) from getting worse. This is important as you get older and begin to notice twinges in muscles and joints and a decline in the strength and stamina you once took for granted. A healthy lifestyle includes good healthcare, good nutrition, weight control, recreation, and regular exercise. Avoid harmful substances and do what you can to keep safe. Another part of a healthy lifestyle is stay mentally active and socially involved.    Good healthcare     Have a wellness visit every year.     If you have new symptoms, let us know right away. Don't wait until the next checkup.     Take medicines exactly as prescribed and keep your medicines in a safe place. Tell us if your medicine causes problems.   Healthy diet and weight control     Eat 3 or 4 small, nutritious, low-fat, high-fiber meals a day. Include a variety of fruits, vegetables, and whole-grain foods.     Make sure you get enough calcium in your diet. Calcium, vitamin D, and exercise help prevent osteoporosis (bone thinning).     If you live alone, try eating with others when you can. That way you get a good meal and have company while you eat it.     Try to keep a healthy weight. If you eat more calories than your body  uses for energy, it will be stored as fat and you will gain weight.     Recreation   Recreation is not limited to sports and team events. It includes any activity that provides relaxation, interest, enjoyment, and exercise. Recreation provides an outlet for physical, mental, and social energy. It can give a sense of worth and achievement. It can help you stay healthy.    Mental Exercise and Social Involvement  Mental and emotional health is as important as physical health. Keep in touch with friends and family. Stay as active as possible. Continue to learn and challenge yourself.   Things you can do to stay mentally active are:    Learn something new, like a foreign language or musical instrument.     Play SCRABBLE or do crossword puzzles. If you cannot find people to play these games with you at home, you can play them with others on your computer through the Internet.     Join a games club--anything from card games to chess or checkers or lawn bowling.     Start a new hobby.     Go back to school.     Volunteer.     Read.   Keep up with world events.    Exercise for a Healthier Heart     Exercise with a friend. When activity is fun, you're more likely to stick with it.   You may wonder how you can improve the health of your heart. If you re thinking about exercise, you re on the right track. You don t need to become an athlete, but you do need a certain amount of brisk exercise to help strengthen your heart. If you have been diagnosed with a heart condition, your doctor may recommend exercise to help stabilize your condition. To help make exercise a habit, choose safe, fun activities.  Be sure to check with your healthcare provider before starting an exercise program.   Why exercise?  Exercising regularly offers many healthy rewards. It can help you do all of the following:    Improve your blood cholesterol level to help prevent further heart trouble    Lower your blood pressure to help prevent a stroke or heart  attack    Control diabetes, or reduce your risk of getting this disease    Improve your heart and lung function    Reach and maintain a healthy weight    Make your muscles stronger and more limber so you can stay active    Prevent falls and fractures by slowing the loss of bone mass (osteoporosis)    Manage stress better    Reduce your blood pressure    Improve your sense of self and your body image  Exercise tips  Ease into your routine. Set small goals. Then build on them.  Exercise on most days. Aim for a total of 150 or more minutes of moderate to  vigorous intensity activity each week. Consider 40 minutes, 3 to 4 times a week. For best results, activity should last for 40 minutes on average. It is OK to work up to the 40 minute period over time. Examples of moderate-intensity activity is walking 1 mile in 15 minutes or 30 to 45 minutes of yard work.  Step up your daily activity level. Along with your exercise program, try being more active throughout the day. Walk instead of drive. Do more household tasks or yard work.  Choose one or more activities you enjoy. Walking is one of the easiest things you can do. You can also try swimming, riding a bike, dancing, or taking an exercise class.  Stop exercising and call your doctor if you:    Have chest pain or feel dizzy or lightheaded    Feel burning, tightness, pressure, or heaviness in your chest, neck, shoulders, back, or arms    Have unusual shortness of breath    Have increased joint or muscle pain    Have palpitations or an irregular heartbeat   Date Last Reviewed: 5/1/2016 2000-2018 [employer]. [address] [place] [address]. All rights reserved. This information is not intended as a substitute for professional medical care. Always follow your healthcare professional's instructions.          Understanding USDA MyPlate  The USDA (U.S. Department of Agriculture) has guidelines to help you make healthy food choices. These are called  MyPlate. MyPlate shows the food groups that make up healthy meals using the image of a place setting. Before you eat, think about the healthiest choices for what to put onto your plate or into your cup or bowl. To learn more about building a healthy plate, visit www.choosemyplate.gov.    The food groups    Fruits. Any fruit or 100% fruit juice counts as part of the Fruit Group. Fruits may be fresh, canned, frozen, or dried, and may be whole, cut-up, or pureed. Make half your plate fruits and vegetables.    Vegetables. Any vegetable or 100% vegetable juice counts as a member of the Vegetable Group. Vegetables may be fresh, frozen, canned, or dried. They can be served raw or cooked and may be whole, cut-up, or mashed. Make half your plate fruits and vegetables.    Grains. All foods made from grains are part of the Grains Group. These include wheat, rice, oats, cornmeal, and barley such as bread, pasta, oatmeal, cereal, tortillas, and grits. Grains should be no more than a quarter of your plate. At least half of your grains should be whole grains.    Protein. This group includes meat, poultry, seafood, beans and peas, eggs, processed soy products (like tofu), nuts (including nut butters), and seeds. Make protein choices no more than a quarter of your plate. Meat and poultry choices should be lean or low fat.    Dairy. All fluid milk products and foods made from milk that contain calcium, like yogurt and cheese, are part of the Dairy Group. (Foods that have little calcium, such as cream, butter, and cream cheese, are not part of the group.) Most dairy choices should be low-fat or fat-free.    Oils. These are fats that are liquid at room temperature. They include canola, corn, olive, soybean, and sunflower oil. Foods that are mainly oil include mayonnaise, certain salad dressings, and soft margarines. You should have only 5 to 7 teaspoons of oils a day. You probably already get this much from the food you eat.  Date Last  Reviewed: 8/1/2017 2000-2018 [employer]. [address] [place] [address]. All rights reserved. This information is not intended as a substitute for professional medical care. Always follow your healthcare professional's instructions.          Signs of Hearing Loss     Hearing much better with one ear can be a sign of hearing loss.     Hearing loss is a problem shared by many people. In fact, it is one of the most common health conditions, particularly as people age. Most people over age 65 have some hearing loss, and by age 80, almost everyone does. Because hearing loss usually occurs slowly over the years, you may not realize your hearing ability has gotten worse.  Have your hearing checked  Contact your healthcare provider if you:    Have to strain to hear normal conversation    Have to watch other people s faces very carefully to follow what they re saying    Need to ask people to repeat what they ve said    Often misunderstand what people are saying    Turn the volume of the television or radio up so high that others complain    Feel that people are mumbling when they re talking to you    Find that the effort to hear leaves you feeling tired and irritated    Notice, when using the phone, that you hear better with one ear than the other  Date Last Reviewed: 12/1/2016 2000-2018 [employer]. [address] [place] [address]. All rights reserved. This information is not intended as a substitute for professional medical care. Always follow your healthcare professional's instructions.          Urinary Incontinence (Male)    Urinary incontinence means not being able to control the release of urine from the bladder.  Causes  Common causes of urinary incontinence in men include:    Infection    Certain medicines    Aging    Poor pelvic muscle tone    Bladder spasms    Obesity    Urinary retention  Nervous system diseases, diabetes, sleep apnea, urinary tract  infections, prostate surgery, and pelvic trauma can also cause incontinence. Constipation and smoking have also been identified as risk factors.  Symptoms    Urge incontinence (also called  overactive bladder ) is a sudden urge to urinate even though there may not be much urine in the bladder. The need to urinate often during the night is common. It is due to bladder spasms.    Stress incontinence is involuntary urine leakage that can occur with sneezing, coughing, and other actions that put stress on the bladder.  Treatment  Treatment of urinary incontinence depends on the cause. Infections of the bladder are treated with antibiotics. Urinary retention is treated with a bladder catheter.  Home care  Follow these guidelines when caring for yourself at home:    Don't consume foods and drinks that may irritate the bladder. These include drinks containing alcohol, caffeinate, or carbonation; chocolate; and acidic fruits and juices.    Limit fluid intake to 6 to 8 cups a day.    Lose weight if you are overweight. This will reduce your symptoms.    If needed, wear absorbent pads to catch urine. Change pads frequently to maintain hygiene and prevent skin and bladder infections.    Bathe daily to maintain good hygiene.    If an antibiotic was prescribed to treat a bladder infection, be sure to take it until finished, even if you are feeling better before then. This is to make sure your infection has cleared.    If a catheter was left in place, it is important to keep bacteria from getting into the collection bag. Don't disconnect the catheter from the collection bag.    Use a leg band to secure the catheter drainage tube, so it does not pull on the catheter. Drain the collection bag when it becomes full using the drain spout at the bottom of the bag. Don't disconnect the bag from the catheter.    Don't pull on or try to remove a catheter. The catheter must be removed by a healthcare provider.  Follow-up care  Follow up  with your healthcare provider, or as advised.  When to seek medical advice  Call your healthcare provider right away if any of these occur:    Fever over 100.4 F (38 C), or as directed by your healthcare provider    Bladder pain or fullness    Abdominal swelling, nausea, or vomiting    Back pain    Weakness, dizziness, or fainting    If a catheter was left in place, return if:  ? Catheter falls out  ? Catheter stops draining for 6 hours  Date Last Reviewed: 10/1/2017    2307-4615 [employer]. [address] [place] [address]. All rights reserved. This information is not intended as a substitute for professional medical care. Always follow your healthcare professional's instructions.          Urinary Incontinence, Female (Adult)  Urinary incontinence means loss of control of the bladder. This problem affects many women, especially as they get older. If you have incontinence, you may be embarrassed to ask for help. But know that this problem can be treated.  Types of Incontinence  There are different types of incontinence. Two of the main types are described here. You can have more than one type.    Stress incontinence. With this type, urine leaks when pressure (stress) is put on the bladder. This may happen when you cough, sneeze, or laugh. Stress incontinence most often occurs because the pelvic floor muscles that support the bladder and urethra are weak. This can happen after pregnancy and vaginal childbirth or a hysterectomy. It can also be due to excess body weight or hormone changes.    Urge incontinence (also called overactive bladder). With this type, a sudden urge to urinate is felt often. This may happen even though there may not be much urine in the bladder. The need to urinate often during the night is common. Urge incontinence most often occurs because of bladder spasms. This may be due to bladder irritation or infection. Damage to bladder nerves or pelvic muscles, constipation, and  certain medicines can also lead to urge incontinence.  Treatment of urinary incontinence depends on the cause. Further evaluation is needed to find the type you have. This will likely include an exam and certain tests. Based on the results, you and your healthcare provider can then plan treatment. Until a diagnosis is made, the home care tips below can help relieve symptoms.  Home care    Do pelvic floor muscle exercises, if they are prescribed. The pelvic floor muscles help support the bladder and urethra. Many women find that their symptoms improve when doing special exercises that strengthen these muscles. To do the exercises contract the muscles you would use to stop your stream of urine, but do this when you re not urinating. Hold for 10 seconds, then relax. Repeat 10 to 20 times in a row, at least 3 times a day. Your provider may give you other instructions for how to do the exercises and how often.    Keep a bladder diary. This helps track how often and how much you urinate over a set period of time. Bring this diary with you to your next visit with the provider. The information can help your provider learn more about your bladder problem.    Lose weight, if advised to by your provider. Excess weight puts pressure on the bladder. Your provider can help you create a weight-loss plan that s right for you. This may include exercising more and making certain diet changes.    Don't consume foods and drinks that may irritate the bladder. These can include alcohol and caffeinated drinks.    Quit smoking. Smoking and other tobacco use can lead to chronic cough that strains the pelvic floor muscles. Smoking may also damage the bladder and urethra. Talk with your provider about treatments or methods you can use to quit smoking.    If drinking large amounts of fluid causes you to have symptoms, you may be advised to limit your fluid intake. You may also be advised to drink most of your fluids during the day and to limit  fluids at night.    If you re worried about urine leakage or accidents, you may wear absorbent pads to catch urine. Change the pads often. This helps reduce discomfort. It may also reduce the risk of skin or bladder infections.  Follow-up care  Follow up with your healthcare provider, or as directed. It may take some to find the right treatment for your problem. Your treatment plan may include special therapies or medicines. Certain procedures or surgery may also be options. Be sure to discuss any questions you have with your provider.  When to seek medical advice  Call the healthcare provider right away if any of these occur:    Fever of 100.4 F (38 C) or higher, or as directed by your provider    Bladder pain or fullness    Abdominal swelling    Nausea or vomiting    Back pain    Weakness, dizziness or fainting  Date Last Reviewed: 10/1/2017    9990-7235 [employer]. [address] [place] [address], [place] [address]. All rights reserved. This information is not intended as a substitute for professional medical care. Always follow your healthcare professional's instructions.

## 2019-09-05 ENCOUNTER — OFFICE VISIT (OUTPATIENT)
Dept: CARDIOLOGY | Facility: CLINIC | Age: 79
End: 2019-09-05
Attending: INTERNAL MEDICINE
Payer: COMMERCIAL

## 2019-09-05 VITALS
BODY MASS INDEX: 34.84 KG/M2 | HEIGHT: 67 IN | DIASTOLIC BLOOD PRESSURE: 78 MMHG | OXYGEN SATURATION: 98 % | SYSTOLIC BLOOD PRESSURE: 162 MMHG | WEIGHT: 222 LBS | RESPIRATION RATE: 12 BRPM | HEART RATE: 48 BPM

## 2019-09-05 DIAGNOSIS — I10 ESSENTIAL HYPERTENSION, BENIGN: ICD-10-CM

## 2019-09-05 PROCEDURE — 99214 OFFICE O/P EST MOD 30 MIN: CPT | Performed by: PHYSICIAN ASSISTANT

## 2019-09-05 RX ORDER — AMLODIPINE BESYLATE 5 MG/1
5 TABLET ORAL DAILY
Qty: 30 TABLET | Refills: 0 | Status: SHIPPED | OUTPATIENT
Start: 2019-09-05 | End: 2019-10-24

## 2019-09-05 RX ORDER — AMLODIPINE BESYLATE 5 MG/1
5 TABLET ORAL DAILY
Qty: 90 TABLET | Refills: 3 | Status: SHIPPED | OUTPATIENT
Start: 2019-09-05 | End: 2019-09-05

## 2019-09-05 ASSESSMENT — MIFFLIN-ST. JEOR: SCORE: 1670.62

## 2019-09-05 ASSESSMENT — PAIN SCALES - GENERAL: PAINLEVEL: NO PAIN (0)

## 2019-09-05 NOTE — PROGRESS NOTES
Service Date: 2019      PRIMARY CARDIOLOGIST:  Dr. Wells.      REASON FOR VISIT:  Hypertension followup.      HISTORY OF PRESENT ILLNESS:  Mr. Champagne is a delightful 79-year-old gentleman with past medical history significant for the followin.  Coronary artery disease with history of CABG which was a LIMA to the LAD, saphenous vein graft to the RCA and saphenous vein graft to the OM1, sometime in the early .  He underwent multiple stents to the vein graft and native RCA in , at that point had a patent LIMA to the LAD, patent vein graft to the OM1 but an occluded OM1.  The circ filled with collaterals from the LAD.   2.  Hypertension.   3.  Dyslipidemia.   4.  Atrial fibrillation with slow ventricular response, on anticoagulation of Coumadin.      He comes in today for followup today of hypertension.  He was seen by Dr. Wells in  and at that point he was switched onto olmesartan as he was hypertensive.  He brings in very nice records today, and his blood pressures initially were in the 120s and 130s primarily up until about a week ago when they went consistently in the 140s to 150s.  He denies any change in diet, stressors at home, big moves, etc.  The only thing we can figure out is that the weather became cool last week.  He has otherwise felt well.  He denies chest pain, shortness of breath, orthopnea or PND.  He has not had any syncope or presyncope.  No dizziness.      PHYSICAL EXAMINATION:   GENERAL:  Well-developed, well-nourished gentleman in no acute distress.   HEENT:  Normocephalic, atraumatic.   HEART:  Irregularly irregular, but I do not appreciate murmur, rub or gallop.   LUNGS:  Clear bilaterally.    NECK:  Carotids are quiet.   EXTREMITIES:  Without peripheral edema.   SKIN:  Warm and dry.      ASSESSMENT AND PLAN:   1.  Hypertension elevated again today consistent with his home readings for unclear reasons increased in the last 1 week.  I am going to add amlodipine 5  mg daily and keep him on his olmesartan 40 and his chlorthalidone 25.  We could consider increasing his chlorthalidone, although with his coronary artery disease, I think the amlodipine may be a good choice.  Also, I do not want too much stress on his kidneys.  I will recheck a BMP at his next visit, and if need be, we could consider increasing chlorthalidone at that time.   2.  Dyslipidemia, excellent control with LDL 45, HDL 42, total cholesterol 102.   3.  Coronary artery disease without recurrent anginal symptoms.  He has not had a recent echo or stress test, but he has no clear indication for it at this time.  May consider do that at the next visit at least to reestablish EF.   4.  Atrial fibrillation, chronic with slow ventricular response, appropriately on Coumadin.      Thank you for allowing me to participate in this delightful patient's care.  He will follow him up in about 2 months with a BMP prior.      SOFY Dubon PA-C             D: 2019   T: 2019   MT: JERE      Name:     KIRK LAYTON   MRN:      [id number]        Account:      [id number]   :      1940           Service Date: 2019      Document: [id number]

## 2019-09-05 NOTE — PATIENT INSTRUCTIONS
Thanks for coming into [facility] today.     We discussed: we'll get your blood pressure back below 130/80.       Medication changes: start taking amlodipine 5 mg once a day in the morning.    Continue other medications.       Follow up: with me with labs in about 2 months.       Please call the clinic at  [phone] with any questions or concerns and my nurses will be happy to help.     Please call [phone] for scheduling.       Reminder: Please bring in all current medications, over the counter supplements and vitamin bottles to your next appointment.

## 2019-09-05 NOTE — LETTER
2019      Isra Carrillo MD  [address] [facility] [address]      RE: Uli Diegolisy       Dear Colleague,    I had the pleasure of seeing Uli Champagne in the [facility].    Service Date: 2019      PRIMARY CARDIOLOGIST:  Dr. Wells.      REASON FOR VISIT:  Hypertension followup.      HISTORY OF PRESENT ILLNESS:  Mr. Champagne is a delightful 79-year-old gentleman with past medical history significant for the followin.  Coronary artery disease with history of CABG which was a LIMA to the LAD, saphenous vein graft to the RCA and saphenous vein graft to the OM1, sometime in the early .  He underwent multiple stents to the vein graft and native RCA in , at that point had a patent LIMA to the LAD, patent vein graft to the OM1 but an occluded OM1.  The circ filled with collaterals from the LAD.   2.  Hypertension.   3.  Dyslipidemia.   4.  Atrial fibrillation with slow ventricular response, on anticoagulation of Coumadin.      He comes in today for followup today of hypertension.  He was seen by Dr. Wells in  and at that point he was switched onto olmesartan as he was hypertensive.  He brings in very nice records today, and his blood pressures initially were in the 120s and 130s primarily up until about a week ago when they went consistently in the 140s to 150s.  He denies any change in diet, stressors at home, big moves, etc.  The only thing we can figure out is that the weather became cool last week.  He has otherwise felt well.  He denies chest pain, shortness of breath, orthopnea or PND.  He has not had any syncope or presyncope.  No dizziness.      PHYSICAL EXAMINATION:   GENERAL:  Well-developed, well-nourished gentleman in no acute distress.   HEENT:  Normocephalic, atraumatic.   HEART:  Irregularly irregular, but I do not appreciate murmur, rub or gallop.   LUNGS:  Clear bilaterally.    NECK:  Carotids are quiet.   EXTREMITIES:   Without peripheral edema.   SKIN:  Warm and dry.      ASSESSMENT AND PLAN:   1.  Hypertension elevated again today consistent with his home readings for unclear reasons increased in the last 1 week.  I am going to add amlodipine 5 mg daily and keep him on his olmesartan 40 and his chlorthalidone 25.  We could consider increasing his chlorthalidone, although with his coronary artery disease, I think the amlodipine may be a good choice.  Also, I do not want too much stress on his kidneys.  I will recheck a BMP at his next visit, and if need be, we could consider increasing chlorthalidone at that time.   2.  Dyslipidemia, excellent control with LDL 45, HDL 42, total cholesterol 102.   3.  Coronary artery disease without recurrent anginal symptoms.  He has not had a recent echo or stress test, but he has no clear indication for it at this time.  May consider do that at the next visit at least to reestablish EF.   4.  Atrial fibrillation, chronic with slow ventricular response, appropriately on Coumadin.      Thank you for allowing me to participate in this delightful patient's care.  He will follow him up in about 2 months with a BMP prior.      SOFY Dubon PA-C             D: 2019   T: 2019   MT: JERE      Name:     KIRK LAYTON   MRN:      [id number]        Account:      [id number]   :      1940           Service Date: 2019      Document: [id number]         Outpatient Encounter Medications as of 2019   Medication Sig Dispense Refill     allopurinol (ZYLOPRIM) 300 MG tablet TAKE 1 TABLET DAILY FOR GOUT 90 tablet 3     amLODIPine (NORVASC) 5 MG tablet Take 1 tablet (5 mg) by mouth daily 30 tablet 0     ASPIRIN 81 MG OR TABS 1 TABLET DAILY       chlorthalidone (HYGROTON) 25 MG tablet Take 1 tablet (25 mg) by mouth daily 90 tablet 3     fish oil-omega-3 fatty acids (FISH OIL) 1000 MG capsule Take 1 g by mouth daily.       olmesartan (BENICAR) 40 MG  tablet Take 1 tablet (40 mg) by mouth daily 90 tablet 3     simvastatin (ZOCOR) 40 MG tablet Take 1 tablet (40 mg) by mouth At Bedtime 90 tablet 3     warfarin (COUMADIN) 5 MG tablet TAKE ONE-HALF (1/2) TABLET ON TUESDAY AND ONE TABLET ON ALL OTHER DAYS OF THE WEEK OR AS DIRECTED BY THE COUMADIN CLINIC 90 tablet 3     nitroGLYcerin (NITROSTAT) 0.4 MG sublingual tablet Place 1 tablet (0.4 mg) under the tongue See Admin Instructions for chest pain (Patient not taking: Reported on 7/24/2019) 25 tablet 1     order for DME Equipment being ordered: Walker with seat 1 each 0     [DISCONTINUED] amLODIPine (NORVASC) 5 MG tablet Take 1 tablet (5 mg) by mouth daily 90 tablet 3     No facility-administered encounter medications on file as of 9/5/2019.        Again, thank you for allowing me to participate in the care of your patient.      Sincerely,    Nichole Walsh PA-C     [facility]

## 2019-09-05 NOTE — PROGRESS NOTES
[id number]  HPI and Plan:   See dictation    Orders this Visit:  Orders Placed This Encounter   Procedures     Basic metabolic panel     Follow-Up with Cardiac Advanced Practice Provider     Orders Placed This Encounter   Medications     DISCONTD: amLODIPine (NORVASC) 5 MG tablet     Sig: Take 1 tablet (5 mg) by mouth daily     Dispense:  90 tablet     Refill:  3     amLODIPine (NORVASC) 5 MG tablet     Sig: Take 1 tablet (5 mg) by mouth daily     Dispense:  30 tablet     Refill:  0     Medications Discontinued During This Encounter   Medication Reason     amLODIPine (NORVASC) 5 MG tablet          Encounter Diagnosis   Name Primary?     Essential hypertension, benign        CURRENT MEDICATIONS:  Current Outpatient Medications   Medication Sig Dispense Refill     allopurinol (ZYLOPRIM) 300 MG tablet TAKE 1 TABLET DAILY FOR GOUT 90 tablet 3     amLODIPine (NORVASC) 5 MG tablet Take 1 tablet (5 mg) by mouth daily 30 tablet 0     ASPIRIN 81 MG OR TABS 1 TABLET DAILY       chlorthalidone (HYGROTON) 25 MG tablet Take 1 tablet (25 mg) by mouth daily 90 tablet 3     fish oil-omega-3 fatty acids (FISH OIL) 1000 MG capsule Take 1 g by mouth daily.       olmesartan (BENICAR) 40 MG tablet Take 1 tablet (40 mg) by mouth daily 90 tablet 3     simvastatin (ZOCOR) 40 MG tablet Take 1 tablet (40 mg) by mouth At Bedtime 90 tablet 3     warfarin (COUMADIN) 5 MG tablet TAKE ONE-HALF (1/2) TABLET ON TUESDAY AND ONE TABLET ON ALL OTHER DAYS OF THE WEEK OR AS DIRECTED BY THE COUMADIN CLINIC 90 tablet 3     nitroGLYcerin (NITROSTAT) 0.4 MG sublingual tablet Place 1 tablet (0.4 mg) under the tongue See Admin Instructions for chest pain (Patient not taking: Reported on 7/24/2019) 25 tablet 1     order for DME Equipment being ordered: Walker with seat 1 each 0       ALLERGIES     Allergies   Allergen Reactions     No Known Drug Allergies        PAST MEDICAL HISTORY:  Past Medical History:   Diagnosis Date     Atrial fibrillation (H)      Coronary  atherosclerosis of unspecified type of vessel, native or graft      Hernia of unspecified site of abdominal cavity without mention of obstruction or gangrene     umbilical hernia; s/p repair 3/1998     History of CVA (cerebrovascular accident) 3/16/2018     Hordeolum externum left upper eyelid 1/21/2016     Malignant neoplasm of prostate (H) 07/14/08    Admit.Discharged 07/17/08     Other and unspecified hyperlipidemia      Postsurgical aortocoronary bypass status 1992     Respiratory complications      Unspecified essential hypertension      Unspecified tinnitus     left ear       PAST SURGICAL HISTORY:  Past Surgical History:   Procedure Laterality Date     C CABG, VEIN, THREE  1992     C NONSPECIFIC PROCEDURE  1987    Bone fusion in neck     C REMV PROSTATE,RETROPUB,RAD,LTD NODES  7/14/2008    Radical retropubic prostatectomy and pelvic lymph node dissection.     HC REMOVAL OF TONSILS,10+ Y/O  1969    Tonsils 10+y.o.     HEMILAMINECTOMY, DISCECTOMY LUMBAR TWO LEVELS, COMBINED Left 12/16/2015    Procedure: COMBINED HEMILAMINECTOMY, DISCECTOMY LUMBAR TWO LEVELS;  Surgeon: Jung Finley MD;  [facility]     RELEASE CARPAL TUNNEL Right 5/17/2019    Procedure: RELEASE CARPAL TUNNEL-RIGHT;  Surgeon: Yovani Ogden DO;  Location: [facility]     STENT, CORONARY, ALEAH  2002    x5       FAMILY HISTORY:  Family History   Problem Relation Age of Onset     Arthritis Father      Heart Disease Father      Hypertension Brother      Cancer Brother         liver     Heart Disease Sister      Heart Disease Brother        SOCIAL HISTORY:  Social History     Socioeconomic History     Marital status:      Spouse name: Not on file     Number of children: Not on file     Years of education: Not on file     Highest education level: Not on file   Occupational History     Not on file   Social Needs     Financial resource strain: Not on file     Food insecurity:     Worry: Not on file     Inability: Not on file      "Transportation needs:     Medical: Not on file     Non-medical: Not on file   Tobacco Use     Smoking status: Former Smoker     Packs/day: 0.50     Years: 2.00     Pack years: 1.00     Types: Cigarettes     Last attempt to quit: 1960     Years since quittin.7     Smokeless tobacco: Never Used   Substance and Sexual Activity     Alcohol use: Yes     Comment: socially     Drug use: No     Sexual activity: Never   Lifestyle     Physical activity:     Days per week: Not on file     Minutes per session: Not on file     Stress: Not on file   Relationships     Social connections:     Talks on phone: Not on file     Gets together: Not on file     Attends Christianity service: Not on file     Active member of club or organization: Not on file     Attends meetings of clubs or organizations: Not on file     Relationship status: Not on file     Intimate partner violence:     Fear of current or ex partner: Not on file     Emotionally abused: Not on file     Physically abused: Not on file     Forced sexual activity: Not on file   Other Topics Concern     Parent/sibling w/ CABG, MI or angioplasty before 65F 55M? No   Social History Narrative     Not on file       Review of Systems:  Skin:  Negative     Eyes:  Positive for glasses  ENT:  Positive for tinnitus  Respiratory:  Negative shortness of breath;dyspnea on exertion;cough;wheezing  Cardiovascular:  Negative for;palpitations;chest pain;edema;lightheadedness;dizziness Positive for;edema  Gastroenterology: Negative heartburn;excessive gas or bloating;diarrhea  Genitourinary:  Negative incontinence  Musculoskeletal:  Positive for arthritis;back pain  Neurologic:  Negative numbness or tingling of hands  Psychiatric:  Negative    Heme/Lymph/Imm:  Negative    Endocrine:  Negative      Physical Exam:  Vitals: BP (!) 162/78 (BP Location: Right arm, Cuff Size: Adult Regular)   Pulse (!) 48   Resp 12   Ht 1.702 m (5' 7\")   Wt 100.7 kg (222 lb)   SpO2 98%   BMI 34.77 kg/m   "   Please refer to dictation for physical exam    Recent Lab Results:  LIPID RESULTS:  Lab Results   Component Value Date    CHOL 102 05/14/2019    HDL 42 05/14/2019    LDL 45.0 05/14/2019    TRIG 74 05/14/2019    CHOLHDLRATIO 2.5 12/16/2014       LIVER ENZYME RESULTS:  Lab Results   Component Value Date    AST 28 05/14/2019    ALT 20 05/14/2019       CBC RESULTS:  Lab Results   Component Value Date    WBC 7.6 11/27/2015    RBC 5.23 11/27/2015    HGB 14.7 12/29/2015    HCT 47.3 11/27/2015    MCV 90 11/27/2015    MCH 31.9 11/27/2015    MCHC 35.3 11/27/2015    RDW 13.7 11/27/2015     11/27/2015       BMP RESULTS:  Lab Results   Component Value Date     04/12/2019    POTASSIUM 3.9 05/14/2019    CHLORIDE 104 04/12/2019    CO2 25 04/12/2019    ANIONGAP 11 04/12/2019     (H) 05/14/2019    BUN 22 04/12/2019    CR 0.9 05/14/2019    GFRESTIMATED >60 05/14/2019    GFRESTBLACK >90 04/12/2019    ANKIT 9.3 04/12/2019        A1C RESULTS:  No results found for: A1C    INR RESULTS:  Lab Results   Component Value Date    INR 2.0 (A) 08/01/2019    INR 2.4 (H) 07/08/2019    INR 2.1 (A) 06/20/2019    INR 1.60 (H) 05/17/2019    INR 1.99 (H) 01/07/2016           CC  Miguelito Wells MD  [address] [place] [id number]

## 2019-09-05 NOTE — LETTER
9/5/2019    Isra Carrillo MD  [address] [facility] [address]    RE: Uli Champagne       Dear Colleague,    I had the pleasure of seeing Uli Champagne in the [facility].    [id number]  HPI and Plan:   See dictation    Orders this Visit:  Orders Placed This Encounter   Procedures     Basic metabolic panel     Follow-Up with Cardiac Advanced Practice Provider     Orders Placed This Encounter   Medications     DISCONTD: amLODIPine (NORVASC) 5 MG tablet     Sig: Take 1 tablet (5 mg) by mouth daily     Dispense:  90 tablet     Refill:  3     amLODIPine (NORVASC) 5 MG tablet     Sig: Take 1 tablet (5 mg) by mouth daily     Dispense:  30 tablet     Refill:  0     Medications Discontinued During This Encounter   Medication Reason     amLODIPine (NORVASC) 5 MG tablet          Encounter Diagnosis   Name Primary?     Essential hypertension, benign        CURRENT MEDICATIONS:  Current Outpatient Medications   Medication Sig Dispense Refill     allopurinol (ZYLOPRIM) 300 MG tablet TAKE 1 TABLET DAILY FOR GOUT 90 tablet 3     amLODIPine (NORVASC) 5 MG tablet Take 1 tablet (5 mg) by mouth daily 30 tablet 0     ASPIRIN 81 MG OR TABS 1 TABLET DAILY       chlorthalidone (HYGROTON) 25 MG tablet Take 1 tablet (25 mg) by mouth daily 90 tablet 3     fish oil-omega-3 fatty acids (FISH OIL) 1000 MG capsule Take 1 g by mouth daily.       olmesartan (BENICAR) 40 MG tablet Take 1 tablet (40 mg) by mouth daily 90 tablet 3     simvastatin (ZOCOR) 40 MG tablet Take 1 tablet (40 mg) by mouth At Bedtime 90 tablet 3     warfarin (COUMADIN) 5 MG tablet TAKE ONE-HALF (1/2) TABLET ON TUESDAY AND ONE TABLET ON ALL OTHER DAYS OF THE WEEK OR AS DIRECTED BY THE COUMADIN CLINIC 90 tablet 3     nitroGLYcerin (NITROSTAT) 0.4 MG sublingual tablet Place 1 tablet (0.4 mg) under the tongue See Admin Instructions for chest pain (Patient not taking: Reported on 7/24/2019) 25 tablet 1     order for DME Equipment being  ordered: Walker with seat 1 each 0       ALLERGIES     Allergies   Allergen Reactions     No Known Drug Allergies        PAST MEDICAL HISTORY:  Past Medical History:   Diagnosis Date     Atrial fibrillation (H)      Coronary atherosclerosis of unspecified type of vessel, native or graft      Hernia of unspecified site of abdominal cavity without mention of obstruction or gangrene     umbilical hernia; s/p repair 3/1998     History of CVA (cerebrovascular accident) 3/16/2018     Hordeolum externum left upper eyelid 1/21/2016     Malignant neoplasm of prostate (H) 07/14/08    Admit.Discharged 07/17/08     Other and unspecified hyperlipidemia      Postsurgical aortocoronary bypass status 1992     Respiratory complications      Unspecified essential hypertension      Unspecified tinnitus     left ear       PAST SURGICAL HISTORY:  Past Surgical History:   Procedure Laterality Date     C CABG, VEIN, THREE  1992     C NONSPECIFIC PROCEDURE  1987    Bone fusion in neck     C REMV PROSTATE,RETROPUB,RAD,LTD NODES  7/14/2008    Radical retropubic prostatectomy and pelvic lymph node dissection.     HC REMOVAL OF TONSILS,12+ Y/O  1969    Tonsils 10+y.o.     HEMILAMINECTOMY, DISCECTOMY LUMBAR TWO LEVELS, COMBINED Left 12/16/2015    Procedure: COMBINED HEMILAMINECTOMY, DISCECTOMY LUMBAR TWO LEVELS;  Surgeon: Jung Finely MD;  [facility]     RELEASE CARPAL TUNNEL Right 5/17/2019    Procedure: RELEASE CARPAL TUNNEL-RIGHT;  Surgeon: Yovani Ogden DO;  Location: [facility]     STENT, CORONARY, ALEAH  2002    x5       FAMILY HISTORY:  Family History   Problem Relation Age of Onset     Arthritis Father      Heart Disease Father      Hypertension Brother      Cancer Brother         liver     Heart Disease Sister      Heart Disease Brother        SOCIAL HISTORY:  Social History     Socioeconomic History     Marital status:      Spouse name: Not on file     Number of children: Not on file     Years of education:  Not on file     Highest education level: Not on file   Occupational History     Not on file   Social Needs     Financial resource strain: Not on file     Food insecurity:     Worry: Not on file     Inability: Not on file     Transportation needs:     Medical: Not on file     Non-medical: Not on file   Tobacco Use     Smoking status: Former Smoker     Packs/day: 0.50     Years: 2.00     Pack years: 1.00     Types: Cigarettes     Last attempt to quit: 1960     Years since quittin.7     Smokeless tobacco: Never Used   Substance and Sexual Activity     Alcohol use: Yes     Comment: socially     Drug use: No     Sexual activity: Never   Lifestyle     Physical activity:     Days per week: Not on file     Minutes per session: Not on file     Stress: Not on file   Relationships     Social connections:     Talks on phone: Not on file     Gets together: Not on file     Attends Rastafari service: Not on file     Active member of club or organization: Not on file     Attends meetings of clubs or organizations: Not on file     Relationship status: Not on file     Intimate partner violence:     Fear of current or ex partner: Not on file     Emotionally abused: Not on file     Physically abused: Not on file     Forced sexual activity: Not on file   Other Topics Concern     Parent/sibling w/ CABG, MI or angioplasty before 65F 55M? No   Social History Narrative     Not on file       Review of Systems:  Skin:  Negative     Eyes:  Positive for glasses  ENT:  Positive for tinnitus  Respiratory:  Negative shortness of breath;dyspnea on exertion;cough;wheezing  Cardiovascular:  Negative for;palpitations;chest pain;edema;lightheadedness;dizziness Positive for;edema  Gastroenterology: Negative heartburn;excessive gas or bloating;diarrhea  Genitourinary:  Negative incontinence  Musculoskeletal:  Positive for arthritis;back pain  Neurologic:  Negative numbness or tingling of hands  Psychiatric:  Negative    Heme/Lymph/Imm:  Negative  "   Endocrine:  Negative      Physical Exam:  Vitals: BP (!) 162/78 (BP Location: Right arm, Cuff Size: Adult Regular)   Pulse (!) 48   Resp 12   Ht 1.702 m (5' 7\")   Wt 100.7 kg (222 lb)   SpO2 98%   BMI 34.77 kg/m      Please refer to dictation for physical exam    Recent Lab Results:  LIPID RESULTS:  Lab Results   Component Value Date    CHOL 102 05/14/2019    HDL 42 05/14/2019    LDL 45.0 05/14/2019    TRIG 74 05/14/2019    CHOLHDLRATIO 2.5 12/16/2014       LIVER ENZYME RESULTS:  Lab Results   Component Value Date    AST 28 05/14/2019    ALT 20 05/14/2019       CBC RESULTS:  Lab Results   Component Value Date    WBC 7.6 11/27/2015    RBC 5.23 11/27/2015    HGB 14.7 12/29/2015    HCT 47.3 11/27/2015    MCV 90 11/27/2015    MCH 31.9 11/27/2015    MCHC 35.3 11/27/2015    RDW 13.7 11/27/2015     11/27/2015       BMP RESULTS:  Lab Results   Component Value Date     04/12/2019    POTASSIUM 3.9 05/14/2019    CHLORIDE 104 04/12/2019    CO2 25 04/12/2019    ANIONGAP 11 04/12/2019     (H) 05/14/2019    BUN 22 04/12/2019    CR 0.9 05/14/2019    GFRESTIMATED >60 05/14/2019    GFRESTBLACK >90 04/12/2019    ANKIT 9.3 04/12/2019        A1C RESULTS:  No results found for: A1C    INR RESULTS:  Lab Results   Component Value Date    INR 2.0 (A) 08/01/2019    INR 2.4 (H) 07/08/2019    INR 2.1 (A) 06/20/2019    INR 1.60 (H) 05/17/2019    INR 1.99 (H) 01/07/2016           CC  Miguelito Wells MD  [address] [place] [id number]        Thank you for allowing me to participate in the care of your patient.      Sincerely,     KIARA MckeonC     [facility]    cc:   Miguelito Wells MD  [address] [place] [id number]        "

## 2019-09-12 ENCOUNTER — ANTICOAGULATION THERAPY VISIT (OUTPATIENT)
Dept: ANTICOAGULATION | Facility: OTHER | Age: 79
End: 2019-09-12
Payer: COMMERCIAL

## 2019-09-12 ENCOUNTER — TELEPHONE (OUTPATIENT)
Dept: ANTICOAGULATION | Facility: OTHER | Age: 79
End: 2019-09-12

## 2019-09-12 DIAGNOSIS — Z79.01 LONG TERM CURRENT USE OF ANTICOAGULANT THERAPY: Primary | ICD-10-CM

## 2019-09-12 DIAGNOSIS — I48.20 CHRONIC A-FIB (H): ICD-10-CM

## 2019-09-12 DIAGNOSIS — Z86.73 HISTORY OF CVA (CEREBROVASCULAR ACCIDENT): ICD-10-CM

## 2019-09-12 DIAGNOSIS — Z79.01 LONG TERM CURRENT USE OF ANTICOAGULANT THERAPY: ICD-10-CM

## 2019-09-12 LAB — INR POINT OF CARE: 2 (ref 0.86–1.14)

## 2019-09-12 PROCEDURE — 85610 PROTHROMBIN TIME: CPT | Mod: QW

## 2019-09-12 PROCEDURE — 36416 COLLJ CAPILLARY BLOOD SPEC: CPT

## 2019-09-12 PROCEDURE — 99207 ZZC NO CHARGE NURSE ONLY: CPT

## 2019-09-12 NOTE — PROGRESS NOTES
ANTICOAGULATION FOLLOW-UP CLINIC VISIT    Patient Name:  Uli Champagne  Date:  2019  Contact Type:  Face to Face    SUBJECTIVE:  Patient Findings     Comments:   The patient was assessed for diet, medication, and activity level changes, missed or extra doses, bruising or bleeding, with no problem findings.          Clinical Outcomes     Negatives:   Major bleeding event, Thromboembolic event, Anticoagulation-related hospital admission, Anticoagulation-related ED visit, Anticoagulation-related fatality    Comments:   The patient was assessed for diet, medication, and activity level changes, missed or extra doses, bruising or bleeding, with no problem findings.             OBJECTIVE    INR Protime   Date Value Ref Range Status   2019 2.0 (A) 0.86 - 1.14 Final       ASSESSMENT / PLAN  INR assessment THER    Recheck INR In: 6 WEEKS    INR Location Clinic      Anticoagulation Summary  As of 2019    INR goal:   2.0-3.0   TTR:   78.7 % (3.4 y)   INR used for dosin.0 (2019)   Warfarin maintenance plan:   2.5 mg (5 mg x 0.5) every Tue, Sat; 5 mg (5 mg x 1) all other days   Full warfarin instructions:   2.5 mg every Tue, Sat; 5 mg all other days   Weekly warfarin total:   30 mg   No change documented:   Rui Landers, RN   Plan last modified:   Mahsa Julien RN (3/22/2019)   Next INR check:   10/24/2019   Target end date:       Indications    Long-term (current) use of anticoagulants [Z79.01] [Z79.01]  Chronic a-fib (H) [I48.2]             Anticoagulation Episode Summary     INR check location:       Preferred lab:       Send INR reminders to:   [facility] [place]    Comments:   5 mg tab, AM dose, appt card        Anticoagulation Care Providers     Provider Role Specialty Phone number    Isra Carrillo MD [facility] [phone]            See the Encounter Report to view Anticoagulation Flowsheet and Dosing Calendar (Go to Encounters tab in chart review, and find  the Anticoagulation Therapy Visit)    Dosage adjustment made based on physician directed care plan.    Rui Landers RN

## 2019-10-03 ENCOUNTER — OFFICE VISIT (OUTPATIENT)
Dept: ORTHOPEDICS | Facility: CLINIC | Age: 79
End: 2019-10-03
Payer: COMMERCIAL

## 2019-10-03 VITALS
DIASTOLIC BLOOD PRESSURE: 74 MMHG | BODY MASS INDEX: 36.65 KG/M2 | SYSTOLIC BLOOD PRESSURE: 132 MMHG | HEIGHT: 67 IN | WEIGHT: 233.5 LBS

## 2019-10-03 DIAGNOSIS — G56.02 LEFT CARPAL TUNNEL SYNDROME: Primary | ICD-10-CM

## 2019-10-03 PROCEDURE — 99213 OFFICE O/P EST LOW 20 MIN: CPT | Performed by: ORTHOPAEDIC SURGERY

## 2019-10-03 ASSESSMENT — MIFFLIN-ST. JEOR: SCORE: 1722.78

## 2019-10-03 NOTE — PROGRESS NOTES
"Office Visit-Follow up    Chief Complaint: Uli Champagne is a 79 year old adult who is being seen for   Chief Complaint   Patient presents with     Pain     left hand        History of Present Illness:   Today's visit  Returns for left hand numbness/tingling. It is the thumb, index, middle finger, worst to the middle finger. No index or ring finger.  Comes and goes. Intermittent, especially at night.  Does not wake him at night. same symptoms as the right side prior to the CTR but not nearly as severe or frequent.  Patient states not really sure if it has been getting worse or not over the last few years. Has not tried bracing, therapy, or injections on the left. States the hand therapy on the right did help some but was short lived. Bracing did help as well.    Regarding right hand, has had total resolution of the numbness/tingling since CTR on 5/2019. Recently been having some very mild, very limited and infrequent numbness/tingling to the pinky and ring finger that is self limiting. Also occasional locking type symptoms to those fingers. Again very self limiting and not bothersome.   4/18/19 visit  Returns for bilateral hands but right is much worse than left.  Did hand therapy which has improved his symptoms.  Although feels like his right hand is starting to come back.  Complains of numbness and tingling particularly to the index.  It comes and goes.  Also painfully sore in the morning with numbness and tingling.  Small finger is preserved.  He is continue to use his cockup wrist splints at night.  He is on Coumadin  8/29/18 visit  Complains of progressive numbness to bilateral hands right slightly worse than left for 2 years.  Getting somewhat more progressive recently.  Uses a wrist brace at night on the right.  Left has minimal symptoms in it.  \"It was just left eye when.\".  Has some numbness and tingling when he is playing cards and reading a book.  Initially in the morning all of his hand will be " "numb.  However during the day it is more the radial digits.      REVIEW OF SYSTEMS  General: negative for, night sweats, dizziness, fatigue  Resp: No shortness of breath and no cough  CV: negative for chest pain, syncope or near-syncope  GI: negative for nausea, vomiting and diarrhea  : negative for dysuria and hematuria  Musculoskeletal: as above  Neurologic: negative for syncope   Hematologic: negative for bleeding disorder    Physical Exam:  Vitals: /74   Ht 1.702 m (5' 7\")   Wt 105.9 kg (233 lb 8 oz)   BMI 36.57 kg/m    BMI= Body mass index is 36.57 kg/m .  Constitutional: healthy, alert and no acute distress   Psychiatric: mentation appears normal and affect normal/bright  NEURO: no focal deficits  RESP: Normal with easy respirations and no use of accessory muscles to breathe, no audible wheezing or retractions  CV: bilateral upper extemity:  no edema         Regular rate and rhythm by palpation  SKIN: No erythema, rashes, excoriation, or breakdown. No evidence of infection.   JOINT/EXTREMITIES:left hands and wrists: No gross deformity.  Some stiffness with wrist flexion and extension.  No focal areas of tenderness.  No gross atrophy. Mild sensation decreased to middle finger.   The fingers are well-perfused.  Positive Phalen's, negative carpal compression, negative tinel's to wrist and elbow.   Right hand: full motion. No locking catching with fingers. No focal tenderness. Full sensation. Can make a closed fist.  Fingers well perfused. Well healed previous surgical incision.  Negative tinel's at elbow. Able to keep fingers adducted, no drift with little finger. No webspacing, hypothnar or thenar wasting.   GAIT: not tested      Diagnostic Modalities:  None today.  Independent visualization of the images was performed.      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  Regarding the right hand, does sound like has very infrequent and non-bothersome nmbness/tingling to the little and ring fingers, could represent an ulnar nerve entrapment, however it is so infrequent he does not want any intervention. Did discuss keeping the elbow straight at night with a blanket or towel.  He also mentioned a few times of trigger finger like symptoms to little and ring, but again none bothersome, unable to recreate this in the clinic and he does not want anything.  Discussed the left CTS. Same symptoms as the right prior to surgery but much less frequent and intense. Discussed options, does not want to do therapy, surgery. Would like a cock up wrist splint.  One was provided. He states he will come back if the left hand starts getting as bad as the right hand was.         Return to clinic 4-6 weeks if not improving , or sooner as needed for changes.  Re-x-ray on return: No    Scribed by:  MARIA D Salcedo, CNP  10:30 AM  10/3/2019  I attest I have seen and evaluated the patient.  I agree with above impression and plan.       Isaiah Ogden D.O.

## 2019-10-03 NOTE — LETTER
10/3/2019         RE: Uli Champagne  [address] [facility] [address]        Dear Colleague,    Thank you for referring your patient, Uli Champagne, to the [facility]. Please see a copy of my visit note below.    Office Visit-Follow up    Chief Complaint: Uli Champagne is a 79 year old adult who is being seen for   Chief Complaint   Patient presents with     Pain     left hand        History of Present Illness:   Today's visit  Returns for left hand numbness/tingling. It is the thumb, index, middle finger, worst to the middle finger. No index or ring finger.  Comes and goes. Intermittent, especially at night.  Does not wake him at night. same symptoms as the right side prior to the CTR but not nearly as severe or frequent.  Patient states not really sure if it has been getting worse or not over the last few years. Has not tried bracing, therapy, or injections on the left. States the hand therapy on the right did help some but was short lived. Bracing did help as well.    Regarding right hand, has had total resolution of the numbness/tingling since CTR on 5/2019. Recently been having some very mild, very limited and infrequent numbness/tingling to the pinky and ring finger that is self limiting. Also occasional locking type symptoms to those fingers. Again very self limiting and not bothersome.   4/18/19 visit  Returns for bilateral hands but right is much worse than left.  Did hand therapy which has improved his symptoms.  Although feels like his right hand is starting to come back.  Complains of numbness and tingling particularly to the index.  It comes and goes.  Also painfully sore in the morning with numbness and tingling.  Small finger is preserved.  He is continue to use his cockup wrist splints at night.  He is on Coumadin  8/29/18 visit  Complains of progressive numbness to bilateral hands right slightly worse than left for 2 years.  Getting somewhat more  "progressive recently.  Uses a wrist brace at night on the right.  Left has minimal symptoms in it.  \"It was just left eye when.\".  Has some numbness and tingling when he is playing cards and reading a book.  Initially in the morning all of his hand will be numb.  However during the day it is more the radial digits.      REVIEW OF SYSTEMS  General: negative for, night sweats, dizziness, fatigue  Resp: No shortness of breath and no cough  CV: negative for chest pain, syncope or near-syncope  GI: negative for nausea, vomiting and diarrhea  : negative for dysuria and hematuria  Musculoskeletal: as above  Neurologic: negative for syncope   Hematologic: negative for bleeding disorder    Physical Exam:  Vitals: /74   Ht 1.702 m (5' 7\")   Wt 105.9 kg (233 lb 8 oz)   BMI 36.57 kg/m     BMI= Body mass index is 36.57 kg/m .  Constitutional: healthy, alert and no acute distress   Psychiatric: mentation appears normal and affect normal/bright  NEURO: no focal deficits  RESP: Normal with easy respirations and no use of accessory muscles to breathe, no audible wheezing or retractions  CV: bilateral upper extemity:  no edema         Regular rate and rhythm by palpation  SKIN: No erythema, rashes, excoriation, or breakdown. No evidence of infection.   JOINT/EXTREMITIES:left hands and wrists: No gross deformity.  Some stiffness with wrist flexion and extension.  No focal areas of tenderness.  No gross atrophy. Mild sensation decreased to middle finger.   The fingers are well-perfused.  Positive Phalen's, negative carpal compression, negative tinel's to wrist and elbow.   Right hand: full motion. No locking catching with fingers. No focal tenderness. Full sensation. Can make a closed fist.  Fingers well perfused. Well healed previous surgical incision.  Negative tinel's at elbow. Able to keep fingers adducted, no drift with little finger. No webspacing, hypothnar or thenar wasting.   GAIT: not  tested      Diagnostic " Modalities:  None today.  Independent visualization of the images was performed.      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  Regarding the right hand, does sound like has very infrequent and non-bothersome nmbness/tingling to the little and ring fingers, could represent an ulnar nerve entrapment, however it is so infrequent he does not want any intervention. Did discuss keeping the elbow straight at night with a blanket or towel.  He also mentioned a few times of trigger finger like symptoms to little and ring, but again none bothersome, unable to recreate this in the clinic and he does not want anything.  Discussed the left CTS. Same symptoms as the right prior to surgery but much less frequent and intense. Discussed options, does not want to do therapy, surgery. Would like a cock up wrist splint.  One was provided. He states he will come back if the left hand starts getting as bad as the right hand was.         Return to clinic 4-6 weeks if not improving , or sooner as needed for changes.  Re-x-ray on return: No    Scribed by:  MARIA D Salcedo, CNP  10:30 AM  10/3/2019  I attest I have seen and evaluated the patient.  I agree with above impression and plan.       Isaiah Ogden D.O.          Again, thank you for allowing me to participate in the care of your patient.        Sincerely,        Yovani Ogden, DO

## 2019-10-24 ENCOUNTER — ANTICOAGULATION THERAPY VISIT (OUTPATIENT)
Dept: ANTICOAGULATION | Facility: OTHER | Age: 79
End: 2019-10-24
Payer: COMMERCIAL

## 2019-10-24 ENCOUNTER — OFFICE VISIT (OUTPATIENT)
Dept: CARDIOLOGY | Facility: CLINIC | Age: 79
End: 2019-10-24
Payer: COMMERCIAL

## 2019-10-24 VITALS
OXYGEN SATURATION: 97 % | RESPIRATION RATE: 16 BRPM | DIASTOLIC BLOOD PRESSURE: 68 MMHG | SYSTOLIC BLOOD PRESSURE: 132 MMHG | WEIGHT: 230.7 LBS | BODY MASS INDEX: 36.13 KG/M2 | HEART RATE: 46 BPM

## 2019-10-24 DIAGNOSIS — Z79.01 LONG TERM CURRENT USE OF ANTICOAGULANT THERAPY: ICD-10-CM

## 2019-10-24 DIAGNOSIS — I10 ESSENTIAL HYPERTENSION, BENIGN: ICD-10-CM

## 2019-10-24 DIAGNOSIS — I48.20 CHRONIC A-FIB (H): ICD-10-CM

## 2019-10-24 LAB — INR POINT OF CARE: 1.8 (ref 0.86–1.14)

## 2019-10-24 PROCEDURE — 85610 PROTHROMBIN TIME: CPT | Mod: QW

## 2019-10-24 PROCEDURE — 36416 COLLJ CAPILLARY BLOOD SPEC: CPT

## 2019-10-24 PROCEDURE — 99213 OFFICE O/P EST LOW 20 MIN: CPT | Performed by: PHYSICIAN ASSISTANT

## 2019-10-24 PROCEDURE — 99207 ZZC NO CHARGE NURSE ONLY: CPT

## 2019-10-24 RX ORDER — MULTIPLE VITAMINS W/ MINERALS TAB 9MG-400MCG
1 TAB ORAL DAILY
COMMUNITY

## 2019-10-24 RX ORDER — AMLODIPINE BESYLATE 2.5 MG/1
2.5 TABLET ORAL DAILY
Qty: 90 TABLET | Refills: 3 | Status: SHIPPED | OUTPATIENT
Start: 2019-10-24 | End: 2019-11-06 | Stop reason: SINTOL

## 2019-10-24 ASSESSMENT — PAIN SCALES - GENERAL: PAINLEVEL: MILD PAIN (2)

## 2019-10-24 NOTE — PROGRESS NOTES
[id number]  HPI and Plan:   See dictation    Orders this Visit:  Orders Placed This Encounter   Procedures     Lipid Profile     ALT     Basic metabolic panel     Follow-Up with Cardiologist     Orders Placed This Encounter   Medications     multivitamin w/minerals (THERA-VIT-M) tablet     Sig: Take 1 tablet by mouth daily     amLODIPine (NORVASC) 2.5 MG tablet     Sig: Take 1 tablet (2.5 mg) by mouth daily     Dispense:  90 tablet     Refill:  3     Medications Discontinued During This Encounter   Medication Reason     amLODIPine (NORVASC) 5 MG tablet          Encounter Diagnosis   Name Primary?     Essential hypertension, benign        CURRENT MEDICATIONS:  Current Outpatient Medications   Medication Sig Dispense Refill     allopurinol (ZYLOPRIM) 300 MG tablet TAKE 1 TABLET DAILY FOR GOUT 90 tablet 3     amLODIPine (NORVASC) 2.5 MG tablet Take 1 tablet (2.5 mg) by mouth daily 90 tablet 3     ASPIRIN 81 MG OR TABS 1 TABLET DAILY       chlorthalidone (HYGROTON) 25 MG tablet Take 1 tablet (25 mg) by mouth daily 90 tablet 3     fish oil-omega-3 fatty acids (FISH OIL) 1000 MG capsule Take 1.2 g by mouth daily        multivitamin w/minerals (THERA-VIT-M) tablet Take 1 tablet by mouth daily       olmesartan (BENICAR) 40 MG tablet Take 1 tablet (40 mg) by mouth daily 90 tablet 3     simvastatin (ZOCOR) 40 MG tablet Take 1 tablet (40 mg) by mouth At Bedtime 90 tablet 3     warfarin (COUMADIN) 5 MG tablet TAKE ONE-HALF (1/2) TABLET ON TUESDAY AND ONE TABLET ON ALL OTHER DAYS OF THE WEEK OR AS DIRECTED BY THE COUMADIN CLINIC 90 tablet 3     nitroGLYcerin (NITROSTAT) 0.4 MG sublingual tablet Place 1 tablet (0.4 mg) under the tongue See Admin Instructions for chest pain (Patient not taking: Reported on 7/24/2019) 25 tablet 1     order for DME Equipment being ordered: Walker with seat 1 each 0       ALLERGIES     Allergies   Allergen Reactions     No Known Drug Allergies        PAST MEDICAL HISTORY:  Past Medical History:    Diagnosis Date     Atrial fibrillation (H)      Coronary atherosclerosis of unspecified type of vessel, native or graft      Hernia of unspecified site of abdominal cavity without mention of obstruction or gangrene     umbilical hernia; s/p repair 3/1998     History of CVA (cerebrovascular accident) 3/16/2018     Hordeolum externum left upper eyelid 1/21/2016     Malignant neoplasm of prostate (H) 07/14/08    Admit.Discharged 07/17/08     Other and unspecified hyperlipidemia      Postsurgical aortocoronary bypass status 1992     Respiratory complications      Unspecified essential hypertension      Unspecified tinnitus     left ear       PAST SURGICAL HISTORY:  Past Surgical History:   Procedure Laterality Date     C CABG, VEIN, THREE  1992     C NONSPECIFIC PROCEDURE  1987    Bone fusion in neck     C REMV PROSTATE,RETROPUB,RAD,LTD NODES  7/14/2008    Radical retropubic prostatectomy and pelvic lymph node dissection.     HC REMOVAL OF TONSILS,10+ Y/O  1969    Tonsils 10+y.o.     HEMILAMINECTOMY, DISCECTOMY LUMBAR TWO LEVELS, COMBINED Left 12/16/2015    Procedure: COMBINED HEMILAMINECTOMY, DISCECTOMY LUMBAR TWO LEVELS;  Surgeon: Jung Finley MD;  Location: [facility] OR     RELEASE CARPAL TUNNEL Right 5/17/2019    Procedure: RELEASE CARPAL TUNNEL-RIGHT;  Surgeon: Yovani Ogden DO;  Location: [facility] OR     STENT, CORONARY, ALEAH  2002    x5       FAMILY HISTORY:  Family History   Problem Relation Age of Onset     Arthritis Father      Heart Disease Father      Hypertension Brother      Cancer Brother         liver     Heart Disease Sister      Heart Disease Brother        SOCIAL HISTORY:  Social History     Socioeconomic History     Marital status:      Spouse name: None     Number of children: None     Years of education: None     Highest education level: None   Occupational History     None   Social Needs     Financial resource strain: None     Food insecurity:     Worry: None     Inability: None      Transportation needs:     Medical: None     Non-medical: None   Tobacco Use     Smoking status: Former Smoker     Packs/day: 0.50     Years: 2.00     Pack years: 1.00     Types: Cigarettes     Last attempt to quit: 1960     Years since quittin.8     Smokeless tobacco: Never Used   Substance and Sexual Activity     Alcohol use: Yes     Comment: socially     Drug use: No     Sexual activity: Never   Lifestyle     Physical activity:     Days per week: None     Minutes per session: None     Stress: None   Relationships     Social connections:     Talks on phone: None     Gets together: None     Attends Christianity service: None     Active member of club or organization: None     Attends meetings of clubs or organizations: None     Relationship status: None     Intimate partner violence:     Fear of current or ex partner: None     Emotionally abused: None     Physically abused: None     Forced sexual activity: None   Other Topics Concern     Parent/sibling w/ CABG, MI or angioplasty before 65F 55M? No   Social History Narrative     None       Review of Systems:  Skin:  Negative     Eyes:  Negative    ENT:  Negative    Respiratory:  Positive for shortness of breath;dyspnea on exertion;sleep apnea;CPAP  Cardiovascular:    Positive for;lower extremity symptoms;edema  Gastroenterology: Negative    Genitourinary:  Negative    Musculoskeletal:  Positive for back pain  Neurologic:  Negative    Psychiatric:  Negative    Heme/Lymph/Imm:  Negative    Endocrine:  Negative      Physical Exam:  Vitals: /68   Pulse (!) 46   Resp 16   Wt 104.6 kg (230 lb 11.2 oz)   SpO2 97%   BMI 36.13 kg/m     Please refer to dictation for physical exam    Recent Lab Results:  LIPID RESULTS:  Lab Results   Component Value Date    CHOL 102 2019    HDL 42 2019    LDL 45.0 2019    TRIG 74 2019    CHOLHDLRATIO 2.5 2014       LIVER ENZYME RESULTS:  Lab Results   Component Value Date    AST 28 2019     ALT 20 05/14/2019       CBC RESULTS:  Lab Results   Component Value Date    WBC 7.6 11/27/2015    RBC 5.23 11/27/2015    HGB 14.7 12/29/2015    HCT 47.3 11/27/2015    MCV 90 11/27/2015    MCH 31.9 11/27/2015    MCHC 35.3 11/27/2015    RDW 13.7 11/27/2015     11/27/2015       BMP RESULTS:  Lab Results   Component Value Date     04/12/2019    POTASSIUM 3.9 05/14/2019    CHLORIDE 104 04/12/2019    CO2 25 04/12/2019    ANIONGAP 11 04/12/2019     (H) 05/14/2019    BUN 22 04/12/2019    CR 0.9 05/14/2019    GFRESTIMATED >60 05/14/2019    GFRESTBLACK >90 04/12/2019    ANKIT 9.3 04/12/2019        A1C RESULTS:  No results found for: A1C    INR RESULTS:  Lab Results   Component Value Date    INR 1.8 (A) 10/24/2019    INR 2.0 (A) 09/12/2019    INR 1.60 (H) 05/17/2019    INR 1.99 (H) 01/07/2016           CC  Nichole Walsh [facility]  [id number]  [place] [id number]

## 2019-10-24 NOTE — LETTER
10/24/2019    Isra Carrillo MD  [address] [facility] [address]    RE: Uli Champagne       Dear Colleague,    I had the pleasure of seeing Uli Champagne in the [facility].    [id number]  HPI and Plan:   See dictation    Orders this Visit:  Orders Placed This Encounter   Procedures     Lipid Profile     ALT     Basic metabolic panel     Follow-Up with Cardiologist     Orders Placed This Encounter   Medications     multivitamin w/minerals (THERA-VIT-M) tablet     Sig: Take 1 tablet by mouth daily     amLODIPine (NORVASC) 2.5 MG tablet     Sig: Take 1 tablet (2.5 mg) by mouth daily     Dispense:  90 tablet     Refill:  3     Medications Discontinued During This Encounter   Medication Reason     amLODIPine (NORVASC) 5 MG tablet          Encounter Diagnosis   Name Primary?     Essential hypertension, benign        CURRENT MEDICATIONS:  Current Outpatient Medications   Medication Sig Dispense Refill     allopurinol (ZYLOPRIM) 300 MG tablet TAKE 1 TABLET DAILY FOR GOUT 90 tablet 3     amLODIPine (NORVASC) 2.5 MG tablet Take 1 tablet (2.5 mg) by mouth daily 90 tablet 3     ASPIRIN 81 MG OR TABS 1 TABLET DAILY       chlorthalidone (HYGROTON) 25 MG tablet Take 1 tablet (25 mg) by mouth daily 90 tablet 3     fish oil-omega-3 fatty acids (FISH OIL) 1000 MG capsule Take 1.2 g by mouth daily        multivitamin w/minerals (THERA-VIT-M) tablet Take 1 tablet by mouth daily       olmesartan (BENICAR) 40 MG tablet Take 1 tablet (40 mg) by mouth daily 90 tablet 3     simvastatin (ZOCOR) 40 MG tablet Take 1 tablet (40 mg) by mouth At Bedtime 90 tablet 3     warfarin (COUMADIN) 5 MG tablet TAKE ONE-HALF (1/2) TABLET ON TUESDAY AND ONE TABLET ON ALL OTHER DAYS OF THE WEEK OR AS DIRECTED BY THE COUMADIN CLINIC 90 tablet 3     nitroGLYcerin (NITROSTAT) 0.4 MG sublingual tablet Place 1 tablet (0.4 mg) under the tongue See Admin Instructions for chest pain (Patient not taking: Reported on  7/24/2019) 25 tablet 1     order for DME Equipment being ordered: Walker with seat 1 each 0       ALLERGIES     Allergies   Allergen Reactions     No Known Drug Allergies        PAST MEDICAL HISTORY:  Past Medical History:   Diagnosis Date     Atrial fibrillation (H)      Coronary atherosclerosis of unspecified type of vessel, native or graft      Hernia of unspecified site of abdominal cavity without mention of obstruction or gangrene     umbilical hernia; s/p repair 3/1998     History of CVA (cerebrovascular accident) 3/16/2018     Hordeolum externum left upper eyelid 1/21/2016     Malignant neoplasm of prostate (H) 07/14/08    Admit.Discharged 07/17/08     Other and unspecified hyperlipidemia      Postsurgical aortocoronary bypass status 1992     Respiratory complications      Unspecified essential hypertension      Unspecified tinnitus     left ear       PAST SURGICAL HISTORY:  Past Surgical History:   Procedure Laterality Date     C CABG, VEIN, THREE  1992     C NONSPECIFIC PROCEDURE  1987    Bone fusion in neck     C REMV PROSTATE,RETROPUB,RAD,LTD NODES  7/14/2008    Radical retropubic prostatectomy and pelvic lymph node dissection.     HC REMOVAL OF TONSILS,10+ Y/O  1969    Tonsils 10+y.o.     HEMILAMINECTOMY, DISCECTOMY LUMBAR TWO LEVELS, COMBINED Left 12/16/2015    Procedure: COMBINED HEMILAMINECTOMY, DISCECTOMY LUMBAR TWO LEVELS;  Surgeon: Jung Finley MD;  Location: [facility] OR     RELEASE CARPAL TUNNEL Right 5/17/2019    Procedure: RELEASE CARPAL TUNNEL-RIGHT;  Surgeon: Yovani Ogden DO;  Location: [facility] OR     STENT, CORONARY, ALEAH  2002    x5       FAMILY HISTORY:  Family History   Problem Relation Age of Onset     Arthritis Father      Heart Disease Father      Hypertension Brother      Cancer Brother         liver     Heart Disease Sister      Heart Disease Brother        SOCIAL HISTORY:  Social History     Socioeconomic History     Marital status:      Spouse name: None     Number  of children: None     Years of education: None     Highest education level: None   Occupational History     None   Social Needs     Financial resource strain: None     Food insecurity:     Worry: None     Inability: None     Transportation needs:     Medical: None     Non-medical: None   Tobacco Use     Smoking status: Former Smoker     Packs/day: 0.50     Years: 2.00     Pack years: 1.00     Types: Cigarettes     Last attempt to quit: 1960     Years since quittin.8     Smokeless tobacco: Never Used   Substance and Sexual Activity     Alcohol use: Yes     Comment: socially     Drug use: No     Sexual activity: Never   Lifestyle     Physical activity:     Days per week: None     Minutes per session: None     Stress: None   Relationships     Social connections:     Talks on phone: None     Gets together: None     Attends Christianity service: None     Active member of club or organization: None     Attends meetings of clubs or organizations: None     Relationship status: None     Intimate partner violence:     Fear of current or ex partner: None     Emotionally abused: None     Physically abused: None     Forced sexual activity: None   Other Topics Concern     Parent/sibling w/ CABG, MI or angioplasty before 65F 55M? No   Social History Narrative     None       Review of Systems:  Skin:  Negative     Eyes:  Negative    ENT:  Negative    Respiratory:  Positive for shortness of breath;dyspnea on exertion;sleep apnea;CPAP  Cardiovascular:    Positive for;lower extremity symptoms;edema  Gastroenterology: Negative    Genitourinary:  Negative    Musculoskeletal:  Positive for back pain  Neurologic:  Negative    Psychiatric:  Negative    Heme/Lymph/Imm:  Negative    Endocrine:  Negative      Physical Exam:  Vitals: /68   Pulse (!) 46   Resp 16   Wt 104.6 kg (230 lb 11.2 oz)   SpO2 97%   BMI 36.13 kg/m      Please refer to dictation for physical exam    Recent Lab Results:  LIPID RESULTS:  Lab Results    Component Value Date    CHOL 102 05/14/2019    HDL 42 05/14/2019    LDL 45.0 05/14/2019    TRIG 74 05/14/2019    CHOLHDLRATIO 2.5 12/16/2014       LIVER ENZYME RESULTS:  Lab Results   Component Value Date    AST 28 05/14/2019    ALT 20 05/14/2019       CBC RESULTS:  Lab Results   Component Value Date    WBC 7.6 11/27/2015    RBC 5.23 11/27/2015    HGB 14.7 12/29/2015    HCT 47.3 11/27/2015    MCV 90 11/27/2015    MCH 31.9 11/27/2015    MCHC 35.3 11/27/2015    RDW 13.7 11/27/2015     11/27/2015       BMP RESULTS:  Lab Results   Component Value Date     04/12/2019    POTASSIUM 3.9 05/14/2019    CHLORIDE 104 04/12/2019    CO2 25 04/12/2019    ANIONGAP 11 04/12/2019     (H) 05/14/2019    BUN 22 04/12/2019    CR 0.9 05/14/2019    GFRESTIMATED >60 05/14/2019    GFRESTBLACK >90 04/12/2019    ANKIT 9.3 04/12/2019        A1C RESULTS:  No results found for: A1C    INR RESULTS:  Lab Results   Component Value Date    INR 1.8 (A) 10/24/2019    INR 2.0 (A) 09/12/2019    INR 1.60 (H) 05/17/2019    INR 1.99 (H) 01/07/2016           CC  Nichole Walsh PA-C  [address]  [place] [address]        Thank you for allowing me to participate in the care of your patient.      Sincerely,     Nichole Walsh PA-C     [facility]    cc:   Nichole Walsh PA-C  [address]  [place] [address]

## 2019-10-24 NOTE — PROGRESS NOTES
ANTICOAGULATION FOLLOW-UP CLINIC VISIT    Patient Name:  Uli Champagne  Date:  10/24/2019  Contact Type:  Face to Face    SUBJECTIVE:  Patient Findings     Comments:   I was unable to identify a reason for subtherapeutic INR. Recheck in 2 weeks. KDN          Clinical Outcomes     Comments:   I was unable to identify a reason for subtherapeutic INR. Recheck in 2 weeks. KDN             OBJECTIVE    INR Protime   Date Value Ref Range Status   10/24/2019 1.8 (A) 0.86 - 1.14 Final       ASSESSMENT / PLAN  INR assessment SUB    Recheck INR In: 2 WEEKS    INR Location Clinic      Anticoagulation Summary  As of 10/24/2019    INR goal:   2.0-3.0   TTR:   76.2 % (3.5 y)   INR used for dosin.8! (10/24/2019)   Warfarin maintenance plan:   2.5 mg (5 mg x 0.5) every Tue, Sat; 5 mg (5 mg x 1) all other days   Full warfarin instructions:   10/25: 7.5 mg; Otherwise 2.5 mg every Tue, Sat; 5 mg all other days   Weekly warfarin total:   30 mg   Plan last modified:   Mahsa Julien RN (3/22/2019)   Next INR check:   2019   Target end date:       Indications    Long-term (current) use of anticoagulants [Z79.01] [Z79.01]  Chronic a-fib [I48.20]             Anticoagulation Episode Summary     INR check location:       Preferred lab:       Send INR reminders to:   [facility]    Comments:   5 mg tab, AM dose, appt card        Anticoagulation Care Providers     Provider Role Specialty Phone number    Isra Carrillo MD [facility] [phone]            See the Encounter Report to view Anticoagulation Flowsheet and Dosing Calendar (Go to Encounters tab in chart review, and find the Anticoagulation Therapy Visit)    Dosage adjustment made based on physician directed care plan.    Rui Landers, RN

## 2019-10-24 NOTE — LETTER
10/24/2019      Isra Carrillo MD  [address] [facility] [address]      RE: Uli Champagne       Dear Colleague,    I had the pleasure of seeing Uli Champagne in the [facility].    Service Date: 10/24/2019      PRIMARY CARDIOLOGIST:  Dr. Wells.      REASON FOR VISIT:  Hypertension followup.      HISTORY OF PRESENT ILLNESS:  Mr. Champagne is a delightful 79-year-old gentleman with past medical history significant for the followin.  Coronary artery disease with history of CABG which was LIMA to LAD, saphenous vein graft to the RCA, and saphenous vein graft to the OM1 in the early .  He had multiple stents to the vein graft and native RCA in  and at that point had a patent LIMA to the LAD, patent vein graft to the OM1, and an occluded OM1 with the circ filling with collaterals from the LAD.   2.  Hypertension.   3.  Dyslipidemia.   4.  AFib with slow ventricular response on Coumadin.      He comes in today for followup of his blood pressure.  I saw him about 6 weeks ago and he had some fairly suddenly developed hypertensive blood pressures in the 140s and 150s consistently.  We could not find any inciting factor and started him on amlodipine 5 mg daily.  Within 5 days, his blood pressures came down to the 110s to 120s consistently.  His pulses remained in the 40s.  Prior to starting the amlodipine though, he had no peripheral edema and now he has some peripheral edema that is worse in the evenings, but still there slightly in the mornings.      PHYSICAL EXAMINATION:   GENERAL:  Well-developed, well-nourished gentleman in no acute distress.   HEENT:  Normocephalic, atraumatic.   HEART:  Irregularly irregular and bradycardic.  I do not appreciate murmur, rub or gallop.   LUNGS:  Clear bilaterally.     NECK:  Carotids are quiet.   EXTREMITIES:  2+ pitting edema to the knees on the left, 1+ pitting edema to the knee on the right.   SKIN:  Warm and dry.       ASSESSMENT AND PLAN:   1.  Hypertension, well-controlled with current meds, but adverse effect of peripheral edema with amlodipine.  We will decrease it to 2.5 mg daily and if his blood pressures stay under 130/80 we will continue it there, only if though his edema resolves.  If his edema does not resolve or his blood pressure is not controlled, would go up to chlorthalidone 50 mg daily with a BMP in 1 month.   2.  Coronary artery disease without recurrent anginal symptoms, appropriately on aspirin and statin.   3.  Dyslipidemia, excellent control.   4.  AFib with slow ventricular response, but stable and asymptomatic.  He is appropriately on Coumadin.      Thank you for allowing me to participate in this delightful patient's care.  He can follow up with Dr. Wells in 6 months.  We will see him sooner if we cannot get his blood pressures controlled.         SENAIT ABRAHAM PA-C             D: 10/24/2019   T: 10/24/2019   MT: SEBASTIAN      Name:     KIRK LAYTON   MRN:      [id number]        Account:      [id number]   :      1940           Service Date: 10/24/2019      Document: [id number]         Outpatient Encounter Medications as of 10/24/2019   Medication Sig Dispense Refill     allopurinol (ZYLOPRIM) 300 MG tablet TAKE 1 TABLET DAILY FOR GOUT 90 tablet 3     amLODIPine (NORVASC) 2.5 MG tablet Take 1 tablet (2.5 mg) by mouth daily 90 tablet 3     ASPIRIN 81 MG OR TABS 1 TABLET DAILY       chlorthalidone (HYGROTON) 25 MG tablet Take 1 tablet (25 mg) by mouth daily 90 tablet 3     fish oil-omega-3 fatty acids (FISH OIL) 1000 MG capsule Take 1.2 g by mouth daily        multivitamin w/minerals (THERA-VIT-M) tablet Take 1 tablet by mouth daily       olmesartan (BENICAR) 40 MG tablet Take 1 tablet (40 mg) by mouth daily 90 tablet 3     simvastatin (ZOCOR) 40 MG tablet Take 1 tablet (40 mg) by mouth At Bedtime 90 tablet 3     warfarin (COUMADIN) 5 MG tablet TAKE ONE-HALF (1/2) TABLET ON TUESDAY AND  ONE TABLET ON ALL OTHER DAYS OF THE WEEK OR AS DIRECTED BY THE COUMADIN CLINIC 90 tablet 3     nitroGLYcerin (NITROSTAT) 0.4 MG sublingual tablet Place 1 tablet (0.4 mg) under the tongue See Admin Instructions for chest pain (Patient not taking: Reported on 7/24/2019) 25 tablet 1     order for DME Equipment being ordered: Walker with seat 1 each 0     [DISCONTINUED] amLODIPine (NORVASC) 5 MG tablet Take 1 tablet (5 mg) by mouth daily 30 tablet 0     No facility-administered encounter medications on file as of 10/24/2019.        Again, thank you for allowing me to participate in the care of your patient.      Sincerely,    Nichole Walsh PA-C     [facility]

## 2019-10-24 NOTE — PROGRESS NOTES
Service Date: 10/24/2019      PRIMARY CARDIOLOGIST:  Dr. Wells.      REASON FOR VISIT:  Hypertension followup.      HISTORY OF PRESENT ILLNESS:  Mr. Champagne is a delightful 79-year-old gentleman with past medical history significant for the followin.  Coronary artery disease with history of CABG which was LIMA to LAD, saphenous vein graft to the RCA, and saphenous vein graft to the OM1 in the early .  He had multiple stents to the vein graft and native RCA in  and at that point had a patent LIMA to the LAD, patent vein graft to the OM1, and an occluded OM1 with the circ filling with collaterals from the LAD.   2.  Hypertension.   3.  Dyslipidemia.   4.  AFib with slow ventricular response on Coumadin.      He comes in today for followup of his blood pressure.  I saw him about 6 weeks ago and he had some fairly suddenly developed hypertensive blood pressures in the 140s and 150s consistently.  We could not find any inciting factor and started him on amlodipine 5 mg daily.  Within 5 days, his blood pressures came down to the 110s to 120s consistently.  His pulses remained in the 40s.  Prior to starting the amlodipine though, he had no peripheral edema and now he has some peripheral edema that is worse in the evenings, but still there slightly in the mornings.      PHYSICAL EXAMINATION:   GENERAL:  Well-developed, well-nourished gentleman in no acute distress.   HEENT:  Normocephalic, atraumatic.   HEART:  Irregularly irregular and bradycardic.  I do not appreciate murmur, rub or gallop.   LUNGS:  Clear bilaterally.     NECK:  Carotids are quiet.   EXTREMITIES:  2+ pitting edema to the knees on the left, 1+ pitting edema to the knee on the right.   SKIN:  Warm and dry.      ASSESSMENT AND PLAN:   1.  Hypertension, well-controlled with current meds, but adverse effect of peripheral edema with amlodipine.  We will decrease it to 2.5 mg daily and if his blood pressures stay under 130/80 we will  continue it there, only if though his edema resolves.  If his edema does not resolve or his blood pressure is not controlled, would go up to chlorthalidone 50 mg daily with a BMP in 1 month.   2.  Coronary artery disease without recurrent anginal symptoms, appropriately on aspirin and statin.   3.  Dyslipidemia, excellent control.   4.  AFib with slow ventricular response, but stable and asymptomatic.  He is appropriately on Coumadin.      Thank you for allowing me to participate in this delightful patient's care.  He can follow up with Dr. Wells in 6 months.  We will see him sooner if we cannot get his blood pressures controlled.         SENAIT ABRAHAM PA-C             D: 10/24/2019   T: 10/24/2019   MT: SEBASTIAN      Name:     KIRK LAYTON   MRN:      [id number]        Account:      [id number]   :      1940           Service Date: 10/24/2019      Document: [id number]

## 2019-10-24 NOTE — PATIENT INSTRUCTIONS
Thanks for coming into [facility] today.     We discussed: I'm glad your blood pressure is better, but your legs are too swollen.       Medication changes: Decrease amlodipine to 2.5 mg once a day.  Keep checking your blood pressure, our goal is less than 130/80.    If your blood pressure is good and the swelling goes away we'll stay on that dose.    If your blood pressure goes up or the swelling doesn't go away, we'll switch medicine.    Please call my nurse in about a week or so.       Follow up: with Dr. Wells in about 6 months with fasting labs before that visit.  Please call sooner with concerns.       Please call the clinic at  [phone] with any questions or concerns and my nurses will be happy to help.     Please call [phone] for scheduling.       Reminder: Please bring in all current medications, over the counter supplements and vitamin bottles to your next appointment.

## 2019-11-04 ENCOUNTER — TELEPHONE (OUTPATIENT)
Dept: CARDIOLOGY | Facility: CLINIC | Age: 79
End: 2019-11-04

## 2019-11-04 DIAGNOSIS — I10 ESSENTIAL HYPERTENSION, BENIGN: ICD-10-CM

## 2019-11-04 NOTE — TELEPHONE ENCOUNTER
BP Medication changes with Nichole More. calling to let us know numbers are 120-130's/ 60-70's. A few outliners. Will continue to monitor. Call if changes need to be made,

## 2019-11-05 NOTE — TELEPHONE ENCOUNTER
Left message for patient to return call  Breanne Hernandes, RN on 11/6/2019 at 8:59 AM    Patient returned call and states he still is having problems with edema somewhat decreased but still has lower extremity edema.  Agreed to stop amlodipine and increase his chlorthaladone from 25mg to 50mg new RX [facility]        As long as edema has resolved ok to continue on current meds.  If he still has edema, I recommend stopping amlodipine and increasing chlorthalidone to 50 mg daily.   Request team 3 call pt as Dom[place] is not covering nursing in [place].   Nichole Walsh PA-C 11/5/2019 5:52 PM

## 2019-11-06 RX ORDER — CHLORTHALIDONE 25 MG/1
50 TABLET ORAL DAILY
Qty: 90 TABLET | Refills: 3 | Status: SHIPPED | OUTPATIENT
Start: 2019-11-06 | End: 2019-11-06

## 2019-11-06 RX ORDER — CHLORTHALIDONE 50 MG/1
50 TABLET ORAL DAILY
Qty: 90 TABLET | Refills: 3 | Status: SHIPPED | OUTPATIENT
Start: 2019-11-06 | End: 2020-01-01

## 2019-11-07 ENCOUNTER — TELEPHONE (OUTPATIENT)
Dept: FAMILY MEDICINE | Facility: OTHER | Age: 79
End: 2019-11-07

## 2019-11-07 ENCOUNTER — ANTICOAGULATION THERAPY VISIT (OUTPATIENT)
Dept: ANTICOAGULATION | Facility: OTHER | Age: 79
End: 2019-11-07
Payer: COMMERCIAL

## 2019-11-07 DIAGNOSIS — I48.20 CHRONIC A-FIB (H): ICD-10-CM

## 2019-11-07 DIAGNOSIS — Z79.01 LONG TERM CURRENT USE OF ANTICOAGULANT THERAPY: ICD-10-CM

## 2019-11-07 LAB — INR POINT OF CARE: 2.1 (ref 0.86–1.14)

## 2019-11-07 PROCEDURE — 85610 PROTHROMBIN TIME: CPT | Mod: QW

## 2019-11-07 PROCEDURE — 36416 COLLJ CAPILLARY BLOOD SPEC: CPT

## 2019-11-07 PROCEDURE — 99207 ZZC NO CHARGE NURSE ONLY: CPT

## 2019-11-07 NOTE — PROGRESS NOTES
ANTICOAGULATION FOLLOW-UP CLINIC VISIT    Patient Name:  Uli Champagne  Date:  2019  Contact Type:  Face to Face    SUBJECTIVE:  Patient Findings     Comments:   The patient was assessed for diet, medication, and activity level changes, missed or extra doses, bruising or bleeding, with no problem findings.          Clinical Outcomes     Negatives:   Major bleeding event, Thromboembolic event, Anticoagulation-related hospital admission, Anticoagulation-related ED visit, Anticoagulation-related fatality    Comments:   The patient was assessed for diet, medication, and activity level changes, missed or extra doses, bruising or bleeding, with no problem findings.             OBJECTIVE    INR Protime   Date Value Ref Range Status   2019 2.1 (A) 0.86 - 1.14 Final       ASSESSMENT / PLAN  INR assessment THER    Recheck INR In: 6 WEEKS    INR Location Clinic      Anticoagulation Summary  As of 2019    INR goal:   2.0-3.0   TTR:   75.7 % (3.6 y)   INR used for dosin.1 (2019)   Warfarin maintenance plan:   2.5 mg (5 mg x 0.5) every Tue, Sat; 5 mg (5 mg x 1) all other days   Full warfarin instructions:   2.5 mg every Tue, Sat; 5 mg all other days   Weekly warfarin total:   30 mg   No change documented:   Rui Landers, RN   Plan last modified:   Mahsa Julien RN (3/22/2019)   Next INR check:   2019   Target end date:       Indications    Long-term (current) use of anticoagulants [Z79.01] [Z79.01]  Chronic a-fib [I48.20]             Anticoagulation Episode Summary     INR check location:       Preferred lab:       Send INR reminders to:   [facility]    Comments:   5 mg tab, AM dose, appt card        Anticoagulation Care Providers     Provider Role Specialty Phone number    Isra Carrillo MD [facility] [phone]            See the Encounter Report to view Anticoagulation Flowsheet and Dosing Calendar (Go to Encounters tab in chart review, and find the  Anticoagulation Therapy Visit)    Dosage adjustment made based on physician directed care plan.    Rui Landers RN

## 2019-12-03 ENCOUNTER — TELEPHONE (OUTPATIENT)
Dept: CARDIOLOGY | Facility: CLINIC | Age: 79
End: 2019-12-03

## 2019-12-03 NOTE — TELEPHONE ENCOUNTER
Received call from patient, would like to clarify if he should be taking amlodipine, as his pharmacy called him to tell him he had a refill ready.    Clarified that Nichole discontinued the amlodipine - he should not take it. He should continue on 50 mg chlorthalidone daily. Verbalized understanding and agreed.     Patient also provided BP readings for the last week and a half. Readings in the 120s-low 130s over 60s-70s. HR consistently in the 40s. States he takes chlorthalidone in the AM, BP at night.    Requested patient take his BP 2 hours after meds for the next few days for more accurate readings - agrees to do this and will call clinic with updated readings.    Jordyn Sherman, ANGELN, RN, PHN, HNB-BC  Care Coordinator - Cardiology  [phone]

## 2019-12-23 ENCOUNTER — ANTICOAGULATION THERAPY VISIT (OUTPATIENT)
Dept: ANTICOAGULATION | Facility: OTHER | Age: 79
End: 2019-12-23
Payer: COMMERCIAL

## 2019-12-23 ENCOUNTER — TELEPHONE (OUTPATIENT)
Dept: CARDIOLOGY | Facility: CLINIC | Age: 79
End: 2019-12-23

## 2019-12-23 DIAGNOSIS — I10 ESSENTIAL HYPERTENSION, BENIGN: ICD-10-CM

## 2019-12-23 DIAGNOSIS — Z79.01 LONG TERM CURRENT USE OF ANTICOAGULANT THERAPY: ICD-10-CM

## 2019-12-23 DIAGNOSIS — I10 ESSENTIAL HYPERTENSION, BENIGN: Primary | ICD-10-CM

## 2019-12-23 DIAGNOSIS — I48.20 CHRONIC A-FIB (H): ICD-10-CM

## 2019-12-23 LAB
ANION GAP SERPL CALCULATED.3IONS-SCNC: 6 MMOL/L (ref 3–14)
BUN SERPL-MCNC: 20 MG/DL (ref 7–30)
CALCIUM SERPL-MCNC: 8.8 MG/DL (ref 8.5–10.1)
CHLORIDE SERPL-SCNC: 106 MMOL/L (ref 94–109)
CO2 SERPL-SCNC: 29 MMOL/L (ref 20–32)
CREAT SERPL-MCNC: 0.88 MG/DL (ref 0.66–1.25)
GFR SERPL CREATININE-BSD FRML MDRD: 81 ML/MIN/{1.73_M2}
GLUCOSE SERPL-MCNC: 84 MG/DL (ref 70–99)
INR POINT OF CARE: 2.3 (ref 0.86–1.14)
POTASSIUM SERPL-SCNC: 3.9 MMOL/L (ref 3.4–5.3)
SODIUM SERPL-SCNC: 141 MMOL/L (ref 133–144)

## 2019-12-23 PROCEDURE — 85610 PROTHROMBIN TIME: CPT | Mod: QW

## 2019-12-23 PROCEDURE — 80048 BASIC METABOLIC PNL TOTAL CA: CPT | Performed by: PHYSICIAN ASSISTANT

## 2019-12-23 PROCEDURE — 36416 COLLJ CAPILLARY BLOOD SPEC: CPT

## 2019-12-23 PROCEDURE — 99207 ZZC NO CHARGE NURSE ONLY: CPT

## 2019-12-23 RX ORDER — LISINOPRIL 5 MG/1
5 TABLET ORAL DAILY
Qty: 90 TABLET | Refills: 3 | Status: SHIPPED | OUTPATIENT
Start: 2019-12-23 | End: 2020-03-24

## 2019-12-23 RX ORDER — LISINOPRIL 5 MG/1
5 TABLET ORAL DAILY
Qty: 30 TABLET | Refills: 0 | Status: SHIPPED | OUTPATIENT
Start: 2019-12-23 | End: 2019-12-23

## 2019-12-23 NOTE — TELEPHONE ENCOUNTER
Instructed patient on new medication lisinopril 5mg daily in addition to the chlorthiladone. He will get BMP today and then again in 2 weeks after starting lisinopril. Orders in Epic and lisinopril sent to patient's pharmacy. Patient verbalized understanding and had no further questions at this time.

## 2019-12-23 NOTE — TELEPHONE ENCOUNTER
Patient is not on amlodipine anymore and is currently taking chlorthalidone 50 mg daily. His swelling has resolved.

## 2019-12-23 NOTE — TELEPHONE ENCOUNTER
I apologize for the confusion.  Please have him get a bmp and start lisinopril 5 mg daily in addition to chlorthalidone with repeat bmp in 2 weeks.  Nichole Walsh PA-C 12/23/2019 11:58 AM

## 2019-12-23 NOTE — TELEPHONE ENCOUNTER
If edema has resolved on lower dose of amlodipine have him increase chlorthalidone to 50 mg daily. If not change in edema increase amlodipine back to 5 mg daily.  Repeat bmp in 1 month if increasing chlorthalidone pls.    Nichole Walsh PA-C 12/23/2019 11:34 AM

## 2019-12-23 NOTE — PROGRESS NOTES
ANTICOAGULATION FOLLOW-UP CLINIC VISIT    Patient Name:  Uli Champagne  Date:  2019  Contact Type:  Face to Face    SUBJECTIVE:  Patient Findings     Comments:   The patient was assessed for diet, medication, and activity level changes, missed or extra doses, bruising or bleeding, with no problem findings.          Clinical Outcomes     Negatives:   Major bleeding event, Thromboembolic event, Anticoagulation-related hospital admission, Anticoagulation-related ED visit, Anticoagulation-related fatality    Comments:   The patient was assessed for diet, medication, and activity level changes, missed or extra doses, bruising or bleeding, with no problem findings.             OBJECTIVE    INR Protime   Date Value Ref Range Status   2019 2.3 (A) 0.86 - 1.14 Final       ASSESSMENT / PLAN  INR assessment THER    Recheck INR In: 5 WEEKS    INR Location Clinic      Anticoagulation Summary  As of 2019    INR goal:   2.0-3.0   TTR:   73.7 % (1 y)   INR used for dosin.3 (2019)   Warfarin maintenance plan:   2.5 mg (5 mg x 0.5) every Tue, Sat; 5 mg (5 mg x 1) all other days   Full warfarin instructions:   2.5 mg every Tue, Sat; 5 mg all other days   Weekly warfarin total:   30 mg   No change documented:   Rui Landers, RN   Plan last modified:   Mahsa Julien, RN (3/22/2019)   Next INR check:   2020   Target end date:       Indications    Long-term (current) use of anticoagulants [Z79.01] [Z79.01]  Chronic a-fib [I48.20]             Anticoagulation Episode Summary     INR check location:       Preferred lab:       Send INR reminders to:   [facility]    Comments:   5 mg tab, AM dose, appt card        Anticoagulation Care Providers     Provider Role Specialty Phone number    Isra Carrillo MD [facility] [phone]            See the Encounter Report to view Anticoagulation Flowsheet and Dosing Calendar (Go to Encounters tab in chart review, and find the  Anticoagulation Therapy Visit)    Dosage adjustment made based on physician directed care plan.    Rui Landers RN

## 2019-12-23 NOTE — TELEPHONE ENCOUNTER
Patient stated ever since December 7th his blood pressures have been reading high. He takes his blood pressures twice a day. In the morning he waits about an hour and half to after taking his medications before checking blood pressure. Patient's systolic reading has been running in 160-140 and diastolic 70-60. Patient wanted to let ZHANG Dubon know about blood pressure readings. Message sent to Nichole for review.

## 2020-01-01 ENCOUNTER — HOSPITAL ENCOUNTER (OUTPATIENT)
Dept: GENERAL RADIOLOGY | Facility: CLINIC | Age: 80
End: 2020-10-22
Attending: ANESTHESIOLOGY | Admitting: ANESTHESIOLOGY
Payer: COMMERCIAL

## 2020-01-01 ENCOUNTER — ANESTHESIA EVENT (OUTPATIENT)
Dept: SURGERY | Facility: CLINIC | Age: 80
End: 2020-01-01
Payer: COMMERCIAL

## 2020-01-01 ENCOUNTER — OFFICE VISIT (OUTPATIENT)
Dept: FAMILY MEDICINE | Facility: CLINIC | Age: 80
End: 2020-01-01
Payer: COMMERCIAL

## 2020-01-01 ENCOUNTER — HOSPITAL ENCOUNTER (OUTPATIENT)
Facility: CLINIC | Age: 80
Discharge: HOME | End: 2020-10-22
Attending: ANESTHESIOLOGY | Admitting: ANESTHESIOLOGY
Payer: COMMERCIAL

## 2020-01-01 ENCOUNTER — ANESTHESIA (OUTPATIENT)
Dept: SURGERY | Facility: CLINIC | Age: 80
End: 2020-01-01
Payer: COMMERCIAL

## 2020-01-01 VITALS
RESPIRATION RATE: 14 BRPM | WEIGHT: 231 LBS | HEART RATE: 63 BPM | DIASTOLIC BLOOD PRESSURE: 74 MMHG | OXYGEN SATURATION: 98 % | TEMPERATURE: 97.8 F | BODY MASS INDEX: 36.18 KG/M2 | SYSTOLIC BLOOD PRESSURE: 132 MMHG

## 2020-01-01 VITALS
HEART RATE: 52 BPM | SYSTOLIC BLOOD PRESSURE: 99 MMHG | TEMPERATURE: 97.7 F | DIASTOLIC BLOOD PRESSURE: 58 MMHG | OXYGEN SATURATION: 95 % | RESPIRATION RATE: 20 BRPM

## 2020-01-01 DIAGNOSIS — E66.01 MORBID OBESITY (H): ICD-10-CM

## 2020-01-01 DIAGNOSIS — M54.50 LOW BACK PAIN: ICD-10-CM

## 2020-01-01 DIAGNOSIS — Z11.59 ENCOUNTER FOR SCREENING FOR OTHER VIRAL DISEASES: ICD-10-CM

## 2020-01-01 DIAGNOSIS — I10 ESSENTIAL HYPERTENSION, BENIGN: ICD-10-CM

## 2020-01-01 DIAGNOSIS — I48.21 PERMANENT ATRIAL FIBRILLATION (H): Chronic | ICD-10-CM

## 2020-01-01 DIAGNOSIS — Z00.00 ENCOUNTER FOR MEDICARE ANNUAL WELLNESS EXAM: Primary | ICD-10-CM

## 2020-01-01 DIAGNOSIS — E78.5 HYPERLIPIDEMIA LDL GOAL <100: ICD-10-CM

## 2020-01-01 DIAGNOSIS — Z23 NEED FOR PROPHYLACTIC VACCINATION AND INOCULATION AGAINST INFLUENZA: ICD-10-CM

## 2020-01-01 LAB
INR PPP: 1.03 (ref 0.86–1.14)
SARS-COV-2 RNA SPEC QL NAA+PROBE: NOT DETECTED
SPECIMEN SOURCE: NORMAL

## 2020-01-01 PROCEDURE — 85610 PROTHROMBIN TIME: CPT | Performed by: NURSE ANESTHETIST, CERTIFIED REGISTERED

## 2020-01-01 PROCEDURE — 62323 NJX INTERLAMINAR LMBR/SAC: CPT | Performed by: ANESTHESIOLOGY

## 2020-01-01 PROCEDURE — G0008 ADMIN INFLUENZA VIRUS VAC: HCPCS | Performed by: FAMILY MEDICINE

## 2020-01-01 PROCEDURE — 250N000011 HC RX IP 250 OP 636: Performed by: ANESTHESIOLOGY

## 2020-01-01 PROCEDURE — 99397 PER PM REEVAL EST PAT 65+ YR: CPT | Mod: 25 | Performed by: FAMILY MEDICINE

## 2020-01-01 PROCEDURE — 999N000179 XR SURGERY CARM FLUORO LESS THAN 5 MIN W STILLS: Mod: TC

## 2020-01-01 PROCEDURE — 370N000002 HC ANESTHESIA TECHNICAL FEE, EACH ADDTL 15 MIN: Performed by: ANESTHESIOLOGY

## 2020-01-01 PROCEDURE — U0003 INFECTIOUS AGENT DETECTION BY NUCLEIC ACID (DNA OR RNA); SEVERE ACUTE RESPIRATORY SYNDROME CORONAVIRUS 2 (SARS-COV-2) (CORONAVIRUS DISEASE [COVID-19]), AMPLIFIED PROBE TECHNIQUE, MAKING USE OF HIGH THROUGHPUT TECHNOLOGIES AS DESCRIBED BY CMS-2020-01-R: HCPCS | Performed by: ANESTHESIOLOGY

## 2020-01-01 PROCEDURE — 250N000011 HC RX IP 250 OP 636: Performed by: NURSE ANESTHETIST, CERTIFIED REGISTERED

## 2020-01-01 PROCEDURE — 250N000009 HC RX 250: Performed by: NURSE ANESTHETIST, CERTIFIED REGISTERED

## 2020-01-01 PROCEDURE — 90662 IIV NO PRSV INCREASED AG IM: CPT | Performed by: FAMILY MEDICINE

## 2020-01-01 PROCEDURE — 370N000001 HC ANESTHESIA TECHNICAL FEE, 1ST 30 MIN: Performed by: ANESTHESIOLOGY

## 2020-01-01 RX ORDER — WARFARIN SODIUM 5 MG/1
TABLET ORAL
Qty: 25 TABLET | Refills: 0 | Status: SHIPPED | OUTPATIENT
Start: 2020-01-01 | End: 2021-01-01

## 2020-01-01 RX ORDER — TRIAMCINOLONE ACETONIDE 40 MG/ML
INJECTION, SUSPENSION INTRA-ARTICULAR; INTRAMUSCULAR PRN
Status: DISCONTINUED | OUTPATIENT
Start: 2020-01-01 | End: 2020-01-01 | Stop reason: HOSPADM

## 2020-01-01 RX ORDER — PROPOFOL 10 MG/ML
INJECTION, EMULSION INTRAVENOUS PRN
Status: DISCONTINUED | OUTPATIENT
Start: 2020-01-01 | End: 2020-01-01

## 2020-01-01 RX ORDER — IOPAMIDOL 612 MG/ML
INJECTION, SOLUTION INTRATHECAL PRN
Status: DISCONTINUED | OUTPATIENT
Start: 2020-01-01 | End: 2020-01-01 | Stop reason: HOSPADM

## 2020-01-01 RX ORDER — SIMVASTATIN 40 MG
TABLET ORAL
Qty: 90 TABLET | Refills: 1 | Status: SHIPPED | OUTPATIENT
Start: 2020-01-01 | End: 2021-01-01

## 2020-01-01 RX ORDER — CHLORTHALIDONE 50 MG/1
50 TABLET ORAL DAILY
Qty: 90 TABLET | Refills: 1 | Status: SHIPPED | OUTPATIENT
Start: 2020-01-01 | End: 2021-01-01

## 2020-01-01 RX ORDER — LIDOCAINE HYDROCHLORIDE 20 MG/ML
INJECTION, SOLUTION INFILTRATION; PERINEURAL PRN
Status: DISCONTINUED | OUTPATIENT
Start: 2020-01-01 | End: 2020-01-01

## 2020-01-01 RX ADMIN — PROPOFOL 10 MG: 10 INJECTION, EMULSION INTRAVENOUS at 10:27

## 2020-01-01 RX ADMIN — LIDOCAINE HYDROCHLORIDE 40 MG: 20 INJECTION, SOLUTION INFILTRATION; PERINEURAL at 10:22

## 2020-01-01 RX ADMIN — PROPOFOL 50 MG: 10 INJECTION, EMULSION INTRAVENOUS at 10:23

## 2020-01-01 ASSESSMENT — ENCOUNTER SYMPTOMS
ABDOMINAL PAIN: 0
FREQUENCY: 1
ARTHRALGIAS: 1
FEVER: 0
DIARRHEA: 0
HEADACHES: 0
HEMATOCHEZIA: 0
PARESTHESIAS: 0
BREAST MASS: 0
PALPITATIONS: 0
CHILLS: 0
MYALGIAS: 1
JOINT SWELLING: 0
DIZZINESS: 0
SHORTNESS OF BREATH: 0
COUGH: 0
NAUSEA: 0
NERVOUS/ANXIOUS: 0
DYSRHYTHMIAS: 1
HEMATURIA: 0
CONSTIPATION: 0
EYE PAIN: 0
HEARTBURN: 0
DYSURIA: 0
SORE THROAT: 0

## 2020-01-01 ASSESSMENT — ACTIVITIES OF DAILY LIVING (ADL): CURRENT_FUNCTION: NO ASSISTANCE NEEDED

## 2020-01-01 ASSESSMENT — PAIN SCALES - GENERAL: PAINLEVEL: NO PAIN (1)

## 2020-01-06 DIAGNOSIS — I10 ESSENTIAL HYPERTENSION, BENIGN: ICD-10-CM

## 2020-01-06 LAB
ANION GAP SERPL CALCULATED.3IONS-SCNC: 6 MMOL/L (ref 3–14)
BUN SERPL-MCNC: 20 MG/DL (ref 7–30)
CALCIUM SERPL-MCNC: 9.1 MG/DL (ref 8.5–10.1)
CHLORIDE SERPL-SCNC: 107 MMOL/L (ref 94–109)
CO2 SERPL-SCNC: 28 MMOL/L (ref 20–32)
CREAT SERPL-MCNC: 0.92 MG/DL (ref 0.66–1.25)
GFR SERPL CREATININE-BSD FRML MDRD: 78 ML/MIN/{1.73_M2}
GLUCOSE SERPL-MCNC: 102 MG/DL (ref 70–99)
POTASSIUM SERPL-SCNC: 4.1 MMOL/L (ref 3.4–5.3)
SODIUM SERPL-SCNC: 141 MMOL/L (ref 133–144)

## 2020-01-06 PROCEDURE — 36415 COLL VENOUS BLD VENIPUNCTURE: CPT | Performed by: PHYSICIAN ASSISTANT

## 2020-01-06 PROCEDURE — 80048 BASIC METABOLIC PNL TOTAL CA: CPT | Performed by: PHYSICIAN ASSISTANT

## 2020-01-07 NOTE — TELEPHONE ENCOUNTER
Patient called with updated blood pressure readings with medication changes. Patient's systolic has been under 140's with ranging between 108-136. Diastolic ranging between 60-70's. Patient will continue to monitor blood pressure readings notify us if there are any major changes with blood pressure.

## 2020-01-23 ENCOUNTER — TELEPHONE (OUTPATIENT)
Dept: FAMILY MEDICINE | Facility: OTHER | Age: 80
End: 2020-01-23

## 2020-01-23 NOTE — TELEPHONE ENCOUNTER
Simona from [employer] calling. Pt was prescribed Amlodipine from another provider. There is an interaction between that and the Zocor. Amlodipine can cause increased levels of the Zocor which can lead to myopathy. Ok to continue? Please call. You will need reference # [id number].    Thank you,  Fadumo Brown- Patient Representative

## 2020-01-27 ENCOUNTER — ANTICOAGULATION THERAPY VISIT (OUTPATIENT)
Dept: ANTICOAGULATION | Facility: OTHER | Age: 80
End: 2020-01-27
Payer: COMMERCIAL

## 2020-01-27 DIAGNOSIS — Z79.01 LONG TERM CURRENT USE OF ANTICOAGULANT THERAPY: ICD-10-CM

## 2020-01-27 DIAGNOSIS — I48.20 CHRONIC A-FIB (H): ICD-10-CM

## 2020-01-27 LAB — INR POINT OF CARE: 2.3 (ref 0.86–1.14)

## 2020-01-27 PROCEDURE — 36416 COLLJ CAPILLARY BLOOD SPEC: CPT

## 2020-01-27 PROCEDURE — 99207 ZZC NO CHARGE NURSE ONLY: CPT

## 2020-01-27 PROCEDURE — 85610 PROTHROMBIN TIME: CPT | Mod: QW

## 2020-01-27 NOTE — PROGRESS NOTES
ANTICOAGULATION FOLLOW-UP CLINIC VISIT    Patient Name:  Uli Champagne  Date:  2020  Contact Type:  Face to Face    SUBJECTIVE:  Patient Findings     Comments:   The patient was assessed for diet, medication, and activity level changes, missed or extra doses, bruising or bleeding, with no problem findings.          Clinical Outcomes     Negatives:   Major bleeding event, Thromboembolic event, Anticoagulation-related hospital admission, Anticoagulation-related ED visit, Anticoagulation-related fatality    Comments:   The patient was assessed for diet, medication, and activity level changes, missed or extra doses, bruising or bleeding, with no problem findings.             OBJECTIVE    INR Protime   Date Value Ref Range Status   2020 2.3 (A) 0.86 - 1.14 Final       ASSESSMENT / PLAN  INR assessment THER    Recheck INR In: 6 WEEKS    INR Location Clinic      Anticoagulation Summary  As of 2020    INR goal:   2.0-3.0   TTR:   76.5 % (1 y)   INR used for dosin.3 (2020)   Warfarin maintenance plan:   2.5 mg (5 mg x 0.5) every Tue, Sat; 5 mg (5 mg x 1) all other days   Full warfarin instructions:   2.5 mg every Tue, Sat; 5 mg all other days   Weekly warfarin total:   30 mg   No change documented:   Rui Landers, RN   Plan last modified:   Mahsa Julien, RN (3/22/2019)   Next INR check:   3/9/2020   Priority:   Maintenance   Target end date:       Indications    Long-term (current) use of anticoagulants [Z79.01] [Z79.01]  Chronic a-fib [I48.20]             Anticoagulation Episode Summary     INR check location:       Preferred lab:       Send INR reminders to:   [facility] [place] [place]    Comments:   5 mg tab, AM dose, appt card        Anticoagulation Care Providers     Provider Role Specialty Phone number    Isra Carrillo MD [facility] [phone]            See the Encounter Report to view Anticoagulation Flowsheet and Dosing Calendar (Go to Encounters tab in chart  review, and find the Anticoagulation Therapy Visit)    Dosage adjustment made based on physician directed care plan.    Rui Landers RN

## 2020-02-05 ENCOUNTER — ANCILLARY PROCEDURE (OUTPATIENT)
Dept: GENERAL RADIOLOGY | Facility: CLINIC | Age: 80
End: 2020-02-05
Attending: ORTHOPAEDIC SURGERY
Payer: COMMERCIAL

## 2020-02-05 ENCOUNTER — OFFICE VISIT (OUTPATIENT)
Dept: ORTHOPEDICS | Facility: CLINIC | Age: 80
End: 2020-02-05
Payer: COMMERCIAL

## 2020-02-05 VITALS
HEIGHT: 68 IN | WEIGHT: 223.6 LBS | BODY MASS INDEX: 33.89 KG/M2 | DIASTOLIC BLOOD PRESSURE: 80 MMHG | SYSTOLIC BLOOD PRESSURE: 130 MMHG

## 2020-02-05 DIAGNOSIS — M19.011 PRIMARY OSTEOARTHRITIS OF RIGHT SHOULDER: Primary | ICD-10-CM

## 2020-02-05 DIAGNOSIS — M25.511 RIGHT SHOULDER PAIN: ICD-10-CM

## 2020-02-05 PROCEDURE — 99214 OFFICE O/P EST MOD 30 MIN: CPT | Performed by: ORTHOPAEDIC SURGERY

## 2020-02-05 PROCEDURE — 73030 X-RAY EXAM OF SHOULDER: CPT | Mod: TC

## 2020-02-05 ASSESSMENT — MIFFLIN-ST. JEOR: SCORE: 1689.77

## 2020-02-05 ASSESSMENT — PAIN SCALES - GENERAL: PAINLEVEL: MILD PAIN (2)

## 2020-02-05 NOTE — LETTER
"    2020         RE: Uli Champagne  [address] [facility] [address]        Dear Colleague,    Thank you for referring your patient, Uli Champagne, to the [facility]. Please see a copy of my visit note below.    Office Visit-Follow up    Chief Complaint: Uli Champagne is a 79 year old adult who is being seen for   Chief Complaint   Patient presents with     Shoulder Pain     right shoulder pain       History of Present Illness:   Previously seen for other issues, first time for shoulder.   Mechanism of Injury: No trauma or inciting event. Has had off and on mild shoulder pain for 1-2 years at least, probably longer. Last 6 months has been increasing pain at night when sleeping on the shoulder. Anterior, lateral and \"deep into the joint\".  Worst with motion, sleeping on it. Better with \"stretching\". Non-radiating.   Treatments tried: occasional tylenol, rest, stretching, activity modification  Non-radiating. No numbness/tingling.   Lives independent at home with his spouse.   Long hx of multiple cardiac hx including previous MIs, stents, bypass and CVA. Takes coumadin. Does not take NSAIDs.       Social History     Occupational History     Not on file   Tobacco Use     Smoking status: Former Smoker     Packs/day: 0.50     Years: 2.00     Pack years: 1.00     Types: Cigarettes     Last attempt to quit: 1960     Years since quittin.1     Smokeless tobacco: Never Used   Substance and Sexual Activity     Alcohol use: Yes     Comment: socially     Drug use: No     Sexual activity: Never       REVIEW OF SYSTEMS  General: negative for, night sweats, dizziness, fatigue  Resp: No shortness of breath and no cough  CV: negative for chest pain, syncope or near-syncope  GI: negative for nausea, vomiting and diarrhea  : negative for dysuria and hematuria  Musculoskeletal: as above  Neurologic: negative for syncope   Hematologic: negative for bleeding disorder    Physical " "Exam:  Vitals: /80   Ht 1.721 m (5' 7.75\")   Wt 101.4 kg (223 lb 9.6 oz)   BMI 34.25 kg/m     BMI= Body mass index is 34.25 kg/m .  Constitutional: healthy, alert and no acute distress   Psychiatric: mentation appears normal and affect normal/bright  NEURO: no focal deficits  RESP: Normal with easy respirations and no use of accessory muscles to breathe, no audible wheezing or retractions  CV: RUE:  no edema  SKIN: No erythema, rashes, excoriation, or breakdown. No evidence of infection.   JOINT/EXTREMITIES:right shoulder: no effusion, atrophy. Deformity. AROM: 130/110/30/back pocket  PROM 150/130. Crepitus and pain with motion >90/90. Pain and mild weakness with infraspinatus, supraspinatus and subscapularis.  No pain or weakness with biceps including speed's.    GAIT: not tested             Diagnostic Modalities:  right shoulder X-ray: with bone on bone glenohumeral wear as well as large osteophyte formation. Advanced degenerative changes to the AC Joint. No fractures identified.  Independent visualization of the images was performed.      Impression: right shoulder primary osteoarthritis    Plan:  All of the above pertinent physical exam and imaging modalities findings was reviewed with Uli.  Discussed with patient. Really only bothersome when he sleeps on it. Does good during the day. Not a great surgical candidate with extensive cardiac history.  He is not interested in steroid injection. Would like to see physical therapy.  Will get him arranged for this. Takes tylenol PRN.  Referral provided to physical therapy.       Return to clinic PRN, or sooner as needed for changes.  Re-x-ray on return: No    Scribed by:  Chuck Ochoa APRN, CNP  3:45 PM  2/5/2020  The information in this document, created by a scribe for me, accurately reflects the services I personally performed and the decisions made by me. I have reviewed and approved this document for accuracy    Yovani Ogden, DO      Again, thank you " for allowing me to participate in the care of your patient.        Sincerely,        Yovani Ogden, DO

## 2020-02-05 NOTE — PROGRESS NOTES
"Office Visit-Follow up    Chief Complaint: Uli Champagne is a 79 year old adult who is being seen for   Chief Complaint   Patient presents with     Shoulder Pain     right shoulder pain       History of Present Illness:   Previously seen for other issues, first time for shoulder.   Mechanism of Injury: No trauma or inciting event. Has had off and on mild shoulder pain for 1-2 years at least, probably longer. Last 6 months has been increasing pain at night when sleeping on the shoulder. Anterior, lateral and \"deep into the joint\".  Worst with motion, sleeping on it. Better with \"stretching\". Non-radiating.   Treatments tried: occasional tylenol, rest, stretching, activity modification  Non-radiating. No numbness/tingling.   Lives independent at home with his spouse.   Long hx of multiple cardiac hx including previous MIs, stents, bypass and CVA. Takes coumadin. Does not take NSAIDs.       Social History     Occupational History     Not on file   Tobacco Use     Smoking status: Former Smoker     Packs/day: 0.50     Years: 2.00     Pack years: 1.00     Types: Cigarettes     Last attempt to quit: 1960     Years since quittin.1     Smokeless tobacco: Never Used   Substance and Sexual Activity     Alcohol use: Yes     Comment: socially     Drug use: No     Sexual activity: Never       REVIEW OF SYSTEMS  General: negative for, night sweats, dizziness, fatigue  Resp: No shortness of breath and no cough  CV: negative for chest pain, syncope or near-syncope  GI: negative for nausea, vomiting and diarrhea  : negative for dysuria and hematuria  Musculoskeletal: as above  Neurologic: negative for syncope   Hematologic: negative for bleeding disorder    Physical Exam:  Vitals: /80   Ht 1.721 m (5' 7.75\")   Wt 101.4 kg (223 lb 9.6 oz)   BMI 34.25 kg/m    BMI= Body mass index is 34.25 kg/m .  Constitutional: healthy, alert and no acute distress   Psychiatric: mentation appears normal and affect " normal/bright  NEURO: no focal deficits  RESP: Normal with easy respirations and no use of accessory muscles to breathe, no audible wheezing or retractions  CV: RUE:  no edema  SKIN: No erythema, rashes, excoriation, or breakdown. No evidence of infection.   JOINT/EXTREMITIES:right shoulder: no effusion, atrophy. Deformity. AROM: 130/110/30/back pocket  PROM 150/130. Crepitus and pain with motion >90/90. Pain and mild weakness with infraspinatus, supraspinatus and subscapularis.  No pain or weakness with biceps including speed's.    GAIT: not tested             Diagnostic Modalities:  right shoulder X-ray: with bone on bone glenohumeral wear as well as large osteophyte formation. Advanced degenerative changes to the AC Joint. No fractures identified.  Independent visualization of the images was performed.      Impression: right shoulder primary osteoarthritis    Plan:  All of the above pertinent physical exam and imaging modalities findings was reviewed with Uli.  Discussed with patient. Really only bothersome when he sleeps on it. Does good during the day. Not a great surgical candidate with extensive cardiac history.  He is not interested in steroid injection. Would like to see physical therapy.  Will get him arranged for this. Takes tylenol PRN.  Referral provided to physical therapy.       Return to clinic PRN, or sooner as needed for changes.  Re-x-ray on return: No    Scribed by:  MARIA D Salcedo, CNP  3:45 PM  2/5/2020  The information in this document, created by a scribe for me, accurately reflects the services I personally performed and the decisions made by me. I have reviewed and approved this document for accuracy    Yovani Ogden DO

## 2020-02-12 NOTE — TELEPHONE ENCOUNTER
Writer called [facility] twice and sat on hold for about an hour each time due to patient has a restricted account through [facility] and needing to talk with a specialist per [facility] representative. Writer was wanting to update pharmacy that patient is no longer taking amlodipine as it was stopped on 11/6/19 due to edema.

## 2020-02-13 ENCOUNTER — HOSPITAL ENCOUNTER (OUTPATIENT)
Dept: PHYSICAL THERAPY | Facility: OTHER | Age: 80
Setting detail: THERAPIES SERIES
End: 2020-02-13
Attending: NURSE PRACTITIONER
Payer: COMMERCIAL

## 2020-02-13 DIAGNOSIS — M19.011 PRIMARY OSTEOARTHRITIS OF RIGHT SHOULDER: ICD-10-CM

## 2020-02-13 PROCEDURE — 97110 THERAPEUTIC EXERCISES: CPT | Mod: GP | Performed by: PHYSICAL THERAPIST

## 2020-02-13 PROCEDURE — 97161 PT EVAL LOW COMPLEX 20 MIN: CPT | Mod: GP | Performed by: PHYSICAL THERAPIST

## 2020-02-13 NOTE — PROGRESS NOTES
[facility]          OUTPATIENT PHYSICAL THERAPY ORTHOPEDIC EVALUATION  PLAN OF TREATMENT FOR OUTPATIENT REHABILITATION  (COMPLETE FOR INITIAL CLAIMS ONLY)  Patient's Last Name, First Name, M.I.  YOB: 1940  Uli Champagne    Provider s Name:  [facility]   Medical Record No.  [id number]   Start of Care Date:  02/13/20   Onset Date:  12/13/19   Type:     _X__PT   ___OT   ___SLP Medical Diagnosis:  primary OA of right shoulder M19.011     PT Diagnosis:  severe OA rightshoulder    Visits from SOC:  1      _________________________________________________________________________________  Plan of Treatment/Functional Goals:  ADL retraining, ROM, strengthening, stretching        modalities as needed  Goals  Goal Identifier: 1  Goal Description: instruction in HEP and compliant with it 5 of 7d days to improve quality of life   Target Date: 04/13/20    Goal Identifier: 2  Goal Description: patient to report he is able to sleep at night 50% better   Target Date: 04/13/20                                                                      Therapy Frequency:     Predicted Duration of Therapy Intervention:  recheck in 2 weeks if doing well discharge to [facility] , 4 Rx over 2 months    Irma Driscoll, PT                 I CERTIFY THE NEED FOR THESE SERVICES FURNISHED UNDER        THIS PLAN OF TREATMENT AND WHILE UNDER MY CARE     (Physician co-signature of this document indicates review and certification of the therapy plan).                       Certification Date From:  02/13/20   Certification Date To:  04/13/20    Referring Provider:  ilana vu DO    Initial Assessment        See Epic Evaluation Start of Care Date: 02/13/20

## 2020-02-13 NOTE — PROGRESS NOTES
02/13/20 1400   General Information   Type of Visit Initial OP Ortho PT Evaluation   Start of Care Date 02/13/20   Referring Physician ilana vu DO   Patient/Family Goals Statement right shoulder rehab    Orders Evaluate and Treat   Date of Order 02/05/20   Certification Required? Yes   Medical Diagnosis primary OA of right shoulder M19.011   Surgical/Medical history reviewed Yes   Precautions/Limitations no known precautions/limitations   Body Part(s)   Body Part(s) Shoulder   Presentation and Etiology   Pertinent history of current problem (include personal factors and/or comorbidities that impact the POC) patient reports that for about last 1 year and recently has had trouble sleeping on right side , is now sleeping on his back again , will have sharp pain when he lays down but then he does rowing movement on his back and then its good . PMH stroke many years ago neck spine surgery curred headaches , right handed , uses cane in right hand    Impairments A. Pain   Functional Limitations perform activities of daily living;perform desired leisure / sports activities   Symptom Location right shoulder    Onset date of current episode/exacerbation 12/13/19   Chronicity New   Pain rating (0-10 point scale) Best (/10);Worst (/10)   Best (/10) 0/10   Worst (/10) 8/10   Pain quality A. Sharp   Frequency of pain/symptoms C. With activity   Pain/symptoms are: Worse during the night   Pain/symptoms exacerbated by G. Certain positions   Pain/symptoms eased by C. Rest;E. Changing positions   Progression of symptoms since onset: Unchanged   Current / Previous Interventions   Diagnostic Tests: X-ray   Current Level of Function   Current Community Support Family/friend caregiver   Patient role/employment history F. Retired   Fall Risk Screen   Fall screen completed by PT   Have you fallen 2 or more times in the past year? No   Have you fallen and had an injury in the past year? No   Is patient a fall risk? No   Shoulder  Objective Findings   Side (if bilateral, select both right and left) Right   Posture forward head and rounded shoulder    Shoulder ROM Comment AROM right 117 left 140 external rotation right 5 left 25    Shoulder Flexibility Comments shoulder flexion , extension , abduction 4/5 internal rotation 4-/5 external rotation 4-/5 discomfort    Planned Therapy Interventions   Planned Therapy Interventions ADL retraining;ROM;strengthening;stretching   Planned Modality Interventions   Planned Modality Interventions Comments modalities as needed   Clinical Impression   Criteria for Skilled Therapeutic Interventions Met yes, treatment indicated   PT Diagnosis severe OA rightshoulder    Functional limitations due to impairments spadi 13.08   Clinical Presentation Stable/Uncomplicated   Clinical Presentation Rationale patient seen due to right shoulder pain and decrease ROM and strength , Rx is instruction in HEP    Clinical Decision Making (Complexity) Low complexity   Predicted Duration of Therapy Intervention (days/wks) recheck in 2 weeks if doing well discharge to [facility] , 4 Rx over 2 months   Risk & Benefits of therapy have been explained Yes   Patient, Family & other staff in agreement with plan of care Yes   Education Assessment   Preferred Learning Style Listening;Reading;Demonstration   Barriers to Learning No barriers   ORTHO GOALS   PT Ortho Eval Goals 1;2   Ortho Goal 1   Goal Identifier 1   Goal Description instruction in HEP and compliant with it 5 of 7d days to improve quality of life    Target Date 04/13/20   Ortho Goal 2   Goal Identifier 2   Goal Description patient to report he is able to sleep at night 50% better    Target Date 04/13/20   Total Evaluation Time   PT Katelynn, Low Complexity Minutes (10160) 15   Therapy Certification   Certification date from 02/13/20   Certification date to 04/13/20   Medical Diagnosis primary OA of right shoulder M19.011

## 2020-02-26 NOTE — PROGRESS NOTES
Outpatient Physical Therapy Discharge Note     Patient: Uli Champagne  : 1940    Beginning/End Dates of Reporting Period:  20 to 2020    Referring Provider: ilana Rodriguez Diagnosis: right shoulder pain      Client Self Report:      Objective Measurements:  Objective Measure: pain 0-8/10 spadi 13.38      patient seen 1x for instruction in HEP he called and cancelled his follow up Rx as he was doing well and going to continue on HEP      Goals:  Goal Identifier 1   Goal Description instruction in HEP and compliant with it 5 of 7d days to improve quality of life    Target Date 20   Date Met      Progress:     Goal Identifier 2   Goal Description patient to report he is able to sleep at night 50% better    Target Date 20   Date Met      Progress:       Progress Toward Goals:   Progress this reporting period: meeting his goals and going to continue on HEP           Plan:  Discharge from therapy.    Discharge:    Reason for Discharge: Patient has met all goals.    Equipment Issued: none    Discharge Plan: Patient to continue home program.

## 2020-03-02 DIAGNOSIS — I48.21 PERMANENT ATRIAL FIBRILLATION (H): Chronic | ICD-10-CM

## 2020-03-04 RX ORDER — WARFARIN SODIUM 5 MG/1
TABLET ORAL
Qty: 75 TABLET | Refills: 0 | Status: SHIPPED | OUTPATIENT
Start: 2020-03-04 | End: 2020-05-20

## 2020-03-09 ENCOUNTER — ANTICOAGULATION THERAPY VISIT (OUTPATIENT)
Dept: ANTICOAGULATION | Facility: OTHER | Age: 80
End: 2020-03-09
Payer: COMMERCIAL

## 2020-03-09 DIAGNOSIS — I48.20 CHRONIC A-FIB (H): ICD-10-CM

## 2020-03-09 DIAGNOSIS — Z79.01 LONG TERM CURRENT USE OF ANTICOAGULANT THERAPY: ICD-10-CM

## 2020-03-09 LAB — INR POINT OF CARE: 1.8 (ref 0.86–1.14)

## 2020-03-09 PROCEDURE — 36416 COLLJ CAPILLARY BLOOD SPEC: CPT

## 2020-03-09 PROCEDURE — 99207 ZZC NO CHARGE NURSE ONLY: CPT

## 2020-03-09 PROCEDURE — 85610 PROTHROMBIN TIME: CPT | Mod: QW

## 2020-03-18 ENCOUNTER — TELEPHONE (OUTPATIENT)
Dept: CARDIOLOGY | Facility: CLINIC | Age: 80
End: 2020-03-18

## 2020-03-18 DIAGNOSIS — I10 ESSENTIAL HYPERTENSION, BENIGN: ICD-10-CM

## 2020-03-18 NOTE — TELEPHONE ENCOUNTER
Patient wanting to update ZHANG Dubon regarding his blood pressures. Patient stated 11 blood pressure readings out of 20 since December systolic has been over 130 and diastolic has been 60-70. Confirmed with patient he is taking his medications as prescribed. Will route to ZHANG Dubon for review.

## 2020-03-23 NOTE — TELEPHONE ENCOUNTER
"Patient stated last blood pressure readings since 3/18/20 were 134/64, 128/65, 122/68, 149/68, 104/54, 118/68, 118/63, and 136/65. Pulse has been running 40-45 but this has been his \"normal\" for years. Will route back to ZHANG Dubon to see if she wants to still increase lisinopril to 10mg daily.   "

## 2020-03-24 RX ORDER — LISINOPRIL 10 MG/1
5 TABLET ORAL DAILY
Qty: 90 TABLET | Refills: 3 | Status: SHIPPED | OUTPATIENT
Start: 2020-03-24 | End: 2020-05-19

## 2020-03-24 NOTE — TELEPHONE ENCOUNTER
Yes pls- his numbers are pretty good, but I'd like to see more <130 systolic.  If he's super reluctant he can stay where his is, but 10 will likely be better.

## 2020-04-20 ENCOUNTER — ANTICOAGULATION THERAPY VISIT (OUTPATIENT)
Dept: ANTICOAGULATION | Facility: OTHER | Age: 80
End: 2020-04-20

## 2020-04-20 DIAGNOSIS — I48.20 CHRONIC A-FIB (H): ICD-10-CM

## 2020-04-20 DIAGNOSIS — Z79.01 LONG TERM CURRENT USE OF ANTICOAGULANT THERAPY: ICD-10-CM

## 2020-04-20 LAB
CAPILLARY BLOOD COLLECTION: NORMAL
INR BLD: 2 (ref 0.86–1.14)

## 2020-04-20 PROCEDURE — 85610 PROTHROMBIN TIME: CPT | Mod: QW | Performed by: FAMILY MEDICINE

## 2020-04-20 PROCEDURE — 36416 COLLJ CAPILLARY BLOOD SPEC: CPT | Performed by: FAMILY MEDICINE

## 2020-04-20 PROCEDURE — 99207 ZZC NO CHARGE NURSE ONLY: CPT | Performed by: FAMILY MEDICINE

## 2020-04-20 NOTE — PROGRESS NOTES
ANTICOAGULATION FOLLOW-UP CLINIC VISIT    Patient Name:  Uli Champagne  Date:  2020  Contact Type:  Telephone/ LM with dosing instructions    SUBJECTIVE:  Patient Findings     Comments:   I left a detailed voicemail with the orders below. I have also requested a call back if there have been any missed doses, concerns, illness, fever, or if there have been any changes in medications, activity level, or diet          Clinical Outcomes     Negatives:   Major bleeding event, Thromboembolic event, Anticoagulation-related hospital admission, Anticoagulation-related ED visit, Anticoagulation-related fatality    Comments:   I left a detailed voicemail with the orders below. I have also requested a call back if there have been any missed doses, concerns, illness, fever, or if there have been any changes in medications, activity level, or diet             OBJECTIVE    INR Point of Care   Date Value Ref Range Status   2020 2.0 (H) 0.86 - 1.14 Final     Comment:     This test is intended for monitoring Coumadin therapy.  Results are not   accurate in patients with prolonged INR due to factor deficiency.         ASSESSMENT / PLAN  INR assessment THER    Recheck INR In: 6 WEEKS    INR Location Outside lab      Anticoagulation Summary  As of 2020    INR goal:   2.0-3.0   TTR:   61.8 % (1 y)   INR used for dosin.0 (2020)   Warfarin maintenance plan:   2.5 mg (5 mg x 0.5) every Tue, Sat; 5 mg (5 mg x 1) all other days   Full warfarin instructions:   2.5 mg every Tue, Sat; 5 mg all other days   Weekly warfarin total:   30 mg   No change documented:   Rui Landers RN   Plan last modified:   Mahsa Julien RN (3/22/2019)   Next INR check:   2020   Priority:   High   Target end date:       Indications    Long-term (current) use of anticoagulants [Z79.01] [Z79.01]  Chronic a-fib [I48.20]             Anticoagulation Episode Summary     INR check location:       Preferred lab:       Send INR  reminders to:   LAURA [facility]    Comments:   5 mg tab, AM dose, appt card        Anticoagulation Care Providers     Provider Role Specialty Phone number    Isra Carrillo MD [facility] [phone]            See the Encounter Report to view Anticoagulation Flowsheet and Dosing Calendar (Go to Encounters tab in chart review, and find the Anticoagulation Therapy Visit)    Dosage adjustment made based on physician directed care plan.    Rui Landers RN

## 2020-05-07 ENCOUNTER — TELEPHONE (OUTPATIENT)
Dept: CARDIOLOGY | Facility: CLINIC | Age: 80
End: 2020-05-07

## 2020-05-07 NOTE — TELEPHONE ENCOUNTER
"Telephone call to patient with Dr. Wells's recommendations. Was unable to reach patient. Detailed message was left with direct call back number.     \"Blood pressure changes of 20 points over a short period of time are due to some external cause, often hard to tell what it is.  I would not change meds at this point.  After another month, if it is still high, we can change meds.  Possible his BP machine has become miscalibrated?   SERA Wells MD, FACC\"  "

## 2020-05-07 NOTE — TELEPHONE ENCOUNTER
Calling to give updates on BP.  He reports that the last week the BP has gone up. He has been running for the last few months 130's or less 80's or less. The last week his systolic blood pressure has been up 150's-160's. Heart Rates are stable at upper 40's-low 50's. I will let Dr Wells know and he can advise should we need changes.

## 2020-05-11 NOTE — TELEPHONE ENCOUNTER
Patient called back and left v/m asking when he should report back for too high of blood pressures during the next month while monitoring blood pressures. Writer was unable to reach patient. Left detailed message that if he is consistently getting systolic readings 170's or above he should report back to cardiology. Left direct cardiology phone number to call back if he has any other questions or concerns.

## 2020-05-13 DIAGNOSIS — M1A.09X0 IDIOPATHIC CHRONIC GOUT OF MULTIPLE SITES WITHOUT TOPHUS: ICD-10-CM

## 2020-05-14 RX ORDER — ALLOPURINOL 300 MG/1
TABLET ORAL
Qty: 90 TABLET | Refills: 3 | Status: SHIPPED | OUTPATIENT
Start: 2020-05-14 | End: 2021-01-01

## 2020-05-14 NOTE — TELEPHONE ENCOUNTER
Routing refill request to provider for review/approval because:  Labs not current:  CBC, Uric Acid    Last Written Prescription Date:  4/12/19  Last Fill Quantity: 90,  # refills: 3   Last office visit: Visit date not found with prescribing provider:  8 months   Future Office Visit:      ANGEL VelazquezN, RN

## 2020-05-19 DIAGNOSIS — I10 ESSENTIAL HYPERTENSION, BENIGN: ICD-10-CM

## 2020-05-19 RX ORDER — LISINOPRIL 10 MG/1
10 TABLET ORAL DAILY
Qty: 90 TABLET | Refills: 3 | Status: SHIPPED | OUTPATIENT
Start: 2020-05-19 | End: 2021-01-01

## 2020-05-20 DIAGNOSIS — I48.21 PERMANENT ATRIAL FIBRILLATION (H): Chronic | ICD-10-CM

## 2020-05-20 RX ORDER — WARFARIN SODIUM 5 MG/1
TABLET ORAL
Qty: 75 TABLET | Refills: 2 | Status: SHIPPED | OUTPATIENT
Start: 2020-05-20 | End: 2020-01-01

## 2020-05-20 NOTE — TELEPHONE ENCOUNTER
Reason for Call:  Medication or medication refill:    Do you use a [facility]?  Name of the pharmacy and phone number for the current request:  [facility]    Name of the medication requested: warfarin ANTICOAGULANT (COUMADIN) 5 MG tablet     Other request: pt was told by [facility] they had sent a request to [facility]     Can we leave a detailed message on this number? YES    Phone number patient can be reached at: Cell number on file:    Telephone Information:   Mobile [phone]       Best Time:     Call taken on 5/20/2020 at 11:07 AM by Beverley Zafar

## 2020-05-26 ENCOUNTER — ANTICOAGULATION THERAPY VISIT (OUTPATIENT)
Dept: ANTICOAGULATION | Facility: OTHER | Age: 80
End: 2020-05-26

## 2020-05-26 DIAGNOSIS — Z79.01 LONG TERM CURRENT USE OF ANTICOAGULANT THERAPY: ICD-10-CM

## 2020-05-26 DIAGNOSIS — I48.20 CHRONIC A-FIB (H): ICD-10-CM

## 2020-05-26 LAB — INR BLD: 2.1 (ref 0.86–1.14)

## 2020-05-26 PROCEDURE — 99207 ZZC NO CHARGE NURSE ONLY: CPT | Performed by: FAMILY MEDICINE

## 2020-05-26 PROCEDURE — 85610 PROTHROMBIN TIME: CPT | Mod: QW | Performed by: FAMILY MEDICINE

## 2020-05-26 PROCEDURE — 36416 COLLJ CAPILLARY BLOOD SPEC: CPT | Performed by: FAMILY MEDICINE

## 2020-05-26 NOTE — PROGRESS NOTES
ANTICOAGULATION FOLLOW-UP CLINIC VISIT    Patient Name:  Uli Champagne  Date:  2020  Contact Type:  Telephone    SUBJECTIVE:  Patient Findings     Comments:   Left a detailed message on VM with this information, pt is advised to call back if pt has any questions, missed doses or concerns such as symptoms of bleeding, clotting, infection, change in diet or other.          Clinical Outcomes     Comments:   Left a detailed message on VM with this information, pt is advised to call back if pt has any questions, missed doses or concerns such as symptoms of bleeding, clotting, infection, change in diet or other.             OBJECTIVE    Recent labs: (last 7 days)     20  1242   INR 2.1*       ASSESSMENT / PLAN  INR assessment THER    Recheck INR In: 6 WEEKS    INR Location Outside lab      Anticoagulation Summary  As of 2020    INR goal:   2.0-3.0   TTR:   65.1 % (1 y)   INR used for dosin.1 (2020)   Warfarin maintenance plan:   2.5 mg (5 mg x 0.5) every Tue, Sat; 5 mg (5 mg x 1) all other days   Full warfarin instructions:   2.5 mg every Tue, Sat; 5 mg all other days   Weekly warfarin total:   30 mg   No change documented:   Delfina Yeh, RN   Plan last modified:   Mahsa Julien, RN (3/22/2019)   Next INR check:   2020   Priority:   High   Target end date:       Indications    Long-term (current) use of anticoagulants [Z79.01] [Z79.01]  Chronic a-fib [I48.20]             Anticoagulation Episode Summary     INR check location:       Preferred lab:       Send INR reminders to:   [facility]    Comments:   5 mg tab, AM dose, appt card        Anticoagulation Care Providers     Provider Role Specialty Phone number    Isra Carrillo MD [facility] [phone]            See the Encounter Report to view Anticoagulation Flowsheet and Dosing Calendar (Go to Encounters tab in chart review, and find the Anticoagulation Therapy Visit)    Dosage adjustment made  based on physician directed care plan.    Delfina Yeh RN

## 2020-06-09 DIAGNOSIS — I10 ESSENTIAL HYPERTENSION, BENIGN: ICD-10-CM

## 2020-06-09 RX ORDER — OLMESARTAN MEDOXOMIL 40 MG/1
40 TABLET ORAL DAILY
Qty: 90 TABLET | Refills: 0 | Status: SHIPPED | OUTPATIENT
Start: 2020-06-09 | End: 2020-08-26

## 2020-06-09 NOTE — TELEPHONE ENCOUNTER
Patient overdue for f/u. Will have scheduling contact patient. Okayed a 90 day refill.     Jacob RN, BSN  [facility] ~ [place], [place]  [phone]

## 2020-06-09 NOTE — TELEPHONE ENCOUNTER
Patient is requesting a 90 day supply...    Last Written Prescription Date:  6/17/19  Last Fill Quantity: 90 tablet,  # refills: 3   Last office visit: 10/24/2019    Future Office Visit:  no    Requested Prescriptions   Pending Prescriptions Disp Refills     olmesartan (BENICAR) 40 MG tablet 90 tablet 3     Sig: Take 1 tablet (40 mg) by mouth daily       There is no refill protocol information for this order              Gayle Obando on 6/9/2020 at 8:58 AM

## 2020-06-29 ENCOUNTER — VIRTUAL VISIT (OUTPATIENT)
Dept: CARDIOLOGY | Facility: CLINIC | Age: 80
End: 2020-06-29
Payer: COMMERCIAL

## 2020-06-29 DIAGNOSIS — I10 ESSENTIAL HYPERTENSION, BENIGN: ICD-10-CM

## 2020-06-29 NOTE — PROGRESS NOTES
"  The patient has been notified of following:     \"This telephone visit will be conducted via a call between you and your physician/provider. We have found that certain health care needs can be provided without the need for a physical exam.  This service lets us provide the care you need with a short phone conversation.  If a prescription is necessary we can send it directly to your pharmacy.  If lab work is needed we can place an order for that and you can then stop by our lab to have the test done at a later time.    Telephone visits are billed at different rates depending on your insurance coverage. During this emergency period, for some insurers they may be billed the same as an in-person visit.  Please reach out to your insurance provider with any questions.    If during the course of the call the physician/provider feels a telephone visit is not appropriate, you will not be charged for this service.\"    Patient has given verbal consent for Telephone visit?  Yes    What phone number would you like to be contacted at? [phone]    How would you like to obtain your AVS? MyChart    Blood pressure:   6/16-28th over 130 systolic readings was three readings total. Rest were all below 130 systolic. Diastolic running between 60-79  Pulse: running betwee 41-46 (patient states always has had a slow heart rate)  Weight: 215 #    Phone call duration: 0 minutes    Miguelito Wells MD  "

## 2020-06-29 NOTE — LETTER
"6/29/2020    Isra Carrillo MD  [address] [facility] [address]    RE: Uli SINGH Ihsan       Dear Colleague,    I had the pleasure of seeing Uli Champagne in the [facility].      The patient has been notified of following:     \"This telephone visit will be conducted via a call between you and your physician/provider. We have found that certain health care needs can be provided without the need for a physical exam.  This service lets us provide the care you need with a short phone conversation.  If a prescription is necessary we can send it directly to your pharmacy.  If lab work is needed we can place an order for that and you can then stop by our lab to have the test done at a later time.    Telephone visits are billed at different rates depending on your insurance coverage. During this emergency period, for some insurers they may be billed the same as an in-person visit.  Please reach out to your insurance provider with any questions.    If during the course of the call the physician/provider feels a telephone visit is not appropriate, you will not be charged for this service.\"    Patient has given verbal consent for Telephone visit?  Yes    What phone number would you like to be contacted at? [phone]    How would you like to obtain your AVS? MyChart    Blood pressure:   6/16-28th over 130 systolic readings was three readings total. Rest were all below 130 systolic. Diastolic running between 60-79  Pulse: running betwee 41-46 (patient states always has had a slow heart rate)  Weight: 215 #    Phone call duration: 0 minutes    Miguelito Wells MD    5 attempts, unable to reach (direct to voicemail)    Thank you for allowing me to participate in the care of your patient.      Sincerely,     Miguelito Wells MD     [facility]    cc:   Nichole Walsh, PA-C  [address]  [place] [address]        "

## 2020-07-24 ENCOUNTER — ANTICOAGULATION THERAPY VISIT (OUTPATIENT)
Dept: ANTICOAGULATION | Facility: OTHER | Age: 80
End: 2020-07-24

## 2020-07-24 DIAGNOSIS — I48.20 CHRONIC A-FIB (H): ICD-10-CM

## 2020-07-24 DIAGNOSIS — Z79.01 LONG TERM (CURRENT) USE OF ANTICOAGULANTS: Primary | ICD-10-CM

## 2020-07-24 DIAGNOSIS — Z79.01 LONG TERM CURRENT USE OF ANTICOAGULANT THERAPY: ICD-10-CM

## 2020-07-24 DIAGNOSIS — I10 ESSENTIAL HYPERTENSION, BENIGN: ICD-10-CM

## 2020-07-24 DIAGNOSIS — I48.20 CHRONIC ATRIAL FIBRILLATION (H): ICD-10-CM

## 2020-07-24 LAB
ALT SERPL W P-5'-P-CCNC: 25 U/L (ref 0–70)
ANION GAP SERPL CALCULATED.3IONS-SCNC: 6 MMOL/L (ref 3–14)
BUN SERPL-MCNC: 20 MG/DL (ref 7–30)
CALCIUM SERPL-MCNC: 9.3 MG/DL (ref 8.5–10.1)
CHLORIDE SERPL-SCNC: 105 MMOL/L (ref 94–109)
CHOLEST SERPL-MCNC: 115 MG/DL
CO2 SERPL-SCNC: 28 MMOL/L (ref 20–32)
CREAT SERPL-MCNC: 1.03 MG/DL (ref 0.66–1.25)
GFR SERPL CREATININE-BSD FRML MDRD: 67 ML/MIN/{1.73_M2}
GLUCOSE SERPL-MCNC: 102 MG/DL (ref 70–99)
HDLC SERPL-MCNC: 51 MG/DL
INR PPP: 2.03 (ref 0.86–1.14)
LDLC SERPL CALC-MCNC: 42 MG/DL
NONHDLC SERPL-MCNC: 64 MG/DL
POTASSIUM SERPL-SCNC: 3.8 MMOL/L (ref 3.4–5.3)
SODIUM SERPL-SCNC: 139 MMOL/L (ref 133–144)
TRIGL SERPL-MCNC: 110 MG/DL

## 2020-07-24 PROCEDURE — 36415 COLL VENOUS BLD VENIPUNCTURE: CPT | Performed by: FAMILY MEDICINE

## 2020-07-24 PROCEDURE — 99207 ZZC NO CHARGE NURSE ONLY: CPT | Performed by: FAMILY MEDICINE

## 2020-07-24 PROCEDURE — 85610 PROTHROMBIN TIME: CPT | Performed by: FAMILY MEDICINE

## 2020-07-24 PROCEDURE — 80048 BASIC METABOLIC PNL TOTAL CA: CPT | Performed by: FAMILY MEDICINE

## 2020-07-24 PROCEDURE — 84460 ALANINE AMINO (ALT) (SGPT): CPT | Performed by: FAMILY MEDICINE

## 2020-07-24 PROCEDURE — 80061 LIPID PANEL: CPT | Performed by: FAMILY MEDICINE

## 2020-07-24 NOTE — PROGRESS NOTES
ANTICOAGULATION FOLLOW-UP CLINIC VISIT    Patient Name:  Uli Champagne  Date:  2020  Contact Type:  Telephone/ LM with dosing instructions    SUBJECTIVE:  Patient Findings     Comments:   I left a detailed voicemail with the orders below. I have also requested a call back if there have been any missed doses, concerns, illness, fever, or if there have been any changes in medications, activity level, or diet          Clinical Outcomes     Negatives:   Major bleeding event, Thromboembolic event, Anticoagulation-related hospital admission, Anticoagulation-related ED visit, Anticoagulation-related fatality    Comments:   I left a detailed voicemail with the orders below. I have also requested a call back if there have been any missed doses, concerns, illness, fever, or if there have been any changes in medications, activity level, or diet             OBJECTIVE    Recent labs: (last 7 days)     20  0908   INR 2.03*       ASSESSMENT / PLAN  INR assessment THER    Recheck INR In: 6 WEEKS    INR Location Outside lab      Anticoagulation Summary  As of 2020    INR goal:   2.0-3.0   TTR:   69.7 % (1 y)   INR used for dosin.03 (2020)   Warfarin maintenance plan:   2.5 mg (5 mg x 0.5) every Tue, Sat; 5 mg (5 mg x 1) all other days   Full warfarin instructions:   2.5 mg every Tue, Sat; 5 mg all other days   Weekly warfarin total:   30 mg   No change documented:   Rui Landers, RN   Plan last modified:   Mahsa Julien, RN (3/22/2019)   Next INR check:   2020   Priority:   High   Target end date:       Indications    Long-term (current) use of anticoagulants [Z79.01] [Z79.01]  Chronic a-fib (H) [I48.20]             Anticoagulation Episode Summary     INR check location:       Preferred lab:       Send INR reminders to:   [facility]    Comments:   5 mg tab, AM dose, appt card        Anticoagulation Care Providers     Provider Role Specialty Phone number    Isra Carrillo MD  [facility] [phone]            See the Encounter Report to view Anticoagulation Flowsheet and Dosing Calendar (Go to Encounters tab in chart review, and find the Anticoagulation Therapy Visit)    Dosage adjustment made based on physician directed care plan.    Rui Landers RN

## 2020-07-25 ENCOUNTER — TELEPHONE (OUTPATIENT)
Dept: ORTHOPEDICS | Facility: CLINIC | Age: 80
End: 2020-07-25

## 2020-07-25 ENCOUNTER — NURSE TRIAGE (OUTPATIENT)
Dept: NURSING | Facility: CLINIC | Age: 80
End: 2020-07-25

## 2020-07-25 ENCOUNTER — HOSPITAL ENCOUNTER (EMERGENCY)
Facility: CLINIC | Age: 80
Discharge: HOME | End: 2020-07-25
Attending: FAMILY MEDICINE | Admitting: FAMILY MEDICINE
Payer: COMMERCIAL

## 2020-07-25 VITALS
WEIGHT: 230 LBS | OXYGEN SATURATION: 100 % | DIASTOLIC BLOOD PRESSURE: 100 MMHG | HEART RATE: 37 BPM | TEMPERATURE: 97.6 F | BODY MASS INDEX: 35.23 KG/M2 | SYSTOLIC BLOOD PRESSURE: 183 MMHG | RESPIRATION RATE: 14 BRPM

## 2020-07-25 DIAGNOSIS — M19.011 PRIMARY OSTEOARTHRITIS OF RIGHT SHOULDER: ICD-10-CM

## 2020-07-25 PROCEDURE — 99284 EMERGENCY DEPT VISIT MOD MDM: CPT | Mod: Z6 | Performed by: FAMILY MEDICINE

## 2020-07-25 PROCEDURE — 99282 EMERGENCY DEPT VISIT SF MDM: CPT | Performed by: FAMILY MEDICINE

## 2020-07-25 RX ORDER — HYDROCODONE BITARTRATE AND ACETAMINOPHEN 5; 325 MG/1; MG/1
1 TABLET ORAL EVERY 6 HOURS PRN
Qty: 10 TABLET | Refills: 0 | Status: SHIPPED | OUTPATIENT
Start: 2020-07-25 | End: 2020-07-28

## 2020-07-25 RX ORDER — METHYLPREDNISOLONE 4 MG
TABLET, DOSE PACK ORAL
Qty: 21 TABLET | Refills: 0 | Status: SHIPPED | OUTPATIENT
Start: 2020-07-25 | End: 2020-08-05

## 2020-07-25 NOTE — ED TRIAGE NOTES
Patient having increased pain in the right shoulder. He is currently being treated by Dr. Ogden for problems with this shoulder and has no new injury.

## 2020-07-25 NOTE — TELEPHONE ENCOUNTER
"Patient calling for a pain medication for severe shoulder pain. Denies triage. Advised ER.\"well I figured that\" per pt.  Genesis Stone RN [facility]    COVID 19 Nurse Triage Plan/Patient Instructions    Please be aware that novel coronavirus (COVID-19) may be circulating in the community. If you develop symptoms such as fever, cough, or SOB or if you have concerns about the presence of another infection including coronavirus (COVID-19), please contact your health care provider or visit [email or web address].     Disposition/Instructions    ED Visit recommended. Follow protocol based instructions.     Bring Your Own Device:  Please also bring your smart device(s) (smart phones, tablets, laptops) and their charging cables for your personal use and to communicate with your care team during your visit.    Thank you for taking steps to prevent the spread of this virus.  o Limit your contact with others.  o Wear a simple mask to cover your cough.  o Wash your hands well and often.    Resources    [facility]: About COVID-19: [email or web address]    CDC: What to Do If You're Sick: www.cdc.gov/coronavirus/2019-ncov/about/steps-when-sick.html    CDC: Ending Home Isolation: www.cdc.gov/coronavirus/2019-ncov/hcp/disposition-in-home-patients.html     CDC: Caring for Someone: www.cdc.gov/coronavirus/2019-ncov/if-you-are-sick/care-for-someone.html     [facility]: Interim Guidance for Hospital Discharge to Home: www.health.[facility].[place]/diseases/coronavirus/hcp/hospdischarge.pdf    [facility] clinical trials (COVID-19 research studies): clinicalaffairs.[facility]-clinical-trials     Below are the COVID-19 hotlines at the [place] [facility] ([facility]). Interpreters are available.   o For health questions: Call 133-909-5025 or 1-791.532.1557 (7 a.m. to 7 p.m.)  o For questions about schools and childcare: Call 696-447-7045 or 1-762.885.2545 (7 a.m. to 7 p.m.)                   "

## 2020-07-25 NOTE — ED AVS SNAPSHOT
[facility] Emergency Department  [address] [place] [address]  Phone:  [phone]  Fax:  [phone]                                    Uli Champagne   MRN: [id number]    Department:  [facility] Emergency Department   Date of Visit:  7/25/2020           After Visit Summary Signature Page    I have received my discharge instructions, and my questions have been answered. I have discussed any challenges I see with this plan with the nurse or doctor.    ..........................................................................................................................................  Patient/Patient Representative Signature      ..........................................................................................................................................  Patient Representative Print Name and Relationship to Patient    ..................................................               ................................................  Date                                   Time    ..........................................................................................................................................  Reviewed by Signature/Title    ...................................................              ..............................................  Date                                               Time          22EPIC Rev 08/18

## 2020-07-25 NOTE — TELEPHONE ENCOUNTER
Reason for call:  Other   Patient called regarding (reason for call): call back    Additional comments: per patient , still in a lot of pain and wants to schedule the injections that  offered . Patient will like a call back from the provider or a nurse.    Phone number to reach patient:  Home number on file [phone] (home)    Best Time:  Anytime     Can we leave a detailed message on this number?  NO    Travel screening: Not Applicable

## 2020-07-25 NOTE — ED PROVIDER NOTES
"  History     Chief Complaint   Patient presents with     Shoulder Pain     The history is provided by the patient.     Uli Champagne is a 79 year old adult who presents to the emergency department with concerns of right shoulder pain. The patient states that he saw Dr. Ogden in February of 2020 and had some X-Rays done. He says he was told he would either need some injections or a shoulder replacement. He did not do any injections at the time. The patient noticed his shoulder started \"acting up\" when he was lying down at night, but notes that during the day his shoulder feels fine. He says he can sometimes \"rotate\" his arm and hear a snap, then his shoulder feels better. He mentions that when he goes to bed he cannot lay on his right side, but in the middle of the night if he rolls to that side he is able to sleep. The patient denies any pain radiating into his neck or down his right arm. He has no new numbness or tingling in his hands or fingers. He has not felt lightheaded or dizzy. The patient uses a walker at home and notes he has not been doing more walking than usual. He has had no recent falls or injuries.     Allergies:  Allergies   Allergen Reactions     No Known Drug Allergies        Problem List:    Patient Active Problem List    Diagnosis Date Noted     Primary osteoarthritis of right shoulder 02/05/2020     Priority: Medium     Left carpal tunnel syndrome 10/03/2019     Priority: Medium     Right hip pain 07/24/2019     Priority: Medium     Traumatic bursitis 07/24/2019     Priority: Medium     Primary osteoarthritis of right hip 07/24/2019     Priority: Medium     Bilateral carpal tunnel syndrome 08/29/2018     Priority: Medium     History of CVA (cerebrovascular accident) 03/16/2018     Priority: Medium     Abdominal aortic aneurysm (AAA) without rupture (H) 01/10/2018     Priority: Medium     May 10, 2011  Entered By: KULDIP SANCHEZ  Comment: BY U/S IN MAY 2011       Adenocarcinoma of prostate " (H) 01/10/2018     Priority: Medium     No comments entered for problem.  No comments entered for problem.       Postsurgical aortocoronary bypass status 01/10/2018     Priority: Medium     No comments entered for problem.  No comments entered for problem.       Bradycardia 04/20/2017     Priority: Medium     Tear of medial meniscus of left knee, initial encounter 07/20/2016     Priority: Medium     Primary osteoarthritis of left knee 07/20/2016     Priority: Medium     Long-term (current) use of anticoagulants [Z79.01] 03/31/2016     Priority: Medium     Hordeolum externum left upper eyelid 01/21/2016     Priority: Medium     S/P lumbar laminectomy 01/15/2016     Priority: Medium     Chronic a-fib (H) 01/15/2016     Priority: Medium     Essential hypertension 01/15/2016     Priority: Medium     Lumbar spinal stenosis 12/14/2015     Priority: Medium     Non morbid obesity due to excess calories 12/07/2015     Priority: Medium     Stye 02/11/2013     Priority: Medium     Health Care Home 12/17/2012     Priority: Medium     Cher Tabor RN-PHN  FPA / [facility]CECIL [facility]   [phone]    DX V65.8 REPLACED WITH 01084 HEALTH CARE HOME (04/08/2013)       Advanced directives, counseling/discussion 09/19/2011     Priority: Medium     Advance Directive Problem List Overview:   Name Relationship Phone    Primary Health Care Agent            Alternative Health Care Agent          Discussed advance care planning with patient; information given to patient to review. 9/19/2011   Stan Yoder, Karthik Bhatia MD  07/25/20 [id number]         CAD (coronary artery disease) 03/02/2011     Priority: Medium     HYPERLIPIDEMIA LDL GOAL <100 10/31/2010     Priority: Medium     Chronic idiopathic gout of multiple sites 02/03/2010     Priority: Medium     Permanent atrial fibrillation (H) 08/07/2008     Priority: Medium     Prostate cancer (H) 07/01/2008     Priority: Medium     Cardiac dysrhythmia  01/05/2007     Priority: Medium     Problem list name updated by automated process. Provider to review       Essential hypertension, benign 01/05/2007     Priority: Medium     Neurogenic bladder 01/05/2007     Priority: Medium     Problem list name updated by automated process. Provider to review          Past Medical History:    Past Medical History:   Diagnosis Date     Atrial fibrillation (H)      Coronary atherosclerosis of unspecified type of vessel, native or graft      Hernia of unspecified site of abdominal cavity without mention of obstruction or gangrene      History of CVA (cerebrovascular accident) 3/16/2018     Hordeolum externum left upper eyelid 1/21/2016     Malignant neoplasm of prostate (H) 07/14/08     Other and unspecified hyperlipidemia      Postsurgical aortocoronary bypass status 1992     Respiratory complications      Unspecified essential hypertension      Unspecified tinnitus        Past Surgical History:    Past Surgical History:   Procedure Laterality Date     C CABG, VEIN, THREE  1992     C NONSPECIFIC PROCEDURE  1987    Bone fusion in neck     C REMV PROSTATE,RETROPUB,RAD,LTD NODES  7/14/2008    Radical retropubic prostatectomy and pelvic lymph node dissection.     HC REMOVAL OF TONSILS,12+ Y/O  1969    Tonsils 10+y.o.     HEMILAMINECTOMY, DISCECTOMY LUMBAR TWO LEVELS, COMBINED Left 12/16/2015    Procedure: COMBINED HEMILAMINECTOMY, DISCECTOMY LUMBAR TWO LEVELS;  Surgeon: Jung Finley MD;  Location: [facility]     RELEASE CARPAL TUNNEL Right 5/17/2019    Procedure: RELEASE CARPAL TUNNEL-RIGHT;  Surgeon: Yovani Ogden DO;  Location: [facility] OR     STENT, CORONARY, ALEAH  2002    x5       Family History:    Family History   Problem Relation Age of Onset     Arthritis Father      Heart Disease Father      Hypertension Brother      Cancer Brother         liver     Heart Disease Sister      Heart Disease Brother        Social History:  Marital Status:   [2]  Social History      Tobacco Use     Smoking status: Former Smoker     Packs/day: 0.50     Years: 2.00     Pack years: 1.00     Types: Cigarettes     Last attempt to quit: 1960     Years since quittin.6     Smokeless tobacco: Never Used   Substance Use Topics     Alcohol use: Yes     Comment: socially     Drug use: No        Medications:    allopurinol (ZYLOPRIM) 300 MG tablet  ASPIRIN 81 MG OR TABS  chlorthalidone (HYGROTON) 50 MG tablet  fish oil-omega-3 fatty acids (FISH OIL) 1000 MG capsule  lisinopril (ZESTRIL) 10 MG tablet  multivitamin w/minerals (THERA-VIT-M) tablet  nitroGLYcerin (NITROSTAT) 0.4 MG sublingual tablet  olmesartan (BENICAR) 40 MG tablet  order for DME  simvastatin (ZOCOR) 40 MG tablet  warfarin ANTICOAGULANT (COUMADIN) 5 MG tablet          Review of Systems   All other systems reviewed and are negative.      Physical Exam   BP: (!) 183/100  Pulse: (!) 37  Temp: 97.6  F (36.4  C)  Resp: 14  Weight: 104.3 kg (230 lb)  SpO2: 100 %      Physical Exam  Vitals signs and nursing note reviewed.   Constitutional:       General: He is not in acute distress.     Appearance: Normal appearance. He is not diaphoretic.   HENT:      Head: Normocephalic and atraumatic.      Right Ear: Tympanic membrane and external ear normal.      Left Ear: Tympanic membrane and external ear normal.      Nose: Nose normal. No congestion or rhinorrhea.      Mouth/Throat:      Mouth: Mucous membranes are moist.      Pharynx: Oropharynx is clear. No oropharyngeal exudate or posterior oropharyngeal erythema.   Eyes:      General: No scleral icterus.     Extraocular Movements: Extraocular movements intact.      Conjunctiva/sclera: Conjunctivae normal.      Pupils: Pupils are equal, round, and reactive to light.   Neck:      Musculoskeletal: Normal range of motion and neck supple. No neck rigidity or muscular tenderness.   Cardiovascular:      Rate and Rhythm: Normal rate and regular rhythm.      Pulses: Normal pulses.      Heart sounds:  Normal heart sounds. No murmur.   Pulmonary:      Effort: Pulmonary effort is normal. No respiratory distress.      Breath sounds: Normal breath sounds. No wheezing.   Chest:      Chest wall: No tenderness.   Abdominal:      General: Bowel sounds are normal.      Palpations: Abdomen is soft.      Tenderness: There is no abdominal tenderness. There is no guarding or rebound.   Musculoskeletal: Normal range of motion.         General: Tenderness (Minimal tenderness present over the deltoid area.) present. No deformity or signs of injury.      Right shoulder: He exhibits normal range of motion, normal pulse and normal strength.   Lymphadenopathy:      Cervical: No cervical adenopathy.   Skin:     General: Skin is warm and dry.      Capillary Refill: Capillary refill takes less than 2 seconds.      Coloration: Skin is not pale.      Findings: No rash.   Neurological:      General: No focal deficit present.      Mental Status: He is alert and oriented to person, place, and time.      Cranial Nerves: No cranial nerve deficit.      Sensory: No sensory deficit.   Psychiatric:         Thought Content: Thought content normal.         Judgment: Judgment normal.         ED Course        Procedures      No results found for this or any previous visit (from the past 24 hour(s)).    Medications - No data to display    Assessments & Plan (with Medical Decision Making)  Uli Champagne is an 79 year old male with a history of primary osteoarthritis of the right shoulder who presents with ongoing right shoulder pain. He was last seen in 02/2020 by Dr. Ogden who has been treating his shoulder. Upon arrival in the ED the patient's pulse was low at 37 but has a history of low heart rates.  Patient is not symptomatic.  Vitals are otherwise all within normal limits. When examined the patient was found to have minimal tenderness over the right deltoid area. Physical exam was otherwise normal.   I think this could be his arthritis  flaring up.  Will treat with some oral steroids.  We will also give the patient a few pills of hydrocodone just to use at night to help him sleep.  Patient will be discharged home.    Assessment: Osteoarthritis of the right shoulder     I have reviewed the nursing notes.    I have reviewed the findings, diagnosis, plan and need for follow up with the patient.                This document serves as a record of services personally performed by Karthik Christianson, *. It was created on their behalf by Jaja Echevarria, a trained medical scribe. The creation of this record is based on the provider's personal observations and the statements of the patient. This document has been checked and approved by the attending provider.  Note: Chart documentation done in part with Dragon Voice Recognition software. Although reviewed after completion, some word and grammatical errors may remain.  7/25/2020   [facility] EMERGENCY DEPARTMENT

## 2020-07-27 ENCOUNTER — APPOINTMENT (OUTPATIENT)
Dept: CARDIOLOGY | Facility: CLINIC | Age: 80
End: 2020-07-27
Payer: COMMERCIAL

## 2020-07-27 NOTE — TELEPHONE ENCOUNTER
Writing nurse returned call to patient who wanted to be scheduled with Dr. Ogden for right shoulder pain. Patient went to the ED on 7/25/2020 for osteoarthritis of the right shoulder where he was given oral steroids and hydrocodone to use at night to help him sleep.       LOV note from Dr. Ogden on 2/5/2020:  All of the above pertinent physical exam and imaging modalities findings was reviewed with Uli.  Discussed with patient. Really only bothersome when he sleeps on it. Does good during the day. Not a great surgical candidate with extensive cardiac history.  He is not interested in steroid injection. Would like to see physical therapy.  Will get him arranged for this. Takes tylenol PRN.  Referral provided to physical therapy.     Patient scheduled.     Suyapa Haywood RN on 7/27/2020 at 9:02 AM

## 2020-07-27 NOTE — PROGRESS NOTES
"  The patient has been notified of following:     \"This telephone visit will be conducted via a call between you and your physician/provider. We have found that certain health care needs can be provided without the need for a physical exam.  This service lets us provide the care you need with a short phone conversation.  If a prescription is necessary we can send it directly to your pharmacy.  If lab work is needed we can place an order for that and you can then stop by our lab to have the test done at a later time.    Telephone visits are billed at different rates depending on your insurance coverage. During this emergency period, for some insurers they may be billed the same as an in-person visit.  Please reach out to your insurance provider with any questions.    If during the course of the call the physician/provider feels a telephone visit is not appropriate, you will not be charged for this service.\"    Patient has given verbal consent for Telephone visit?  Yes    What phone number would you like to be contacted at? [phone]    How would you like to obtain your AVS? Alexander      Blood pressure: 1130 yesterday 108/51  Pulse: 40-50  Weight: 215 #    Phone call duration: *** minutes    {signature options:[id number]}  "

## 2020-07-29 ENCOUNTER — TELEPHONE (OUTPATIENT)
Dept: ANTICOAGULATION | Facility: OTHER | Age: 80
End: 2020-07-29

## 2020-07-29 ENCOUNTER — ANTICOAGULATION THERAPY VISIT (OUTPATIENT)
Dept: ANTICOAGULATION | Facility: OTHER | Age: 80
End: 2020-07-29

## 2020-07-29 DIAGNOSIS — Z79.01 LONG TERM CURRENT USE OF ANTICOAGULANT THERAPY: Primary | ICD-10-CM

## 2020-07-29 DIAGNOSIS — Z79.01 LONG TERM CURRENT USE OF ANTICOAGULANT THERAPY: ICD-10-CM

## 2020-07-29 DIAGNOSIS — I48.20 CHRONIC A-FIB (H): ICD-10-CM

## 2020-07-29 LAB
CAPILLARY BLOOD COLLECTION: NORMAL
INR BLD: 2.3 (ref 0.86–1.14)

## 2020-07-29 PROCEDURE — 85610 PROTHROMBIN TIME: CPT | Mod: QW | Performed by: FAMILY MEDICINE

## 2020-07-29 PROCEDURE — 36416 COLLJ CAPILLARY BLOOD SPEC: CPT | Performed by: FAMILY MEDICINE

## 2020-07-29 PROCEDURE — 99207 ZZC NO CHARGE NURSE ONLY: CPT | Performed by: FAMILY MEDICINE

## 2020-07-29 NOTE — TELEPHONE ENCOUNTER
Attempted to contact pt regarding INR of 2.3 from 7/29. VM left to call [facility] back to discuss    Mahsa Julien RN

## 2020-07-29 NOTE — TELEPHONE ENCOUNTER
Please review and renew this patients INR referral, orders pending. Thank you!        Mahsa Julien RN

## 2020-07-29 NOTE — PROGRESS NOTES
ANTICOAGULATION FOLLOW-UP CLINIC VISIT    Patient Name:  Uli Champagne  Date:  2020  Contact Type:  Telephone    SUBJECTIVE:  Patient Findings     Comments:   The patient was assessed for diet, medication, and activity level changes, missed or extra doses, bruising or bleeding, with no problem findings.  Mahsa Julien RN          Clinical Outcomes     Negatives:   Major bleeding event, Thromboembolic event, Anticoagulation-related hospital admission, Anticoagulation-related ED visit, Anticoagulation-related fatality    Comments:   The patient was assessed for diet, medication, and activity level changes, missed or extra doses, bruising or bleeding, with no problem findings.  Mahsa Julien RN             OBJECTIVE    Recent labs: (last 7 days)     20  1258   INR 2.3*       ASSESSMENT / PLAN  INR assessment THER    Recheck INR In: 6 WEEKS    INR Location Outside lab      Anticoagulation Summary  As of 2020    INR goal:   2.0-3.0   TTR:   69.8 % (1 y)   INR used for dosin.3 (2020)   Warfarin maintenance plan:   2.5 mg (5 mg x 0.5) every Tue, Sat; 5 mg (5 mg x 1) all other days   Full warfarin instructions:   2.5 mg every Tue, Sat; 5 mg all other days   Weekly warfarin total:   30 mg   No change documented:   Mahsa Julien RN   Plan last modified:   Mahsa Julien RN (3/22/2019)   Next INR check:   9/10/2020   Priority:   High   Target end date:       Indications    Long-term (current) use of anticoagulants [Z79.01] [Z79.01]  Chronic a-fib (H) [I48.20]             Anticoagulation Episode Summary     INR check location:       Preferred lab:       Send INR reminders to:   [facility] [place]    Comments:   5 mg tab, AM dose, appt card        Anticoagulation Care Providers     Provider Role Specialty Phone number    Isra Carrillo MD [facility] [phone]            See the Encounter Report to view Anticoagulation Flowsheet and Dosing Calendar (Go to  Encounters tab in chart review, and find the Anticoagulation Therapy Visit)    Dosage adjustment made based on physician directed care plan.      Mahsa Julien RN

## 2020-08-05 ENCOUNTER — OFFICE VISIT (OUTPATIENT)
Dept: ORTHOPEDICS | Facility: CLINIC | Age: 80
End: 2020-08-05
Payer: COMMERCIAL

## 2020-08-05 VITALS
HEIGHT: 68 IN | SYSTOLIC BLOOD PRESSURE: 130 MMHG | BODY MASS INDEX: 34.02 KG/M2 | DIASTOLIC BLOOD PRESSURE: 70 MMHG | WEIGHT: 224.5 LBS

## 2020-08-05 DIAGNOSIS — M65.342 TRIGGER RING FINGER OF LEFT HAND: ICD-10-CM

## 2020-08-05 DIAGNOSIS — M19.011 PRIMARY OSTEOARTHRITIS OF RIGHT SHOULDER: Primary | ICD-10-CM

## 2020-08-05 PROCEDURE — 20550 NJX 1 TENDON SHEATH/LIGAMENT: CPT | Mod: 51 | Performed by: ORTHOPAEDIC SURGERY

## 2020-08-05 PROCEDURE — 20610 DRAIN/INJ JOINT/BURSA W/O US: CPT | Mod: RT | Performed by: ORTHOPAEDIC SURGERY

## 2020-08-05 RX ORDER — BETAMETHASONE SODIUM PHOSPHATE AND BETAMETHASONE ACETATE 3; 3 MG/ML; MG/ML
6 INJECTION, SUSPENSION INTRA-ARTICULAR; INTRALESIONAL; INTRAMUSCULAR; SOFT TISSUE ONCE
Status: COMPLETED | OUTPATIENT
Start: 2020-08-05 | End: 2020-08-05

## 2020-08-05 RX ORDER — TRIAMCINOLONE ACETONIDE 40 MG/ML
40 INJECTION, SUSPENSION INTRA-ARTICULAR; INTRAMUSCULAR ONCE
Status: COMPLETED | OUTPATIENT
Start: 2020-08-05 | End: 2020-08-05

## 2020-08-05 RX ADMIN — TRIAMCINOLONE ACETONIDE 40 MG: 40 INJECTION, SUSPENSION INTRA-ARTICULAR; INTRAMUSCULAR at 13:44

## 2020-08-05 RX ADMIN — BETAMETHASONE SODIUM PHOSPHATE AND BETAMETHASONE ACETATE 6 MG: 3; 3 INJECTION, SUSPENSION INTRA-ARTICULAR; INTRALESIONAL; INTRAMUSCULAR; SOFT TISSUE at 13:44

## 2020-08-05 ASSESSMENT — MIFFLIN-ST. JEOR: SCORE: 1693.86

## 2020-08-05 NOTE — PROGRESS NOTES
"Office Visit-Follow up    Chief Complaint: Uli Champagne is a 79 year old adult who is being seen for   Chief Complaint   Patient presents with     RECHECK     right shoulder primary osteoarthritis       History of Present Illness:   Today's visit:  Here for his right shoulder as well as his left ring finger.  He reports over last few months that shoulder pain is gotten worse.  He was recently in the ER due to it.  He took some oral steroids which seems to help.  However noticing the pain coming back.  Same pain as before.  No new injuries.  Describes it is deep inside.  Worse with motion.    Also complains difficulty fully flexing his left ring finger.  Is been going on about 6 weeks.  No traumas or injuries.  He is never had this before    2020 visit:  Previously seen for other issues, first time for shoulder.   Mechanism of Injury: No trauma or inciting event. Has had off and on mild shoulder pain for 1-2 years at least, probably longer. Last 6 months has been increasing pain at night when sleeping on the shoulder. Anterior, lateral and \"deep into the joint\".  Worst with motion, sleeping on it. Better with \"stretching\". Non-radiating.   Treatments tried: occasional tylenol, rest, stretching, activity modification  Non-radiating. No numbness/tingling.   Lives independent at home with his spouse.   Long hx of multiple cardiac hx including previous MIs, stents, bypass and CVA. Takes coumadin. Does not take NSAIDs.     Social History     Occupational History     Not on file   Tobacco Use     Smoking status: Former Smoker     Packs/day: 0.50     Years: 2.00     Pack years: 1.00     Types: Cigarettes     Last attempt to quit: 1960     Years since quittin.6     Smokeless tobacco: Never Used   Substance and Sexual Activity     Alcohol use: Yes     Comment: socially     Drug use: No     Sexual activity: Never       REVIEW OF SYSTEMS  General: negative for, night sweats, dizziness, " fatigue  Resp: No shortness of breath and no cough  CV: negative for chest pain, syncope or near-syncope  GI: negative for nausea, vomiting and diarrhea  : negative for dysuria and hematuria  Musculoskeletal: as above  Neurologic: negative for syncope   Hematologic: negative for bleeding disorder    Physical Exam:  Vitals: There were no vitals taken for this visit.  BMI= There is no height or weight on file to calculate BMI.  Constitutional: healthy, alert and no acute distress   Psychiatric: mentation appears normal and affect normal/bright  NEURO: no focal deficits  RESP: Normal with easy respirations and no use of accessory muscles to breathe, no audible wheezing or retractions  CV: RUE:  no edema         Regular rate and rhythm by palpation  SKIN: No erythema, rashes, excoriation, or breakdown. No evidence of infection.   JOINT/EXTREMITIES:right shoulder: Active motion limited to 90/90.  Passively may be an additional 5 degrees.  No focal areas of deformity.  No focal areas of tenderness.  No evidence of infection.  No soft tissue swelling.  Left ring finger has a palpable click with a lock with flexion.  No deformity or evidence infection.  Tenderness over the A1 pulley  GAIT: not tested             Diagnostic Modalities:  Previous imaging reviewed.  Independent visualization of the images was performed.      Impression: right shoulder glenohumeral osteoarthritis  Left ring trigger finger    Plan:  All of the above pertinent physical exam and imaging modalities findings was reviewed with Uli.                                          INJECTION PROCEDURE:  The patient was counseled about an  injection, including discussion of risks (including infection), contents of the injection, rationale for performing the injection, and expected benefits of the injection. The skin was prepped with alcohol and betadine and then utilizing sterile technique an injection of the right shoulder glenohumeral joint from the  posterior approach was performed. The injection consisted 1ml of Kenalog (40mg per 1 ml) mixed with 3ml of 0.5% Marcaine. The patient tolerated the injection well, and there were no complications. The injection site was covered with a Band-Aid. The injection was performed by MARIA D Salcedo, CNP, JOCE    The patient was counseled about an  injection, including discussion of risks (including infection), contents of the injection, rationale for performing the injection, and expected benefits of the injection. The skin was prepped with alcohol and betadine and then utilizing sterile technique an injection of the left flexor tendon sheath of the ring digit at the A1 pulley was performed. The injection consisted 0.5 ml Betamethasone (30mg per 5 ml) mixed with 0.5 ml of  0.5% Marcaine. The patient tolerated the injection well, and there were no complications. The injection site was covered with a Band-Aid. The injection was performed by MARIA D Salcedo CNP, DNP    If any concerns for infection seek immediate medical evaluation either in the clinic or the ED.     Treatment options discussed.  Oral steroids have been helpful although has noticed since stopping the pain is coming back.  No new injuries or traumas.  No evidence of infection.  Recommend intra-articular steroid injection.  Given history of Coumadin usage will try injection in the office.    We also discussed his left ring finger.  Recommend flexor sheath steroid injection.    Return to clinic 4-6 , week(s), PRN, or sooner as needed for changes.  Re-x-ray on return: No    Isaiah Ogden D.O.

## 2020-08-05 NOTE — PROGRESS NOTES
Clinic Administered Medication Documentation      Injection Documentation     Injection was administered by provider (please see MAR for given by information). Please see MAR and medication order for additional information.     Site: Joint injection   Medication Used: Kenalog 40mg   Betamethasone 6mg     Expiration Date:  3/2022- kenalog  3/2021- betamethasone    The following medication was given by MARIA D Bosch, CNP, JOCE:     MEDICATION: Betamethasone (6 mg/mL)  ROUTE: Joint Injection  SITE: left ring finger  DOSE: 0.5  LOT #: [id number]  : [employer]  EXPIRATION DATE:  3/2021  NDC: 9040-5042-00    The following medication was given by MARI AD Bosch, CNP, JOCE:     MEDICATION: Kenalog 40mg/1ml  ROUTE: Joint Injection  SITE: right shoulder  DOSE: 1 mL  LOT #: [id number]  : [employer]  EXPIRATION DATE:  3/2022  NDC: [address]-8

## 2020-08-05 NOTE — LETTER
"    8/5/2020         RE: Uli Champagne  [address] [facility] [address]        Dear Colleague,    Thank you for referring your patient, Uli Champagne, to the [facility]. Please see a copy of my visit note below.    Office Visit-Follow up    Chief Complaint: Uli Champagne is a 79 year old adult who is being seen for   Chief Complaint   Patient presents with     RECHECK     right shoulder primary osteoarthritis       History of Present Illness:   Today's visit:  Here for his right shoulder as well as his left ring finger.  He reports over last few months that shoulder pain is gotten worse.  He was recently in the ER due to it.  He took some oral steroids which seems to help.  However noticing the pain coming back.  Same pain as before.  No new injuries.  Describes it is deep inside.  Worse with motion.    Also complains difficulty fully flexing his left ring finger.  Is been going on about 6 weeks.  No traumas or injuries.  He is never had this before    February 5, 2020 visit:  Previously seen for other issues, first time for shoulder.   Mechanism of Injury: No trauma or inciting event. Has had off and on mild shoulder pain for 1-2 years at least, probably longer. Last 6 months has been increasing pain at night when sleeping on the shoulder. Anterior, lateral and \"deep into the joint\".  Worst with motion, sleeping on it. Better with \"stretching\". Non-radiating.   Treatments tried: occasional tylenol, rest, stretching, activity modification  Non-radiating. No numbness/tingling.   Lives independent at home with his spouse.   Long hx of multiple cardiac hx including previous MIs, stents, bypass and CVA. Takes coumadin. Does not take NSAIDs.     Social History     Occupational History     Not on file   Tobacco Use     Smoking status: Former Smoker     Packs/day: 0.50     Years: 2.00     Pack years: 1.00     Types: Cigarettes     Last attempt to quit: 1/1/1960     Years since " quittin.6     Smokeless tobacco: Never Used   Substance and Sexual Activity     Alcohol use: Yes     Comment: socially     Drug use: No     Sexual activity: Never       REVIEW OF SYSTEMS  General: negative for, night sweats, dizziness, fatigue  Resp: No shortness of breath and no cough  CV: negative for chest pain, syncope or near-syncope  GI: negative for nausea, vomiting and diarrhea  : negative for dysuria and hematuria  Musculoskeletal: as above  Neurologic: negative for syncope   Hematologic: negative for bleeding disorder    Physical Exam:  Vitals: There were no vitals taken for this visit.  BMI= There is no height or weight on file to calculate BMI.  Constitutional: healthy, alert and no acute distress   Psychiatric: mentation appears normal and affect normal/bright  NEURO: no focal deficits  RESP: Normal with easy respirations and no use of accessory muscles to breathe, no audible wheezing or retractions  CV: RUE:  no edema         Regular rate and rhythm by palpation  SKIN: No erythema, rashes, excoriation, or breakdown. No evidence of infection.   JOINT/EXTREMITIES:right shoulder: Active motion limited to 90/90.  Passively may be an additional 5 degrees.  No focal areas of deformity.  No focal areas of tenderness.  No evidence of infection.  No soft tissue swelling.  Left ring finger has a palpable click with a lock with flexion.  No deformity or evidence infection.  Tenderness over the A1 pulley  GAIT: not tested             Diagnostic Modalities:  Previous imaging reviewed.  Independent visualization of the images was performed.      Impression: right shoulder glenohumeral osteoarthritis  Left ring trigger finger    Plan:  All of the above pertinent physical exam and imaging modalities findings was reviewed with Uli.                                          INJECTION PROCEDURE:  The patient was counseled about an  injection, including discussion of risks (including infection), contents of the  injection, rationale for performing the injection, and expected benefits of the injection. The skin was prepped with alcohol and betadine and then utilizing sterile technique an injection of the right shoulder glenohumeral joint from the posterior approach was performed. The injection consisted 1ml of Kenalog (40mg per 1 ml) mixed with 3ml of 0.5% Marcaine. The patient tolerated the injection well, and there were no complications. The injection site was covered with a Band-Aid. The injection was performed by MARIA D Salcedo CNP, DNP    The patient was counseled about an  injection, including discussion of risks (including infection), contents of the injection, rationale for performing the injection, and expected benefits of the injection. The skin was prepped with alcohol and betadine and then utilizing sterile technique an injection of the left flexor tendon sheath of the ring digit at the A1 pulley was performed. The injection consisted 0.5 ml Betamethasone (30mg per 5 ml) mixed with 0.5 ml of  0.5% Marcaine. The patient tolerated the injection well, and there were no complications. The injection site was covered with a Band-Aid. The injection was performed by MARIA D Salcedo, CNP, DNP    If any concerns for infection seek immediate medical evaluation either in the clinic or the ED.     Treatment options discussed.  Oral steroids have been helpful although has noticed since stopping the pain is coming back.  No new injuries or traumas.  No evidence of infection.  Recommend intra-articular steroid injection.  Given history of Coumadin usage will try injection in the office.    We also discussed his left ring finger.  Recommend flexor sheath steroid injection.    Return to clinic 4-6 , week(s), PRN, or sooner as needed for changes.  Re-x-ray on return: No    Isaiah Ogden D.O.          Clinic Administered Medication Documentation      Injection Documentation     Injection was administered by provider (please see  MAR for given by information). Please see MAR and medication order for additional information.     Site: Joint injection   Medication Used: Kenalog 40mg   Betamethasone 6mg     Expiration Date:  3/2022- kenalog  3/2021- betamethasone    The following medication was given by MARIA D Bosch, CNP, DNP:     MEDICATION: Betamethasone (6 mg/mL)  ROUTE: Joint Injection  SITE: left ring finger  DOSE: 0.5  LOT #: [id number]  : [employer]  EXPIRATION DATE:  3/2021  NDC: 3861-4841-19    The following medication was given by MARIA D Bosch, CNP, DNP:     MEDICATION: Kenalog 40mg/1ml  ROUTE: Joint Injection  SITE: right shoulder  DOSE: 1 mL  LOT #: [id number]  : [employer]  EXPIRATION DATE:  3/2022  NDC: [address]-9                                            Again, thank you for allowing me to participate in the care of your patient.        Sincerely,        Yovani Ogden, DO

## 2020-08-10 ENCOUNTER — VIRTUAL VISIT (OUTPATIENT)
Dept: CARDIOLOGY | Facility: CLINIC | Age: 80
End: 2020-08-10
Payer: COMMERCIAL

## 2020-08-10 DIAGNOSIS — I10 ESSENTIAL HYPERTENSION: ICD-10-CM

## 2020-08-10 DIAGNOSIS — E78.5 HYPERLIPIDEMIA LDL GOAL <100: ICD-10-CM

## 2020-08-10 DIAGNOSIS — R00.1 BRADYCARDIA: ICD-10-CM

## 2020-08-10 DIAGNOSIS — I48.20 CHRONIC A-FIB (H): ICD-10-CM

## 2020-08-10 DIAGNOSIS — I10 ESSENTIAL HYPERTENSION, BENIGN: Primary | ICD-10-CM

## 2020-08-10 PROCEDURE — 99214 OFFICE O/P EST MOD 30 MIN: CPT | Mod: 95 | Performed by: INTERNAL MEDICINE

## 2020-08-10 NOTE — PROGRESS NOTES
"  The patient has been notified of following:     \"This telephone visit will be conducted via a call between you and your physician/provider. We have found that certain health care needs can be provided without the need for a physical exam.  This service lets us provide the care you need with a short phone conversation.  If a prescription is necessary we can send it directly to your pharmacy.  If lab work is needed we can place an order for that and you can then stop by our lab to have the test done at a later time.    Telephone visits are billed at different rates depending on your insurance coverage. During this emergency period, for some insurers they may be billed the same as an in-person visit.  Please reach out to your insurance provider with any questions.    If during the course of the call the physician/provider feels a telephone visit is not appropriate, you will not be charged for this service.\"    Patient has given verbal consent for Telephone visit?  Yes    What phone number would you like to be contacted at? Cell number: [phone]  Home number: [phone]    How would you like to obtain your AVS? MyChart    Blood pressure: 110-130 systolic, 50-68 diastolic   Pulse: mid 40's (42-46)  Weight: 220 #    HISTORY:    Uli Champagne is a very pleasant 79-year-old male with a history of chronic atrial fibrillation with a very slow ventricular response (on warfarin for stroke prophylaxis), hyperlipidemia, hypertension, and obstructive sleep apnea utilizing CPAP.  In addition he has a longstanding history of coronary artery disease.  He underwent a bypass surgery consisting of LIMA to the LAD, saphenous vein graft to the RCA, and SVG to OM1 in the early 1990s.  He has since undergone multiple stents to his vein grafts and native RCA.  His most recent angiogram in 2002 showed a patent LIMA with a patent graft to his RCA and an occluded graft to his OM1 with that vessel filling via " collaterals.    Uli was followed by Dr. Amanda for many years on an annual basis but he has had several visits over the last year because of some difficulty controlling hypertension.  He was eventually placed on amlodipine which seemed to make a substantial difference, although higher dose caused some peripheral edema and on his last visit his dose was cut in half.    Today Uli reports that he continues to check his blood pressure on a regular basis at home and he read these off to me.  His blood pressure is excellent with virtually all values less than 130 systolic.  Diastolic values are between 50 and 68.  He reports that he has been trying to lose weight but has not been very successful with this.  He denies any exertional chest pain, sense of palpitations, syncope or near syncope, lightheadedness, PND/orthopnea, strokelike symptoms, peripheral edema, or symptoms of claudication.  He uses a walker because of low back pain and balance issues.  He is satisfied that he is doing well and has no cardiovascular complaints.      ASSESSMENT/PLAN:    1.  Hypertension.  Very well controlled with current medications, continue same.  2.  Coronary artery disease.  No symptoms of angina, relatively inactive due to physical limitations.  Using aspirin plus statin.  3.  Hyperlipidemia, excellent control with simvastatin 40, continue same.  4.  Atrial fibrillation.  This is permanent with a slow ventricular response.  He reports that his heart rate always falls within a very narrow range of 42-46 which is a little bit higher than it was in the past.  He is asymptomatic with his bradycardia.    Thank you for inviting me to participate in your patient's care.  We will move back to annual visits but would be happy to see him sooner should further cardiology questions or issues arise.      Phone call duration: 13 minutes    Miguelito Wells MD

## 2020-08-10 NOTE — LETTER
8/10/2020    Isra Carrillo MD  [address] [facility] [address]    RE: Uli Champagne       Dear Colleague,    I had the pleasure of seeing Uli Champagne in the [facility].      HISTORY:    Uli Champagne is a very pleasant 79-year-old male with a history of chronic atrial fibrillation with a very slow ventricular response (on warfarin for stroke prophylaxis), hyperlipidemia, hypertension, and obstructive sleep apnea utilizing CPAP.  In addition he has a longstanding history of coronary artery disease.  He underwent a bypass surgery consisting of LIMA to the LAD, saphenous vein graft to the RCA, and SVG to OM1 in the early 1990s.  He has since undergone multiple stents to his vein grafts and native RCA.  His most recent angiogram in 2002 showed a patent LIMA with a patent graft to his RCA and an occluded graft to his OM1 with that vessel filling via collaterals.    Uli was followed by Dr. Amanda for many years on an annual basis but he has had several visits over the last year because of some difficulty controlling hypertension.  He was eventually placed on amlodipine which seemed to make a substantial difference, although higher dose caused some peripheral edema and on his last visit his dose was cut in half.    Today Uli reports that he continues to check his blood pressure on a regular basis at home and he read these off to me.  His blood pressure is excellent with virtually all values less than 130 systolic.  Diastolic values are between 50 and 68.  He reports that he has been trying to lose weight but has not been very successful with this.  He denies any exertional chest pain, sense of palpitations, syncope or near syncope, lightheadedness, PND/orthopnea, strokelike symptoms, peripheral edema, or symptoms of claudication.  He uses a walker because of low back pain and balance issues.  He is satisfied that he is doing well and has no cardiovascular  complaints.      ASSESSMENT/PLAN:    1.  Hypertension.  Very well controlled with current medications, continue same.  2.  Coronary artery disease.  No symptoms of angina, relatively inactive due to physical limitations.  Using aspirin plus statin.  3.  Hyperlipidemia, excellent control with simvastatin 40, continue same.  4.  Atrial fibrillation.  This is permanent with a slow ventricular response.  He reports that his heart rate always falls within a very narrow range of 42-46 which is a little bit higher than it was in the past.  He is asymptomatic with his bradycardia.    Thank you for inviting me to participate in your patient's care.  We will move back to annual visits but would be happy to see him sooner should further cardiology questions or issues arise.      Phone call duration: 13 minutes      Thank you for allowing me to participate in the care of your patient.    Sincerely,     Miguelito Wells MD     [facility]

## 2020-08-26 ENCOUNTER — ANCILLARY PROCEDURE (OUTPATIENT)
Dept: GENERAL RADIOLOGY | Facility: CLINIC | Age: 80
End: 2020-08-26
Attending: ORTHOPAEDIC SURGERY
Payer: COMMERCIAL

## 2020-08-26 ENCOUNTER — OFFICE VISIT (OUTPATIENT)
Dept: ORTHOPEDICS | Facility: CLINIC | Age: 80
End: 2020-08-26
Payer: COMMERCIAL

## 2020-08-26 VITALS
BODY MASS INDEX: 32.6 KG/M2 | HEIGHT: 68 IN | DIASTOLIC BLOOD PRESSURE: 82 MMHG | WEIGHT: 215.1 LBS | SYSTOLIC BLOOD PRESSURE: 122 MMHG

## 2020-08-26 DIAGNOSIS — M16.11 PRIMARY OSTEOARTHRITIS OF RIGHT HIP: Primary | ICD-10-CM

## 2020-08-26 DIAGNOSIS — I10 ESSENTIAL HYPERTENSION, BENIGN: ICD-10-CM

## 2020-08-26 DIAGNOSIS — M54.16 LUMBAR RADICULOPATHY: ICD-10-CM

## 2020-08-26 DIAGNOSIS — M16.11 PRIMARY OSTEOARTHRITIS OF RIGHT HIP: ICD-10-CM

## 2020-08-26 PROCEDURE — 99214 OFFICE O/P EST MOD 30 MIN: CPT | Performed by: ORTHOPAEDIC SURGERY

## 2020-08-26 PROCEDURE — 73502 X-RAY EXAM HIP UNI 2-3 VIEWS: CPT | Mod: TC

## 2020-08-26 RX ORDER — PREDNISONE 20 MG/1
TABLET ORAL
Qty: 20 TABLET | Refills: 0 | Status: SHIPPED | OUTPATIENT
Start: 2020-08-26 | End: 2020-09-15

## 2020-08-26 RX ORDER — OLMESARTAN MEDOXOMIL 40 MG/1
40 TABLET ORAL DAILY
Qty: 90 TABLET | Refills: 3 | Status: SHIPPED | OUTPATIENT
Start: 2020-08-26 | End: 2021-01-01

## 2020-08-26 ASSESSMENT — MIFFLIN-ST. JEOR: SCORE: 1651.22

## 2020-08-26 ASSESSMENT — PAIN SCALES - GENERAL: PAINLEVEL: MILD PAIN (3)

## 2020-08-26 NOTE — TELEPHONE ENCOUNTER
Received refill request for:  Olmesartan  Last OV was: 08/10/2020 with Dr. Wells  Labs/EKG: last BMP 7/24/2020  F/U scheduled: orders in Epic for 8/2021  New script sent to: [facility]

## 2020-08-26 NOTE — LETTER
"    8/26/2020         RE: Uli Champagne  [address] [facility] [address]        Dear Colleague,    Thank you for referring your patient, Uli Champagne, to the [facility]. Please see a copy of my visit note below.    Office Visit-Follow up    Chief Complaint: Uli Champagne is a 79 year old adult who is being seen for   Chief Complaint   Patient presents with     RECHECK     right hip traumatic bursitis, resolving, Right hip early OA - intermittent symptoms       History of Present Illness:   Today's visit:  Presents with approximately 2 weeks with the pain that starts in his hip and groin radiates down his thigh and radiates to his foot.  At times he will have pain just isolated the dorsum of the foot.  Associated with this is some tingling feeling that goes down the same pathway.  No injuries or traumas.  No changes in his bowel or bladder habits.  No changes in muscle strength.  Pain is better in the morning but gets worse throughout the day.  Worse with activity.  No back pain.  July 24, 2019 visit:  Uli Champagne is a 78 year old adult who is seen in consultation at the request of AMY Dozier for evaluation of right hip pain.  Seen multiple times in past, first time for right hip. States has had off on deep hip pain into the groin for 6-8 months. Then about 2 weeks ago, fell, landing on the buttock/back pocket. Developed moderate to severe lateral hip pain especially with walking, laying on it, or sit to stand.  New type of pain for him. [place].  Saw his PCP on 7/8/19 who diagnosed him with trochanteric bursitis, gave him some \"pain pills\". Over the last 2 weeks took 3 of them, states the lateral hip pain has gotten better and close to full resolution. States little pain when he sleeps on it, otherwise no pain with walking or other activity. Denies back pain. States now has more of the mild aching deep groin pain. Happy with progress. Seeing a chiropractor " and this is going well.  Otherwise rest, activity modification has all been helpful.  No radiating pain, no numbness/tingling.   Chronic back pain that he states is at baseline.  Uses a walker/cane.  On coumadin.        Social History     Occupational History     Not on file   Tobacco Use     Smoking status: Former Smoker     Packs/day: 0.50     Years: 2.00     Pack years: 1.00     Types: Cigarettes     Last attempt to quit: 1960     Years since quittin.6     Smokeless tobacco: Never Used   Substance and Sexual Activity     Alcohol use: Yes     Comment: socially     Drug use: No     Sexual activity: Never       REVIEW OF SYSTEMS  General: negative for, night sweats, dizziness, fatigue  Resp: No shortness of breath and no cough  CV: negative for chest pain, syncope or near-syncope  GI: negative for nausea, vomiting and diarrhea  : negative for dysuria and hematuria  Musculoskeletal: as above  Neurologic: negative for syncope   Hematologic: negative for bleeding disorder    Physical Exam:  Vitals: There were no vitals taken for this visit.  BMI= There is no height or weight on file to calculate BMI.  Constitutional: healthy, alert and no acute distress   Psychiatric: mentation appears normal and affect normal/bright  NEURO: no focal deficits  RESP: Normal with easy respirations and no use of accessory muscles to breathe, no audible wheezing or retractions  CV: RLE: no edema           SKIN: No erythema, rashes, excoriation, or breakdown. No evidence of infection.   JOINT/EXTREMITIES:right hip/leg: No gross deformity.  No atrophy.  He has no pain with passive hip flexion internal rotation.  Straight leg does cause pain down the posterior aspect of his leg into his calf.  He has no weakness with hip flexion or dorsiflexion of the foot.  He does have some weakness with plantar flexion although he reports that is been present for many years since his CVA.  GAIT: not tested             Diagnostic  Modalities:  right hip X-ray: No fractures or dislocations.  Moderate joint space narrowing bilateral hips.  Some rimming osteophytes and subchondral sclerosis.  Some irregularity along superior femoral head consistent with arthritic changes.  Independent visualization of the images was performed.      Impression: right hip primary osteoarthritis-appears to be asymptomatic  Right lumbar radiculopathy    Plan:  All of the above pertinent physical exam and imaging modalities findings was reviewed with Uli.    I discussed his concerns.  He has pain that radiates from his hip/groin area down to his foot.  Is associated with a tingling feeling.  He has no changes in his bowel or bladder or motor strength (he has had underlying plantarflexion weakness to the ankle for many years due to CVA).  He is ambulating normally.  Recommend a trial of a prednisone taper pack.  I provided him Dr. Romo's card for follow-up if he has continued issues.          Return to clinic4,6 PRN, or sooner as needed for changes.  Re-x-ray on return: No    Isaiah Ogden D.O.          Again, thank you for allowing me to participate in the care of your patient.        Sincerely,        Yovani Ogden, DO

## 2020-08-26 NOTE — PROGRESS NOTES
"Office Visit-Follow up    Chief Complaint: Uli Champagne is a 79 year old adult who is being seen for   Chief Complaint   Patient presents with     RECHECK     right hip traumatic bursitis, resolving, Right hip early OA - intermittent symptoms       History of Present Illness:   Today's visit:  Presents with approximately 2 weeks with the pain that starts in his hip and groin radiates down his thigh and radiates to his foot.  At times he will have pain just isolated the dorsum of the foot.  Associated with this is some tingling feeling that goes down the same pathway.  No injuries or traumas.  No changes in his bowel or bladder habits.  No changes in muscle strength.  Pain is better in the morning but gets worse throughout the day.  Worse with activity.  No back pain.  July 24, 2019 visit:  Uli Champagne is a 78 year old adult who is seen in consultation at the request of AMY Dozier for evaluation of right hip pain.  Seen multiple times in past, first time for right hip. States has had off on deep hip pain into the groin for 6-8 months. Then about 2 weeks ago, fell, landing on the buttock/back pocket. Developed moderate to severe lateral hip pain especially with walking, laying on it, or sit to stand.  New type of pain for him. [place].  Saw his PCP on 7/8/19 who diagnosed him with trochanteric bursitis, gave him some \"pain pills\". Over the last 2 weeks took 3 of them, states the lateral hip pain has gotten better and close to full resolution. States little pain when he sleeps on it, otherwise no pain with walking or other activity. Denies back pain. States now has more of the mild aching deep groin pain. Happy with progress. Seeing a chiropractor and this is going well.  Otherwise rest, activity modification has all been helpful.  No radiating pain, no numbness/tingling.   Chronic back pain that he states is at baseline.  Uses a walker/cane.  On coumadin.        Social History "     Occupational History     Not on file   Tobacco Use     Smoking status: Former Smoker     Packs/day: 0.50     Years: 2.00     Pack years: 1.00     Types: Cigarettes     Last attempt to quit: 1960     Years since quittin.6     Smokeless tobacco: Never Used   Substance and Sexual Activity     Alcohol use: Yes     Comment: socially     Drug use: No     Sexual activity: Never       REVIEW OF SYSTEMS  General: negative for, night sweats, dizziness, fatigue  Resp: No shortness of breath and no cough  CV: negative for chest pain, syncope or near-syncope  GI: negative for nausea, vomiting and diarrhea  : negative for dysuria and hematuria  Musculoskeletal: as above  Neurologic: negative for syncope   Hematologic: negative for bleeding disorder    Physical Exam:  Vitals: There were no vitals taken for this visit.  BMI= There is no height or weight on file to calculate BMI.  Constitutional: healthy, alert and no acute distress   Psychiatric: mentation appears normal and affect normal/bright  NEURO: no focal deficits  RESP: Normal with easy respirations and no use of accessory muscles to breathe, no audible wheezing or retractions  CV: RLE: no edema           SKIN: No erythema, rashes, excoriation, or breakdown. No evidence of infection.   JOINT/EXTREMITIES:right hip/leg: No gross deformity.  No atrophy.  He has no pain with passive hip flexion internal rotation.  Straight leg does cause pain down the posterior aspect of his leg into his calf.  He has no weakness with hip flexion or dorsiflexion of the foot.  He does have some weakness with plantar flexion although he reports that is been present for many years since his CVA.  GAIT: not tested             Diagnostic Modalities:  right hip X-ray: No fractures or dislocations.  Moderate joint space narrowing bilateral hips.  Some rimming osteophytes and subchondral sclerosis.  Some irregularity along superior femoral head consistent with arthritic  changes.  Independent visualization of the images was performed.      Impression: right hip primary osteoarthritis-appears to be asymptomatic  Right lumbar radiculopathy    Plan:  All of the above pertinent physical exam and imaging modalities findings was reviewed with Uli.    I discussed his concerns.  He has pain that radiates from his hip/groin area down to his foot.  Is associated with a tingling feeling.  He has no changes in his bowel or bladder or motor strength (he has had underlying plantarflexion weakness to the ankle for many years due to CVA).  He is ambulating normally.  Recommend a trial of a prednisone taper pack.  I provided him Dr. Romo's card for follow-up if he has continued issues.          Return to clinic4,6 PRN, or sooner as needed for changes.  Re-x-ray on return: No    Isaiah Ogden D.O.

## 2020-09-09 ENCOUNTER — TELEPHONE (OUTPATIENT)
Dept: ORTHOPEDICS | Facility: CLINIC | Age: 80
End: 2020-09-09

## 2020-09-09 NOTE — TELEPHONE ENCOUNTER
Reason for Call:  Other call back    Detailed comments: please call Uli at#[phone] he would like to get the ball rolling for a chairlift for his new home he will be moving in 1 month and will need one. He would like orders/ DME placed so medicare will help to cover this need. . Thank You Kianna    Phone Number Patient can be reached at: Cell number on file:    Telephone Information:   Mobile [phone]       Best Time:any time     Can we leave a detailed message on this number? YES    Call taken on 9/9/2020 at 1:04 PM by Kianna Hayes

## 2020-09-09 NOTE — TELEPHONE ENCOUNTER
Patient would need to go through PCP to order this.      MARIA D Boshc, CNP  Orthopedic Surgery

## 2020-09-10 ENCOUNTER — TELEPHONE (OUTPATIENT)
Dept: FAMILY MEDICINE | Facility: OTHER | Age: 80
End: 2020-09-10

## 2020-09-10 ENCOUNTER — ANTICOAGULATION THERAPY VISIT (OUTPATIENT)
Dept: ANTICOAGULATION | Facility: OTHER | Age: 80
End: 2020-09-10

## 2020-09-10 DIAGNOSIS — Z79.01 LONG TERM CURRENT USE OF ANTICOAGULANT THERAPY: ICD-10-CM

## 2020-09-10 DIAGNOSIS — I48.20 CHRONIC A-FIB (H): ICD-10-CM

## 2020-09-10 LAB
CAPILLARY BLOOD COLLECTION: NORMAL
INR BLD: 2.3 (ref 0.86–1.14)

## 2020-09-10 PROCEDURE — 36416 COLLJ CAPILLARY BLOOD SPEC: CPT | Performed by: FAMILY MEDICINE

## 2020-09-10 PROCEDURE — 85610 PROTHROMBIN TIME: CPT | Mod: QW | Performed by: FAMILY MEDICINE

## 2020-09-10 PROCEDURE — 99207 ZZC NO CHARGE NURSE ONLY: CPT | Performed by: FAMILY MEDICINE

## 2020-09-10 NOTE — PROGRESS NOTES
ANTICOAGULATION FOLLOW-UP CLINIC VISIT    Patient Name:  Uli Champagne  Date:  9/10/2020  Contact Type:  Telephone/ LM with dosing instructions    SUBJECTIVE:  Patient Findings     Comments:   I left a detailed voicemail with the orders below. I have also requested a call back if there have been any missed doses, concerns, illness, fever, or if there have been any changes in medications, activity level, or diet          Clinical Outcomes     Negatives:   Major bleeding event, Thromboembolic event, Anticoagulation-related hospital admission, Anticoagulation-related ED visit, Anticoagulation-related fatality    Comments:   I left a detailed voicemail with the orders below. I have also requested a call back if there have been any missed doses, concerns, illness, fever, or if there have been any changes in medications, activity level, or diet             OBJECTIVE    Recent labs: (last 7 days)     09/10/20  1051   INR 2.3*       ASSESSMENT / PLAN  INR assessment THER    Recheck INR In: 6 WEEKS    INR Location Outside lab      Anticoagulation Summary  As of 9/10/2020    INR goal:   2.0-3.0   TTR:   69.8 % (1 y)   Prior goal:   2.0-3.0   INR used for dosin.3 (9/10/2020)   Warfarin maintenance plan:   2.5 mg (5 mg x 0.5) every Tue, Sat; 5 mg (5 mg x 1) all other days   Full warfarin instructions:   2.5 mg every Tue, Sat; 5 mg all other days   Weekly warfarin total:   30 mg   No change documented:   Rui Landers, RN   Plan last modified:   Mahsa Julien RN (3/22/2019)   Next INR check:   10/22/2020   Priority:   High   Target end date:   Indefinite    Indications    Long-term (current) use of anticoagulants [Z79.01] [Z79.01]  Chronic a-fib (H) [I48.20]             Anticoagulation Episode Summary     INR check location:       Preferred lab:       Send INR reminders to:   [facility] [place]    Comments:   5 mg tab, AM dose, appt card        Anticoagulation Care Providers     Provider Role Specialty Phone  number    Isra Carrillo MD [facility] [phone]            See the Encounter Report to view Anticoagulation Flowsheet and Dosing Calendar (Go to Encounters tab in chart review, and find the Anticoagulation Therapy Visit)    Dosage adjustment made based on physician directed care plan.    Rui Landers RN

## 2020-09-10 NOTE — TELEPHONE ENCOUNTER
Reason for Call:  Other call back    Detailed comments: Patient is wanting to apply for a powered stair chair lift for their house as they are moving to a house that has stairs. So he can get up and down the stairs. Please advise     Phone Number Patient can be reached at: Home number on file [phone]    Best Time: anytime     Can we leave a detailed message on this number? YES    Call taken on 9/10/2020 at 2:26 PM by Miranda Seipel Vaccari

## 2020-09-10 NOTE — TELEPHONE ENCOUNTER
OK for workin face to face encounter with any provider for Medicare mandated F2F encounter for DME. Please call patient  to schedule time.  Electronically signed by Isra Carrillo MD

## 2020-09-10 NOTE — TELEPHONE ENCOUNTER
His orthopedic dr told him he needs to go through his pcp to get the order started.   ................Madhav Kline LPN,   September 10, 2020,      2:39 PM,   [facility]

## 2020-09-15 ENCOUNTER — OFFICE VISIT (OUTPATIENT)
Dept: FAMILY MEDICINE | Facility: CLINIC | Age: 80
End: 2020-09-15
Payer: COMMERCIAL

## 2020-09-15 VITALS
OXYGEN SATURATION: 97 % | WEIGHT: 219 LBS | DIASTOLIC BLOOD PRESSURE: 72 MMHG | TEMPERATURE: 98.4 F | HEART RATE: 64 BPM | HEIGHT: 67 IN | RESPIRATION RATE: 20 BRPM | SYSTOLIC BLOOD PRESSURE: 126 MMHG | BODY MASS INDEX: 34.37 KG/M2

## 2020-09-15 DIAGNOSIS — M48.062 SPINAL STENOSIS OF LUMBAR REGION WITH NEUROGENIC CLAUDICATION: ICD-10-CM

## 2020-09-15 DIAGNOSIS — M16.11 PRIMARY OSTEOARTHRITIS OF RIGHT HIP: Primary | ICD-10-CM

## 2020-09-15 PROCEDURE — 99213 OFFICE O/P EST LOW 20 MIN: CPT | Performed by: FAMILY MEDICINE

## 2020-09-15 ASSESSMENT — MIFFLIN-ST. JEOR: SCORE: 1652.01

## 2020-09-15 ASSESSMENT — PAIN SCALES - GENERAL: PAINLEVEL: NO PAIN (0)

## 2020-09-15 NOTE — PROGRESS NOTES
"Subjective     Uli Champagne is a 80 year old adult who presents to clinic today for the following health issues:    HPI        Consult-  Wants to get a chairlift.  Has not called  hyaqu or Medicare.          Patient has a new house that he will be moving into soon.  It is a two level with a walkout basement.  He notes that he has difficulty maneuvering steps due to his osteoarthritis of right hip and spinal stenosis.  He can do stairs, but has to go very slow and has to take the steps one at a time with both feet.  This process is very tiring for him.  And, by the end of the day, he is no longer able to maneuver stairs safely as he is legs become too weak.      He notes that his main living quarters are all on the main level and he is able to get into and out of the house without stairs.  Bedroom, bathroom, kitchen are all on same main level.  The basement contains his leisure activities and he notes that it is accessible via a hill, but that this is a dangerous way for him to access the basement and he is concerned that going this result will indeed cause a fall and injury versus using the stairway within the house.    He ambulates with the aide of a walker both within his house as well as out of it.    Review of Systems   Constitutional, HEENT, cardiovascular, pulmonary, gi and gu systems are negative, except as otherwise noted.      Objective    /72   Pulse 64   Temp 98.4  F (36.9  C) (Temporal)   Resp 20   Ht 1.702 m (5' 7\")   Wt 99.3 kg (219 lb)   SpO2 97%   Breastfeeding No   BMI 34.30 kg/m    Body mass index is 34.3 kg/m .  Physical Exam  Constitutional:       Appearance: He is obese.      Comments: Sitting in chair with 3 wheeled walker at the ready.   Neurological:      Mental Status: He is alert.                    Assessment & Plan     ASSESSMENT/ORDERS:    ICD-10-CM    1. Primary osteoarthritis of right hip  M16.11    2. Spinal stenosis of lumbar region with neurogenic claudication " " M48.062      PLAN:  1.  Patient is going to contact his insurance provider with medicare and see if he is able to have a power stair-chair covered for under his plan and what the requirements are for qualification.  Provided he qualifies, I informed him that we may or may not be able to complete the request without another visit as certain physical exam evaluations may need to occur.  He understood this.     BMI:   Estimated body mass index is 34.3 kg/m  as calculated from the following:    Height as of this encounter: 1.702 m (5' 7\").    Weight as of this encounter: 99.3 kg (219 lb).               Return in about 4 weeks (around 10/13/2020) for annual medicare wellness visit with Dr. Carrillo.     I spent >15 minutes of face to face time with the patient, >50% of which was spent counseling and coordination of care regarding review of request for equipment for aiding in his physical limitations.     Obi Nino MD  [facility]    "

## 2020-09-22 ENCOUNTER — OFFICE VISIT (OUTPATIENT)
Dept: ORTHOPEDICS | Facility: CLINIC | Age: 80
End: 2020-09-22
Payer: COMMERCIAL

## 2020-09-22 ENCOUNTER — TELEPHONE (OUTPATIENT)
Dept: CARDIOLOGY | Facility: CLINIC | Age: 80
End: 2020-09-22

## 2020-09-22 VITALS
WEIGHT: 219 LBS | DIASTOLIC BLOOD PRESSURE: 70 MMHG | BODY MASS INDEX: 34.37 KG/M2 | SYSTOLIC BLOOD PRESSURE: 126 MMHG | HEIGHT: 67 IN

## 2020-09-22 DIAGNOSIS — M54.41 CHRONIC BILATERAL LOW BACK PAIN WITH RIGHT-SIDED SCIATICA: Primary | ICD-10-CM

## 2020-09-22 DIAGNOSIS — G89.29 CHRONIC BILATERAL LOW BACK PAIN WITH RIGHT-SIDED SCIATICA: Primary | ICD-10-CM

## 2020-09-22 DIAGNOSIS — M51.369 LUMBAR DEGENERATIVE DISC DISEASE: ICD-10-CM

## 2020-09-22 PROCEDURE — 99204 OFFICE O/P NEW MOD 45 MIN: CPT | Performed by: PHYSICAL MEDICINE & REHABILITATION

## 2020-09-22 ASSESSMENT — MIFFLIN-ST. JEOR: SCORE: 1652.01

## 2020-09-22 NOTE — TELEPHONE ENCOUNTER
Patient calling stating his blood pressures are running higher started a little bit after talking with Dr. Wells on 8/10/20. Patient has not changed diet, exercise, or medications. Patient said his systolic readings are rangins from 120's-158 and diastolic is ranging between 53-75. Patient said 11 days out of 21 his systolic has been over 130. Patient is taking lisinopril 10mg daily, chlorthalidone 50mg daily and olmesartan 40mg daily. Will route to Dr. Wells if he wants to make any changes.

## 2020-09-22 NOTE — PATIENT INSTRUCTIONS
Today's Plan of Care:  -Epidural Steroid injection done at [facility]. Scheduling will call you to schedule. You will need a pre op physical and a COVID-19 test prior to your procedure. Please contact your primary care provider for your pre op physical. Someone will contact you regarding your COVID-19 test.     -We also discussed other future treatment options:  Steroid injection of the right hip    Follow Up: As needed if symptoms fail to improve or worsen. Please call with any questions or concerns.

## 2020-09-22 NOTE — PROGRESS NOTES
Sports Medicine Clinic Visit    PCP: Isra Carrillo    CC: Patient presents with:  Musculoskeletal Problem: R leg      HPI:  Uli Champagne is a 80 year old adult who is seen in consultation at the request of Dr. Ogden.   He notes back pain for many years. He has had a procedure many years ago due to weakness and giving out in the leg. He notes pain in the right glute, knee, and into the foot. He notes the pain is always there. He rates the pain at a  9/10 at its worst and a 5/10 currently.  Symptoms are relieved with sitting and sleeping. Symptoms are worsened by prolonged standing and walking.  He endorses tingling with lying down and weakness.   He denies numbness and loss of bowel or bladder control associated with the back and leg pain.  Other treatment has included heat and Tylenol.  He had an MRI completed in 2018. His cardiologist has recommended not taking NSAIDs however his daughter who is a therapist was wondering about ibuprofen.       Review of Systems:  Musculoskeletal: as above  Remainder of review of systems is negative including constitutional, eyes, ENT, CV, pulmonary, GI, , endocrine, skin, hematologic, and neurologic except as noted in HPI or medical history.    History reviewed. No pertinent past surgical/medical/family/social history other than as mentioned in HPI.    Patient Active Problem List   Diagnosis     Cardiac dysrhythmia     Essential hypertension, benign     Neurogenic bladder     Prostate cancer (H)     Permanent atrial fibrillation (H)     Chronic idiopathic gout of multiple sites     HYPERLIPIDEMIA LDL GOAL <100     CAD (coronary artery disease)     Advanced directives, counseling/discussion     Health Care Home     Stye     Non morbid obesity due to excess calories     Lumbar spinal stenosis     S/P lumbar laminectomy     Chronic a-fib (H)     Essential hypertension     Hordeolum externum left upper eyelid     Long-term (current) use of anticoagulants [Z79.01]     Tear  of medial meniscus of left knee, initial encounter     Primary osteoarthritis of left knee     Bradycardia     Abdominal aortic aneurysm (AAA) without rupture (H)     Adenocarcinoma of prostate (H)     Postsurgical aortocoronary bypass status     History of CVA (cerebrovascular accident)     Bilateral carpal tunnel syndrome     Right hip pain     Traumatic bursitis     Primary osteoarthritis of right hip     Left carpal tunnel syndrome     Primary osteoarthritis of right shoulder     Past Medical History:   Diagnosis Date     Atrial fibrillation (H)      Coronary atherosclerosis of unspecified type of vessel, native or graft      Hernia of unspecified site of abdominal cavity without mention of obstruction or gangrene     umbilical hernia; s/p repair 3/1998     History of CVA (cerebrovascular accident) 3/16/2018     Hordeolum externum left upper eyelid 1/21/2016     Malignant neoplasm of prostate (H) 07/14/08    Admit.Discharged 07/17/08     Other and unspecified hyperlipidemia      Postsurgical aortocoronary bypass status 1992     Respiratory complications      Unspecified essential hypertension      Unspecified tinnitus     left ear     Past Surgical History:   Procedure Laterality Date     C CABG, VEIN, THREE  1992     C NONSPECIFIC PROCEDURE  1987    Bone fusion in neck     C REMV PROSTATE,RETROPUB,RAD,LTD NODES  7/14/2008    Radical retropubic prostatectomy and pelvic lymph node dissection.     HC REMOVAL OF TONSILS,10+ Y/O  1969    Tonsils 10+y.o.     HEMILAMINECTOMY, DISCECTOMY LUMBAR TWO LEVELS, COMBINED Left 12/16/2015    Procedure: COMBINED HEMILAMINECTOMY, DISCECTOMY LUMBAR TWO LEVELS;  Surgeon: uJng Finley MD;  Location: [facility] OR     RELEASE CARPAL TUNNEL Right 5/17/2019    Procedure: RELEASE CARPAL TUNNEL-RIGHT;  Surgeon: Yovani Ogden DO;  Location: [facility] OR     STENT, CORONARY, ALEAH  2002    x5     Family History   Problem Relation Age of Onset     Arthritis Father      Heart Disease  Father      Hypertension Brother      Cancer Brother         liver     Heart Disease Sister      Heart Disease Brother      Social History     Socioeconomic History     Marital status:      Spouse name: Not on file     Number of children: Not on file     Years of education: Not on file     Highest education level: Not on file   Occupational History     Not on file   Social Needs     Financial resource strain: Not on file     Food insecurity     Worry: Not on file     Inability: Not on file     Transportation needs     Medical: Not on file     Non-medical: Not on file   Tobacco Use     Smoking status: Former Smoker     Packs/day: 0.50     Years: 2.00     Pack years: 1.00     Types: Cigarettes     Last attempt to quit: 1960     Years since quittin.7     Smokeless tobacco: Never Used   Substance and Sexual Activity     Alcohol use: Yes     Comment: socially     Drug use: No     Sexual activity: Never   Lifestyle     Physical activity     Days per week: Not on file     Minutes per session: Not on file     Stress: Not on file   Relationships     Social connections     Talks on phone: Not on file     Gets together: Not on file     Attends Yazidi service: Not on file     Active member of club or organization: Not on file     Attends meetings of clubs or organizations: Not on file     Relationship status: Not on file     Intimate partner violence     Fear of current or ex partner: Not on file     Emotionally abused: Not on file     Physically abused: Not on file     Forced sexual activity: Not on file   Other Topics Concern     Parent/sibling w/ CABG, MI or angioplasty before 65F 55M? No   Social History Narrative     Not on file       He lives at home with his wife.     Current Outpatient Medications   Medication     allopurinol (ZYLOPRIM) 300 MG tablet     ASPIRIN 81 MG OR TABS     chlorthalidone (HYGROTON) 50 MG tablet     fish oil-omega-3 fatty acids (FISH OIL) 1000 MG capsule     lisinopril (ZESTRIL)  "10 MG tablet     multivitamin w/minerals (THERA-VIT-M) tablet     nitroGLYcerin (NITROSTAT) 0.4 MG sublingual tablet     olmesartan (BENICAR) 40 MG tablet     simvastatin (ZOCOR) 40 MG tablet     warfarin ANTICOAGULANT (COUMADIN) 5 MG tablet     No current facility-administered medications for this visit.      Allergies   Allergen Reactions     No Known Drug Allergies          Objective:  /70   Ht 1.702 m (5' 7\")   Wt 99.3 kg (219 lb)   BMI 34.30 kg/m      General: Alert and in no distress    Head: Normocephalic, atraumatic  Eyes: no scleral icterus or conjunctival erythema   Skin: no erythema, petechiae, or jaundice  CV: regular rhythm by palpation, 2+ distal pulses  Resp: normal respiratory effort without conversational dyspnea   Psych: normal mood and affect    Gait: Using walker  Neuro: Motor strength and sensation as noted below    Musculoskeletal:    Low back and hip exam:    Inspection:            normal skin    Palpation:  -No tenderness over the lumbar spine/paraspinal muscles or groin    Lumbar ROM: Decreased and painful lumbar flexion.  Right lumbar flexion is also painful.  Lumbar extension and right sided rotation are also decreased.      Hip ROM:  Minimal right hip passive ROM.  Mildly painful.  Left hip ROM full and painfree.    Strength:  Hip flexion 5/5 bilaterally  Hip abduction 5/5 bilaterally  Hip adduction 5/5 bilaterally  Knee flexion 5/5 bilaterally  Knee extension 5/5 bilaterally  Ankle dorsiflexion 5/5 bilaterally  Ankle plantarflexion 5/5 left, 5-/5 right    Sensation:    grossly intact throughout lower extremities      Radiology:  Independent visualization of images performed.    PELVIS AND HIP RIGHT TWO VIEWS 8/26/2020 2:13 PM      HISTORY: Primary osteoarthritis of right hip.     COMPARISON: 7/24/2019.                                                                      IMPRESSION: At least moderate osteoarthritis of the right hip. Joint  space narrowing is more severe than on " the prior exam since the  current study is weightbearing. No acute bony or soft tissue  abnormality. AP view partially includes the left hip and proximal  femur and shows moderate to advanced left hip osteoarthritis, similar  to the prior exam.     FILEMON COOK MD    MRI LUMBAR SPINE WITHOUT AND WITH CONTRAST April 30, 2018 1:31 PM      HISTORY: Chronic bilateral low back pain without sciatica.      TECHNIQUE: Multiplanar multisequence MRI of the lumbar spine without  and with 10 mL Gadavist.     COMPARISON: Scan dated 3/3/2015.     FINDINGS: The report is dictated assuming five lumbar-type vertebral  bodies, and radiographic correlation may be necessary.  The distal  spinal cord and cauda equina appear normal.  The patient is status  post a left renan-laminectomy at the left L3-L4 and left L4-L5 levels.     T12-L1: Degeneration of the disc. Loss of disc space height.  Degenerative change in the facet joints. Central canal still adequate.  Mild foraminal stenosis.     L1-L2: Degeneration of the disc. Loss of disc space height. Right  central disc osteophyte complex causing mass effect on the dural sac.  Degenerative changes in the facet joints. These degenerative changes  are leading to moderate central spinal stenosis. There is mild right  and moderate-severe left foraminal stenosis due to these degenerative  changes.     L2-L3: Degeneration of the disc. Loss of disc space height.  Degenerative change in the facet joints with thickening of the  ligamentum flavum. Central spinal canal is mild-moderately narrowed.  Mild right and moderate left foraminal stenosis.     L3-L4: Degeneration of the disc. Loss of disc space height. Left  laminectomy. Small amount of scar tissue along the left lateral aspect  of the dural sac. Diffuse annular disc bulge. Degenerative change in  the facet joints with thickening of the ligamentum flavum.  Moderate-severe central spinal stenosis. Moderate right and severe  left foraminal  stenosis.     L4-L5: Left laminectomy. Diffuse annular disc bulge. Severe  degenerative change in the facet joints, right greater than left.  There appears to be a synovial cyst arising from the right facet  joint. These factors are combining to produce severe central spinal  stenosis at this level. There is also severe bilateral foraminal  stenosis. Small amount of enhancing scar tissue along the left lateral  aspect of the dural sac.     L5-S1: Degeneration of the disc. Loss of disc space height. Diffuse  annular disc bulge. Degenerative change in the facet joints. Severe  bilateral foraminal stenosis due to loss of disc space height and  bulging disc.     Paraspinous soft tissues: Normal.       Bone marrow: Normal.                                                                       IMPRESSION:    1. Postoperative changes at the left L3-L4 and L4-L5 levels. Small  amount of enhancing scar tissue along the left lateral aspect of the  dural sac.  2. Severe multilevel degenerative change with severe central stenosis  at L3-L4 and L4-L5. Severe foraminal stenosis at multiple levels. See  above report.     CHARLSE WILKINSON MD    Assessment:  1. Chronic bilateral low back pain with right-sided sciatica    2. Lumbar degenerative disc disease        Plan:  Discussed the assessment with the patient and developed a plan together:  -Discussed low back versus hip etiology.  Has more pain in the glute and down the leg than in the groin.  Seems radicular in nature.  Discussed steroid injections and elected to proceed with a lumbar epidural steroid injection.  Ordered this to be [facility] at [facility].    -We also discussed other future treatment options:  Steroid injection of the right hip    -Follow up as needed if symptoms fail to improve or worsen. Please call with any questions or concerns.       Lawanda Romo MD, CAQ Sports Medicine  [facility]

## 2020-09-22 NOTE — LETTER
9/22/2020         RE: Uli Champagne  [address] [facility] [address]        Dear Colleague,    Thank you for referring your patient, Uli Champagne, to the [facility]. Please see a copy of my visit note below.    Sports Medicine Clinic Visit    PCP: Isra Carrillo    CC: Patient presents with:  Musculoskeletal Problem: R leg      HPI:  Uli Champagne is a 80 year old adult who is seen in consultation at the request of Dr. Ogden.   He notes back pain for many years. He has had a procedure many years ago due to weakness and giving out in the leg. He notes pain in the right glute, knee, and into the foot. He notes the pain is always there. He rates the pain at a  9/10 at its worst and a 5/10 currently.  Symptoms are relieved with sitting and sleeping. Symptoms are worsened by prolonged standing and walking.  He endorses tingling with lying down and weakness.   He denies numbness and loss of bowel or bladder control associated with the back and leg pain.  Other treatment has included heat and Tylenol.  He had an MRI completed in 2018. His cardiologist has recommended not taking NSAIDs however his daughter who is a therapist was wondering about ibuprofen.       Review of Systems:  Musculoskeletal: as above  Remainder of review of systems is negative including constitutional, eyes, ENT, CV, pulmonary, GI, , endocrine, skin, hematologic, and neurologic except as noted in HPI or medical history.    History reviewed. No pertinent past surgical/medical/family/social history other than as mentioned in HPI.    Patient Active Problem List   Diagnosis     Cardiac dysrhythmia     Essential hypertension, benign     Neurogenic bladder     Prostate cancer (H)     Permanent atrial fibrillation (H)     Chronic idiopathic gout of multiple sites     HYPERLIPIDEMIA LDL GOAL <100     CAD (coronary artery disease)     Advanced directives, counseling/discussion     Health Care Home     St     Non  morbid obesity due to excess calories     Lumbar spinal stenosis     S/P lumbar laminectomy     Chronic a-fib (H)     Essential hypertension     Hordeolum externum left upper eyelid     Long-term (current) use of anticoagulants [Z79.01]     Tear of medial meniscus of left knee, initial encounter     Primary osteoarthritis of left knee     Bradycardia     Abdominal aortic aneurysm (AAA) without rupture (H)     Adenocarcinoma of prostate (H)     Postsurgical aortocoronary bypass status     History of CVA (cerebrovascular accident)     Bilateral carpal tunnel syndrome     Right hip pain     Traumatic bursitis     Primary osteoarthritis of right hip     Left carpal tunnel syndrome     Primary osteoarthritis of right shoulder     Past Medical History:   Diagnosis Date     Atrial fibrillation (H)      Coronary atherosclerosis of unspecified type of vessel, native or graft      Hernia of unspecified site of abdominal cavity without mention of obstruction or gangrene     umbilical hernia; s/p repair 3/1998     History of CVA (cerebrovascular accident) 3/16/2018     Hordeolum externum left upper eyelid 1/21/2016     Malignant neoplasm of prostate (H) 07/14/08    Admit.Discharged 07/17/08     Other and unspecified hyperlipidemia      Postsurgical aortocoronary bypass status 1992     Respiratory complications      Unspecified essential hypertension      Unspecified tinnitus     left ear     Past Surgical History:   Procedure Laterality Date     C CABG, VEIN, THREE  1992     C NONSPECIFIC PROCEDURE  1987    Bone fusion in neck     C REMV PROSTATE,RETROPUB,RAD,LTD NODES  7/14/2008    Radical retropubic prostatectomy and pelvic lymph node dissection.     HC REMOVAL OF TONSILS,10+ Y/O  1969    Tonsils 10+y.o.     HEMILAMINECTOMY, DISCECTOMY LUMBAR TWO LEVELS, COMBINED Left 12/16/2015    Procedure: COMBINED HEMILAMINECTOMY, DISCECTOMY LUMBAR TWO LEVELS;  Surgeon: Jung Finley MD;  Location: [facility] OR     RELEASE CARPAL TUNNEL  Right 2019    Procedure: RELEASE CARPAL TUNNEL-RIGHT;  Surgeon: Yovani Ogden DO;  Location: [facility] OR     STENT, CORONARY, ALEAH  2002    x5     Family History   Problem Relation Age of Onset     Arthritis Father      Heart Disease Father      Hypertension Brother      Cancer Brother         liver     Heart Disease Sister      Heart Disease Brother      Social History     Socioeconomic History     Marital status:      Spouse name: Not on file     Number of children: Not on file     Years of education: Not on file     Highest education level: Not on file   Occupational History     Not on file   Social Needs     Financial resource strain: Not on file     Food insecurity     Worry: Not on file     Inability: Not on file     Transportation needs     Medical: Not on file     Non-medical: Not on file   Tobacco Use     Smoking status: Former Smoker     Packs/day: 0.50     Years: 2.00     Pack years: 1.00     Types: Cigarettes     Last attempt to quit: 1960     Years since quittin.7     Smokeless tobacco: Never Used   Substance and Sexual Activity     Alcohol use: Yes     Comment: socially     Drug use: No     Sexual activity: Never   Lifestyle     Physical activity     Days per week: Not on file     Minutes per session: Not on file     Stress: Not on file   Relationships     Social connections     Talks on phone: Not on file     Gets together: Not on file     Attends Denominational service: Not on file     Active member of club or organization: Not on file     Attends meetings of clubs or organizations: Not on file     Relationship status: Not on file     Intimate partner violence     Fear of current or ex partner: Not on file     Emotionally abused: Not on file     Physically abused: Not on file     Forced sexual activity: Not on file   Other Topics Concern     Parent/sibling w/ CABG, MI or angioplasty before 65F 55M? No   Social History Narrative     Not on file       He lives at home with his  "wife.     Current Outpatient Medications   Medication     allopurinol (ZYLOPRIM) 300 MG tablet     ASPIRIN 81 MG OR TABS     chlorthalidone (HYGROTON) 50 MG tablet     fish oil-omega-3 fatty acids (FISH OIL) 1000 MG capsule     lisinopril (ZESTRIL) 10 MG tablet     multivitamin w/minerals (THERA-VIT-M) tablet     nitroGLYcerin (NITROSTAT) 0.4 MG sublingual tablet     olmesartan (BENICAR) 40 MG tablet     simvastatin (ZOCOR) 40 MG tablet     warfarin ANTICOAGULANT (COUMADIN) 5 MG tablet     No current facility-administered medications for this visit.      Allergies   Allergen Reactions     No Known Drug Allergies          Objective:  /70   Ht 1.702 m (5' 7\")   Wt 99.3 kg (219 lb)   BMI 34.30 kg/m      General: Alert and in no distress    Head: Normocephalic, atraumatic  Eyes: no scleral icterus or conjunctival erythema   Skin: no erythema, petechiae, or jaundice  CV: regular rhythm by palpation, 2+ distal pulses  Resp: normal respiratory effort without conversational dyspnea   Psych: normal mood and affect    Gait: Using walker  Neuro: Motor strength and sensation as noted below    Musculoskeletal:    Low back and hip exam:    Inspection:            normal skin    Palpation:  -No tenderness over the lumbar spine/paraspinal muscles or groin    Lumbar ROM: Decreased and painful lumbar flexion.  Right lumbar flexion is also painful.  Lumbar extension and right sided rotation are also decreased.      Hip ROM:  Minimal right hip passive ROM.  Mildly painful.  Left hip ROM full and painfree.    Strength:  Hip flexion 5/5 bilaterally  Hip abduction 5/5 bilaterally  Hip adduction 5/5 bilaterally  Knee flexion 5/5 bilaterally  Knee extension 5/5 bilaterally  Ankle dorsiflexion 5/5 bilaterally  Ankle plantarflexion 5/5 left, 5-/5 right    Sensation:    grossly intact throughout lower extremities      Radiology:  Independent visualization of images performed.    PELVIS AND HIP RIGHT TWO VIEWS 8/26/2020 2:13 PM "      HISTORY: Primary osteoarthritis of right hip.     COMPARISON: 7/24/2019.                                                                      IMPRESSION: At least moderate osteoarthritis of the right hip. Joint  space narrowing is more severe than on the prior exam since the  current study is weightbearing. No acute bony or soft tissue  abnormality. AP view partially includes the left hip and proximal  femur and shows moderate to advanced left hip osteoarthritis, similar  to the prior exam.     FILEMON COOK MD    MRI LUMBAR SPINE WITHOUT AND WITH CONTRAST April 30, 2018 1:31 PM      HISTORY: Chronic bilateral low back pain without sciatica.      TECHNIQUE: Multiplanar multisequence MRI of the lumbar spine without  and with 10 mL Gadavist.     COMPARISON: Scan dated 3/3/2015.     FINDINGS: The report is dictated assuming five lumbar-type vertebral  bodies, and radiographic correlation may be necessary.  The distal  spinal cord and cauda equina appear normal.  The patient is status  post a left renan-laminectomy at the left L3-L4 and left L4-L5 levels.     T12-L1: Degeneration of the disc. Loss of disc space height.  Degenerative change in the facet joints. Central canal still adequate.  Mild foraminal stenosis.     L1-L2: Degeneration of the disc. Loss of disc space height. Right  central disc osteophyte complex causing mass effect on the dural sac.  Degenerative changes in the facet joints. These degenerative changes  are leading to moderate central spinal stenosis. There is mild right  and moderate-severe left foraminal stenosis due to these degenerative  changes.     L2-L3: Degeneration of the disc. Loss of disc space height.  Degenerative change in the facet joints with thickening of the  ligamentum flavum. Central spinal canal is mild-moderately narrowed.  Mild right and moderate left foraminal stenosis.     L3-L4: Degeneration of the disc. Loss of disc space height. Left  laminectomy. Small amount of  scar tissue along the left lateral aspect  of the dural sac. Diffuse annular disc bulge. Degenerative change in  the facet joints with thickening of the ligamentum flavum.  Moderate-severe central spinal stenosis. Moderate right and severe  left foraminal stenosis.     L4-L5: Left laminectomy. Diffuse annular disc bulge. Severe  degenerative change in the facet joints, right greater than left.  There appears to be a synovial cyst arising from the right facet  joint. These factors are combining to produce severe central spinal  stenosis at this level. There is also severe bilateral foraminal  stenosis. Small amount of enhancing scar tissue along the left lateral  aspect of the dural sac.     L5-S1: Degeneration of the disc. Loss of disc space height. Diffuse  annular disc bulge. Degenerative change in the facet joints. Severe  bilateral foraminal stenosis due to loss of disc space height and  bulging disc.     Paraspinous soft tissues: Normal.       Bone marrow: Normal.                                                                       IMPRESSION:    1. Postoperative changes at the left L3-L4 and L4-L5 levels. Small  amount of enhancing scar tissue along the left lateral aspect of the  dural sac.  2. Severe multilevel degenerative change with severe central stenosis  at L3-L4 and L4-L5. Severe foraminal stenosis at multiple levels. See  above report.     CHARLES WILKINSON MD    Assessment:  1. Chronic bilateral low back pain with right-sided sciatica    2. Lumbar degenerative disc disease        Plan:  Discussed the assessment with the patient and developed a plan together:  -Discussed low back versus hip etiology.  Has more pain in the glute and down the leg than in the groin.  Seems radicular in nature.  Discussed steroid injections and elected to proceed with a lumbar epidural steroid injection.  Ordered this to be completed at [facility].    -We also discussed other future treatment options:  Steroid  injection of the right hip    -Follow up as needed if symptoms fail to improve or worsen. Please call with any questions or concerns.       Lawanda Romo MD, [facility] Sports Medicine  [facility]    Again, thank you for allowing me to participate in the care of your patient.        Sincerely,        Ainsley Romo MD

## 2020-09-23 ENCOUNTER — TELEPHONE (OUTPATIENT)
Facility: CLINIC | Age: 80
End: 2020-09-23

## 2020-09-23 DIAGNOSIS — Z11.59 ENCOUNTER FOR SCREENING FOR OTHER VIRAL DISEASES: Primary | ICD-10-CM

## 2020-09-23 NOTE — TELEPHONE ENCOUNTER
Left detailed message for patient with Dr. Wells's response. Direct call back number given if patient has any further questions or concerns.

## 2020-09-23 NOTE — TELEPHONE ENCOUNTER
No change to medications.  His BP is still acceptable for 80 year old.  I am not concerned unless most of his BP measurements are over 150.  Right now about half are < 130.

## 2020-10-05 ENCOUNTER — OFFICE VISIT (OUTPATIENT)
Dept: FAMILY MEDICINE | Facility: CLINIC | Age: 80
End: 2020-10-05
Payer: COMMERCIAL

## 2020-10-05 VITALS
HEIGHT: 67 IN | SYSTOLIC BLOOD PRESSURE: 130 MMHG | RESPIRATION RATE: 20 BRPM | TEMPERATURE: 97.2 F | BODY MASS INDEX: 34 KG/M2 | OXYGEN SATURATION: 96 % | WEIGHT: 216.6 LBS | DIASTOLIC BLOOD PRESSURE: 80 MMHG | HEART RATE: 56 BPM

## 2020-10-05 DIAGNOSIS — Z11.59 ENCOUNTER FOR SCREENING FOR OTHER VIRAL DISEASES: ICD-10-CM

## 2020-10-05 DIAGNOSIS — M48.062 SPINAL STENOSIS OF LUMBAR REGION WITH NEUROGENIC CLAUDICATION: ICD-10-CM

## 2020-10-05 DIAGNOSIS — Z01.818 PRE-OP EXAM: Primary | ICD-10-CM

## 2020-10-05 LAB
ANION GAP SERPL CALCULATED.3IONS-SCNC: 4 MMOL/L (ref 3–14)
BASOPHILS # BLD AUTO: 0.1 10E9/L (ref 0–0.2)
BASOPHILS NFR BLD AUTO: 0.6 %
BUN SERPL-MCNC: 23 MG/DL (ref 7–30)
CALCIUM SERPL-MCNC: 9.6 MG/DL (ref 8.5–10.1)
CHLORIDE SERPL-SCNC: 110 MMOL/L (ref 94–109)
CO2 SERPL-SCNC: 30 MMOL/L (ref 20–32)
CREAT SERPL-MCNC: 1 MG/DL (ref 0.66–1.25)
DIFFERENTIAL METHOD BLD: ABNORMAL
EOSINOPHIL NFR BLD AUTO: 1.4 %
ERYTHROCYTE [DISTWIDTH] IN BLOOD BY AUTOMATED COUNT: 14.1 % (ref 10–15)
GFR SERPL CREATININE-BSD FRML MDRD: 70 ML/MIN/{1.73_M2}
GLUCOSE SERPL-MCNC: 95 MG/DL (ref 70–99)
HCT VFR BLD AUTO: 43.6 % (ref 40–53)
HGB BLD-MCNC: 15.2 G/DL (ref 13.3–17.7)
IMM GRANULOCYTES # BLD: 0 10E9/L (ref 0–0.4)
IMM GRANULOCYTES NFR BLD: 0.4 %
LYMPHOCYTES # BLD AUTO: 1.6 10E9/L (ref 0.8–5.3)
LYMPHOCYTES NFR BLD AUTO: 20.4 %
MCH RBC QN AUTO: 33.1 PG (ref 26.5–33)
MCHC RBC AUTO-ENTMCNC: 34.9 G/DL (ref 31.5–36.5)
MCV RBC AUTO: 95 FL (ref 78–100)
MONOCYTES # BLD AUTO: 0.9 10E9/L (ref 0–1.3)
MONOCYTES NFR BLD AUTO: 11.1 %
NEUTROPHILS # BLD AUTO: 5.3 10E9/L (ref 1.6–8.3)
NEUTROPHILS NFR BLD AUTO: 66.1 %
NRBC # BLD AUTO: 0 10*3/UL
NRBC BLD AUTO-RTO: 0 /100
PLATELET # BLD AUTO: 233 10E9/L (ref 150–450)
POTASSIUM SERPL-SCNC: 3.8 MMOL/L (ref 3.4–5.3)
RBC # BLD AUTO: 4.59 10E12/L (ref 4.4–5.9)
SODIUM SERPL-SCNC: 144 MMOL/L (ref 133–144)
WBC # BLD AUTO: 8.1 10E9/L (ref 4–11)

## 2020-10-05 PROCEDURE — 93000 ELECTROCARDIOGRAM COMPLETE: CPT | Performed by: PHYSICIAN ASSISTANT

## 2020-10-05 PROCEDURE — 99215 OFFICE O/P EST HI 40 MIN: CPT | Performed by: PHYSICIAN ASSISTANT

## 2020-10-05 PROCEDURE — 80048 BASIC METABOLIC PNL TOTAL CA: CPT | Performed by: PHYSICIAN ASSISTANT

## 2020-10-05 PROCEDURE — 36415 COLL VENOUS BLD VENIPUNCTURE: CPT | Performed by: PHYSICIAN ASSISTANT

## 2020-10-05 PROCEDURE — U0003 INFECTIOUS AGENT DETECTION BY NUCLEIC ACID (DNA OR RNA); SEVERE ACUTE RESPIRATORY SYNDROME CORONAVIRUS 2 (SARS-COV-2) (CORONAVIRUS DISEASE [COVID-19]), AMPLIFIED PROBE TECHNIQUE, MAKING USE OF HIGH THROUGHPUT TECHNOLOGIES AS DESCRIBED BY CMS-2020-01-R: HCPCS | Performed by: ANESTHESIOLOGY

## 2020-10-05 PROCEDURE — 85025 COMPLETE CBC W/AUTO DIFF WBC: CPT | Performed by: PHYSICIAN ASSISTANT

## 2020-10-05 ASSESSMENT — MIFFLIN-ST. JEOR: SCORE: 1641.12

## 2020-10-05 ASSESSMENT — PAIN SCALES - GENERAL: PAINLEVEL: MODERATE PAIN (5)

## 2020-10-05 NOTE — PROGRESS NOTES
[address] [address]  Phone: [phone]  Fax: [phone]  Primary Provider: Isra Carrillo  Pre-op Performing Provider: RELL RUIZ    PREOPERATIVE EVALUATION:  Today's date: 10/5/2020    Uli Champagne is a 80 year old adult who presents for a preoperative evaluation.    Surgical Information:  Surgery Details 10/5/2020   Surgery/Procedure: INJECTION, SPINE, LUMBAR, EPIDURAL   Surgery Location: [facility]   Surgeon: Dr. Hughes   Surgery Date: 10-8-2020   Time of Surgery: 11:00   Where patient plans to recover: At home with family     Fax number for surgical facility: Note does not need to be faxed, will be available electronically in Epic.  Type of Anesthesia Anticipated: Monitor anesthesia care    Subjective     HPI related to upcoming procedure: INJECTION, SPINE, LUMBAR, EPIDURAL  Preop Questions 10/5/2020   1. Have you ever had a heart attack or stroke? YES  - heart attack   2. Have you ever had surgery on your heart or blood vessels, such as a stent placement, a coronary artery bypass, or surgery on an artery in your head, neck, heart, or legs? YES: stent   3. Do you have chest pain with activity? No   4. Do you have a history of  heart failure? No   5. Do you currently have a cold, bronchitis or symptoms of other infection? No   6. Do you have a cough, shortness of breath, or wheezing? No   7. Do you or anyone in your family have previous history of blood clots? No   8. Do you or does anyone in your family have a serious bleeding problem such as prolonged bleeding following surgeries or cuts? No   9. Have you ever had problems with anemia or been told to take iron pills? No   10. Have you had any abnormal blood loss such as black, tarry or bloody stools, or abnormal vaginal bleeding? No   10. Have you had any abnormal blood loss such as black, tarry or bloody stools? No   11. Have you ever had a blood  transfusion? No   Are you willing to have a blood transfusion if it is medically needed before, during, or after your surgery? Yes   13. Have you or any of your relatives ever had problems with anesthesia? No   14. Do you have sleep apnea, excessive snoring or daytime drowsiness? No   15. Do you have any artifical heart valves or other implanted medical devices like a pacemaker, defibrillator, or continuous glucose monitor? No   16. Do you have artificial joints? No   17. Are you allergic to latex? No   18. Is there any chance that you may be pregnant? No       Review of Systems  Constitutional, neuro, ENT, endocrine, pulmonary, cardiac, gastrointestinal, genitourinary, musculoskeletal, integument and psychiatric systems are negative, except as otherwise noted.    Patient Active Problem List    Diagnosis Date Noted     Primary osteoarthritis of right shoulder 02/05/2020     Priority: Medium     Left carpal tunnel syndrome 10/03/2019     Priority: Medium     Right hip pain 07/24/2019     Priority: Medium     Traumatic bursitis 07/24/2019     Priority: Medium     Primary osteoarthritis of right hip 07/24/2019     Priority: Medium     Bilateral carpal tunnel syndrome 08/29/2018     Priority: Medium     History of CVA (cerebrovascular accident) 03/16/2018     Priority: Medium     Abdominal aortic aneurysm (AAA) without rupture (H) 01/10/2018     Priority: Medium     May 10, 2011  Entered By: KULDIP SANCHEZ  Comment: BY U/S IN MAY 2011       Adenocarcinoma of prostate (H) 01/10/2018     Priority: Medium     No comments entered for problem.  No comments entered for problem.       Postsurgical aortocoronary bypass status 01/10/2018     Priority: Medium     No comments entered for problem.  No comments entered for problem.       Bradycardia 04/20/2017     Priority: Medium     Tear of medial meniscus of left knee, initial encounter 07/20/2016     Priority: Medium     Primary osteoarthritis of left knee 07/20/2016     Priority:  Medium     Long-term (current) use of anticoagulants [Z79.01] 03/31/2016     Priority: Medium     Hordeolum externum left upper eyelid 01/21/2016     Priority: Medium     S/P lumbar laminectomy 01/15/2016     Priority: Medium     Chronic a-fib (H) 01/15/2016     Priority: Medium     Essential hypertension 01/15/2016     Priority: Medium     Lumbar spinal stenosis 12/14/2015     Priority: Medium     Non morbid obesity due to excess calories 12/07/2015     Priority: Medium     Stye 02/11/2013     Priority: Medium     Health Care Home 12/17/2012     Priority: Medium     Cher Tabor RN-PHN  FPA / [facility]   [phone]    DX V65.8 REPLACED WITH [id number] HEALTH CARE HOME (04/08/2013)       Advanced directives, counseling/discussion 09/19/2011     Priority: Medium     Advance Directive Problem List Overview:   Name Relationship Phone    Primary Health Care Agent            Alternative Health Care Agent          Discussed advance care planning with patient; information given to patient to review. 9/19/2011   Nichole Toribio PharmD           CAD (coronary artery disease) 03/02/2011     Priority: Medium     HYPERLIPIDEMIA LDL GOAL <100 10/31/2010     Priority: Medium     Chronic idiopathic gout of multiple sites 02/03/2010     Priority: Medium     Permanent atrial fibrillation (H) 08/07/2008     Priority: Medium     Prostate cancer (H) 07/01/2008     Priority: Medium     Cardiac dysrhythmia 01/05/2007     Priority: Medium     Problem list name updated by automated process. Provider to review       Essential hypertension, benign 01/05/2007     Priority: Medium     Neurogenic bladder 01/05/2007     Priority: Medium     Problem list name updated by automated process. Provider to review        Past Medical History:   Diagnosis Date     Atrial fibrillation (H)      Coronary atherosclerosis of unspecified type of vessel, native or graft      Hernia of unspecified site of abdominal cavity without mention of  obstruction or gangrene     umbilical hernia; s/p repair 3/1998     History of CVA (cerebrovascular accident) 3/16/2018     Hordeolum externum left upper eyelid 1/21/2016     Malignant neoplasm of prostate (H) 07/14/08    Admit.Discharged 07/17/08     Other and unspecified hyperlipidemia      Postsurgical aortocoronary bypass status 1992     Respiratory complications      Unspecified essential hypertension      Unspecified tinnitus     left ear     Past Surgical History:   Procedure Laterality Date     C CABG, VEIN, THREE  1992     C REMV PROSTATE,RETROPUB,RAD,LTD NODES  7/14/2008    Radical retropubic prostatectomy and pelvic lymph node dissection.     HC REMOVAL OF TONSILS,10+ Y/O  1969    Tonsils 10+y.o.     HEMILAMINECTOMY, DISCECTOMY LUMBAR TWO LEVELS, COMBINED Left 12/16/2015    Procedure: COMBINED HEMILAMINECTOMY, DISCECTOMY LUMBAR TWO LEVELS;  Surgeon: Jung Finley MD;  [facility]     RELEASE CARPAL TUNNEL Right 5/17/2019    Procedure: RELEASE CARPAL TUNNEL-RIGHT;  Surgeon: Yovani Ogden DO;  Location: [facility]     STENT, CORONARY, ALEAH  2002    x5     ZZC NONSPECIFIC PROCEDURE  1987    Bone fusion in neck     Current Outpatient Medications   Medication Sig Dispense Refill     allopurinol (ZYLOPRIM) 300 MG tablet TAKE 1 TABLET DAILY FOR GOUT 90 tablet 3     ASPIRIN 81 MG OR TABS 1 TABLET DAILY       chlorthalidone (HYGROTON) 50 MG tablet Take 1 tablet (50 mg) by mouth daily 90 tablet 3     fish oil-omega-3 fatty acids (FISH OIL) 1000 MG capsule Take 1.2 g by mouth daily        lisinopril (ZESTRIL) 10 MG tablet Take 1 tablet (10 mg) by mouth daily 90 tablet 3     multivitamin w/minerals (THERA-VIT-M) tablet Take 1 tablet by mouth daily       olmesartan (BENICAR) 40 MG tablet Take 1 tablet (40 mg) by mouth daily 90 tablet 3     simvastatin (ZOCOR) 40 MG tablet Take 1 tablet (40 mg) by mouth At Bedtime 90 tablet 3     warfarin ANTICOAGULANT (COUMADIN) 5 MG tablet Take 2.5 mg on Tues, Sat  "and 5 mg all other days, or as directed by the Coumadin Clinic. 75 tablet 2     nitroGLYcerin (NITROSTAT) 0.4 MG sublingual tablet Place 1 tablet (0.4 mg) under the tongue See Admin Instructions for chest pain (Patient not taking: Reported on 10/5/2020) 25 tablet 1       Allergies   Allergen Reactions     No Known Drug Allergies         Social History     Tobacco Use     Smoking status: Former Smoker     Packs/day: 0.50     Years: 2.00     Pack years: 1.00     Types: Cigarettes     Quit date: 1960     Years since quittin.8     Smokeless tobacco: Never Used   Substance Use Topics     Alcohol use: Yes     Comment: socially     Family History   Problem Relation Age of Onset     Arthritis Father      Heart Disease Father      Hypertension Brother      Cancer Brother         liver     Heart Disease Sister      Heart Disease Brother      History   Drug Use No            Objective   /80 (BP Location: Right arm, Patient Position: Chair, Cuff Size: Adult Large)   Pulse 56   Temp 97.2  F (36.2  C) (Temporal)   Resp 20   Ht 1.702 m (5' 7\")   Wt 98.2 kg (216 lb 9.6 oz)   SpO2 96%   BMI 33.92 kg/m    Physical Exam    GENERAL APPEARANCE: healthy, alert and no distress     EYES: EOMI,  PERRL     HENT: ear canals and TM's normal and nose and mouth without ulcers or lesions     NECK: no adenopathy, no asymmetry, masses, or scars and thyroid normal to palpation     RESP: lungs clear to auscultation - no rales, rhonchi or wheezes     CV: regular rates and rhythm, normal S1 S2, no S3 or S4 and no murmur, click or rub     ABDOMEN:  soft, nontender, no HSM or masses and bowel sounds normal     MS: extremities normal- no gross deformities noted, no evidence of inflammation in joints, FROM in all extremities.     NEURO: Normal strength and tone, sensory exam grossly normal, mentation intact and speech normal     PSYCH: mentation appears normal. and affect normal/bright    Recent Labs   Lab Test 09/10/20  1051 " 07/29/20  1258 07/24/20  0908 01/06/20  1319 01/06/20  1319   INR 2.3* 2.3* 2.03*   < >  --    NA  --   --  139  --  141   POTASSIUM  --   --  3.8  --  4.1   CR  --   --  1.03  --  0.92    < > = values in this interval not displayed.        PRE-OP Diagnostics:  Recent Results (from the past 48 hour(s))   Asymptomatic COVID-19 Virus (Coronavirus) by PCR    Collection Time: 10/05/20 10:16 AM    Specimen: Nasopharyngeal   Result Value Ref Range    COVID-19 Virus PCR to [facility] - Source Nasopharyngeal     COVID-19 Virus PCR to [facility] - Result Not Detected    CBC with platelets and differential    Collection Time: 10/05/20 12:02 PM   Result Value Ref Range    WBC 8.1 4.0 - 11.0 10e9/L    RBC Count 4.59 4.4 - 5.9 10e12/L    Hemoglobin 15.2 13.3 - 17.7 g/dL    Hematocrit 43.6 40.0 - 53.0 %    MCV 95 78 - 100 fl    MCH 33.1 (H) 26.5 - 33.0 pg    MCHC 34.9 31.5 - 36.5 g/dL    RDW 14.1 10.0 - 15.0 %    Platelet Count 233 150 - 450 10e9/L    Diff Method Automated Method     % Neutrophils 66.1 %    % Lymphocytes 20.4 %    % Monocytes 11.1 %    % Eosinophils 1.4 %    % Basophils 0.6 %    % Immature Granulocytes 0.4 %    Nucleated RBCs 0 0 /100    Absolute Neutrophil 5.3 1.6 - 8.3 10e9/L    Absolute Lymphocytes 1.6 0.8 - 5.3 10e9/L    Absolute Monocytes 0.9 0.0 - 1.3 10e9/L    Absolute Basophils 0.1 0.0 - 0.2 10e9/L    Abs Immature Granulocytes 0.0 0 - 0.4 10e9/L    Absolute Nucleated RBC 0.0    Basic metabolic panel  (Ca, Cl, CO2, Creat, Gluc, K, Na, BUN)    Collection Time: 10/05/20 12:02 PM   Result Value Ref Range    Sodium 144 133 - 144 mmol/L    Potassium 3.8 3.4 - 5.3 mmol/L    Chloride 110 (H) 94 - 109 mmol/L    Carbon Dioxide 30 20 - 32 mmol/L    Anion Gap 4 3 - 14 mmol/L    Glucose 95 70 - 99 mg/dL    Urea Nitrogen 23 7 - 30 mg/dL    Creatinine 1.00 0.66 - 1.25 mg/dL    GFR Estimate 70 >60 mL/min/[1.73_m2]    GFR Estimate If Black 81 >60 mL/min/[1.73_m2]    Calcium 9.6 8.5 - 10.1 mg/dL     EKG: Atrial fibrillation with  irregular beat, unchanged from 02/2019         Assessment & Plan   The proposed surgical procedure is considered INTERMEDIATE risk.    REVISED CARDIAC RISK INDEX  The patient has the following serious cardiovascular risks for perioperative complications:  No serious cardiac risks = 0 points    INTERPRETATION: 0 points: Class I (very low risk - 0.4% complication rate)         ICD-10-CM    1. Pre-op exam  Z01.818 EKG 12-lead complete w/read - Clinics     CBC with platelets and differential     Basic metabolic panel  (Ca, Cl, CO2, Creat, Gluc, K, Na, BUN)   2. Spinal stenosis of lumbar region with neurogenic claudication  M48.062 EKG 12-lead complete w/read - Clinics     CBC with platelets and differential     Basic metabolic panel  (Ca, Cl, CO2, Creat, Gluc, K, Na, BUN)       The patient has the following additional risks and recommendations for perioperative complications:     - No identified additional risk factors other than previously addressed     MEDICATION INSTRUCTIONS:    Anticoagulant or Antiplatelet Medication Use   - WARFARIN: Thomboembolic risk is high (>10%/year). HOLD warfarin Tuesday and Wednesday. No bridging needed. Curbside consultation with coumadin clinic to confirm this. Records show that patient's warfarin held similarly in 05/2019 for Carpal tunnel without bridging. Resume warfarin following procedure.     CARDIAC Medications:   - ACE/ARB: HOLD will hold and take following procedure.     RECOMMENDATION:  APPROVAL GIVEN to proceed with proposed procedure, without further diagnostic evaluation.    No follow-ups on file.    Signed Electronically by: Dusty Carter PA-C    Copy of this evaluation report is provided to requesting physician.    [facility] Guidelines    Revised Cardiac Risk Index

## 2020-10-05 NOTE — PATIENT INSTRUCTIONS
Before Your Surgery      Call your surgeon if there is any change in your health. This includes signs of a cold or flu (such as a sore throat, runny nose, cough, rash or fever).    A surgery nurse will call you to review your health history and instructions. They will give you an arrival time based on your scheduled surgery time.    Please be ready to share the following:    Your doctor's clinic name and phone number    Your medical, surgical and anesthesia history    A list of allergies and sensitivities    A list of medicines, including herbal treatments and over-the-counter drugs    Whether the patient has a legal guardian (ask how to send us the papers in advance)    Do not smoke, drink alcohol or take over the counter medicine (unless your surgeon or primary care doctor tells you to) for the 24 hours before and after surgery.  o No smoking 2 weeks prior and 2 weeks after surgery to improve healing  o No alcohol 24 hours prior to surgery to reduce bleed risk  o No NSAIDs (ibuprofen, advil, motrin, aleve, naproxen, aspirin) 7 days prior to surgery to reduce bleed risk  - Tylenol or acetaminophen is A-OK    If you take prescribed drugs: Follow your doctor s orders about which medicines to take and which to stop until after surgery.    Eating and drinking prior to surgery: follow the instructions from your surgeon    Eat and drink as usual until 8 hours before surgery. After that, no food or milk.    Drink clear liquids until 2 hours before surgery. These are liquids you can see through, like water, Gatorade and Propel Water. You may also have black coffee and tea (no cream or milk).    Nothing by mouth within 2 hours of surgery. This includes gum, candy and breath mints.  Preventing infection    Shower or bathe the night before and morning of your surgery. Follow the instructions your clinic gave you. (If no instructions, use regular soap.)    Don't shave or clip hair near your surgery site. This can lead to skin  infection.    Your care team will make every effort to keep you safe from infection. We will:  ? Clean our hands often with soap and water (or an alcohol-based hand rub).  ? Clean the skin at your surgery site with a special soap that kills germs. We'll also remove hair from the site as needed.  ? Wear special hair covers, masks, gowns and gloves during surgery.  ? Give antibiotic medicine, if prescribed. Not all surgeries need antibiotics.  What to bring on the day of surgery    Photo ID and insurance card    Copy of your health care directive, if you have one    Glasses and hearing aides (bring cases)  ? You can't wear contacts during surgery    Inhaler and eye drops, if you use them (tell us about these when you arrive)    CPAP machine or breathing device, if you use them    A few personal items, if spending the night    If you have . . .  ? A pacemaker or ICD (cardiac defibrillator): Bring the ID card.  ? An implanted stimulator: Bring the remote control.  ? A legal guardian: Bring a copy of the certified (court-stamped) guardianship papers.  Please remove any jewelry, including body piercings. Leave jewelry and other valuables at home.  If you're going home the day of surgery  Important: If you don't follow the rules below, we must cancel your surgery.     Arrange for someone to drive you home after surgery. You may not drive, take a taxi or take public transportation by yourself (unless you'll have local anesthesia only).    Arrange for a responsible adult to stay with you overnight. If you don't, we may keep you in the hospital overnight, and you may need to pay the costs yourself.

## 2020-10-05 NOTE — NURSING NOTE
Health Maintenance Due   Topic Date Due     MEDICARE ANNUAL WELLNESS VISIT  08/30/2020     INFLUENZA VACCINE (1) 09/01/2020     Meghan GRIFFIN LPN

## 2020-10-06 DIAGNOSIS — Z11.59 ENCOUNTER FOR SCREENING FOR OTHER VIRAL DISEASES: Primary | ICD-10-CM

## 2020-10-06 LAB
SARS-COV-2 RNA SPEC QL NAA+PROBE: NOT DETECTED
SPECIMEN SOURCE: NORMAL

## 2020-10-12 ENCOUNTER — TELEPHONE (OUTPATIENT)
Dept: CARDIOLOGY | Facility: CLINIC | Age: 80
End: 2020-10-12

## 2020-10-12 NOTE — TELEPHONE ENCOUNTER
Reason for Call:  Other appointment    Detailed comments: Patient states that he's returning a call from Cardiology. Please call him back    Phone Number Patient can be reached at: Home number on file [phone] (home)    Best Time: an    Can we leave a detailed message on this number? YES     Call taken on 10/12/2020 at 1:36 PM by Edda King

## 2020-10-12 NOTE — TELEPHONE ENCOUNTER
Patient stated his systolic blood pressure has been running over 130 consistently. When patient called regarding blood pressure readings back on 9/22 Dr. Wells said this: No change to medications.  His BP is still acceptable for 80 year old.  I am not concerned unless most of his BP measurements are over 150.  Right now about half are < 130.    Instructed patient to continue to monitor his blood pressure and if his systolic number is constantly over 150 then to call back. Patient verbalized understanding.

## 2020-10-21 NOTE — ANESTHESIA PREPROCEDURE EVALUATION
Anesthesia Pre-Procedure Evaluation    Patient: Uli Champagne   MRN: [id number] : 1940          Preoperative Diagnosis: Back pain [M54.9]  DDD (degenerative disc disease), lumbar [M51.36]    Procedure(s):  INJECTION, SPINE, LUMBAR 3-4 bilateral, EPIDURAL    Past Medical History:   Diagnosis Date     Atrial fibrillation (H)      Coronary atherosclerosis of unspecified type of vessel, native or graft      Hernia of unspecified site of abdominal cavity without mention of obstruction or gangrene     umbilical hernia; s/p repair 3/1998     History of CVA (cerebrovascular accident) 3/16/2018     Hordeolum externum left upper eyelid 2016     Malignant neoplasm of prostate (H) 08    Admit.Discharged 08     Other and unspecified hyperlipidemia      Postsurgical aortocoronary bypass status      Respiratory complications      Unspecified essential hypertension      Unspecified tinnitus     left ear     Past Surgical History:   Procedure Laterality Date     C CABG, VEIN, THREE       C REMV PROSTATE,RETROPUB,RAD,LTD NODES  2008    Radical retropubic prostatectomy and pelvic lymph node dissection.     HC REMOVAL OF TONSILS,12+ Y/O  1969    Tonsils 10+y.o.     HEMILAMINECTOMY, DISCECTOMY LUMBAR TWO LEVELS, COMBINED Left 2015    Procedure: COMBINED HEMILAMINECTOMY, DISCECTOMY LUMBAR TWO LEVELS;  Surgeon: Jung Finley MD;  [facility]     RELEASE CARPAL TUNNEL Right 2019    Procedure: RELEASE CARPAL TUNNEL-RIGHT;  Surgeon: Yovani Ogden DO;  Location: [facility]     STENT, CORONARY, ALEAH  2002    x5     ZZC NONSPECIFIC PROCEDURE      Bone fusion in neck       Anesthesia Evaluation     . Pt has had prior anesthetic. Type: General    No history of anesthetic complications          ROS/MED HX    ENT/Pulmonary:     (+)sleep apnea, CRUZITO risk factors snores loudly, hypertension, obese, observed stopped breathing uses CPAP 4 cmH2O , . .    Neurologic:     (+)CVA  TIA other neuro Neurogenic bladder    Cardiovascular: Comment: AAA without rupture     (+) Dyslipidemia, hypertension-range: < 140 / 90, -CAD, --stent,1992 10 . Taking blood thinners Pt has received instructions: Instructions Given to patient: Last dose Coumadin 10/15/20 - ASA Dc ed 10 days ago. . . :. dysrhythmias a-fib, . Previous cardiac testing date:results:date: results:ECG reviewed date:10/5/20 results:Atrial fibrillation -irregular conduction   -Nonspecific ST depression +   Extensive T-abnormality  -Nondiagnostic  -Possible  Anterior/lateral and inferior  ischemia.    date: results:         (-) CABG   METS/Exercise Tolerance:  >4 METS   Hematologic:  - neg hematologic  ROS       Musculoskeletal:   (+) arthritis,  other musculoskeletal- gout,chronic low back pain      GI/Hepatic:  - neg GI/hepatic ROS       Renal/Genitourinary: Comment: Neurogenic bladder    (+) Pt has no history of transplant, Other Renal/ Genitourinary, Neurogenic bladder      Endo:     (+) Obesity, .      Psychiatric:         Infectious Disease:  - neg infectious disease ROS       Malignancy:   (+) Malignancy History of Prostate  Prostate CA Remission status post Surgery,         Other:    (+) No chance of pregnancy C-spine cleared: N/A, H/O Chronic Pain,                        Physical Exam  Normal systems: cardiovascular, pulmonary and dental    Airway   Mallampati: II  TM distance: >3 FB  Neck ROM: full    Dental     Cardiovascular   Rhythm and rate: regular and normal      Pulmonary    breath sounds clear to auscultation            Lab Results   Component Value Date    WBC 8.1 10/05/2020    HGB 15.2 10/05/2020    HCT 43.6 10/05/2020     10/05/2020    SED 64 (H) 12/31/2003     10/05/2020    POTASSIUM 3.8 10/05/2020    CHLORIDE 110 (H) 10/05/2020    CO2 30 10/05/2020    BUN 23 10/05/2020    CR 1.00 10/05/2020    GLC 95 10/05/2020    ANKIT 9.6 10/05/2020    PHOS 3.8 10/30/2003    ALBUMIN 3.5 08/31/2017    PROTTOTAL 7.2  "08/31/2017    ALT 25 07/24/2020    AST 28 05/14/2019    ALKPHOS 109 08/31/2017    BILITOTAL 0.9 08/31/2017    PTT 27 07/13/2008    INR 2.3 (H) 09/10/2020    TSH 2.31 08/31/2017       Preop Vitals  BP Readings from Last 3 Encounters:   10/05/20 130/80   09/22/20 126/70   09/15/20 126/72    Pulse Readings from Last 3 Encounters:   10/05/20 56   09/15/20 64   07/25/20 (!) 37      Resp Readings from Last 3 Encounters:   10/05/20 20   09/15/20 20   07/25/20 14    SpO2 Readings from Last 3 Encounters:   10/05/20 96%   09/15/20 97%   07/25/20 100%      Temp Readings from Last 1 Encounters:   10/05/20 97.2  F (36.2  C) (Temporal)    Ht Readings from Last 1 Encounters:   10/05/20 1.702 m (5' 7\")      Wt Readings from Last 1 Encounters:   10/05/20 98.2 kg (216 lb 9.6 oz)    Estimated body mass index is 33.92 kg/m  as calculated from the following:    Height as of 10/5/20: 1.702 m (5' 7\").    Weight as of 10/5/20: 98.2 kg (216 lb 9.6 oz).       Anesthesia Plan      History & Physical Review  History and physical reviewed and following examination; no interval change.    ASA Status:  3 .    NPO Status:  > 8 hours    Plan for MAC with Intravenous and Propofol induction. Maintenance will be TIVA.  Reason for MAC:  Deep or markedly invasive procedure (G8)           Postoperative Care  Postoperative pain management:  Oral pain medications.      Consents  Anesthetic plan, risks, benefits and alternatives discussed with:  Patient..                 MARIA D Madison CRNA  "

## 2020-10-22 NOTE — OP NOTE
CHIEF COMPLAINT: Low back and buttock pain  PROCEDURE:  Interlaminar epidural steroid injection using fluoroscopic guidance with contrast dye.   PROCEDURE DETAILS: After written informed consent was obtained from the patient, the patient was escorted to the procedure room.  The patient was placed in the prone position.  A  time out  was conducted to verify patient identity, procedure to be performed, side, site, allergies and any special requirements.  The skin over the neck and upper back region were prepped and draped in normal sterile fashion. Fluoroscopy was used to identify the interspace in an AP view and the skin was anesthetized with 2 mL of 1% lidocaine with bicarbonate buffer.  A 20-gauge 3-1/2 inch Tuohy needle was advanced using the loss of resistance technique with preservative free normal saline with fluoroscopic guidance.  Initially after injecting contrast I had significant venous uptake.  The needle was removed and several different angles were attempted but each time I got into her epidural space there was vascular uptake.  I then moved down to his L4-5 epidural interspace and again I had vascular uptake.  I then moved up to the L2-3 level.  At this level I achieved a epidurogram but there was primarily flow above the spinal stenosis level.  I felt that this was the best that I could achieve given that at each level at L3-4 and L4-5 I continue to have vascular uptake and he has been anticoagulated.  I did not want a risk any more chance of a epidural hematoma therefore I did inject at this level.  I injected 5 cc of solution containing 40 mg of triamcinolone with 4 cc of preservative-free normal saline.  There was no evidence of any intravascular, intrathecal or intraneural injection and I did have good dispersal injected medication into his epidural space.  After injection of the medication, as the needle tip was withdrawn, it was flushed with local anesthetic.  The patient was monitored with  blood pressure and pulse oximetry machines with the assistance of an RN throughout the procedure.  The patient was alert and responsive to questions throughout the procedure.   The patient tolerated the procedure well and was observed in the post-procedural area.  The patient was dismissed without apparent complications.     BLOOD LOSS: <5 cc    DIAGNOSIS:  1.  Severe spinal stenosis L2-3 L3-4 L4-5 with a history of a laminectomy at the left L4-5 level  PLAN:  1. Performed a L2-3 interlaminar epidural steroid injection.   2. The patient was instructed to follow-up at the [place] [facility] if today's procedure is not helpful.  Unfortunately, his back is so severely degenerated in each area where I tried to inject at L3-4 and L4-5 there was vascular uptake therefore I cannot safely inject at these levels despite these being the most likely the major pain generators.  Raza Hughes MD  Diplomate of the American Board of Anesthesiology, Pain Medicine

## 2020-10-22 NOTE — DISCHARGE INSTRUCTIONS
Home Care Instructions                Procedure: Epidural injection or joint injection    Activity:    Rest today    Do not work today    Resume normal activity tomorrow    Pain:    You may experience soreness at the injection site for 1 to 3 days.    You may use an ice pack for 20 minutes every 2 hours for the first 24 hours    You may use a heating pad after the first 24 hours    You may use Tylenol  (acetaminophen) every 4 hours or other pain medicines as directed by your physician    Safety  Sedation medicine, if given may remain active for many hours.    It is important for the next 24 hours that you do not:    Drive a car    Operate machines or power tools    Consume alcohol, including beer    Sign any important papers or legal documents    You may experience numbness radiating into your legs or arms, (depending on the procedure location)  This numbness may last several hours.  Until the numb sensation returns to normal please use caution in walking, climbing stairs, stepping out of your vehicle, etc.    Common side effects of steroids:  Not everyone will experience corticosteroid side effects. If side effects are experienced they will gradually subside in the 7-10 day period following an injection.    Most common side effects include:    Flushed face and/or chest    Feeling of warmth, particularly in face but could be overall feeling of warmth    Increased blood sugar in diabetic patients    Menstrual irregularities may occur.  If taking hormone based birth control an alternate method of birth control is recommended    Sleep disturbances and/or mood swings are possible    Leg cramps    Please contact us if you have:  Severe pain   Fever more than 101.5 degrees Fahrenheit  Signs of infection (redness, swelling or drainage)      If you have questions during normal business hours (8am-5pm Monday-Friday) contact the [facility] at [phone]. If you need help after hours, we recommend that you go to a  hospital emergency room or dial 911.

## 2020-10-22 NOTE — ANESTHESIA CARE TRANSFER NOTE
Patient: Uli Champagne    Procedure(s):  INJECTION, SPINE, LUMBAR 2-3 bilateral, EPIDURAL    Diagnosis: Back pain [M54.9]  DDD (degenerative disc disease), lumbar [M51.36]  Diagnosis Additional Information: No value filed.    Anesthesia Type:   MAC     Note:  Airway :Room Air  Patient transferred to:Phase II  Handoff Report: Identifed the Patient, Identified the Reponsible Provider, Reviewed the pertinent medical history, Discussed the surgical course, Reviewed Intra-OP anesthesia mangement and issues during anesthesia, Set expectations for post-procedure period and Allowed opportunity for questions and acknowledgement of understanding      Vitals: (Last set prior to Anesthesia Care Transfer)    CRNA VITALS  10/22/2020 1018 - 10/22/2020 1051      10/22/2020             Resp Rate (observed):  11                Electronically Signed By: MARIA D Madison CRNA  October 22, 2020  10:51 AM

## 2020-10-22 NOTE — ANESTHESIA POSTPROCEDURE EVALUATION
Patient: Uli Champagne    Procedure(s):  INJECTION, SPINE, LUMBAR 2-3 bilateral, EPIDURAL    Diagnosis:Back pain [M54.9]  DDD (degenerative disc disease), lumbar [M51.36]  Diagnosis Additional Information: No value filed.    Anesthesia Type:  MAC    Note:  Anesthesia Post Evaluation    Patient location during evaluation: Phase 2  Patient participation: Able to fully participate in evaluation  Level of consciousness: awake and alert  Pain management: adequate  Airway patency: patent  Cardiovascular status: blood pressure returned to baseline  Respiratory status: spontaneous ventilation and room air  Hydration status: balanced  PONV: none     Anesthetic complications: None    Comments: Patient was very pleased with his anesthesia today. No concerns.         Last vitals:  Vitals:    10/22/20 0919 10/22/20 1050 10/22/20 1100   BP: (!) 156/73 (!) 85/50 99/58   Pulse:  (!) 48 52   Resp: 20 20 20   Temp: 97.7  F (36.5  C)     SpO2:  91% 95%         Electronically Signed By: MARIA D Madison CRNA  October 22, 2020  11:45 AM

## 2020-10-23 NOTE — TELEPHONE ENCOUNTER
Prescription approved per [facility] Refill Protocol.    ANGEL RandolphN, RN  [facility]

## 2020-10-26 NOTE — TELEPHONE ENCOUNTER
Reason for Call:  Other prescription    Detailed comments: Requesting a refill of chlorthalidone (HYGROTON) 50 MG tablet.    [facility]    Phone Number Patient can be reached at: Cell number on file:    Telephone Information:   Mobile [phone]       Best Time: any    Can we leave a detailed message on this number? YES    Call taken on 10/26/2020 at 3:32 PM by Mahsa Crowe

## 2020-10-27 NOTE — TELEPHONE ENCOUNTER
Prescription approved per [facility] Refill Protocol.    ANGEL RandolphN, RN  [facility]

## 2020-11-12 PROBLEM — E66.01 MORBID OBESITY (H): Status: ACTIVE | Noted: 2020-01-01

## 2020-11-12 NOTE — PROGRESS NOTES
"   Answers for HPI/ROS submitted by the patient on 11/12/2020   Annual Exam:  In general, how would you rate your overall physical health?: good  Frequency of exercise:: None  Do you usually eat at least 4 servings of fruit and vegetables a day, include whole grains & fiber, and avoid regularly eating high fat or \"junk\" foods? : Yes  Taking medications regularly:: Yes  Medication side effects:: None  Activities of Daily Living: no assistance needed  Home safety: no safety concerns identified  Hearing Impairment:: need to ask people to speak up or repeat themselves  In the past 6 months, have you been bothered by leaking of urine?: Yes  abdominal pain: No  Blood in stool: No  Blood in urine: No  chest pain: No  chills: No  congestion: No  constipation: No  cough: No  diarrhea: No  dizziness: No  ear pain: No  eye pain: No  nervous/anxious: No  fever: No  frequency: Yes  genital sores: No  headaches: No  hearing loss: Yes  heartburn: No  arthralgias: Yes  joint swelling: No  peripheral edema: No  mood changes: No  myalgias: Yes  nausea: No  dysuria: No  palpitations: No  Skin sensation changes: No  sore throat: No  urgency: Yes  rash: No  shortness of breath: No  pelvic pain: No  vaginal bleeding: No  vaginal discharge: No  tenderness: No  breast mass: No  breast discharge: No  impotence: No  penile discharge: No  In general, how would you rate your overall mental or emotional health?: excellent  Additional concerns today:: No  SUBJECTIVE:   Uli Champagne is a 80 year old adult who presents for Preventive Visit.      Patient has been advised of split billing requirements and indicates understanding: No  Are you in the first 12 months of your Medicare Part B coverage?  No    Physical Health:    In general, how would you rate your overall physical health? good    Outside of work, how many days during the week do you exercise? none    Outside of work, approximately how many minutes a day do you exercise?not " "applicable    If you drink alcohol do you typically have >3 drinks per day or >7 drinks per week? No    Do you usually eat at least 4 servings of fruit and vegetables a day, include whole grains & fiber and avoid regularly eating high fat or \"junk\" foods? Yes    Do you have any problems taking medications regularly?  No    Do you have any side effects from medications? none    Needs assistance for the following daily activities: no assistance needed    Which of the following safety concerns are present in your home?  none identified     Hearing impairment: Yes,     In the past 6 months, have you been bothered by leaking of urine? yes    Mental Health:    In general, how would you rate your overall mental or emotional health? excellent  PHQ-2 Score: (P) 0    Do you feel safe in your environment? Yes    Have you ever done Advance Care Planning? (For example, a Health Directive, POLST, or a discussion with a medical provider or your loved ones about your wishes): Yes, advance care planning is on file.    Additional concerns to address?  No    Fall risk:  Fallen 2 or more times in the past year?: No  Any fall with injury in the past year?: No  click delete button to remove this line now  Cognitive Screenin) Repeat 3 items (Leader, Season, Table)    2) Clock draw: NORMAL  3) 3 item recall: Recalls 3 object   Results: NORMAL clock, 1-2 items recalled: COGNITIVE IMPAIRMENT LESS LIKELY    Mini-CogTM Copyright JOSEPH Gutierrez. Licensed by the author for use in [facility]; reprinted with permission (kecia@.[facility]). All rights reserved.      Do you have sleep apnea, excessive snoring or daytime drowsiness?: no            Reviewed and updated as needed this visit by clinical staff  Tobacco  Allergies  Meds      Soc Hx        Reviewed and updated as needed this visit by Provider                Social History     Tobacco Use     Smoking status: Former Smoker     Packs/day: 0.50     Years: 2.00     Pack years: 1.00 "     Types: Cigarettes     Quit date: 1960     Years since quittin.9     Smokeless tobacco: Never Used   Substance Use Topics     Alcohol use: Yes     Comment: socially                           Current providers sharing in care for this patient include:   Patient Care Team:  Isra Carrillo MD as PCP - General (Family Practice)  Dusty Carter PA-C as Assigned PCP  Yovani Ogden DO as Assigned Musculoskeletal Provider  Miguelito Wells MD as Assigned Heart and Vascular Provider    The following health maintenance items are reviewed in Epic and correct as of today:  Health Maintenance   Topic Date Due     MEDICARE ANNUAL WELLNESS VISIT  2020     INFLUENZA VACCINE (1) 2020     ALT  2021     LIPID  2021     FALL RISK ASSESSMENT  09/15/2021     BMP  10/05/2021     ADVANCE CARE PLANNING  01/10/2023     DTAP/TDAP/TD IMMUNIZATION (4 - Td) 2029     PHQ-2  Completed     Pneumococcal Vaccine: 65+ Years  Completed     ZOSTER IMMUNIZATION  Completed     Pneumococcal Vaccine: Pediatrics (0 to 5 Years) and At-Risk Patients (6 to 64 Years)  Aged Out     IPV IMMUNIZATION  Aged Out     MENINGITIS IMMUNIZATION  Aged Out     HEPATITIS B IMMUNIZATION  Aged Out     Labs reviewed in EPIC  BP Readings from Last 3 Encounters:   20 132/74   10/22/20 99/58   10/05/20 130/80    Wt Readings from Last 3 Encounters:   20 104.8 kg (231 lb)   10/05/20 98.2 kg (216 lb 9.6 oz)   20 99.3 kg (219 lb)                  Patient Active Problem List   Diagnosis     Cardiac dysrhythmia     Essential hypertension, benign     Neurogenic bladder     Prostate cancer (H)     Permanent atrial fibrillation (H)     Chronic idiopathic gout of multiple sites     HYPERLIPIDEMIA LDL GOAL <100     CAD (coronary artery disease)     Advanced directives, counseling/discussion     Health Care Home     Stye     Non morbid obesity due to excess calories     Lumbar spinal stenosis     S/P lumbar  laminectomy     Chronic a-fib (H)     Essential hypertension     Hordeolum externum left upper eyelid     Long-term (current) use of anticoagulants [Z79.01]     Tear of medial meniscus of left knee, initial encounter     Primary osteoarthritis of left knee     Bradycardia     Abdominal aortic aneurysm (AAA) without rupture (H)     Adenocarcinoma of prostate (H)     Postsurgical aortocoronary bypass status     History of CVA (cerebrovascular accident)     Bilateral carpal tunnel syndrome     Right hip pain     Traumatic bursitis     Primary osteoarthritis of right hip     Left carpal tunnel syndrome     Primary osteoarthritis of right shoulder     Morbid obesity (H)     Past Surgical History:   Procedure Laterality Date     C CABG, VEIN, THREE       C REMV PROSTATE,RETROPUB,RAD,LTD NODES  2008    Radical retropubic prostatectomy and pelvic lymph node dissection.     HC REMOVAL OF TONSILS,10+ Y/O      Tonsils 10+y.o.     HEMILAMINECTOMY, DISCECTOMY LUMBAR TWO LEVELS, COMBINED Left 2015    Procedure: COMBINED HEMILAMINECTOMY, DISCECTOMY LUMBAR TWO LEVELS;  Surgeon: Jung Finley MD;  Location: [facility] OR     INJECT EPIDURAL LUMBAR N/A 10/22/2020    Procedure: INJECTION, SPINE, LUMBAR 2-3 bilateral, EPIDURAL;  Surgeon: Raza Hughes MD;  Location: [facility] OR     RELEASE CARPAL TUNNEL Right 2019    Procedure: RELEASE CARPAL TUNNEL-RIGHT;  Surgeon: Yovani Ogden DO;  Location: [facility] OR     STENT, CORONARY, ALEAH  2002    x5     ZZC NONSPECIFIC PROCEDURE      Bone fusion in neck       Social History     Tobacco Use     Smoking status: Former Smoker     Packs/day: 0.50     Years: 2.00     Pack years: 1.00     Types: Cigarettes     Quit date: 1960     Years since quittin.9     Smokeless tobacco: Never Used   Substance Use Topics     Alcohol use: Yes     Comment: socially     Family History   Problem Relation Age of Onset     Arthritis Father      Heart Disease Father       Hypertension Brother      Cancer Brother         liver     Heart Disease Sister      Heart Disease Brother          Current Outpatient Medications   Medication Sig Dispense Refill     allopurinol (ZYLOPRIM) 300 MG tablet TAKE 1 TABLET DAILY FOR GOUT 90 tablet 3     ASPIRIN 81 MG OR TABS 1 TABLET DAILY       chlorthalidone (HYGROTON) 50 MG tablet Take 1 tablet (50 mg) by mouth daily 90 tablet 1     fish oil-omega-3 fatty acids (FISH OIL) 1000 MG capsule Take 1.2 g by mouth daily        lisinopril (ZESTRIL) 10 MG tablet Take 1 tablet (10 mg) by mouth daily 90 tablet 3     multivitamin w/minerals (THERA-VIT-M) tablet Take 1 tablet by mouth daily       olmesartan (BENICAR) 40 MG tablet Take 1 tablet (40 mg) by mouth daily 90 tablet 3     simvastatin (ZOCOR) 40 MG tablet TAKE 1 TABLET AT BEDTIME 90 tablet 1     warfarin ANTICOAGULANT (COUMADIN) 5 MG tablet Take 2.5 mg on Tues, Sat and 5 mg all other days, or as directed by the [facility]mad[facility] Clinic. 75 tablet 2     nitroGLYcerin (NITROSTAT) 0.4 MG sublingual tablet Place 1 tablet (0.4 mg) under the tongue See Admin Instructions for chest pain (Patient not taking: Reported on 11/12/2020) 25 tablet 1     Allergies   Allergen Reactions     No Known Drug Allergies      Recent Labs   Lab Test 10/05/20  1202 07/24/20  0908 05/14/19  0000 05/14/19 04/12/19  0929 08/31/17  1323 08/31/17  1323 05/20/15  0000 05/20/15   LDL  --  42  --  45.0 33   < > 39   < > 49.0  49   HDL  --  51  --  42 48   < > 43   < > 35  35   TRIG  --  110  --  74 74   < > 92   < > 120  120   ALT  --  25  --  20 26   < > 27   < > 28  28   CR 1.00 1.03   < > 0.9 0.89   < > 0.92   < > 0.9  0.9   GFRESTIMATED 70 67   < > >60 81   < > 80   < > >60   GFRESTBLACK 81 78   < >  --  >90   < > >90   < >  --    POTASSIUM 3.8 3.8   < > 3.9 4.0   < > 4.0   < > 4.2  4.2   TSH  --   --   --   --   --   --  2.31  --  2.50    < > = values in this interval not displayed.          ROS:  CONSTITUTIONAL: NEGATIVE for fever,  "chills, change in weight  ENT/MOUTH: NEGATIVE for ear, mouth and throat problems  RESP: NEGATIVE for significant cough or SOB  CV: NEGATIVE for chest pain, palpitations or peripheral edema  MUSCULOSKELETAL: POSITIVE  for back pain working with [facility]  ROS otherwise negative    OBJECTIVE:   /74   Pulse 63   Temp 97.8  F (36.6  C) (Temporal)   Resp 14   Wt 104.8 kg (231 lb)   SpO2 98%   BMI 36.18 kg/m   Estimated body mass index is 36.18 kg/m  as calculated from the following:    Height as of 10/5/20: 1.702 m (5' 7\").    Weight as of this encounter: 104.8 kg (231 lb).  EXAM:   GENERAL: healthy, alert, no distress and over weight  EYES: Eyes grossly normal to inspection, PERRL and conjunctivae and sclerae normal  HENT: ear canals and TM's normal, nose and mouth without ulcers or lesions  NECK: no adenopathy, no asymmetry, masses, or scars, thyroid normal to palpation, no carotid bruits and diminished bilateral carotid upstrokes  RESP: lungs clear to auscultation - no rales, rhonchi or wheezes  CV: irregularly irregular rhythm, normal S1 S2, no S3 or S4, no murmur, click or rub, peripheral pulses strong and no peripheral edema  ABDOMEN: soft, nontender, no hepatosplenomegaly, no masses and bowel sounds normal  MS: no gross musculoskeletal defects noted, no edema  LYMPH: no cervical, supraclavicular, axillary, or inguinal adenopathy    Diagnostic Test Results:  Labs reviewed in Epic    ASSESSMENT / PLAN:       ICD-10-CM    1. Encounter for Medicare annual wellness exam  Z00.00    2. Need for prophylactic vaccination and inoculation against influenza  Z23 FLUZONE HIGH DOSE 65+  [66965]   3. Morbid obesity (H)  E66.01        Patient has been advised of split billing requirements and indicates understanding: Yes    COUNSELING:  Reviewed preventive health counseling, as reflected in patient instructions       Regular exercise       Healthy diet/nutrition       Vision screening       Hearing screening       " "Dental care       Fall risk prevention       Immunizations    Vaccinated for: Influenza          Estimated body mass index is 36.18 kg/m  as calculated from the following:    Height as of 10/5/20: 1.702 m (5' 7\").    Weight as of this encounter: 104.8 kg (231 lb).    Weight management plan: Discussed healthy diet and exercise guidelines    He reports that he quit smoking about 60 years ago. His smoking use included cigarettes. He has a 1.00 pack-year smoking history. He has never used smokeless tobacco.    Appropriate preventive services were discussed with this patient, including applicable screening as appropriate for cardiovascular disease, diabetes, osteopenia/osteoporosis, and glaucoma.  As appropriate for age/gender, discussed screening for colorectal cancer, prostate cancer, breast cancer, and cervical cancer. Checklist reviewing preventive services available has been given to the patient.    Reviewed patients plan of care and provided an AVS. The Basic Care Plan (routine screening as documented in Health Maintenance) for Uli meets the Care Plan requirement. This Care Plan has been established and reviewed with the Patient.    Counseling Resources:  ATP IV Guidelines  Pooled Cohorts Equation Calculator  Breast Cancer Risk Calculator  BRCA-Related Cancer Risk Assessment: FHS-7 Tool  FRAX Risk Assessment  ICSI Preventive Guidelines  Dietary Guidelines for Americans, 2010  USDA's MyPlate  ASA Prophylaxis  Lung CA Screening    Isra Carrillo MD  [facility]  "

## 2020-11-12 NOTE — PATIENT INSTRUCTIONS
Patient Education   Personalized Prevention Plan  You are due for the preventive services outlined below.  Your care team is available to assist you in scheduling these services.  If you have already completed any of these items, please share that information with your care team to update in your medical record.  Health Maintenance Due   Topic Date Due     Annual Wellness Visit  08/30/2020     Flu Vaccine (1) 09/01/2020        Patient Education   Personalized Prevention Plan  You are due for the preventive services outlined below.  Your care team is available to assist you in scheduling these services.  If you have already completed any of these items, please share that information with your care team to update in your medical record.  Health Maintenance Due   Topic Date Due     Flu Vaccine (1) 09/01/2020     Preventive Health Recommendations  See your health care provider every year to    Review health changes.     Discuss preventive care.      Review your medicines if your doctor has prescribed any.    Talk with your health care provider about whether you should have a test to screen for prostate cancer (PSA).    Every 3 years, have a diabetes test (fasting glucose). If you are at risk for diabetes, you should have this test more often.    Every 5 years, have a cholesterol test. Have this test more often if you are at risk for high cholesterol or heart disease.     Every 10 years, have a colonoscopy. Or, have a yearly FIT test (stool test). These exams will check for colon cancer.    Talk to with your health care provider about screening for Abdominal Aortic Aneurysm if you have a family history of AAA or have a history of smoking.    Shots:     Get a flu shot each year.     Get a tetanus shot every 10 years.     Talk to your doctor about your pneumonia vaccines. There are now two you should receive - Pneumovax (PPSV 23) and Prevnar (PCV 13).    Talk to your pharmacist about a shingles vaccine.     Talk to your  doctor about the hepatitis B vaccine.    Nutrition:     Eat at least 5 servings of fruits and vegetables each day.     Eat whole-grain bread, whole-wheat pasta and brown rice instead of white grains and rice.     Get adequate Calcium and Vitamin D.     Lifestyle    Exercise for at least 150 minutes a week (30 minutes a day, 5 days a week). This will help you control your weight and prevent disease.     Limit alcohol to one drink per day.     No smoking.     Wear sunscreen to prevent skin cancer.     See your dentist every six months for an exam and cleaning.     See your eye doctor every 1 to 2 years to screen for conditions such as glaucoma, macular degeneration and cataracts.    Personalized Prevention Plan  You are due for the preventive services outlined below.  Your care team is available to assist you in scheduling these services.  If you have already completed any of these items, please share that information with your care team to update in your medical record.  Health Maintenance   Topic Date Due     INFLUENZA VACCINE (1) 09/01/2020     ALT  07/24/2021     LIPID  07/24/2021     FALL RISK ASSESSMENT  09/15/2021     BMP  10/05/2021     MEDICARE ANNUAL WELLNESS VISIT  11/12/2021     ADVANCE CARE PLANNING  01/10/2023     DTAP/TDAP/TD IMMUNIZATION (4 - Td) 04/12/2029     PHQ-2  Completed     Pneumococcal Vaccine: 65+ Years  Completed     ZOSTER IMMUNIZATION  Completed     Pneumococcal Vaccine: Pediatrics (0 to 5 Years) and At-Risk Patients (6 to 64 Years)  Aged Out     IPV IMMUNIZATION  Aged Out     MENINGITIS IMMUNIZATION  Aged Out     HEPATITIS B IMMUNIZATION  Aged Out

## 2021-01-01 ENCOUNTER — TELEPHONE (OUTPATIENT)
Dept: CARDIOLOGY | Facility: CLINIC | Age: 81
End: 2021-01-01

## 2021-01-01 ENCOUNTER — TELEPHONE (OUTPATIENT)
Dept: ANTICOAGULATION | Facility: CLINIC | Age: 81
End: 2021-01-01

## 2021-01-01 ENCOUNTER — HOSPITAL ENCOUNTER (OUTPATIENT)
Dept: PHYSICAL THERAPY | Facility: CLINIC | Age: 81
Setting detail: THERAPIES SERIES
End: 2021-10-08
Attending: PHYSICAL MEDICINE & REHABILITATION
Payer: COMMERCIAL

## 2021-01-01 ENCOUNTER — HOSPITAL ENCOUNTER (OUTPATIENT)
Dept: PHYSICAL THERAPY | Facility: CLINIC | Age: 81
Setting detail: THERAPIES SERIES
End: 2021-10-15
Attending: PHYSICAL MEDICINE & REHABILITATION
Payer: COMMERCIAL

## 2021-01-01 ENCOUNTER — DOCUMENTATION ONLY (OUTPATIENT)
Dept: OTHER | Facility: CLINIC | Age: 81
End: 2021-01-01

## 2021-01-01 ENCOUNTER — OFFICE VISIT (OUTPATIENT)
Dept: CARDIOLOGY | Facility: CLINIC | Age: 81
End: 2021-01-01
Attending: INTERNAL MEDICINE
Payer: COMMERCIAL

## 2021-01-01 ENCOUNTER — LAB (OUTPATIENT)
Dept: LAB | Facility: CLINIC | Age: 81
End: 2021-01-01
Payer: COMMERCIAL

## 2021-01-01 ENCOUNTER — HOSPITAL ENCOUNTER (OUTPATIENT)
Dept: PHYSICAL THERAPY | Facility: CLINIC | Age: 81
Setting detail: THERAPIES SERIES
End: 2021-10-12
Attending: PHYSICAL MEDICINE & REHABILITATION
Payer: COMMERCIAL

## 2021-01-01 ENCOUNTER — ANTICOAGULATION THERAPY VISIT (OUTPATIENT)
Dept: ANTICOAGULATION | Facility: CLINIC | Age: 81
End: 2021-01-01

## 2021-01-01 ENCOUNTER — OFFICE VISIT (OUTPATIENT)
Dept: ORTHOPEDICS | Facility: CLINIC | Age: 81
End: 2021-01-01
Payer: COMMERCIAL

## 2021-01-01 ENCOUNTER — HOSPITAL ENCOUNTER (OUTPATIENT)
Dept: PHYSICAL THERAPY | Facility: CLINIC | Age: 81
Setting detail: THERAPIES SERIES
End: 2021-10-01
Attending: PHYSICAL MEDICINE & REHABILITATION
Payer: COMMERCIAL

## 2021-01-01 ENCOUNTER — HOSPITAL ENCOUNTER (OUTPATIENT)
Dept: PHYSICAL THERAPY | Facility: CLINIC | Age: 81
Setting detail: THERAPIES SERIES
End: 2021-09-23
Attending: FAMILY MEDICINE
Payer: COMMERCIAL

## 2021-01-01 ENCOUNTER — OFFICE VISIT (OUTPATIENT)
Dept: FAMILY MEDICINE | Facility: CLINIC | Age: 81
End: 2021-01-01
Payer: COMMERCIAL

## 2021-01-01 ENCOUNTER — HOSPITAL ENCOUNTER (OUTPATIENT)
Dept: CARDIOLOGY | Facility: CLINIC | Age: 81
Discharge: HOME | End: 2021-08-09
Attending: INTERNAL MEDICINE | Admitting: INTERNAL MEDICINE
Payer: COMMERCIAL

## 2021-01-01 ENCOUNTER — OFFICE VISIT (OUTPATIENT)
Dept: LAB | Facility: CLINIC | Age: 81
End: 2021-01-01
Payer: COMMERCIAL

## 2021-01-01 ENCOUNTER — HOSPITAL ENCOUNTER (OUTPATIENT)
Dept: PHYSICAL THERAPY | Facility: CLINIC | Age: 81
Setting detail: THERAPIES SERIES
End: 2021-10-05
Attending: PHYSICAL MEDICINE & REHABILITATION
Payer: COMMERCIAL

## 2021-01-01 ENCOUNTER — TELEPHONE (OUTPATIENT)
Dept: FAMILY MEDICINE | Facility: CLINIC | Age: 81
End: 2021-01-01

## 2021-01-01 ENCOUNTER — HOSPITAL ENCOUNTER (OUTPATIENT)
Dept: PHYSICAL THERAPY | Facility: CLINIC | Age: 81
Setting detail: THERAPIES SERIES
End: 2021-09-29
Attending: PHYSICAL MEDICINE & REHABILITATION
Payer: COMMERCIAL

## 2021-01-01 VITALS
DIASTOLIC BLOOD PRESSURE: 72 MMHG | HEIGHT: 67 IN | BODY MASS INDEX: 35.12 KG/M2 | WEIGHT: 223.8 LBS | SYSTOLIC BLOOD PRESSURE: 142 MMHG | OXYGEN SATURATION: 96 % | HEART RATE: 45 BPM

## 2021-01-01 VITALS
TEMPERATURE: 98.4 F | BODY MASS INDEX: 34.64 KG/M2 | WEIGHT: 221.2 LBS | HEART RATE: 73 BPM | SYSTOLIC BLOOD PRESSURE: 130 MMHG | OXYGEN SATURATION: 99 % | RESPIRATION RATE: 18 BRPM | DIASTOLIC BLOOD PRESSURE: 72 MMHG

## 2021-01-01 VITALS
SYSTOLIC BLOOD PRESSURE: 137 MMHG | DIASTOLIC BLOOD PRESSURE: 78 MMHG | HEART RATE: 47 BPM | WEIGHT: 223 LBS | BODY MASS INDEX: 34.93 KG/M2

## 2021-01-01 DIAGNOSIS — Z79.01 LONG TERM CURRENT USE OF ANTICOAGULANT THERAPY: ICD-10-CM

## 2021-01-01 DIAGNOSIS — Z86.73 HISTORY OF CVA (CEREBROVASCULAR ACCIDENT): ICD-10-CM

## 2021-01-01 DIAGNOSIS — I48.20 CHRONIC A-FIB (H): ICD-10-CM

## 2021-01-01 DIAGNOSIS — Z85.46 PERSONAL HISTORY OF PROSTATE CANCER: ICD-10-CM

## 2021-01-01 DIAGNOSIS — G89.29 CHRONIC BILATERAL LOW BACK PAIN WITHOUT SCIATICA: Primary | ICD-10-CM

## 2021-01-01 DIAGNOSIS — R00.1 BRADYCARDIA: ICD-10-CM

## 2021-01-01 DIAGNOSIS — I10 ESSENTIAL HYPERTENSION, BENIGN: ICD-10-CM

## 2021-01-01 DIAGNOSIS — E66.01 MORBID OBESITY (H): ICD-10-CM

## 2021-01-01 DIAGNOSIS — I48.21 PERMANENT ATRIAL FIBRILLATION (H): Chronic | ICD-10-CM

## 2021-01-01 DIAGNOSIS — R26.2 DIFFICULTY WALKING: ICD-10-CM

## 2021-01-01 DIAGNOSIS — Z79.01 LONG TERM CURRENT USE OF ANTICOAGULANT THERAPY: Primary | ICD-10-CM

## 2021-01-01 DIAGNOSIS — I10 ESSENTIAL HYPERTENSION: ICD-10-CM

## 2021-01-01 DIAGNOSIS — M51.369 LUMBAR DEGENERATIVE DISC DISEASE: ICD-10-CM

## 2021-01-01 DIAGNOSIS — I10 ESSENTIAL HYPERTENSION, BENIGN: Primary | ICD-10-CM

## 2021-01-01 DIAGNOSIS — E78.5 HYPERLIPIDEMIA LDL GOAL <100: Primary | ICD-10-CM

## 2021-01-01 DIAGNOSIS — M54.50 CHRONIC BILATERAL LOW BACK PAIN WITHOUT SCIATICA: ICD-10-CM

## 2021-01-01 DIAGNOSIS — I25.10 CORONARY ARTERY DISEASE INVOLVING NATIVE HEART WITHOUT ANGINA PECTORIS, UNSPECIFIED VESSEL OR LESION TYPE: ICD-10-CM

## 2021-01-01 DIAGNOSIS — I73.9 PERIPHERAL VASCULAR DISEASE (H): ICD-10-CM

## 2021-01-01 DIAGNOSIS — G89.29 CHRONIC BILATERAL LOW BACK PAIN WITHOUT SCIATICA: ICD-10-CM

## 2021-01-01 DIAGNOSIS — M54.50 CHRONIC BILATERAL LOW BACK PAIN WITHOUT SCIATICA: Primary | ICD-10-CM

## 2021-01-01 DIAGNOSIS — E78.5 HYPERLIPIDEMIA LDL GOAL <100: ICD-10-CM

## 2021-01-01 LAB
ANION GAP SERPL CALCULATED.3IONS-SCNC: 4 MMOL/L (ref 3–14)
BUN SERPL-MCNC: 22 MG/DL (ref 7–30)
CALCIUM SERPL-MCNC: 10 MG/DL (ref 8.5–10.1)
CAPILLARY BLOOD COLLECTION: NORMAL
CHLORIDE BLD-SCNC: 106 MMOL/L (ref 94–109)
CHOLEST SERPL-MCNC: 119 MG/DL
CO2 SERPL-SCNC: 30 MMOL/L (ref 20–32)
CREAT SERPL-MCNC: 0.95 MG/DL (ref 0.52–1.25)
FASTING STATUS PATIENT QL REPORTED: YES
GFR SERPL CREATININE-BSD FRML MDRD: 74 ML/MIN/1.73M2
GLUCOSE BLD-MCNC: 97 MG/DL (ref 70–99)
HDLC SERPL-MCNC: 49 MG/DL
INR BLD: 1.7 (ref 0.9–1.1)
INR BLD: 2 (ref 0.9–1.1)
INR BLD: 2.3 (ref 0.86–1.14)
INR BLD: 2.3 (ref 0.86–1.14)
INR BLD: 2.6 (ref 0.9–1.1)
INR BLD: 2.7 (ref 0.86–1.14)
INR BLD: 2.7 (ref 0.86–1.14)
LDLC SERPL CALC-MCNC: 39 MG/DL
NONHDLC SERPL-MCNC: 70 MG/DL
POTASSIUM BLD-SCNC: 3.9 MMOL/L (ref 3.4–5.3)
SODIUM SERPL-SCNC: 140 MMOL/L (ref 133–144)
TRIGL SERPL-MCNC: 154 MG/DL

## 2021-01-01 PROCEDURE — 85610 PROTHROMBIN TIME: CPT | Performed by: FAMILY MEDICINE

## 2021-01-01 PROCEDURE — 97530 THERAPEUTIC ACTIVITIES: CPT | Mod: GP | Performed by: PHYSICAL THERAPIST

## 2021-01-01 PROCEDURE — 93010 ELECTROCARDIOGRAM REPORT: CPT | Performed by: INTERNAL MEDICINE

## 2021-01-01 PROCEDURE — 99214 OFFICE O/P EST MOD 30 MIN: CPT | Performed by: INTERNAL MEDICINE

## 2021-01-01 PROCEDURE — 36416 COLLJ CAPILLARY BLOOD SPEC: CPT

## 2021-01-01 PROCEDURE — 97110 THERAPEUTIC EXERCISES: CPT | Mod: GP | Performed by: PHYSICAL THERAPIST

## 2021-01-01 PROCEDURE — 99213 OFFICE O/P EST LOW 20 MIN: CPT | Performed by: PHYSICAL MEDICINE & REHABILITATION

## 2021-01-01 PROCEDURE — 93005 ELECTROCARDIOGRAM TRACING: CPT

## 2021-01-01 PROCEDURE — 36415 COLL VENOUS BLD VENIPUNCTURE: CPT | Performed by: FAMILY MEDICINE

## 2021-01-01 PROCEDURE — 85610 PROTHROMBIN TIME: CPT

## 2021-01-01 PROCEDURE — 36415 COLL VENOUS BLD VENIPUNCTURE: CPT

## 2021-01-01 PROCEDURE — 36416 COLLJ CAPILLARY BLOOD SPEC: CPT | Performed by: FAMILY MEDICINE

## 2021-01-01 PROCEDURE — 97112 NEUROMUSCULAR REEDUCATION: CPT | Mod: GP | Performed by: PHYSICAL THERAPIST

## 2021-01-01 PROCEDURE — 97161 PT EVAL LOW COMPLEX 20 MIN: CPT | Mod: GP | Performed by: PHYSICAL THERAPIST

## 2021-01-01 PROCEDURE — 99214 OFFICE O/P EST MOD 30 MIN: CPT | Performed by: FAMILY MEDICINE

## 2021-01-01 PROCEDURE — 80048 BASIC METABOLIC PNL TOTAL CA: CPT | Performed by: INTERNAL MEDICINE

## 2021-01-01 PROCEDURE — 80061 LIPID PANEL: CPT | Performed by: FAMILY MEDICINE

## 2021-01-01 PROCEDURE — 97116 GAIT TRAINING THERAPY: CPT | Mod: GP | Performed by: PHYSICAL THERAPIST

## 2021-01-01 RX ORDER — NITROGLYCERIN 0.4 MG/1
0.4 TABLET SUBLINGUAL SEE ADMIN INSTRUCTIONS
Qty: 25 TABLET | Refills: 1 | Status: SHIPPED | OUTPATIENT
Start: 2021-01-01

## 2021-01-01 RX ORDER — SIMVASTATIN 40 MG
20 TABLET ORAL AT BEDTIME
Qty: 90 TABLET | Refills: 1 | Status: SHIPPED | OUTPATIENT
Start: 2021-01-01

## 2021-01-01 RX ORDER — WARFARIN SODIUM 5 MG/1
TABLET ORAL
Qty: 80 TABLET | Refills: 0 | Status: SHIPPED | OUTPATIENT
Start: 2021-01-01 | End: 2021-01-01

## 2021-01-01 RX ORDER — OLMESARTAN MEDOXOMIL 40 MG/1
40 TABLET ORAL DAILY
Qty: 90 TABLET | Refills: 0 | Status: SHIPPED | OUTPATIENT
Start: 2021-01-01

## 2021-01-01 RX ORDER — WARFARIN SODIUM 5 MG/1
TABLET ORAL
Qty: 25 TABLET | Refills: 2 | Status: SHIPPED | OUTPATIENT
Start: 2021-01-01 | End: 2021-01-01

## 2021-01-01 RX ORDER — WARFARIN SODIUM 5 MG/1
TABLET ORAL
Qty: 80 TABLET | Refills: 1 | Status: SHIPPED | OUTPATIENT
Start: 2021-01-01

## 2021-01-01 RX ORDER — LISINOPRIL 10 MG/1
10 TABLET ORAL DAILY
Qty: 90 TABLET | Refills: 3 | Status: SHIPPED | OUTPATIENT
Start: 2021-01-01

## 2021-01-01 ASSESSMENT — PAIN SCALES - GENERAL: PAINLEVEL: NO PAIN (0)

## 2021-01-01 ASSESSMENT — MIFFLIN-ST. JEOR: SCORE: 1673.78

## 2021-01-18 NOTE — PROGRESS NOTES
ANTICOAGULATION MANAGEMENT     Patient Name:  Uli Champagne  Date:  2021    ASSESSMENT /SUBJECTIVE:    Today's INR result of 2.7 is therapeutic. Goal INR of 2.0-3.0      Warfarin dose taken: Warfarin taken as instructed    Diet: No new diet changes affecting INR    Medication changes/ interactions: No new medications/supplements affecting INR    Previous INR: Subtherapeutic     S/S of bleeding or thromboembolism: No    New injury or illness: No    Upcoming surgery, procedure or cardioversion: No    Additional findings: None      PLAN:    Detailed message left for Uli regarding INR result and instructed:     Warfarin Dosing Instructions: Continue your current warfarin dose 2.5 mg Tues, Sat and 5 mg all other days    Instructed patient to follow up no later than: 6 weeks  Left detailed message with recommended recheck date    Education provided: Please call back if any changes to your diet, medications or how you've been taking warfarin      I left a detailed voicemail with the orders below. I have also requested a call back if there have been any missed doses, concerns, illness, fever, or if there have been any changes in medications, activity level, or diet      Instructed to call the Anticoagulation Clinic for any changes, questions or concerns. (#[phone])        Rui Landers RN      OBJECTIVE:  Recent labs: (last 7 days)     21  1423   INR 2.7*         INR assessment THER    Recheck INR In: 6 WEEKS    INR Location Outside lab      Anticoagulation Summary  As of 2021    INR goal:  2.0-3.0   TTR:  61.0 % (1 y)   INR used for dosin.7 (2021)   Warfarin maintenance plan:  2.5 mg (5 mg x 0.5) every Tue, Sat; 5 mg (5 mg x 1) all other days   Full warfarin instructions:  2.5 mg every Tue, Sat; 5 mg all other days   Weekly warfarin total:  30 mg   No change documented:  Rui Landers RN   Plan last modified:  Mahsa Julien RN (3/22/2019)   Next INR check:  3/1/2021    Priority:  High   Target end date:  Indefinite    Indications    Long-term (current) use of anticoagulants [Z79.01] [Z79.01]  Chronic a-fib (H) [I48.20]             Anticoagulation Episode Summary     INR check location:      Preferred lab:      Send INR reminders to:  [facility]    Comments:  5 mg tab, AM dose, appt card        Anticoagulation Care Providers     Provider Role Specialty Phone number    Isra Carrillo MD Referring Family Medicine [phone]

## 2021-01-25 NOTE — TELEPHONE ENCOUNTER
"Patient called stating he checks his blood pressures at the same time every day right before bed around 11pm. He said the first blood pressure reading systolic will be around 150's but he will check a second and or third time and then the systolic will be in the 130's. He also mentioned that when he sits in a certain chair or position he notices his feet will get either red or bluish coloring. The discoloring doesn't last long if he repositions himself and then they will return to \"flesh color\". Patient just wanted to mention this to Dr. Wells. Patient will let us know if his status changes. He is due for an annual follow up in August of 2021.   "

## 2021-03-15 NOTE — TELEPHONE ENCOUNTER
Outreach 1    ANTICOAGULATION     Uli Champagne is overdue for INR check.      Left message for patient to call and schedule lab appointment as soon as possible. If returning call, please schedule.     Rui Landers RN

## 2021-03-16 NOTE — PROGRESS NOTES
Anticoagulation Management    Unable to reach pt today.    Today's INR result of 2.3 is therapeutic (goal INR of 2.0-3.0).  Result received from: Clinic Lab    Follow up required to confirm warfarin dose taken and assess for changes      Attempted to contact pt regarding INR of 2.3 from 3/16. VM left to call [facility] back to discuss - will try again later               Anticoagulation clinic to follow up    Mahsa Julien RN

## 2021-03-16 NOTE — PROGRESS NOTES
ANTICOAGULATION MANAGEMENT     Patient Name:  Uli Champagne  Date:  3/16/2021    ASSESSMENT /SUBJECTIVE:    Today's INR result of 2.3 is therapeutic. Goal INR of 2.0-3.0      Warfarin dose taken: Warfarin taken as instructed    Diet: No new diet changes affecting INR    Medication changes/ interactions: No new medications/supplements affecting INR    Previous INR: Therapeutic     S/S of bleeding or thromboembolism: No    New injury or illness: No    Upcoming surgery, procedure or cardioversion: No    Additional findings: None      PLAN:    Telephone call with Uli regarding INR result and instructed:     Warfarin Dosing Instructions: Continue your current warfarin dose 2.5 mg Tues, Sat and 5 mg ROW    Instructed patient to follow up no later than: 6 weeks  Lab visit scheduled    Education provided: None required      Pt verbalizes understanding and agrees to warfarin dosing plan.    Instructed to call the Anticoagulation Clinic for any changes, questions or concerns. (#[phone])        Mahsa Julien RN      OBJECTIVE:  Recent labs: (last 7 days)     21  1326   INR 2.3*         INR assessment THER    Recheck INR In: 6 WEEKS    INR Location Outside lab      Anticoagulation Summary  As of 3/16/2021    INR goal:  2.0-3.0   TTR:  67.5 % (1 y)   INR used for dosin.3 (3/16/2021)   Warfarin maintenance plan:  2.5 mg (5 mg x 0.5) every Tue, Sat; 5 mg (5 mg x 1) all other days   Full warfarin instructions:  2.5 mg every Tue, Sat; 5 mg all other days   Weekly warfarin total:  30 mg   No change documented:  Mahsa Julien RN   Plan last modified:  Mahsa Julien RN (3/22/2019)   Next INR check:  2021   Priority:  High   Target end date:  Indefinite    Indications    Long-term (current) use of anticoagulants [Z79.01] [Z79.01]  Chronic a-fib (H) [I48.20]             Anticoagulation Episode Summary     INR check location:      Preferred lab:      Send INR reminders to:  LAURA RODRIGUES  ANGELY    Comments:  5 mg tab, AM dose, appt card        Anticoagulation Care Providers     Provider Role Specialty Phone number    Isra Carrillo MD Referring Family Medicine [phone]

## 2021-03-30 NOTE — TELEPHONE ENCOUNTER
Allopurinol       Last Written Prescription Date: 8/18/16  Last Fill Quantity: 90, # refills: 0  Last Office Visit with [facility], [facility] or [facility] prescribing provider:  10/3/16        URIC ACID   Date Value Ref Range Status   02/03/2010 8.5 3.5 - 8.5 mg/dL Final   ]  CREATININE   Date Value Ref Range Status   05/10/2016 1.0 mg/dL Final   ]  WBC      7.6   11/27/2015  RBC     5.23   11/27/2015  HGB     14.7   12/29/2015  HCT     47.3   11/27/2015  No components found with this name: mct  MCV       90   11/27/2015  MCH     31.9   11/27/2015  MCHC     35.3   11/27/2015  RDW     13.7   11/27/2015  PLT      169   11/27/2015  AST       33   5/10/2016  ALT       28   5/10/2016    
Routing refill request to provider for review/approval because:  Labs not current:  Uric acid level and CBC    Mary Carty, RN  [facility]      
What Type Of Note Output Would You Prefer (Optional)?: Standard Output
How Severe Is Your Skin Lesion?: mild
Has Your Skin Lesion Been Treated?: not been treated
Is This A New Presentation, Or A Follow-Up?: Skin Lesions
Additional History: Pt c/o skin lesions on right cheek and upper lip for years. The lesion on right cheek is itchy, rough, raised, and bleeds for 1 week after picking at it. Pt states lesion on upper lip is asymptomatic. Pt denies hx or family hx of skin cancer

## 2021-04-05 NOTE — TELEPHONE ENCOUNTER
Patient is scheduled for an INR on 04/27/2021 and would like to have more than a one month supply sent in.     [facility].      Vale PUGH

## 2021-04-27 NOTE — PROGRESS NOTES
89F right leg cellulitis from PMD office.     Greg Us MD  01/14/20 6704     ANTICOAGULATION MANAGEMENT     Patient Name:  Uli Champagne  Date:  2021    ASSESSMENT /SUBJECTIVE:    Today's INR result of 2.7 is therapeutic. Goal INR of 2.0-3.0      Warfarin dose taken: Warfarin taken as instructed    Diet: No new diet changes affecting INR    Medication changes/ interactions: No new medications/supplements affecting INR    Previous INR: Therapeutic     S/S of bleeding or thromboembolism: No    New injury or illness: No    Upcoming surgery, procedure or cardioversion: No    Additional findings: None      PLAN:    Telephone call with Uli regarding INR result and instructed:     Warfarin Dosing Instructions: Continue your current warfarin dose 2.5 mg TU, SAT and 5 mg all other days    Instructed patient to follow up no later than: 6 weeks  Lab visit scheduled    Education provided: None required      Pt verbalizes understanding and agrees to warfarin dosing plan.    Instructed to call the Anticoagulation Clinic for any changes, questions or concerns. (#[phone])        Delfina Yeh RN      OBJECTIVE:  Recent labs: (last 7 days)     21  1325   INR 2.7*         INR assessment THER    Recheck INR In: 6 WEEKS    INR Location Outside lab      Anticoagulation Summary  As of 2021    INR goal:  2.0-3.0   TTR:  77.2 % (1 y)   INR used for dosin.7 (2021)   Warfarin maintenance plan:  2.5 mg (5 mg x 0.5) every Tue, Sat; 5 mg (5 mg x 1) all other days   Full warfarin instructions:  2.5 mg every Tue, Sat; 5 mg all other days   Weekly warfarin total:  30 mg   No change documented:  Delfina Yeh RN   Plan last modified:  Mahsa Julien RN (3/22/2019)   Next INR check:  2021   Priority:  High   Target end date:  Indefinite    Indications    Long-term (current) use of anticoagulants [Z79.01] [Z79.01]  Chronic a-fib (H) [I48.20]             Anticoagulation Episode Summary     INR check location:      Preferred lab:      Send INR reminders to:  LAURA RODRIGUES  ANGELY    Comments:  5 mg tab, AM dose, appt card        Anticoagulation Care Providers     Provider Role Specialty Phone number    Isra Carrillo MD Referring Family Medicine [phone]

## 2021-04-30 NOTE — TELEPHONE ENCOUNTER
[facility], Emergency Department    [address]    [place] [address]    Phone:  [phone]    Fax:  [phone]                                       Nevin Alvarado   MRN: [id number]    Department:  [facility], Emergency Department   Date of Visit:  1/4/2017           Patient Information     Date Of Birth          1993        Your diagnoses for this visit were:     Foreign body in anus and rectum, initial encounter        You were seen by Jolly Ravi MD.        Discharge Instructions       You have been seen in the ER for a rectal foreign body.  We have removed this.  You are likely to be sore tomorrow.  This is normal.  You may notice a small amount of bleeding which will be normal for a day or so.    You should increase your miralax to twice a day for the next 3-5 days to keep your stool very soft.     We recommend avoiding using any sex toys or foreign bodies in the rectum, as these can cause significant injury and some people even require surgery and colostomies to fix injury that is caused by this.    If you notice continuing bleeding, fever > 101 or severe pain, come back to the ER for a recheck.     If you have any ongoing rectal issues, you can certainly follow up in the colorectal clinic listed below.      Please make an appointment to follow up with [facility] (phone: [phone]).      Discharge References/Attachments     RECTAL FOREIGN BODY, REMOVED (ADULT) (ENGLISH)      Future Appointments        Provider Department Dept Phone Center    1/9/2017 11:30 AM Sandra Ramos MD [facility] [phone]       24 Hour Appointment Hotline       To make an appointment at any [facility], call [phone] ([phone]). If you don't have a family doctor or clinic, we will help you find one. [facility] are conveniently located to serve the needs of you and your family.             Review of your medicines     Reason for Call:  Medication or medication refill:    Do you use a [facility]?  Name of the pharmacy and phone number for the current request:  [facility] - [place], [place] - [address] [place] [address]    Name of the medication requested: warfarin ANTICOAGULANT (COUMADIN) 5 MG tablet    Other request: Pt is requesting a 90 day supply and not a one month supply.  He would like to see if his PCP can have that Rx switched to a 90 day supply.    Can we leave a detailed message on this number? YES    Phone number patient can be reached at: [phone]     Best Time: anytime    Call taken on 4/30/2021 at 11:22 AM by Rinku Marcum                   Our records show that you are taking the medicines listed below. If these are incorrect, please call your family doctor or clinic.        Dose / Directions Last dose taken    FAMOTIDINE PO   Dose:  20 mg        Take 20 mg by mouth 2 times daily   Refills:  0        FOLIC ACID PO   Dose:  5 mg        Take 5 mg by mouth daily   Refills:  0        ibuprofen 200 MG tablet   Commonly known as:  ADVIL/MOTRIN   Dose:  800 mg        Take 800 mg by mouth every 8 hours as needed for other (Headache)   Refills:  0        IMITREX PO   Dose:  50 mg        Take 50 mg by mouth   Refills:  0        metoclopramide 10 MG tablet   Commonly known as:  REGLAN   Dose:  10 mg   Quantity:  20 tablet        Take 1 tablet (10 mg) by mouth 3 times daily as needed (Nausea or Vomiting)   Refills:  0        ondansetron 4 MG tablet   Commonly known as:  ZOFRAN   Dose:  4 mg   Quantity:  8 tablet        Take 1 tablet (4 mg) by mouth every 6 hours   Refills:  0        ONDANSETRON PO   Dose:  4 mg        Take 4 mg by mouth   Refills:  0        polyethylene glycol powder   Commonly known as:  MIRALAX/GLYCOLAX   Dose:  17 g        Take 17 g by mouth daily   Refills:  0        PRISTIQ PO   Dose:  100 mg        Take 100 mg by mouth daily   Refills:  0        TOPAMAX PO   Dose:  50 mg        Take 50 mg by mouth 2 times daily Take topiramate 100 mg at bedtime; take topiramate 50 mg every morning   Refills:  0        TYLENOL PO   Dose:  1000 mg        Take 1,000 mg by mouth every 6 hours as needed   Refills:  0        VITAMIN D3 PO   Dose:  5000 Units        Take 5,000 Units by mouth daily   Refills:  0        ZANTAC PO        Take by mouth daily   Refills:  0                Procedures and tests performed during your visit     Basic metabolic panel    CBC with platelets differential    Pulse oximetry    XR Abdomen 1 View      Orders Needing Specimen Collection     None      Pending Results     No orders found for last 2 day(s).            Pending Culture  Results     No orders found for last 2 day(s).            Thank you for choosing [facility]       Thank you for choosing [facility] for your care. Our goal is always to provide you with excellent care. Hearing back from our patients is one way we can continue to improve our services. Please take a few minutes to complete the written survey that you may receive in the mail after you visit with us. Thank you!        [employer] Information     [facility] gives you secure access to your electronic health record. If you see a primary care provider, you can also send messages to your care team and make appointments. If you have questions, please call your primary care clinic.  If you do not have a primary care provider, please call [phone] and they will assist you.        Care EveryWhere ID     This is your Care EveryWhere ID. This could be used by other organizations to access your [facility] medical records  [id number]        After Visit Summary       This is your record. Keep this with you and show to your community pharmacist(s) and doctor(s) at your next visit.

## 2021-06-09 NOTE — PROGRESS NOTES
ANTICOAGULATION MANAGEMENT     Patient Name:  Uli Champagne  Date:  2021    ASSESSMENT /SUBJECTIVE:    Today's INR result of 2.3 is therapeutic. Goal INR of 2.0-3.0      Warfarin dose taken: Warfarin taken as instructed    Diet: No new diet changes affecting INR    Medication changes/ interactions: No new medications/supplements affecting INR    Previous INR: Therapeutic     S/S of bleeding or thromboembolism: No    New injury or illness: No    Upcoming surgery, procedure or cardioversion: No    Additional findings: None      PLAN:    Warfarin Dosing Instructions: Continue your current warfarin dose 2.5 mg Tues, Sat and 5 mg all other days    Instructed patient to follow up no later than: 6 weeks  Left detailed message with recommended recheck date    Education provided: Please call back if any changes to your diet, medications or how you've been taking warfarin    Detailed voice message left for Uli with dosing instructions and follow up date.     Instructed to call the Anticoagulation Clinic for any changes, questions or concerns. (#[phone])        Rui Landers RN      OBJECTIVE:  Recent labs: (last 7 days)     21  1150   INR 2.3*         No question data found.  Anticoagulation Summary  As of 2021    INR goal:  2.0-3.0   TTR:  77.2 % (1 y)   INR used for dosin.3 (2021)   Warfarin maintenance plan:  2.5 mg (5 mg x 0.5) every Tue, Sat; 5 mg (5 mg x 1) all other days   Full warfarin instructions:  2.5 mg every Tue, Sat; 5 mg all other days   Weekly warfarin total:  30 mg   No change documented:  Rui Landers RN   Plan last modified:  Mahsa Julien RN (3/22/2019)   Next INR check:  2021   Priority:  Maintenance   Target end date:  Indefinite    Indications    Long-term (current) use of anticoagulants [Z79.01] [Z79.01]  Chronic a-fib (H) [I48.20]             Anticoagulation Episode Summary     INR check location:      Preferred lab:      Send INR reminders to:   LAURA AU    Comments:  5 mg tab, AM dose, appt card        Anticoagulation Care Providers     Provider Role Specialty Phone number    Isra Carrillo MD Referring Family Medicine [phone]

## 2021-06-14 NOTE — TELEPHONE ENCOUNTER
Patient said he is having muscle soreness and he remembers you saying this could be from the simvastatin.  He did stop it for 10 days so far it is getting better.  He wants to know what you recommend.

## 2021-06-14 NOTE — TELEPHONE ENCOUNTER
After 2 weeks off the medication have patient restart Zocor 1/2 tablet daily for 2 weeks. If muscle aches return then discontinue and follow up in clinic.  Isra Carrillo MD

## 2021-07-01 NOTE — TELEPHONE ENCOUNTER
Received refill request for:   Olmesartan                             Last OV was:   8/10/20 Julián  Labs/EKG:  BMP 10/5/2020, added to INR draw on 7/19/21  F/U scheduled:   8/9/21 Julián  New script sent to:    [facility]      Suzan Chaves RN 3:43 PM 07/01/21

## 2021-07-21 NOTE — PROGRESS NOTES
ANTICOAGULATION MANAGEMENT     Uli Champagne 80 year old adult is on warfarin with therapeutic INR result. (Goal INR 2.0-3.0)    Recent labs: (last 7 days)     07/21/21  1145   INR 2.6*       ASSESSMENT     Source(s): Chart Review and Patient/Caregiver Call       Warfarin doses taken: Warfarin taken as instructed    Diet: No new diet changes identified    New illness, injury, or hospitalization: No    Medication/supplement changes: None noted    Signs or symptoms of bleeding or clotting: No    Previous INR: Therapeutic last 2(+) visits    Additional findings: None     PLAN     Recommended plan for no diet, medication or health factor changes affecting INR     Dosing Instructions: Continue your current warfarin dose with next INR in 6 weeks       Summary  As of 7/21/2021    Full warfarin instructions:  2.5 mg every Tue, Sat; 5 mg all other days   Next INR check:  9/1/2021             Telephone call with Uli who verbalizes understanding and agrees to plan and who agrees to plan and repeated back plan correctly    Lab visit scheduled    Education provided: None required    Plan made per [facility] anticoagulation protocol    Rui Landers RN  Anticoagulation Clinic  7/21/2021    _______________________________________________________________________     Anticoagulation Episode Summary     Current INR goal:  2.0-3.0   TTR:  77.2 % (1 y)   Target end date:  Indefinite   Send INR reminders to:  [facility]    Indications    Long-term (current) use of anticoagulants [Z79.01] [Z79.01]  Chronic a-fib (H) [I48.20]           Comments:  5 mg tab, AM dose, appt card           Anticoagulation Care Providers     Provider Role Specialty Phone number    Isra Carrillo MD Referring Family Medicine [phone]

## 2021-07-23 NOTE — PROGRESS NOTES
"    Assessment & Plan     Peripheral vascular disease (H)  Chronic, stable. No leg pain with ambulation. The current medical regimen is effective;  continue present plan and medications.   - Lipid panel reflex to direct LDL Fasting; Future  - Lipid panel reflex to direct LDL Fasting        Prostate cancer (H)  status post RRP in 2008.   PSA   Date Value Ref Range Status   08/31/2017 <0.01 0 - 4 ug/L Final     Comment:     Assay Method:  Chemiluminescence using Siemens Vista analyzer      Currently not active problem     Hyperlipidemia LDL goal <100  Chronic stable. He is not able to tolerate high intensity statin but tolerates moderate intensity. Recheck lipid panel. The current medical regimen is effective;  continue present plan and medications.   - Lipid panel reflex to direct LDL Fasting; Future  - simvastatin (ZOCOR) 40 MG tablet; Take 0.5 tablets (20 mg) by mouth At Bedtime  - Lipid panel reflex to direct LDL Fasting    Coronary artery disease involving native heart without angina pectoris, unspecified vessel or lesion type  Chronic, stable. No anginal symptoms. He has follow up with Dr. Wells in 2 weeks. The current medical regimen is effective;  continue present plan and medications.   - Lipid panel reflex to direct LDL Fasting; Future  - Lipid panel reflex to direct LDL Fasting    Permanent atrial fibrillation (H)  Chronic, stable. He has follow up with Dr. Wells in 2 weeks. The current medical regimen is effective;  continue present plan and medications.   - warfarin ANTICOAGULANT (COUMADIN) 5 MG tablet; Take 2.5 mg Tues, Sat and 5 mg all other days, or as directed by the [facility].    Ordering of each unique test  Prescription drug management    Morbid obesity (H)    BMI:   Estimated body mass index is 34.64 kg/m  as calculated from the following:    Height as of 10/5/20: 1.702 m (5' 7\").    Weight as of this encounter: 100.3 kg (221 lb 3.2 oz).   Weight management plan: Discussed healthy diet " and exercise guidelines    SELF MONITORING:       - Please check blood pressure readings daily  Work on weight loss  Regular exercise    Return in about 4 months (around 11/23/2021) for Follow up, Routine preventive, with me, in person.    Isra Carrillo MD  [facility]ally is a 80 year old who presents for the following health issues     HPI     Chief Complaint   Patient presents with     Recheck Medication     Simvastatin     Derm Problem     mainly scalp - has been using head and shoulders scalp itch, arms and chest are improving       Medication Followup of simvastatin    Taking Medication as prescribed: yes, taking 1/2 tablet daily of the 40 mg tabs    Side Effects:  Muscle weakness and pain - has slightly improved since doing 1/2 tablet    Medication Helping Symptoms:  unsure     Hyperlipidemia Follow-Up      Are you regularly taking any medication or supplement to lower your cholesterol?   Yes- Simvastatin    Are you having muscle aches or other side effects that you think could be caused by your cholesterol lowering medication?  Yes- Improved slightly off Zocor. Restarted 1/2 dose, no worse pain.    Hypertension Follow-up      Do you check your blood pressure regularly outside of the clinic? Yes     Are you following a low salt diet? Yes    Are your blood pressures ever more than 140 on the top number (systolic) OR more   than 90 on the bottom number (diastolic), for example 140/90? No    Vascular Disease Follow-up      How often do you take nitroglycerin? Never    Do you take an aspirin every day? Yes      Patient Active Problem List   Diagnosis     Cardiac dysrhythmia     Essential hypertension, benign     Neurogenic bladder     Prostate cancer (H)     Permanent atrial fibrillation (H)     Chronic idiopathic gout of multiple sites     HYPERLIPIDEMIA LDL GOAL <100     CAD (coronary artery disease)     Advanced directives, counseling/discussion     Health Care Home      Stye     Non morbid obesity due to excess calories     Lumbar spinal stenosis     S/P lumbar laminectomy     Chronic a-fib (H)     Essential hypertension     Hordeolum externum left upper eyelid     Long-term (current) use of anticoagulants [Z79.01]     Tear of medial meniscus of left knee, initial encounter     Primary osteoarthritis of left knee     Bradycardia     Abdominal aortic aneurysm (AAA) without rupture (H)     Adenocarcinoma of prostate (H)     Postsurgical aortocoronary bypass status     History of CVA (cerebrovascular accident)     Bilateral carpal tunnel syndrome     Right hip pain     Traumatic bursitis     Primary osteoarthritis of right hip     Left carpal tunnel syndrome     Primary osteoarthritis of right shoulder     Morbid obesity (H)     Current Outpatient Medications   Medication Sig Dispense Refill     allopurinol (ZYLOPRIM) 300 MG tablet TAKE 1 TABLET DAILY FOR GOUT 90 tablet 1     ASPIRIN 81 MG OR TABS 1 TABLET DAILY       chlorthalidone (HYGROTON) 50 MG tablet TAKE 1 TABLET DAILY 90 tablet 1     fish oil-omega-3 fatty acids (FISH OIL) 1000 MG capsule Take 1.2 g by mouth daily        lisinopril (ZESTRIL) 10 MG tablet Take 1 tablet (10 mg) by mouth daily 90 tablet 3     multivitamin w/minerals (THERA-VIT-M) tablet Take 1 tablet by mouth daily       olmesartan (BENICAR) 40 MG tablet Take 1 tablet (40 mg) by mouth daily 90 tablet 0     simvastatin (ZOCOR) 40 MG tablet TAKE 1 TABLET AT BEDTIME 90 tablet 1     warfarin ANTICOAGULANT (COUMADIN) 5 MG tablet Take 2.5 mg Tues, Sat and 5 mg all other days, or as directed by the [facility] Clinic. 80 tablet 0     nitroGLYcerin (NITROSTAT) 0.4 MG sublingual tablet Place 1 tablet (0.4 mg) under the tongue See Admin Instructions for chest pain (Patient not taking: Reported on 11/12/2020) 25 tablet 1       Review of Systems   CONSTITUTIONAL: NEGATIVE for fever, chills, change in weight  INTEGUMENTARY/SKIN: NEGATIVE for worrisome rashes, moles or  lesions and POSITIVE for pruritis scalp  ENT/MOUTH: NEGATIVE for ear, mouth and throat problems  RESP: NEGATIVE for significant cough or SOB  CV: NEGATIVE for chest pain, palpitations or peripheral edema  ROS otherwise negative      Objective    /72   Pulse 73   Temp 98.4  F (36.9  C) (Temporal)   Resp 18   Wt 100.3 kg (221 lb 3.2 oz)   SpO2 99%   BMI 34.64 kg/m    Body mass index is 34.64 kg/m .  Physical Exam   GENERAL: healthy, alert, no distress and over weight  EYES: Eyes grossly normal to inspection, PERRL and conjunctivae and sclerae normal  NECK: no adenopathy, no asymmetry, masses, or scars and thyroid normal to palpation  RESP: lungs clear to auscultation - no rales, rhonchi or wheezes  CV: regular rate and rhythm, normal S1 S2, no S3 or S4, no murmur, click or rub, no peripheral edema and peripheral pulses strong  ABDOMEN: soft, nontender, no hepatosplenomegaly, no masses and bowel sounds normal  MS: no gross musculoskeletal defects noted, no edema  SKIN: no suspicious lesions or rashes and scalp clear    Lab on 07/21/2021   Component Date Value Ref Range Status     INR 07/21/2021 2.6* 0.9 - 1.1 Final     Sodium 07/21/2021 140  133 - 144 mmol/L Final     Potassium 07/21/2021 3.9  3.4 - 5.3 mmol/L Final     Chloride 07/21/2021 106  94 - 109 mmol/L Final    0-20 years:       Female:  mmol/L  Male:    mmol/L       20 years and older:   Female:  mmol/L   Male:    mmol/L       Carbon Dioxide (CO2) 07/21/2021 30  20 - 32 mmol/L Final     Anion Gap 07/21/2021 4  3 - 14 mmol/L Final     Urea Nitrogen 07/21/2021 22  7 - 30 mg/dL Final    Female  0 to 15 days           3-23  15 days to 1 year      3-17  1 to 10 years          9-22  10 to 19 years         7-19  19 years and older     7-30    Male  0 to 15 days           3-23  15 days to 1 year      3-17  1 to 10 years          9-22  10 to 19 years         7-21  19 years and older     7-30     Creatinine 07/21/2021 0.95  0.52 -  1.25 mg/dL Final    20 y and older Female 0.52-1.04 mg/dL  20 y and older Male 0.66-1.25 mg/dL    Varies with the amount of muscle mass present.    GICH  Female: 0.6-1.2 mg/dL  Male: 0.7-1.3 mg/dL       Calcium 07/21/2021 10.0  8.5 - 10.1 mg/dL Final     Glucose 07/21/2021 97  70 - 99 mg/dL Final     GFR Estimate 07/21/2021 74  >60 mL/min/1.73m2 Final    GFR not calculated when sex unspecified or nonbinary.  As of July 11, 2021, eGFR is calculated by the CKD-EPI creatinine equation, without race adjustment. eGFR can be influenced by muscle mass, exercise, and diet. The reported eGFR is an estimation only and is only applicable if the renal function is stable.

## 2021-08-09 NOTE — PROGRESS NOTES
HISTORY:    Uli Champagne is a very pleasant 80-year-old gentleman with a history of chronic atrial fibrillation on warfarin for stroke prophylaxis and with a very slow ventricular response.  He also has a history of hyperlipidemia, hypertension, and a diagnosis of obstructive sleep apnea.  He has a longstanding history of coronary artery disease and underwent bypass surgery with LIMA to LAD SVG to RCA and SVG to OM1 in the early 90s.  He underwent multiple stents to his vein grafts and native RCA nearly 20 years ago with his most recent angiogram in 2002 showing an occluded OM1 graft with patent LIMA and RCA graft.  Uli also has a history of CVA.    Today Uli reports that he feels that he is doing very well.  He is unaware of his atrial fibrillation with no sense of palpitations.  He reports that his home pulse is often in the 40s but he has no lightheadedness or dizziness and has not experienced syncope.  His home blood pressure is virtually always less than 140.  Denies exertional chest arm neck shoulder or jaw discomfort.  He has some mild peripheral edema which does not really bother him.  He describes no symptoms of claudication, syncope or near syncope, or PND/orthopnea.    Although Uli has a history of obstructive sleep apnea, [facility] and he has been going without his CPAP recently.  He feels that he is sleeping fine and feels refreshed in the morning.  He sleeps soundly at night without significant awakening.    Uli reports that he had some muscle aches earlier and his simvastatin was briefly stopped and then restarted at half dose.  He is currently using 20 mg daily and his most recent lipid profile 10 use to show excellent control with an LDL of just 39.    ECG of today shows afib with slow ventricular response, RBBB with LAHB (bifascicular block)      ASSESSMENT/PLAN:    1.  Coronary artery disease.  No symptoms of angina.  He  uses a walker and is relatively inactive but denies easy fatigability or dyspnea on exertion.  He continues to use an aspirin plus a statin.  No further stress testing is needed unless he develops symptoms.  2.  Hypertension.  Control is generally adequate.  Today his blood pressure systolic is just a little bit on the high side but given his age I think this is very acceptable, no medication changes made.  Home blood pressure readings are normal.  3.  Hyperlipidemia.  Excellent control on simvastatin 20 mg daily, continue.  4.  Permanent atrial fibrillation.  Slow ventricular response without symptoms.  Continue to monitor, continue warfarin for stroke prophylaxis.  3.  Peripheral edema.  This is not bothersome to the patient and I assured him that it is harmless.  He will contact us if it worsens significantly.    Thank you for inviting me to participate in your patient's care.  Please do not hesitate to call if I can be of further assistance.  I spent greater than 30 minutes today reviewing the chart, interviewing and examining the patient, and documenting her visit.  1 year follow-up will be planned.    Chart documentation was completed, in part, with [email or web address] voice-recognition software. Even though reviewed, some grammatical, spelling, and word errors may remain.       Orders Placed This Encounter   Procedures     Follow-Up with Cardiac Advanced Practice Provider     EKG 12-LEAD CLINIC READ     Orders Placed This Encounter   Medications     nitroGLYcerin (NITROSTAT) 0.4 MG sublingual tablet     Sig: Place 1 tablet (0.4 mg) under the tongue See Admin Instructions for chest pain     Dispense:  25 tablet     Refill:  1     Medications Discontinued During This Encounter   Medication Reason     nitroGLYcerin (NITROSTAT) 0.4 MG sublingual tablet Reorder       10 year ASCVD risk: The ASCVD Risk score (Sumner DC Jr., et al., 2013) failed to calculate for the following reasons:    The 2013 ASCVD risk score is only valid for  ages 40 to 79    The patient has a prior MI or stroke diagnosis    Encounter Diagnoses   Name Primary?     Essential hypertension, benign      Chronic a-fib (H)      Bradycardia      Hyperlipidemia LDL goal <100      Essential hypertension        CURRENT MEDICATIONS:  Current Outpatient Medications   Medication Sig Dispense Refill     allopurinol (ZYLOPRIM) 300 MG tablet TAKE 1 TABLET DAILY FOR GOUT 90 tablet 1     ASPIRIN 81 MG OR TABS 1 TABLET DAILY       chlorthalidone (HYGROTON) 50 MG tablet TAKE 1 TABLET DAILY 90 tablet 1     fish oil-omega-3 fatty acids (FISH OIL) 1000 MG capsule Take 1.2 g by mouth daily        lisinopril (ZESTRIL) 10 MG tablet Take 1 tablet (10 mg) by mouth daily 90 tablet 3     multivitamin w/minerals (THERA-VIT-M) tablet Take 1 tablet by mouth daily       nitroGLYcerin (NITROSTAT) 0.4 MG sublingual tablet Place 1 tablet (0.4 mg) under the tongue See Admin Instructions for chest pain 25 tablet 1     olmesartan (BENICAR) 40 MG tablet Take 1 tablet (40 mg) by mouth daily 90 tablet 0     simvastatin (ZOCOR) 40 MG tablet Take 0.5 tablets (20 mg) by mouth At Bedtime 90 tablet 1     warfarin ANTICOAGULANT (COUMADIN) 5 MG tablet Take 2.5 mg Tues, Sat and 5 mg all other days, or as directed by the [facility] Clinic. 80 tablet 0       ALLERGIES     Allergies   Allergen Reactions     No Known Drug Allergies        PAST MEDICAL HISTORY:  Past Medical History:   Diagnosis Date     Atrial fibrillation (H)      Coronary atherosclerosis of unspecified type of vessel, native or graft      Hernia of unspecified site of abdominal cavity without mention of obstruction or gangrene     umbilical hernia; s/p repair 3/1998     History of CVA (cerebrovascular accident) 3/16/2018     Hordeolum externum left upper eyelid 1/21/2016     Malignant neoplasm of prostate (H) 07/14/08    Admit.Discharged 07/17/08     Other and unspecified hyperlipidemia      Postsurgical aortocoronary bypass status 1992     Respiratory  complications      Unspecified essential hypertension      Unspecified tinnitus     left ear       PAST SURGICAL HISTORY:  Past Surgical History:   Procedure Laterality Date     C CABG, VEIN, THREE       C REMV PROSTATE,RETROPUB,RAD,LTD NODES  2008    Radical retropubic prostatectomy and pelvic lymph node dissection.     HC REMOVAL OF TONSILS,10+ Y/O  1969    Tonsils 10+y.o.     HEMILAMINECTOMY, DISCECTOMY LUMBAR TWO LEVELS, COMBINED Left 2015    Procedure: COMBINED HEMILAMINECTOMY, DISCECTOMY LUMBAR TWO LEVELS;  Surgeon: Jung Finley MD;  Location: [facility]     INJECT EPIDURAL LUMBAR N/A 10/22/2020    Procedure: INJECTION, SPINE, LUMBAR 2-3 bilateral, EPIDURAL;  Surgeon: Raza Hughes MD;  Location: [facility]     RELEASE CARPAL TUNNEL Right 2019    Procedure: RELEASE CARPAL TUNNEL-RIGHT;  Surgeon: Yovani Ogden DO;  Location: [facility] OR     STENT, CORONARY, ALEAH  2002    x5     ZZC NONSPECIFIC PROCEDURE      Bone fusion in neck       FAMILY HISTORY:  Family History   Problem Relation Age of Onset     Arthritis Father      Heart Disease Father      Hypertension Brother      Cancer Brother         liver     Heart Disease Sister      Heart Disease Brother        SOCIAL HISTORY:  Social History     Socioeconomic History     Marital status:      Spouse name: Not on file     Number of children: Not on file     Years of education: Not on file     Highest education level: Not on file   Occupational History     Not on file   Tobacco Use     Smoking status: Former Smoker     Packs/day: 0.50     Years: 2.00     Pack years: 1.00     Types: Cigarettes     Quit date: 1960     Years since quittin.6     Smokeless tobacco: Never Used   Substance and Sexual Activity     Alcohol use: Yes     Comment: socially     Drug use: No     Sexual activity: Never   Other Topics Concern     Parent/sibling w/ CABG, MI or angioplasty before 65F 55M? No   Social History Narrative     Not on file  "    Social Determinants of Health     Financial Resource Strain:      Difficulty of Paying Living Expenses:    Food Insecurity:      Worried About Running Out of Food in the Last Year:      Ran Out of Food in the Last Year:    Transportation Needs:      Lack of Transportation (Medical):      Lack of Transportation (Non-Medical):    Physical Activity:      Days of Exercise per Week:      Minutes of Exercise per Session:    Stress:      Feeling of Stress :    Social Connections:      Frequency of Communication with Friends and Family:      Frequency of Social Gatherings with Friends and Family:      Attends [employer] Services:      Active Member of Clubs or Organizations:      Attends Club or Organization Meetings:      Marital Status:    Intimate Partner Violence:      Fear of Current or Ex-Partner:      Emotionally Abused:      Physically Abused:      Sexually Abused:        Review of Systems:  Skin:  Negative     Eyes:  Positive for glasses  ENT:  Negative    Respiratory:  Positive for cough;dyspnea on exertion  Cardiovascular:  Negative for;chest pain;palpitations;lightheadedness;dizziness edema;Positive for  Gastroenterology: Negative    Genitourinary:  Negative    Musculoskeletal:  Negative    Neurologic:  Negative    Psychiatric:  Negative    Heme/Lymph/Imm:  Negative    Endocrine:  Negative      Physical Exam:  Vitals: BP (!) 142/72 (BP Location: Left arm, Patient Position: Sitting, Cuff Size: Adult Regular)   Pulse (!) 45   Ht 1.702 m (5' 7\")   Wt 101.5 kg (223 lb 12.8 oz)   SpO2 96%   BMI 35.05 kg/m      Constitutional:  cooperative, alert and oriented, well developed, well nourished, in no acute distress obese      Skin:  warm and dry to the touch        Head:  normocephalic        Eyes:  no nystagmus;no xanthalasma        ENT:           Neck:  carotid pulses are full and equal bilaterally, JVP normal, no carotid bruit        Chest:  normal breath sounds, clear to auscultation, normal A-P diameter, " normal symmetry, normal respiratory excursion, no use of accessory muscles        Cardiac: normal S1 and S2;no S3 or S4;apical impulse not displaced irregularly irregular rhythm;bradycardic                Abdomen:  abdomen soft;BS normoactive;non-tender obese      Vascular: pulses full and equal                                      Extremities and Muscular Skeletal:    RLE edema;2+;pittingLLE edema;1+        Neurological:  no gross motor deficits        Psych:  affect appropriate, oriented to time, person and place     Recent Lab Results:  LIPID RESULTS:  Lab Results   Component Value Date    CHOL 119 07/23/2021    CHOL 115 07/24/2020    HDL 49 07/23/2021    HDL 51 07/24/2020    LDL 39 07/23/2021    LDL 42 07/24/2020    TRIG 154 (H) 07/23/2021    TRIG 110 07/24/2020    CHOLHDLRATIO 2.5 12/16/2014       LIVER ENZYME RESULTS:  Lab Results   Component Value Date    AST 28 05/14/2019    ALT 25 07/24/2020       CBC RESULTS:  Lab Results   Component Value Date    WBC 8.1 10/05/2020    RBC 4.59 10/05/2020    HGB 15.2 10/05/2020    HCT 43.6 10/05/2020    MCV 95 10/05/2020    MCH 33.1 (H) 10/05/2020    MCHC 34.9 10/05/2020    RDW 14.1 10/05/2020     10/05/2020       BMP RESULTS:  Lab Results   Component Value Date     07/21/2021     10/05/2020    POTASSIUM 3.9 07/21/2021    POTASSIUM 3.8 10/05/2020    CHLORIDE 106 07/21/2021    CHLORIDE 110 (H) 10/05/2020    CO2 30 07/21/2021    CO2 30 10/05/2020    ANIONGAP 4 07/21/2021    ANIONGAP 4 10/05/2020    GLC 97 07/21/2021    GLC 95 10/05/2020    BUN 22 07/21/2021    BUN 23 10/05/2020    CR 0.95 07/21/2021    CR 1.00 10/05/2020    GFRESTIMATED 74 07/21/2021    GFRESTIMATED 70 10/05/2020    GFRESTBLACK 81 10/05/2020    ANKIT 10.0 07/21/2021    ANKIT 9.6 10/05/2020        A1C RESULTS:  No results found for: A1C    INR RESULTS:  Lab Results   Component Value Date    INR 2.6 (H) 07/21/2021    INR 2.3 (H) 06/09/2021    INR 2.7 (H) 04/27/2021    INR 1.03 10/22/2020    INR  2.03 (H) 07/24/2020         Miguelito Wells MD, FACC    CC  Miguelito Wells MD  [address] [place] [id number]

## 2021-08-09 NOTE — LETTER
8/9/2021    Isra Carrillo MD  [address] [place] [address]    RE: Uli Champagne       Dear Colleague,    I had the pleasure of seeing Uli Champagne in the [facility].    HISTORY:    Uli Champagne is a very pleasant 80-year-old gentleman with a history of chronic atrial fibrillation on warfarin for stroke prophylaxis and with a very slow ventricular response.  He also has a history of hyperlipidemia, hypertension, and a diagnosis of obstructive sleep apnea.  He has a longstanding history of coronary artery disease and underwent bypass surgery with LIMA to LAD SVG to RCA and SVG to OM1 in the early 90s.  He underwent multiple stents to his vein grafts and native RCA nearly 20 years ago with his most recent angiogram in 2002 showing an occluded OM1 graft with patent LIMA and RCA graft.  Uli also has a history of CVA.    Today Uli reports that he feels that he is doing very well.  He is unaware of his atrial fibrillation with no sense of palpitations.  He reports that his home pulse is often in the 40s but he has no lightheadedness or dizziness and has not experienced syncope.  His home blood pressure is virtually always less than 140.  Denies exertional chest arm neck shoulder or jaw discomfort.  He has some mild peripheral edema which does not really bother him.  He describes no symptoms of claudication, syncope or near syncope, or PND/orthopnea.    Although Uli has a history of obstructive sleep apnea, [facility] and he has been going without his CPAP recently.  He feels that he is sleeping fine and feels refreshed in the morning.  He sleeps soundly at night without significant awakening.    Uli reports that he had some muscle aches earlier and his simvastatin was briefly stopped and then restarted at half dose.  He is currently using 20 mg daily and his most recent  lipid profile 10 use to show excellent control with an LDL of just 39.    ECG of today shows afib with slow ventricular response, RBBB with LAHB (bifascicular block)      ASSESSMENT/PLAN:    1.  Coronary artery disease.  No symptoms of angina.  He uses a walker and is relatively inactive but denies easy fatigability or dyspnea on exertion.  He continues to use an aspirin plus a statin.  No further stress testing is needed unless he develops symptoms.  2.  Hypertension.  Control is generally adequate.  Today his blood pressure systolic is just a little bit on the high side but given his age I think this is very acceptable, no medication changes made.  Home blood pressure readings are normal.  3.  Hyperlipidemia.  Excellent control on simvastatin 20 mg daily, continue.  4.  Permanent atrial fibrillation.  Slow ventricular response without symptoms.  Continue to monitor, continue warfarin for stroke prophylaxis.  3.  Peripheral edema.  This is not bothersome to the patient and I assured him that it is harmless.  He will contact us if it worsens significantly.    Thank you for inviting me to participate in your patient's care.  Please do not hesitate to call if I can be of further assistance.  I spent greater than 30 minutes today reviewing the chart, interviewing and examining the patient, and documenting her visit.  1 year follow-up will be planned.    Chart documentation was completed, in part, with [email or web address] voice-recognition software. Even though reviewed, some grammatical, spelling, and word errors may remain.       Orders Placed This Encounter   Procedures     Follow-Up with Cardiac Advanced Practice Provider     EKG 12-LEAD CLINIC READ     Orders Placed This Encounter   Medications     nitroGLYcerin (NITROSTAT) 0.4 MG sublingual tablet     Sig: Place 1 tablet (0.4 mg) under the tongue See Admin Instructions for chest pain     Dispense:  25 tablet     Refill:  1     Medications Discontinued During This Encounter    Medication Reason     nitroGLYcerin (NITROSTAT) 0.4 MG sublingual tablet Reorder       10 year ASCVD risk: The ASCVD Risk score (Burlington ABA Jr., et al., 2013) failed to calculate for the following reasons:    The 2013 ASCVD risk score is only valid for ages 40 to 79    The patient has a prior MI or stroke diagnosis    Encounter Diagnoses   Name Primary?     Essential hypertension, benign      Chronic a-fib (H)      Bradycardia      Hyperlipidemia LDL goal <100      Essential hypertension        CURRENT MEDICATIONS:  Current Outpatient Medications   Medication Sig Dispense Refill     allopurinol (ZYLOPRIM) 300 MG tablet TAKE 1 TABLET DAILY FOR GOUT 90 tablet 1     ASPIRIN 81 MG OR TABS 1 TABLET DAILY       chlorthalidone (HYGROTON) 50 MG tablet TAKE 1 TABLET DAILY 90 tablet 1     fish oil-omega-3 fatty acids (FISH OIL) 1000 MG capsule Take 1.2 g by mouth daily        lisinopril (ZESTRIL) 10 MG tablet Take 1 tablet (10 mg) by mouth daily 90 tablet 3     multivitamin w/minerals (THERA-VIT-M) tablet Take 1 tablet by mouth daily       nitroGLYcerin (NITROSTAT) 0.4 MG sublingual tablet Place 1 tablet (0.4 mg) under the tongue See Admin Instructions for chest pain 25 tablet 1     olmesartan (BENICAR) 40 MG tablet Take 1 tablet (40 mg) by mouth daily 90 tablet 0     simvastatin (ZOCOR) 40 MG tablet Take 0.5 tablets (20 mg) by mouth At Bedtime 90 tablet 1     warfarin ANTICOAGULANT (COUMADIN) 5 MG tablet Take 2.5 mg Tues, Sat and 5 mg all other days, or as directed by the [facility]. 80 tablet 0       ALLERGIES     Allergies   Allergen Reactions     No Known Drug Allergies        PAST MEDICAL HISTORY:  Past Medical History:   Diagnosis Date     Atrial fibrillation (H)      Coronary atherosclerosis of unspecified type of vessel, native or graft      Hernia of unspecified site of abdominal cavity without mention of obstruction or gangrene     umbilical hernia; s/p repair 3/1998     History of CVA (cerebrovascular accident)  3/16/2018     Hordeolum externum left upper eyelid 2016     Malignant neoplasm of prostate (H) 08    Admit.Discharged 08     Other and unspecified hyperlipidemia      Postsurgical aortocoronary bypass status      Respiratory complications      Unspecified essential hypertension      Unspecified tinnitus     left ear       PAST SURGICAL HISTORY:  Past Surgical History:   Procedure Laterality Date     C CABG, VEIN, THREE       C REMV PROSTATE,RETROPUB,RAD,LTD NODES  2008    Radical retropubic prostatectomy and pelvic lymph node dissection.     HC REMOVAL OF TONSILS,10+ Y/O  1969    Tonsils 10+y.o.     HEMILAMINECTOMY, DISCECTOMY LUMBAR TWO LEVELS, COMBINED Left 2015    Procedure: COMBINED HEMILAMINECTOMY, DISCECTOMY LUMBAR TWO LEVELS;  Surgeon: Jung Finley MD;  Location: [facility] OR     INJECT EPIDURAL LUMBAR N/A 10/22/2020    Procedure: INJECTION, SPINE, LUMBAR 2-3 bilateral, EPIDURAL;  Surgeon: Raza Hughes MD;  Location: [facility] OR     RELEASE CARPAL TUNNEL Right 2019    Procedure: RELEASE CARPAL TUNNEL-RIGHT;  Surgeon: Yovani Ogden DO;  Location: [facility] OR     STENT, CORONARY, ALEAH  2002    x5     ZZC NONSPECIFIC PROCEDURE      Bone fusion in neck       FAMILY HISTORY:  Family History   Problem Relation Age of Onset     Arthritis Father      Heart Disease Father      Hypertension Brother      Cancer Brother         liver     Heart Disease Sister      Heart Disease Brother        SOCIAL HISTORY:  Social History     Socioeconomic History     Marital status:      Spouse name: Not on file     Number of children: Not on file     Years of education: Not on file     Highest education level: Not on file   Occupational History     Not on file   Tobacco Use     Smoking status: Former Smoker     Packs/day: 0.50     Years: 2.00     Pack years: 1.00     Types: Cigarettes     Quit date: 1960     Years since quittin.6     Smokeless tobacco: Never Used  "  Substance and Sexual Activity     Alcohol use: Yes     Comment: socially     Drug use: No     Sexual activity: Never   Other Topics Concern     Parent/sibling w/ CABG, MI or angioplasty before 65F 55M? No   Social History Narrative     Not on file     Social Determinants of Health     Financial Resource Strain:      Difficulty of Paying Living Expenses:    Food Insecurity:      Worried About Running Out of Food in the Last Year:      Ran Out of Food in the Last Year:    Transportation Needs:      Lack of Transportation (Medical):      Lack of Transportation (Non-Medical):    Physical Activity:      Days of Exercise per Week:      Minutes of Exercise per Session:    Stress:      Feeling of Stress :    Social Connections:      Frequency of Communication with Friends and Family:      Frequency of Social Gatherings with Friends and Family:      Attends [employer] Services:      Active Member of Clubs or Organizations:      Attends Club or Organization Meetings:      Marital Status:    Intimate Partner Violence:      Fear of Current or Ex-Partner:      Emotionally Abused:      Physically Abused:      Sexually Abused:        Review of Systems:  Skin:  Negative     Eyes:  Positive for glasses  ENT:  Negative    Respiratory:  Positive for cough;dyspnea on exertion  Cardiovascular:  Negative for;chest pain;palpitations;lightheadedness;dizziness edema;Positive for  Gastroenterology: Negative    Genitourinary:  Negative    Musculoskeletal:  Negative    Neurologic:  Negative    Psychiatric:  Negative    Heme/Lymph/Imm:  Negative    Endocrine:  Negative      Physical Exam:  Vitals: BP (!) 142/72 (BP Location: Left arm, Patient Position: Sitting, Cuff Size: Adult Regular)   Pulse (!) 45   Ht 1.702 m (5' 7\")   Wt 101.5 kg (223 lb 12.8 oz)   SpO2 96%   BMI 35.05 kg/m      Constitutional:  cooperative, alert and oriented, well developed, well nourished, in no acute distress obese      Skin:  warm and dry to the touch    "     Head:  normocephalic        Eyes:  no nystagmus;no xanthalasma        ENT:           Neck:  carotid pulses are full and equal bilaterally, JVP normal, no carotid bruit        Chest:  normal breath sounds, clear to auscultation, normal A-P diameter, normal symmetry, normal respiratory excursion, no use of accessory muscles        Cardiac: normal S1 and S2;no S3 or S4;apical impulse not displaced irregularly irregular rhythm;bradycardic                Abdomen:  abdomen soft;BS normoactive;non-tender obese      Vascular: pulses full and equal                                      Extremities and Muscular Skeletal:    RLE edema;2+;pittingLLE edema;1+        Neurological:  no gross motor deficits        Psych:  affect appropriate, oriented to time, person and place     Recent Lab Results:  LIPID RESULTS:  Lab Results   Component Value Date    CHOL 119 07/23/2021    CHOL 115 07/24/2020    HDL 49 07/23/2021    HDL 51 07/24/2020    LDL 39 07/23/2021    LDL 42 07/24/2020    TRIG 154 (H) 07/23/2021    TRIG 110 07/24/2020    CHOLHDLRATIO 2.5 12/16/2014       LIVER ENZYME RESULTS:  Lab Results   Component Value Date    AST 28 05/14/2019    ALT 25 07/24/2020       CBC RESULTS:  Lab Results   Component Value Date    WBC 8.1 10/05/2020    RBC 4.59 10/05/2020    HGB 15.2 10/05/2020    HCT 43.6 10/05/2020    MCV 95 10/05/2020    MCH 33.1 (H) 10/05/2020    MCHC 34.9 10/05/2020    RDW 14.1 10/05/2020     10/05/2020       BMP RESULTS:  Lab Results   Component Value Date     07/21/2021     10/05/2020    POTASSIUM 3.9 07/21/2021    POTASSIUM 3.8 10/05/2020    CHLORIDE 106 07/21/2021    CHLORIDE 110 (H) 10/05/2020    CO2 30 07/21/2021    CO2 30 10/05/2020    ANIONGAP 4 07/21/2021    ANIONGAP 4 10/05/2020    GLC 97 07/21/2021    GLC 95 10/05/2020    BUN 22 07/21/2021    BUN 23 10/05/2020    CR 0.95 07/21/2021    CR 1.00 10/05/2020    GFRESTIMATED 74 07/21/2021    GFRESTIMATED 70 10/05/2020    GFRESTBLACK 81 10/05/2020     ANKIT 10.0 07/21/2021    ANKIT 9.6 10/05/2020        A1C RESULTS:  No results found for: A1C    INR RESULTS:  Lab Results   Component Value Date    INR 2.6 (H) 07/21/2021    INR 2.3 (H) 06/09/2021    INR 2.7 (H) 04/27/2021    INR 1.03 10/22/2020    INR 2.03 (H) 07/24/2020         Miguelito Wells MD, FAC    CC  Miguelito Wells MD  [address] [place] [id number]                      Thank you for allowing me to participate in the care of your patient.      Sincerely,     Miguelito Wells MD     [facility]  cc:   Miguelito Wells MD  [address] [place] [id number]

## 2021-09-01 NOTE — PROGRESS NOTES
ANTICOAGULATION MANAGEMENT     Uli Champagne 80 year old adult is on warfarin with subtherapeutic INR result. (Goal INR 2.0-3.0)    Recent labs: (last 7 days)     09/01/21  1304   INR 1.7*       ASSESSMENT     Source(s): Chart Review and Patient/Caregiver Call       Warfarin doses taken: Warfarin taken as instructed    Diet: No new diet changes identified    New illness, injury, or hospitalization: No    Medication/supplement changes: None noted    Signs or symptoms of bleeding or clotting: No    Previous INR: Therapeutic last 2(+) visits    Additional findings: None     PLAN     Recommended plan for no diet, medication or health factor changes affecting INR     Dosing Instructions: Booster dose then continue your current warfarin dose with next INR in 2 weeks       Summary  As of 9/1/2021    Full warfarin instructions:  9/1: 7.5 mg; Otherwise 2.5 mg every Tue, Sat; 5 mg all other days   Next INR check:  9/15/2021             Telephone call with Uli who verbalizes understanding and agrees to plan and who agrees to plan and repeated back plan correctly    Lab visit scheduled    Education provided: Goal range and significance of current result, Importance of therapeutic range and Importance of following up at instructed interval    Plan made per [facility] anticoagulation protocol    Rui Landers, RN  Anticoagulation Clinic  9/1/2021    _______________________________________________________________________     Anticoagulation Episode Summary     Current INR goal:  2.0-3.0   TTR:  73.4 % (1 y)   Target end date:  Indefinite   Send INR reminders to:  HDmessaging    Indications    Long-term (current) use of anticoagulants [Z79.01] [Z79.01]  Chronic a-fib (H) [I48.20]           Comments:  5 mg tab, AM dose, appt card           Anticoagulation Care Providers     Provider Role Specialty Phone number    Isra Carrillo MD Referring Family Medicine [phone]

## 2021-09-09 NOTE — PROGRESS NOTES
Sports Medicine Clinic Visit    PCP: Isra Carrillo    CC: Patient presents with:  Lower Back - Follow Up  Right Hip - Follow Up      HPI:  Uli Champagne is a 81 year old adult who is seen in follow-up for his low back and right hip. He does not believe CHARLEY lumbar injection helped a lot but feels that that is the only treatment he has tried so that must be why he is feeling better.  He is here to discuss his diagnoses and develop a treatment plan. He rates the pain at a 0/10 currently but prolonged standing causes lumbar back pain.  He is now able to stand for longer periods of time and denies pain now from going from sit to stand.  Denies weakness in the legs but uses a walker to ambulate.  He denies pain in the groin.    He is retired.    History reviewed. No pertinent past surgical/medical/family/social history other than as mentioned in HPI.  Review of systems negative except per HPI.      Patient Active Problem List   Diagnosis     Cardiac dysrhythmia     Essential hypertension, benign     Neurogenic bladder     Prostate cancer (H)     Permanent atrial fibrillation (H)     Chronic idiopathic gout of multiple sites     HYPERLIPIDEMIA LDL GOAL <100     CAD (coronary artery disease)     Advanced directives, counseling/discussion     Health Care Home     Stye     Non morbid obesity due to excess calories     Lumbar spinal stenosis     S/P lumbar laminectomy     Chronic a-fib (H)     Essential hypertension     Hordeolum externum left upper eyelid     Long-term (current) use of anticoagulants [Z79.01]     Tear of medial meniscus of left knee, initial encounter     Primary osteoarthritis of left knee     Bradycardia     Abdominal aortic aneurysm (AAA) without rupture (H)     Adenocarcinoma of prostate (H)     Postsurgical aortocoronary bypass status     History of CVA (cerebrovascular accident)     Bilateral carpal tunnel syndrome     Right hip pain     Traumatic bursitis     Primary osteoarthritis of right  hip     Left carpal tunnel syndrome     Primary osteoarthritis of right shoulder     Morbid obesity (H)     Past Medical History:   Diagnosis Date     Atrial fibrillation (H)      Coronary atherosclerosis of unspecified type of vessel, native or graft      Hernia of unspecified site of abdominal cavity without mention of obstruction or gangrene     umbilical hernia; s/p repair 3/1998     History of CVA (cerebrovascular accident) 3/16/2018     Hordeolum externum left upper eyelid 1/21/2016     Malignant neoplasm of prostate (H) 07/14/08    Admit.Discharged 07/17/08     Other and unspecified hyperlipidemia      Postsurgical aortocoronary bypass status 1992     Respiratory complications      Unspecified essential hypertension      Unspecified tinnitus     left ear     Past Surgical History:   Procedure Laterality Date     C CABG, VEIN, THREE  1992     C REMV PROSTATE,RETROPUB,RAD,LTD NODES  7/14/2008    Radical retropubic prostatectomy and pelvic lymph node dissection.     HC REMOVAL OF TONSILS,12+ Y/O  1969    Tonsils 10+y.o.     HEMILAMINECTOMY, DISCECTOMY LUMBAR TWO LEVELS, COMBINED Left 12/16/2015    Procedure: COMBINED HEMILAMINECTOMY, DISCECTOMY LUMBAR TWO LEVELS;  Surgeon: Jung Finley MD;  [facility]     INJECT EPIDURAL LUMBAR N/A 10/22/2020    Procedure: INJECTION, SPINE, LUMBAR 2-3 bilateral, EPIDURAL;  Surgeon: Raza Hughes MD;  [facility]     RELEASE CARPAL TUNNEL Right 5/17/2019    Procedure: RELEASE CARPAL TUNNEL-RIGHT;  Surgeon: Yovani Ogden DO;  Location[facility]     STENT, CORONARY, ALEAH  2002    x5     ZZC NONSPECIFIC PROCEDURE  1987    Bone fusion in neck     Family History   Problem Relation Age of Onset     Arthritis Father      Heart Disease Father      Hypertension Brother      Cancer Brother         liver     Heart Disease Sister      Heart Disease Brother      Social History     Socioeconomic History     Marital status:      Spouse name: Not on file      Number of children: Not on file     Years of education: Not on file     Highest education level: Not on file   Occupational History     Not on file   Tobacco Use     Smoking status: Former Smoker     Packs/day: 0.50     Years: 2.00     Pack years: 1.00     Types: Cigarettes     Quit date: 1960     Years since quittin.7     Smokeless tobacco: Never Used   Substance and Sexual Activity     Alcohol use: Yes     Comment: socially     Drug use: No     Sexual activity: Never   Other Topics Concern     Parent/sibling w/ CABG, MI or angioplasty before 65F 55M? No   Social History Narrative     Not on file     Social Determinants of Health     Financial Resource Strain:      Difficulty of Paying Living Expenses:    Food Insecurity:      Worried About Running Out of Food in the Last Year:      Ran Out of Food in the Last Year:    Transportation Needs:      Lack of Transportation (Medical):      Lack of Transportation (Non-Medical):    Physical Activity:      Days of Exercise per Week:      Minutes of Exercise per Session:    Stress:      Feeling of Stress :    Social Connections:      Frequency of Communication with Friends and Family:      Frequency of Social Gatherings with Friends and Family:      Attends Zoroastrianism Services:      Active Member of Clubs or Organizations:      Attends Club or Organization Meetings:      Marital Status:    Intimate Partner Violence:      Fear of Current or Ex-Partner:      Emotionally Abused:      Physically Abused:      Sexually Abused:          Current Outpatient Medications   Medication     allopurinol (ZYLOPRIM) 300 MG tablet     ASPIRIN 81 MG OR TABS     chlorthalidone (HYGROTON) 50 MG tablet     fish oil-omega-3 fatty acids (FISH OIL) 1000 MG capsule     lisinopril (ZESTRIL) 10 MG tablet     multivitamin w/minerals (THERA-VIT-M) tablet     olmesartan (BENICAR) 40 MG tablet     simvastatin (ZOCOR) 40 MG tablet     warfarin ANTICOAGULANT (COUMADIN) 5 MG tablet     nitroGLYcerin  (NITROSTAT) 0.4 MG sublingual tablet     No current facility-administered medications for this visit.     Allergies   Allergen Reactions     No Known Drug Allergies          Objective:  /78   Pulse (!) 47   Wt 101.2 kg (223 lb)   BMI 34.93 kg/m      General: Alert and in no distress    Head: Normocephalic, atraumatic  Eyes: no scleral icterus or conjunctival erythema   Skin: no erythema, petechiae, or jaundice  Resp: normal respiratory effort without conversational dyspnea   Psych: normal mood and affect    Gait: Walks with a walker  Musculoskeletal:  -No tenderness over the lumbar spine, paraspinal muscles, or anterior groin  -Right hip ROM is relatively well preserved and painfree    Radiology:  Independent visualization of images performed.      PELVIS AND HIP RIGHT TWO VIEWS 8/26/2020 2:13 PM      HISTORY: Primary osteoarthritis of right hip.     COMPARISON: 7/24/2019.                                                                      IMPRESSION: At least moderate osteoarthritis of the right hip. Joint  space narrowing is more severe than on the prior exam since the  current study is weightbearing. No acute bony or soft tissue  abnormality. AP view partially includes the left hip and proximal  femur and shows moderate to advanced left hip osteoarthritis, similar  to the prior exam.     FILEMON COOK MD    MRI LUMBAR SPINE WITHOUT AND WITH CONTRAST April 30, 2018 1:31 PM      HISTORY: Chronic bilateral low back pain without sciatica.      TECHNIQUE: Multiplanar multisequence MRI of the lumbar spine without  and with 10 mL Gadavist.     COMPARISON: Scan dated 3/3/2015.     FINDINGS: The report is dictated assuming five lumbar-type vertebral  bodies, and radiographic correlation may be necessary.  The distal  spinal cord and cauda equina appear normal.  The patient is status  post a left renan-laminectomy at the left L3-L4 and left L4-L5 levels.     T12-L1: Degeneration of the disc. Loss of disc space  height.  Degenerative change in the facet joints. Central canal still adequate.  Mild foraminal stenosis.     L1-L2: Degeneration of the disc. Loss of disc space height. Right  central disc osteophyte complex causing mass effect on the dural sac.  Degenerative changes in the facet joints. These degenerative changes  are leading to moderate central spinal stenosis. There is mild right  and moderate-severe left foraminal stenosis due to these degenerative  changes.     L2-L3: Degeneration of the disc. Loss of disc space height.  Degenerative change in the facet joints with thickening of the  ligamentum flavum. Central spinal canal is mild-moderately narrowed.  Mild right and moderate left foraminal stenosis.     L3-L4: Degeneration of the disc. Loss of disc space height. Left  laminectomy. Small amount of scar tissue along the left lateral aspect  of the dural sac. Diffuse annular disc bulge. Degenerative change in  the facet joints with thickening of the ligamentum flavum.  Moderate-severe central spinal stenosis. Moderate right and severe  left foraminal stenosis.     L4-L5: Left laminectomy. Diffuse annular disc bulge. Severe  degenerative change in the facet joints, right greater than left.  There appears to be a synovial cyst arising from the right facet  joint. These factors are combining to produce severe central spinal  stenosis at this level. There is also severe bilateral foraminal  stenosis. Small amount of enhancing scar tissue along the left lateral  aspect of the dural sac.     L5-S1: Degeneration of the disc. Loss of disc space height. Diffuse  annular disc bulge. Degenerative change in the facet joints. Severe  bilateral foraminal stenosis due to loss of disc space height and  bulging disc.     Paraspinous soft tissues: Normal.       Bone marrow: Normal.                                                                       IMPRESSION:    1. Postoperative changes at the left L3-L4 and L4-L5 levels.  Small  amount of enhancing scar tissue along the left lateral aspect of the  dural sac.  2. Severe multilevel degenerative change with severe central stenosis  at L3-L4 and L4-L5. Severe foraminal stenosis at multiple levels. See  above report.     CHARLES WILKINSON MD    Assessment:  1. Chronic bilateral low back pain without sciatica    2. Lumbar degenerative disc disease    3. Difficulty walking        Plan:  Discussed the assessment with the patient and developed a plan together:  -Uli would like to improve his walking.  He does feel like it has improved somewhat, but would like it to improve further.  If he stands still for a long period of time, he gets low back pain.  He also has hip OA but this appears to be asymptomatic.  I recommend physical therapy to which Uli is receptive.  I have ordered this.   He also had an CHARLEY last fall, which Uli thinks helped, but he would like to try physical therapy first this time.  Could consider a repeat CHARLEY in the future if desired.    -Follow up as needed if symptoms fail to improve or worsen.  Please call with questions or concerns.      Lawanda Romo MD, CAQ Sports Medicine  [facility]

## 2021-09-10 NOTE — PATIENT INSTRUCTIONS
-Physical therapy ordered.  Please do 5-6 days of exercises per week between formal sessions and the home exercises they provide.    -Could also consider repeat lumbar epidural steroid injection if desired    -Follow up as needed if symptoms fail to improve or worsen.  Please call with questions or concerns.

## 2021-09-10 NOTE — LETTER
9/10/2021         RE: Uli Champagne  [address] [facility] [address]        Dear Colleague,    Thank you for referring your patient, Uli Champagne, to the [facility] [place]. Please see a copy of my visit note below.    Sports Medicine Clinic Visit    PCP: Isra Carrillo    CC: Patient presents with:  Lower Back - Follow Up  Right Hip - Follow Up      HPI:  Uli Champagne is a 81 year old adult who is seen in follow-up for his low back and right hip. He does not believe CHARLEY lumbar injection helped a lot but feels that that is the only treatment he has tried so that must be why he is feeling better.  He is here to discuss his diagnoses and develop a treatment plan. He rates the pain at a 0/10 currently but prolonged standing causes lumbar back pain.  He is now able to stand for longer periods of time and denies pain now from going from sit to stand.  Denies weakness in the legs but uses a walker to ambulate.  He denies pain in the groin.    He is retired.    History reviewed. No pertinent past surgical/medical/family/social history other than as mentioned in HPI.  Review of systems negative except per HPI.      Patient Active Problem List   Diagnosis     Cardiac dysrhythmia     Essential hypertension, benign     Neurogenic bladder     Prostate cancer (H)     Permanent atrial fibrillation (H)     Chronic idiopathic gout of multiple sites     HYPERLIPIDEMIA LDL GOAL <100     CAD (coronary artery disease)     Advanced directives, counseling/discussion     Health Care Home     Stye     Non morbid obesity due to excess calories     Lumbar spinal stenosis     S/P lumbar laminectomy     Chronic a-fib (H)     Essential hypertension     Hordeolum externum left upper eyelid     Long-term (current) use of anticoagulants [Z79.01]     Tear of medial meniscus of left knee, initial encounter     Primary osteoarthritis of left knee     Bradycardia     Abdominal aortic  aneurysm (AAA) without rupture (H)     Adenocarcinoma of prostate (H)     Postsurgical aortocoronary bypass status     History of CVA (cerebrovascular accident)     Bilateral carpal tunnel syndrome     Right hip pain     Traumatic bursitis     Primary osteoarthritis of right hip     Left carpal tunnel syndrome     Primary osteoarthritis of right shoulder     Morbid obesity (H)     Past Medical History:   Diagnosis Date     Atrial fibrillation (H)      Coronary atherosclerosis of unspecified type of vessel, native or graft      Hernia of unspecified site of abdominal cavity without mention of obstruction or gangrene     umbilical hernia; s/p repair 3/1998     History of CVA (cerebrovascular accident) 3/16/2018     Hordeolum externum left upper eyelid 1/21/2016     Malignant neoplasm of prostate (H) 07/14/08    Admit.Discharged 07/17/08     Other and unspecified hyperlipidemia      Postsurgical aortocoronary bypass status 1992     Respiratory complications      Unspecified essential hypertension      Unspecified tinnitus     left ear     Past Surgical History:   Procedure Laterality Date     C CABG, VEIN, THREE  1992     C REMV PROSTATE,RETROPUB,RAD,LTD NODES  7/14/2008    Radical retropubic prostatectomy and pelvic lymph node dissection.     HC REMOVAL OF TONSILS,12+ Y/O  1969    Tonsils 10+y.o.     HEMILAMINECTOMY, DISCECTOMY LUMBAR TWO LEVELS, COMBINED Left 12/16/2015    Procedure: COMBINED HEMILAMINECTOMY, DISCECTOMY LUMBAR TWO LEVELS;  Surgeon: Jung Finley MD;  [facility]     INJECT EPIDURAL LUMBAR N/A 10/22/2020    Procedure: INJECTION, SPINE, LUMBAR 2-3 bilateral, EPIDURAL;  Surgeon: Raza Hughes MD;  [facility]     RELEASE CARPAL TUNNEL Right 5/17/2019    Procedure: RELEASE CARPAL TUNNEL-RIGHT;  Surgeon: Yovani Ogden DO;  [facility]     STENT, CORONARY, ALEAH  2002    x5     ZZC NONSPECIFIC PROCEDURE  1987    Bone fusion in neck     Family History   Problem Relation Age  of Onset     Arthritis Father      Heart Disease Father      Hypertension Brother      Cancer Brother         liver     Heart Disease Sister      Heart Disease Brother      Social History     Socioeconomic History     Marital status:      Spouse name: Not on file     Number of children: Not on file     Years of education: Not on file     Highest education level: Not on file   Occupational History     Not on file   Tobacco Use     Smoking status: Former Smoker     Packs/day: 0.50     Years: 2.00     Pack years: 1.00     Types: Cigarettes     Quit date: 1960     Years since quittin.7     Smokeless tobacco: Never Used   Substance and Sexual Activity     Alcohol use: Yes     Comment: socially     Drug use: No     Sexual activity: Never   Other Topics Concern     Parent/sibling w/ CABG, MI or angioplasty before 65F 55M? No   Social History Narrative     Not on file     Social Determinants of Health     Financial Resource Strain:      Difficulty of Paying Living Expenses:    Food Insecurity:      Worried About Running Out of Food in the Last Year:      Ran Out of Food in the Last Year:    Transportation Needs:      Lack of Transportation (Medical):      Lack of Transportation (Non-Medical):    Physical Activity:      Days of Exercise per Week:      Minutes of Exercise per Session:    Stress:      Feeling of Stress :    Social Connections:      Frequency of Communication with Friends and Family:      Frequency of Social Gatherings with Friends and Family:      Attends Tenriism Services:      Active Member of Clubs or Organizations:      Attends Club or Organization Meetings:      Marital Status:    Intimate Partner Violence:      Fear of Current or Ex-Partner:      Emotionally Abused:      Physically Abused:      Sexually Abused:          Current Outpatient Medications   Medication     allopurinol (ZYLOPRIM) 300 MG tablet     ASPIRIN 81 MG OR TABS     chlorthalidone (HYGROTON) 50 MG tablet     fish  oil-omega-3 fatty acids (FISH OIL) 1000 MG capsule     lisinopril (ZESTRIL) 10 MG tablet     multivitamin w/minerals (THERA-VIT-M) tablet     olmesartan (BENICAR) 40 MG tablet     simvastatin (ZOCOR) 40 MG tablet     warfarin ANTICOAGULANT (COUMADIN) 5 MG tablet     nitroGLYcerin (NITROSTAT) 0.4 MG sublingual tablet     No current facility-administered medications for this visit.     Allergies   Allergen Reactions     No Known Drug Allergies          Objective:  /78   Pulse (!) 47   Wt 101.2 kg (223 lb)   BMI 34.93 kg/m      General: Alert and in no distress    Head: Normocephalic, atraumatic  Eyes: no scleral icterus or conjunctival erythema   Skin: no erythema, petechiae, or jaundice  Resp: normal respiratory effort without conversational dyspnea   Psych: normal mood and affect    Gait: Walks with a walker  Musculoskeletal:  -No tenderness over the lumbar spine, paraspinal muscles, or anterior groin  -Right hip ROM is relatively well preserved and painfree    Radiology:  Independent visualization of images performed.      PELVIS AND HIP RIGHT TWO VIEWS 8/26/2020 2:13 PM      HISTORY: Primary osteoarthritis of right hip.     COMPARISON: 7/24/2019.                                                                      IMPRESSION: At least moderate osteoarthritis of the right hip. Joint  space narrowing is more severe than on the prior exam since the  current study is weightbearing. No acute bony or soft tissue  abnormality. AP view partially includes the left hip and proximal  femur and shows moderate to advanced left hip osteoarthritis, similar  to the prior exam.     FILEMON COOK MD    MRI LUMBAR SPINE WITHOUT AND WITH CONTRAST April 30, 2018 1:31 PM      HISTORY: Chronic bilateral low back pain without sciatica.      TECHNIQUE: Multiplanar multisequence MRI of the lumbar spine without  and with 10 mL Gadavist.     COMPARISON: Scan dated 3/3/2015.     FINDINGS: The report is dictated assuming five  lumbar-type vertebral  bodies, and radiographic correlation may be necessary.  The distal  spinal cord and cauda equina appear normal.  The patient is status  post a left renan-laminectomy at the left L3-L4 and left L4-L5 levels.     T12-L1: Degeneration of the disc. Loss of disc space height.  Degenerative change in the facet joints. Central canal still adequate.  Mild foraminal stenosis.     L1-L2: Degeneration of the disc. Loss of disc space height. Right  central disc osteophyte complex causing mass effect on the dural sac.  Degenerative changes in the facet joints. These degenerative changes  are leading to moderate central spinal stenosis. There is mild right  and moderate-severe left foraminal stenosis due to these degenerative  changes.     L2-L3: Degeneration of the disc. Loss of disc space height.  Degenerative change in the facet joints with thickening of the  ligamentum flavum. Central spinal canal is mild-moderately narrowed.  Mild right and moderate left foraminal stenosis.     L3-L4: Degeneration of the disc. Loss of disc space height. Left  laminectomy. Small amount of scar tissue along the left lateral aspect  of the dural sac. Diffuse annular disc bulge. Degenerative change in  the facet joints with thickening of the ligamentum flavum.  Moderate-severe central spinal stenosis. Moderate right and severe  left foraminal stenosis.     L4-L5: Left laminectomy. Diffuse annular disc bulge. Severe  degenerative change in the facet joints, right greater than left.  There appears to be a synovial cyst arising from the right facet  joint. These factors are combining to produce severe central spinal  stenosis at this level. There is also severe bilateral foraminal  stenosis. Small amount of enhancing scar tissue along the left lateral  aspect of the dural sac.     L5-S1: Degeneration of the disc. Loss of disc space height. Diffuse  annular disc bulge. Degenerative change in the facet joints. Severe  bilateral  foraminal stenosis due to loss of disc space height and  bulging disc.     Paraspinous soft tissues: Normal.       Bone marrow: Normal.                                                                       IMPRESSION:    1. Postoperative changes at the left L3-L4 and L4-L5 levels. Small  amount of enhancing scar tissue along the left lateral aspect of the  dural sac.  2. Severe multilevel degenerative change with severe central stenosis  at L3-L4 and L4-L5. Severe foraminal stenosis at multiple levels. See  above report.     CHARLES WILKINSON MD    Assessment:  1. Chronic bilateral low back pain without sciatica    2. Lumbar degenerative disc disease    3. Difficulty walking        Plan:  Discussed the assessment with the patient and developed a plan together:  -Uli would like to improve his walking.  He does feel like it has improved somewhat, but would like it to improve further.  If he stands still for a long period of time, he gets low back pain.  He also has hip OA but this appears to be asymptomatic.  I recommend physical therapy to which Uli is receptive.  I have ordered this.   He also had an CHARLEY last fall, which Uli thinks helped, but he would like to try physical therapy first this time.  Could consider a repeat CHARLEY in the future if desired.    -Follow up as needed if symptoms fail to improve or worsen.  Please call with questions or concerns.      Lawanda Romo MD, [facility] Sports Medicine  [facility]        Again, thank you for allowing me to participate in the care of your patient.        Sincerely,        Ainsley Romo MD

## 2021-09-14 NOTE — TELEPHONE ENCOUNTER
ANTICOAGULATION MANAGEMENT      Uli Champagne due for annual renewal of referral to anticoagulation monitoring. Order pended for your review and signature.      ANTICOAGULATION SUMMARY      Warfarin indication(s)     Atrial fibrillation    Heart valve present?  NO       Current goal range   INR: 2.0-3.0     Goal appropriate for indication? Yes, INR 2-3 appropriate for hx of DVT, PE, hypercoagulable state, Afib, LVAD, or bileaflet AVR without risk factors     Current duration of therapy Indefinite/long term therapy   Time in Therapeutic Range (TTR)  (Goal > 60%) 73.4%       Office visit with referring provider's group within last year yes on 7/23/21       Rui Landers RN

## 2021-09-15 NOTE — PROGRESS NOTES
ANTICOAGULATION MANAGEMENT     Uli Champagne 81 year old adult is on warfarin with therapeutic INR result. (Goal INR 2.0-3.0)    Recent labs: (last 7 days)     09/15/21  1155   INR 2.0*       ASSESSMENT     Source(s): Chart Review I left a detailed voicemail with the orders below. I have also requested a call back if there have been any missed doses, concerns, illness, fever, or if there have been any changes in medications, activity level, or diet        Warfarin doses taken: Warfarin taken as instructed    Diet: No new diet changes identified    New illness, injury, or hospitalization: No    Medication/supplement changes: None noted    Signs or symptoms of bleeding or clotting: No    Previous INR: Subtherapeutic    Additional findings: None     PLAN     Recommended plan for no diet, medication or health factor changes affecting INR     Dosing Instructions: Continue your current warfarin dose with next INR in 4 weeks       Summary  As of 9/15/2021    Full warfarin instructions:  2.5 mg every Tue, Sat; 5 mg all other days   Next INR check:  10/13/2021             Detailed voice message left for Uli with dosing instructions and follow up date.     Contact [phone]  to schedule and with any changes, questions or concerns.     Education provided: Please call back if any changes to your diet, medications or how you've been taking warfarin    Plan made per [facility] anticoagulation protocol    Rui Landers RN  Anticoagulation Clinic  9/15/2021    _______________________________________________________________________     Anticoagulation Episode Summary     Current INR goal:  2.0-3.0   TTR:  69.6 % (1 y)   Target end date:  Indefinite   [facility]  [facility]    Indications    Long-term (current) use of anticoagulants [Z79.01] [Z79.01]  Chronic a-fib (H) [I48.20]  History of CVA (cerebrovascular accident) [Z86.73]           Comments:  5 mg tab, AM dose, appt card           Anticoagulation  Care Providers     Provider Role Specialty Phone number    Isra Carrillo MD Referring Family Medicine [phone]

## 2021-09-23 NOTE — PROGRESS NOTES
[facility]          OUTPATIENT PHYSICAL THERAPY ORTHOPEDIC EVALUATION  PLAN OF TREATMENT FOR OUTPATIENT REHABILITATION  (COMPLETE FOR INITIAL CLAIMS ONLY)  Patient's Last Name, First Name, M.I.  YOB: 1940  HarrymeaganlisyUli  SAMANTHA    Provider s Name:  [facility]   Medical Record No.  [id number]   Start of Care Date:  09/23/21   Onset Date:  04/23/20   Type:     _X__PT   ___OT   ___SLP Medical Diagnosis:  chronic LBP without sciatica     PT Diagnosis:  generalized weakness, gait abnormalities   Visits from SOC:  1      _________________________________________________________________________________  Plan of Treatment/Functional Goals:  balance training, neuromuscular re-education, strengthening, stretching, transfer training, gait training           Goals  Goal Identifier: 1  Goal Description: The patient will be able to walk household distances consistently with SEC in order to improve independence with ambulation.  Target Date: 11/11/21    Goal Identifier: 2  Goal Description: The patient will improve BBS from 20 to 28 to demonstrate significant improvement in balance with functional activities.   Target Date: 11/11/21    Goal Identifier: 3  Goal Description: The patient will be able to walk community distances with SEC in order to improve independence with mobility.  Target Date: 11/11/21    Goal Identifier: 4  Goal Description: The patient will be able to improve BBS score to 45 in order to demonstrate a decreased risk of falls.  Target Date: 11/11/21                                                Therapy Frequency:  2 times/Week  Predicted Duration of Therapy Intervention:  8 weeks    Gloria Holt, PT                 I CERTIFY THE NEED FOR THESE SERVICES FURNISHED UNDER        THIS PLAN OF TREATMENT AND WHILE UNDER MY CARE     (Physician co-signature of this document indicates review and certification of the therapy plan).                        Certification Date From:  09/23/21   Certification Date To:  11/11/21    Referring Provider:  Ainsley Romo MD    Initial Assessment        See Epic Evaluation Start of Care Date: 09/23/21

## 2021-09-23 NOTE — PROGRESS NOTES
"   09/23/21 1302   General Information   Type of Visit Initial OP Ortho PT Evaluation   Start of Care Date 09/23/21   Referring Physician Ainsley Romo MD   Patient/Family Goals Statement improve walking   Orders Evaluate and Treat   Date of Order 09/10/21   Certification Required? Yes   Medical Diagnosis chronic LBP without sciatica   Surgical/Medical history reviewed Yes   Precautions/Limitations no known precautions/limitations       Present No   Presentation and Etiology   Pertinent history of current problem (include personal factors and/or comorbidities that impact the POC) Patient denies significant pain as order suggests, denies pain to LB or hips.  Patient believes that difficulty walking is due to LB due to seeing xrays with significant deficit to lower lumbar spine per patient. Imaging shows significant central stenosis and (B) hip OA. Pt denies significant or consistent back or hip pain.  Unable to find recent lumbar xray per patient report.  Pt reports CVA in the 80s with some mild loss of balance; some loss of sensation in (L) foot.  Pt uses walker with household and community distance ambulation; in the home patient will walk with walls.  Pt reports using the walker has only happened in the last year.  Pt reports using walker due to concern with balance.  Patient does not feel safe walking without an assistive device. Pt reports when he feels like he is going to fall; he feels like \"everything is swirling around\" with quick motions or stumbles.    Impairments D. Decreased ROM;F. Decreased strength and endurance;G. Impaired balance;H. Impaired gait   Functional Limitations perform activities of daily living;perform required work activities;perform desired leisure / sports activities   Onset date of current episode/exacerbation 04/23/20   Chronicity Chronic   Prior Level of Function   Prior Level of Function-Mobility reports starting to use 3WW in last year; uses walker or SEC " with household mobility and walker with all community mobility   Prior Level of Function-ADLs no assistance required   Current Level of Function   Patient role/employment history F. Retired   Living environment House/townhome   Home/community accessibility no concerns   Fall Risk Screen   Fall screen completed by PT   Have you fallen 2 or more times in the past year? No   Have you fallen and had an injury in the past year? No   Timed Up and Go score (seconds) 15.9   Is patient a fall risk? Yes   Fall screen comments will be treated with this POC; reason for referral   Abuse Screen (yes response referral indicated)   Feels Unsafe at Home or Work/School no   Feels Threatened by Someone no   Does Anyone Try to Keep You From Having Contact with Others or Doing Things Outside Your Home? no   Physical Signs of Abuse Present no   System Outcome Measures   Outcome Measures   (Lewis Balance Scale- 20 indicating increased risk of falls)   Planned Therapy Interventions   Planned Therapy Interventions balance training;neuromuscular re-education;strengthening;stretching;transfer training;gait training   Clinical Impression   Criteria for Skilled Therapeutic Interventions Met yes, treatment indicated   PT Diagnosis generalized weakness, gait abnormalities   Influenced by the following impairments difficulty with extended walking, walking in community, standing, transfers, confidence with mobility   Functional limitations due to impairments decreased balance, proprioception deficits   Clinical Presentation Stable/Uncomplicated   Clinical Presentation Rationale The patient is a 82 yo male who was referred to outpatient physical therapy for chronic LBP, patient denies pain in (B) hips or LB.  Patient has positive imaging for significant lumbar central stenosis and (B) hip OA, patient reports history of stroke >30yrs ago with mild balance deficit following.  The patient denies recent falls but many LOB; patient reports starting to use  3WW in the last year with household and community distance mobility.  Using TUG and BBS the patient demonstrates to be at a high risk of falls. Skilled physical therapy is required in order to improve safety and confidence with community and household mobility.   Clinical Decision Making (Complexity) Low complexity   Therapy Frequency 2 times/Week   Predicted Duration of Therapy Intervention (days/wks) 8 weeks   Risk & Benefits of therapy have been explained Yes   Patient, Family & other staff in agreement with plan of care Yes   Education Assessment   Preferred Learning Style Demonstration;Listening   Barriers to Learning Hearing   ORTHO GOALS   PT Ortho Eval Goals 1;2;3;4   Ortho Goal 1   Goal Identifier 1   Goal Description The patient will be able to walk household distances consistently with SEC in order to improve independence with ambulation.   Target Date 11/11/21   Ortho Goal 2   Goal Identifier 2   Goal Description The patient will improve BBS from 20 to 28 to demonstrate significant improvement in balance with functional activities.    Target Date 11/11/21   Ortho Goal 3   Goal Identifier 3   Goal Description The patient will be able to walk community distances with SEC in order to improve independence with mobility.   Target Date 11/11/21   Ortho Goal 4   Goal Identifier 4   Goal Description The patient will be able to improve BBS score to 45 in order to demonstrate a decreased risk of falls.   Target Date 11/11/21   Total Evaluation Time   PT Katelynn, Low Complexity Minutes (98039) 35   Therapy Certification   Certification date from 09/23/21   Certification date to 11/11/21   Medical Diagnosis chronic LBP without sciatica

## 2021-10-21 NOTE — PROGRESS NOTES
Outpatient Physical Therapy Discharge Note     Patient: Uli Champagne  : 1940    Beginning/End Dates of Reporting Period:  21 to 11/15/21     Referring Provider: Ainsley Romo MD    Therapy Diagnosis: chronic LBP with sciatica     Client Self Report:  At time of last session patient was feeling more comfortable with curb ambulation, stair ambulation and walking with SEC for household distances.    Objective Measurements:       Goals:  Goal Identifier 1   Goal Description The patient will be able to walk household distances consistently with SEC in order to improve independence with ambulation.   Target Date 21   Date Met   not met; patient    Progress (detail required for progress note):       Goal Identifier 2   Goal Description The patient will improve BBS from 20 to 28 to demonstrate significant improvement in balance with functional activities.    Target Date 21   Date Met   not met; patient    Progress (detail required for progress note):       Goal Identifier 3   Goal Description The patient will be able to walk community distances with SEC in order to improve independence with mobility.   Target Date 21   Date Met   not met; patient    Progress (detail required for progress note):       Goal Identifier 4   Goal Description The patient will be able to improve BBS score to 45 in order to demonstrate a decreased risk of falls.   Target Date 21   Date Met   not met; patient    Progress (detail required for progress note):           Plan:  Discharge from therapy.    Discharge:    Reason for Discharge: Patient is     Equipment Issued: none    Discharge Plan: not applicable

## 2021-10-23 ENCOUNTER — HEALTH MAINTENANCE LETTER (OUTPATIENT)
Age: 81
End: 2021-10-23

## 2025-05-15 NOTE — PROGRESS NOTES
ANTICOAGULATION FOLLOW-UP CLINIC VISIT    Patient Name:  Uli Champagne  Date:  3/9/2020  Contact Type:  Face to Face    SUBJECTIVE:  Patient Findings     Positives:   Change in diet/appetite (Had a big salad on Saturday)             OBJECTIVE    INR Protime   Date Value Ref Range Status   2020 1.8 (A) 0.86 - 1.14 Final       ASSESSMENT / PLAN  INR assessment SUB    Recheck INR In: 4 WEEKS    INR Location Clinic      Anticoagulation Summary  As of 3/9/2020    INR goal:   2.0-3.0   TTR:   73.4 % (1 y)   INR used for dosin.8! (3/9/2020)   Warfarin maintenance plan:   2.5 mg (5 mg x 0.5) every Tue, Sat; 5 mg (5 mg x 1) all other days   Full warfarin instructions:   3/10: 5 mg; Otherwise 2.5 mg every Tue, Sat; 5 mg all other days   Weekly warfarin total:   30 mg   Plan last modified:   Mahsa Julien RN (3/22/2019)   Next INR check:   2020   Priority:   High   Target end date:       Indications    Long-term (current) use of anticoagulants [Z79.01] [Z79.01]  Chronic a-fib [I48.20]             Anticoagulation Episode Summary     INR check location:       Preferred lab:       Send INR reminders to:   [facility] [place]    Comments:   5 mg tab, AM dose, appt card        Anticoagulation Care Providers     Provider Role Specialty Phone number    Isra Carrillo MD [facility] [phone]            See the Encounter Report to view Anticoagulation Flowsheet and Dosing Calendar (Go to Encounters tab in chart review, and find the Anticoagulation Therapy Visit)    Dosage adjustment made based on physician directed care plan.    Rui Landers RN                  Writer left message with results, advise patient to return our call with any questions or concerns.    CAD (coronary artery disease) Foot osteomyelitis, right HLD (hyperlipidemia) HLD (hyperlipidemia) Foot osteomyelitis, right Foot osteomyelitis, right HLD (hyperlipidemia) Foot osteomyelitis, right Foot osteomyelitis, right HLD (hyperlipidemia) HLD (hyperlipidemia) HLD (hyperlipidemia) Foot osteomyelitis, right Foot osteomyelitis, right CAD (coronary artery disease) Foot osteomyelitis, right

## (undated) DEVICE — NDL ECLIPSE 18GA 1.5"

## (undated) DEVICE — PACK HAND WRIST FOREARM CUSTOM

## (undated) DEVICE — GLOVE PROTEXIS W/NEU-THERA 7.5  2D73TE75

## (undated) DEVICE — TRAY PROCEDURE SUPPORT PAIN MANAGEMENT 332114

## (undated) DEVICE — DRSG BANDAID 1X3" FABRIC

## (undated) DEVICE — DRSG GAUZE 4X4" TRAY

## (undated) DEVICE — DRAPE STERI TOWEL SM 1000

## (undated) DEVICE — GLOVE ESTEEM BLUE W/NEU-THERA 8.0  2D73PB80

## (undated) DEVICE — BASIN SET MINOR DISP

## (undated) DEVICE — SYR 05ML LL W/O NDL

## (undated) DEVICE — SOL NACL 0.9% IRRIG 1000ML BOTTLE 07138-09

## (undated) DEVICE — GLOVE PROTEXIS W/NEU-THERA 7.0  2D73TE70

## (undated) DEVICE — SYR 10ML LL W/O NDL

## (undated) DEVICE — SU ETHILON 4-0 PS-2 18" 1667G

## (undated) DEVICE — PREP CHLORAPREP 26ML TINTED ORANGE  260815

## (undated) DEVICE — TUBING IV EXTENSION SET 34"

## (undated) DEVICE — GLOVE ESTEEM BLUE W/NEU-THERA 7.5  2D73PB75

## (undated) RX ORDER — BUPIVACAINE HYDROCHLORIDE 5 MG/ML
INJECTION, SOLUTION EPIDURAL; INTRACAUDAL
Status: DISPENSED
Start: 2019-05-17